# Patient Record
Sex: FEMALE | Race: WHITE | NOT HISPANIC OR LATINO | Employment: OTHER | URBAN - METROPOLITAN AREA
[De-identification: names, ages, dates, MRNs, and addresses within clinical notes are randomized per-mention and may not be internally consistent; named-entity substitution may affect disease eponyms.]

---

## 2017-02-19 ENCOUNTER — HOSPITAL ENCOUNTER (EMERGENCY)
Facility: HOSPITAL | Age: 82
Discharge: HOME | End: 2017-02-19
Attending: EMERGENCY MEDICINE | Admitting: EMERGENCY MEDICINE
Payer: COMMERCIAL

## 2017-02-19 VITALS
TEMPERATURE: 98.6 F | DIASTOLIC BLOOD PRESSURE: 93 MMHG | OXYGEN SATURATION: 95 % | WEIGHT: 182.32 LBS | HEART RATE: 106 BPM | SYSTOLIC BLOOD PRESSURE: 144 MMHG | RESPIRATION RATE: 21 BRPM

## 2017-02-19 DIAGNOSIS — R60.0 BILATERAL LOWER EXTREMITY EDEMA: Primary | ICD-10-CM

## 2017-02-19 LAB
ANION GAP SERPL CALCULATED.3IONS-SCNC: 9 MMOL/L (ref 4–13)
BUN SERPL-MCNC: 25 MG/DL (ref 5–25)
CALCIUM SERPL-MCNC: 8.5 MG/DL (ref 8.3–10.1)
CHLORIDE SERPL-SCNC: 105 MMOL/L (ref 100–108)
CO2 SERPL-SCNC: 23 MMOL/L (ref 21–32)
CREAT SERPL-MCNC: 1.55 MG/DL (ref 0.6–1.3)
GFR SERPL CREATININE-BSD FRML MDRD: 31.4 ML/MIN/1.73SQ M
GLUCOSE SERPL-MCNC: 107 MG/DL (ref 65–140)
POTASSIUM SERPL-SCNC: 4.2 MMOL/L (ref 3.5–5.3)
SODIUM SERPL-SCNC: 137 MMOL/L (ref 136–145)

## 2017-02-19 PROCEDURE — 80048 BASIC METABOLIC PNL TOTAL CA: CPT | Performed by: EMERGENCY MEDICINE

## 2017-02-19 PROCEDURE — 99283 EMERGENCY DEPT VISIT LOW MDM: CPT

## 2017-02-19 PROCEDURE — 36415 COLL VENOUS BLD VENIPUNCTURE: CPT | Performed by: EMERGENCY MEDICINE

## 2017-02-19 RX ORDER — DIPHENOXYLATE HYDROCHLORIDE AND ATROPINE SULFATE 2.5; .025 MG/1; MG/1
1 TABLET ORAL DAILY
COMMUNITY
End: 2018-10-08 | Stop reason: ALTCHOICE

## 2017-02-19 RX ORDER — NEBIVOLOL 5 MG/1
5 TABLET ORAL DAILY
COMMUNITY
End: 2017-04-06 | Stop reason: HOSPADM

## 2017-02-19 RX ORDER — LEVOTHYROXINE SODIUM 0.03 MG/1
25 TABLET ORAL DAILY
COMMUNITY
End: 2018-03-05 | Stop reason: SDUPTHER

## 2017-02-19 RX ORDER — AMLODIPINE BESYLATE 5 MG/1
5 TABLET ORAL DAILY
COMMUNITY
End: 2017-03-28

## 2017-02-19 RX ORDER — ROSUVASTATIN CALCIUM 5 MG/1
5 TABLET, COATED ORAL DAILY
COMMUNITY
End: 2019-03-18 | Stop reason: SDUPTHER

## 2017-02-19 RX ORDER — ALPRAZOLAM 0.25 MG/1
0.25 TABLET ORAL 4 TIMES DAILY
COMMUNITY
End: 2018-05-02 | Stop reason: SDUPTHER

## 2017-02-21 ENCOUNTER — ALLSCRIPTS OFFICE VISIT (OUTPATIENT)
Dept: OTHER | Facility: OTHER | Age: 82
End: 2017-02-21

## 2017-02-22 DIAGNOSIS — R60.9 EDEMA: ICD-10-CM

## 2017-03-01 ENCOUNTER — ALLSCRIPTS OFFICE VISIT (OUTPATIENT)
Dept: OTHER | Facility: OTHER | Age: 82
End: 2017-03-01

## 2017-03-22 ENCOUNTER — APPOINTMENT (EMERGENCY)
Dept: RADIOLOGY | Facility: HOSPITAL | Age: 82
End: 2017-03-22
Payer: COMMERCIAL

## 2017-03-22 ENCOUNTER — GENERIC CONVERSION - ENCOUNTER (OUTPATIENT)
Dept: OTHER | Facility: OTHER | Age: 82
End: 2017-03-22

## 2017-03-22 ENCOUNTER — HOSPITAL ENCOUNTER (EMERGENCY)
Facility: HOSPITAL | Age: 82
Discharge: HOME | End: 2017-03-22
Attending: EMERGENCY MEDICINE | Admitting: EMERGENCY MEDICINE
Payer: COMMERCIAL

## 2017-03-22 VITALS
HEART RATE: 80 BPM | SYSTOLIC BLOOD PRESSURE: 127 MMHG | WEIGHT: 179.2 LBS | DIASTOLIC BLOOD PRESSURE: 78 MMHG | TEMPERATURE: 97.8 F | OXYGEN SATURATION: 98 % | RESPIRATION RATE: 18 BRPM

## 2017-03-22 DIAGNOSIS — T78.40XA ALLERGIC REACTION: Primary | ICD-10-CM

## 2017-03-22 LAB
ALBUMIN SERPL BCP-MCNC: 3.7 G/DL (ref 3.5–5)
ALP SERPL-CCNC: 75 U/L (ref 46–116)
ALT SERPL W P-5'-P-CCNC: 89 U/L (ref 12–78)
ANION GAP SERPL CALCULATED.3IONS-SCNC: 10 MMOL/L (ref 4–13)
APTT PPP: 32 SECONDS (ref 24–36)
AST SERPL W P-5'-P-CCNC: 41 U/L (ref 5–45)
ATRIAL RATE: 120 BPM
BASOPHILS # BLD AUTO: 0.03 THOUSANDS/ΜL (ref 0–0.1)
BASOPHILS NFR BLD AUTO: 0 % (ref 0–1)
BILIRUB SERPL-MCNC: 1.5 MG/DL (ref 0.2–1)
BUN SERPL-MCNC: 26 MG/DL (ref 5–25)
CALCIUM SERPL-MCNC: 9 MG/DL (ref 8.3–10.1)
CHLORIDE SERPL-SCNC: 104 MMOL/L (ref 100–108)
CO2 SERPL-SCNC: 22 MMOL/L (ref 21–32)
CREAT SERPL-MCNC: 1.89 MG/DL (ref 0.6–1.3)
EOSINOPHIL # BLD AUTO: 0.18 THOUSAND/ΜL (ref 0–0.61)
EOSINOPHIL NFR BLD AUTO: 2 % (ref 0–6)
ERYTHROCYTE [DISTWIDTH] IN BLOOD BY AUTOMATED COUNT: 13.9 % (ref 11.6–15.1)
GFR SERPL CREATININE-BSD FRML MDRD: 25 ML/MIN/1.73SQ M
GLUCOSE SERPL-MCNC: 124 MG/DL (ref 65–140)
HCT VFR BLD AUTO: 46.9 % (ref 34.8–46.1)
HGB BLD-MCNC: 16.1 G/DL (ref 11.5–15.4)
INR PPP: 1.28 (ref 0.86–1.16)
LYMPHOCYTES # BLD AUTO: 1.36 THOUSANDS/ΜL (ref 0.6–4.47)
LYMPHOCYTES NFR BLD AUTO: 16 % (ref 14–44)
MAGNESIUM SERPL-MCNC: 2 MG/DL (ref 1.6–2.6)
MCH RBC QN AUTO: 32.5 PG (ref 26.8–34.3)
MCHC RBC AUTO-ENTMCNC: 34.3 G/DL (ref 31.4–37.4)
MCV RBC AUTO: 95 FL (ref 82–98)
MONOCYTES # BLD AUTO: 0.64 THOUSAND/ΜL (ref 0.17–1.22)
MONOCYTES NFR BLD AUTO: 7 % (ref 4–12)
NEUTROPHILS # BLD AUTO: 6.58 THOUSANDS/ΜL (ref 1.85–7.62)
NEUTS SEG NFR BLD AUTO: 75 % (ref 43–75)
NT-PROBNP SERPL-MCNC: 7242 PG/ML
PLATELET # BLD AUTO: 229 THOUSANDS/UL (ref 149–390)
PMV BLD AUTO: 10.6 FL (ref 8.9–12.7)
POTASSIUM SERPL-SCNC: 4.5 MMOL/L (ref 3.5–5.3)
PROT SERPL-MCNC: 6.9 G/DL (ref 6.4–8.2)
PROTHROMBIN TIME: 15.7 SECONDS (ref 12–14.3)
QRS AXIS: 30 DEGREES
QRSD INTERVAL: 86 MS
QT INTERVAL: 284 MS
QTC INTERVAL: 399 MS
RBC # BLD AUTO: 4.95 MILLION/UL (ref 3.81–5.12)
SODIUM SERPL-SCNC: 136 MMOL/L (ref 136–145)
T WAVE AXIS: 238 DEGREES
TROPONIN I SERPL-MCNC: 0.02 NG/ML
TSH SERPL DL<=0.05 MIU/L-ACNC: 3.49 UIU/ML (ref 0.36–3.74)
VENTRICULAR RATE: 119 BPM
WBC # BLD AUTO: 8.79 THOUSAND/UL (ref 4.31–10.16)

## 2017-03-22 PROCEDURE — 85730 THROMBOPLASTIN TIME PARTIAL: CPT | Performed by: EMERGENCY MEDICINE

## 2017-03-22 PROCEDURE — 71010 HB CHEST X-RAY 1 VIEW FRONTAL (PORTABLE): CPT

## 2017-03-22 PROCEDURE — 93005 ELECTROCARDIOGRAM TRACING: CPT

## 2017-03-22 PROCEDURE — 99284 EMERGENCY DEPT VISIT MOD MDM: CPT

## 2017-03-22 PROCEDURE — 96372 THER/PROPH/DIAG INJ SC/IM: CPT

## 2017-03-22 PROCEDURE — 96375 TX/PRO/DX INJ NEW DRUG ADDON: CPT

## 2017-03-22 PROCEDURE — 36415 COLL VENOUS BLD VENIPUNCTURE: CPT | Performed by: EMERGENCY MEDICINE

## 2017-03-22 PROCEDURE — 83880 ASSAY OF NATRIURETIC PEPTIDE: CPT | Performed by: EMERGENCY MEDICINE

## 2017-03-22 PROCEDURE — 84484 ASSAY OF TROPONIN QUANT: CPT | Performed by: EMERGENCY MEDICINE

## 2017-03-22 PROCEDURE — 85025 COMPLETE CBC W/AUTO DIFF WBC: CPT | Performed by: EMERGENCY MEDICINE

## 2017-03-22 PROCEDURE — 84443 ASSAY THYROID STIM HORMONE: CPT | Performed by: EMERGENCY MEDICINE

## 2017-03-22 PROCEDURE — 85610 PROTHROMBIN TIME: CPT | Performed by: EMERGENCY MEDICINE

## 2017-03-22 PROCEDURE — 96374 THER/PROPH/DIAG INJ IV PUSH: CPT

## 2017-03-22 PROCEDURE — 80053 COMPREHEN METABOLIC PANEL: CPT | Performed by: EMERGENCY MEDICINE

## 2017-03-22 PROCEDURE — 83735 ASSAY OF MAGNESIUM: CPT | Performed by: EMERGENCY MEDICINE

## 2017-03-22 PROCEDURE — 93005 ELECTROCARDIOGRAM TRACING: CPT | Performed by: EMERGENCY MEDICINE

## 2017-03-22 RX ORDER — PREDNISONE 10 MG/1
10 TABLET ORAL DAILY
Qty: 63 TABLET | Refills: 0 | Status: SHIPPED | OUTPATIENT
Start: 2017-03-22 | End: 2017-03-28

## 2017-03-22 RX ORDER — DIPHENHYDRAMINE HYDROCHLORIDE 50 MG/ML
12.5 INJECTION INTRAMUSCULAR; INTRAVENOUS ONCE
Status: COMPLETED | OUTPATIENT
Start: 2017-03-22 | End: 2017-03-22

## 2017-03-22 RX ORDER — ZOLPIDEM TARTRATE 12.5 MG/1
12.5 TABLET, FILM COATED, EXTENDED RELEASE ORAL
COMMUNITY
End: 2018-05-12 | Stop reason: SDUPTHER

## 2017-03-22 RX ORDER — FAMOTIDINE 20 MG/1
20 TABLET, FILM COATED ORAL 2 TIMES DAILY
Qty: 60 TABLET | Refills: 0 | Status: SHIPPED | OUTPATIENT
Start: 2017-03-22 | End: 2017-03-28

## 2017-03-22 RX ORDER — METHYLPREDNISOLONE SODIUM SUCCINATE 125 MG/2ML
125 INJECTION, POWDER, LYOPHILIZED, FOR SOLUTION INTRAMUSCULAR; INTRAVENOUS ONCE
Status: COMPLETED | OUTPATIENT
Start: 2017-03-22 | End: 2017-03-22

## 2017-03-22 RX ORDER — SPIRONOLACTONE 25 MG/1
25 TABLET ORAL DAILY
COMMUNITY
End: 2017-04-06 | Stop reason: HOSPADM

## 2017-03-22 RX ORDER — DILTIAZEM HYDROCHLORIDE 5 MG/ML
15 INJECTION INTRAVENOUS ONCE
Status: COMPLETED | OUTPATIENT
Start: 2017-03-22 | End: 2017-03-22

## 2017-03-22 RX ADMIN — METHYLPREDNISOLONE SODIUM SUCCINATE 125 MG: 125 INJECTION, POWDER, FOR SOLUTION INTRAMUSCULAR; INTRAVENOUS at 13:22

## 2017-03-22 RX ADMIN — DILTIAZEM HYDROCHLORIDE 15 MG: 5 INJECTION INTRAVENOUS at 13:23

## 2017-03-22 RX ADMIN — FAMOTIDINE 20 MG: 10 INJECTION, SOLUTION INTRAVENOUS at 13:19

## 2017-03-22 RX ADMIN — DIPHENHYDRAMINE HYDROCHLORIDE 12.5 MG: 50 INJECTION, SOLUTION INTRAMUSCULAR; INTRAVENOUS at 13:15

## 2017-03-28 ENCOUNTER — GENERIC CONVERSION - ENCOUNTER (OUTPATIENT)
Dept: OTHER | Facility: OTHER | Age: 82
End: 2017-03-28

## 2017-03-28 ENCOUNTER — HOSPITAL ENCOUNTER (INPATIENT)
Facility: HOSPITAL | Age: 82
LOS: 9 days | Discharge: HOME HEALTH | DRG: 314 | End: 2017-04-06
Attending: EMERGENCY MEDICINE | Admitting: INTERNAL MEDICINE
Payer: COMMERCIAL

## 2017-03-28 ENCOUNTER — APPOINTMENT (EMERGENCY)
Dept: RADIOLOGY | Facility: HOSPITAL | Age: 82
DRG: 314 | End: 2017-03-28
Payer: COMMERCIAL

## 2017-03-28 DIAGNOSIS — R22.0 TONGUE SWELLING: ICD-10-CM

## 2017-03-28 DIAGNOSIS — I48.91 ATRIAL FIBRILLATION WITH RVR (HCC): Primary | ICD-10-CM

## 2017-03-28 PROBLEM — E03.9 HYPOTHYROIDISM: Status: ACTIVE | Noted: 2017-03-28

## 2017-03-28 PROBLEM — K13.79 ORAL PAIN OF UNKNOWN ETIOLOGY: Status: ACTIVE | Noted: 2017-03-28

## 2017-03-28 PROBLEM — F41.9 ANXIETY: Chronic | Status: ACTIVE | Noted: 2017-03-28

## 2017-03-28 PROBLEM — E03.9 HYPOTHYROIDISM: Chronic | Status: ACTIVE | Noted: 2017-03-28

## 2017-03-28 PROBLEM — I48.20 CHRONIC ATRIAL FIBRILLATION (HCC): Chronic | Status: ACTIVE | Noted: 2017-03-28

## 2017-03-28 LAB
ALBUMIN SERPL BCP-MCNC: 3.4 G/DL (ref 3.5–5)
ALP SERPL-CCNC: 82 U/L (ref 46–116)
ALT SERPL W P-5'-P-CCNC: 117 U/L (ref 12–78)
ANION GAP SERPL CALCULATED.3IONS-SCNC: 10 MMOL/L (ref 4–13)
AST SERPL W P-5'-P-CCNC: 54 U/L (ref 5–45)
ATRIAL RATE: 170 BPM
BASOPHILS # BLD AUTO: 0.03 THOUSANDS/ΜL (ref 0–0.1)
BASOPHILS NFR BLD AUTO: 0 % (ref 0–1)
BILIRUB SERPL-MCNC: 1.2 MG/DL (ref 0.2–1)
BUN SERPL-MCNC: 30 MG/DL (ref 5–25)
CALCIUM SERPL-MCNC: 8.7 MG/DL (ref 8.3–10.1)
CHLORIDE SERPL-SCNC: 104 MMOL/L (ref 100–108)
CO2 SERPL-SCNC: 22 MMOL/L (ref 21–32)
CREAT SERPL-MCNC: 1.68 MG/DL (ref 0.6–1.3)
EOSINOPHIL # BLD AUTO: 0.25 THOUSAND/ΜL (ref 0–0.61)
EOSINOPHIL NFR BLD AUTO: 3 % (ref 0–6)
ERYTHROCYTE [DISTWIDTH] IN BLOOD BY AUTOMATED COUNT: 14.1 % (ref 11.6–15.1)
GFR SERPL CREATININE-BSD FRML MDRD: 28.6 ML/MIN/1.73SQ M
GLUCOSE SERPL-MCNC: 103 MG/DL (ref 65–140)
HCT VFR BLD AUTO: 47.4 % (ref 34.8–46.1)
HGB BLD-MCNC: 15.9 G/DL (ref 11.5–15.4)
LYMPHOCYTES # BLD AUTO: 1.38 THOUSANDS/ΜL (ref 0.6–4.47)
LYMPHOCYTES NFR BLD AUTO: 14 % (ref 14–44)
MCH RBC QN AUTO: 32.2 PG (ref 26.8–34.3)
MCHC RBC AUTO-ENTMCNC: 33.5 G/DL (ref 31.4–37.4)
MCV RBC AUTO: 96 FL (ref 82–98)
MONOCYTES # BLD AUTO: 0.88 THOUSAND/ΜL (ref 0.17–1.22)
MONOCYTES NFR BLD AUTO: 9 % (ref 4–12)
NEUTROPHILS # BLD AUTO: 7.56 THOUSANDS/ΜL (ref 1.85–7.62)
NEUTS SEG NFR BLD AUTO: 74 % (ref 43–75)
PLATELET # BLD AUTO: 222 THOUSANDS/UL (ref 149–390)
PLATELET # BLD AUTO: 240 THOUSANDS/UL (ref 149–390)
PMV BLD AUTO: 10.6 FL (ref 8.9–12.7)
PMV BLD AUTO: 10.8 FL (ref 8.9–12.7)
POTASSIUM SERPL-SCNC: 4.3 MMOL/L (ref 3.5–5.3)
PROT SERPL-MCNC: 6.4 G/DL (ref 6.4–8.2)
QRS AXIS: 14 DEGREES
QRSD INTERVAL: 84 MS
QT INTERVAL: 294 MS
QTC INTERVAL: 453 MS
RBC # BLD AUTO: 4.94 MILLION/UL (ref 3.81–5.12)
SODIUM SERPL-SCNC: 136 MMOL/L (ref 136–145)
T WAVE AXIS: 224 DEGREES
TROPONIN I SERPL-MCNC: 0.03 NG/ML
VENTRICULAR RATE: 143 BPM
WBC # BLD AUTO: 10.1 THOUSAND/UL (ref 4.31–10.16)

## 2017-03-28 PROCEDURE — 84484 ASSAY OF TROPONIN QUANT: CPT | Performed by: EMERGENCY MEDICINE

## 2017-03-28 PROCEDURE — 80053 COMPREHEN METABOLIC PANEL: CPT | Performed by: EMERGENCY MEDICINE

## 2017-03-28 PROCEDURE — 96365 THER/PROPH/DIAG IV INF INIT: CPT

## 2017-03-28 PROCEDURE — 96361 HYDRATE IV INFUSION ADD-ON: CPT

## 2017-03-28 PROCEDURE — 36415 COLL VENOUS BLD VENIPUNCTURE: CPT | Performed by: EMERGENCY MEDICINE

## 2017-03-28 PROCEDURE — 99284 EMERGENCY DEPT VISIT MOD MDM: CPT

## 2017-03-28 PROCEDURE — 96376 TX/PRO/DX INJ SAME DRUG ADON: CPT

## 2017-03-28 PROCEDURE — 85025 COMPLETE CBC W/AUTO DIFF WBC: CPT | Performed by: EMERGENCY MEDICINE

## 2017-03-28 PROCEDURE — 85049 AUTOMATED PLATELET COUNT: CPT | Performed by: INTERNAL MEDICINE

## 2017-03-28 PROCEDURE — 93005 ELECTROCARDIOGRAM TRACING: CPT | Performed by: EMERGENCY MEDICINE

## 2017-03-28 PROCEDURE — 71020 HB CHEST X-RAY 2VW FRONTAL&LATL: CPT

## 2017-03-28 RX ORDER — ACETAMINOPHEN 325 MG/1
650 TABLET ORAL EVERY 6 HOURS PRN
Status: DISCONTINUED | OUTPATIENT
Start: 2017-03-28 | End: 2017-04-06 | Stop reason: HOSPADM

## 2017-03-28 RX ORDER — CALCIUM CARBONATE 200(500)MG
500 TABLET,CHEWABLE ORAL DAILY PRN
Status: DISCONTINUED | OUTPATIENT
Start: 2017-03-28 | End: 2017-04-06 | Stop reason: HOSPADM

## 2017-03-28 RX ORDER — ALPRAZOLAM 0.25 MG/1
0.25 TABLET ORAL 4 TIMES DAILY PRN
Status: DISCONTINUED | OUTPATIENT
Start: 2017-03-28 | End: 2017-04-03

## 2017-03-28 RX ORDER — LEVOTHYROXINE SODIUM 0.03 MG/1
25 TABLET ORAL
Status: DISCONTINUED | OUTPATIENT
Start: 2017-03-29 | End: 2017-04-06 | Stop reason: HOSPADM

## 2017-03-28 RX ORDER — DILTIAZEM HYDROCHLORIDE 5 MG/ML
15 INJECTION INTRAVENOUS ONCE
Status: COMPLETED | OUTPATIENT
Start: 2017-03-28 | End: 2017-03-28

## 2017-03-28 RX ORDER — SPIRONOLACTONE 25 MG/1
25 TABLET ORAL DAILY
Status: DISCONTINUED | OUTPATIENT
Start: 2017-03-28 | End: 2017-03-29

## 2017-03-28 RX ORDER — ALPRAZOLAM 0.5 MG/1
TABLET ORAL
Status: COMPLETED
Start: 2017-03-28 | End: 2017-03-28

## 2017-03-28 RX ORDER — ALPRAZOLAM 0.5 MG/1
0.25 TABLET ORAL ONCE
Status: COMPLETED | OUTPATIENT
Start: 2017-03-28 | End: 2017-03-28

## 2017-03-28 RX ORDER — ZOLPIDEM TARTRATE 5 MG/1
10 TABLET ORAL
Status: DISCONTINUED | OUTPATIENT
Start: 2017-03-28 | End: 2017-04-06 | Stop reason: HOSPADM

## 2017-03-28 RX ADMIN — ZOLPIDEM TARTRATE 10 MG: 5 TABLET, FILM COATED ORAL at 23:00

## 2017-03-28 RX ADMIN — ENOXAPARIN SODIUM 40 MG: 40 INJECTION SUBCUTANEOUS at 16:57

## 2017-03-28 RX ADMIN — SODIUM CHLORIDE 1000 ML: 0.9 INJECTION, SOLUTION INTRAVENOUS at 11:02

## 2017-03-28 RX ADMIN — ANTACID TABLETS 500 MG: 500 TABLET, CHEWABLE ORAL at 21:33

## 2017-03-28 RX ADMIN — DILTIAZEM HYDROCHLORIDE 15 MG: 5 INJECTION INTRAVENOUS at 11:40

## 2017-03-28 RX ADMIN — ALPRAZOLAM 0.25 MG: 0.5 TABLET ORAL at 14:51

## 2017-03-28 RX ADMIN — DILTIAZEM HYDROCHLORIDE 5 MG/HR: 5 INJECTION INTRAVENOUS at 11:42

## 2017-03-28 RX ADMIN — Medication 1 TABLET: at 16:56

## 2017-03-28 RX ADMIN — LIDOCAINE HYDROCHLORIDE 15 ML: 20 SOLUTION ORAL; TOPICAL at 18:54

## 2017-03-29 ENCOUNTER — APPOINTMENT (INPATIENT)
Dept: PHYSICAL THERAPY | Facility: HOSPITAL | Age: 82
DRG: 314 | End: 2017-03-29
Payer: COMMERCIAL

## 2017-03-29 LAB
ANION GAP SERPL CALCULATED.3IONS-SCNC: 13 MMOL/L (ref 4–13)
BUN SERPL-MCNC: 25 MG/DL (ref 5–25)
CALCIUM SERPL-MCNC: 8.9 MG/DL (ref 8.3–10.1)
CHLORIDE SERPL-SCNC: 105 MMOL/L (ref 100–108)
CO2 SERPL-SCNC: 19 MMOL/L (ref 21–32)
CREAT SERPL-MCNC: 1.68 MG/DL (ref 0.6–1.3)
GFR SERPL CREATININE-BSD FRML MDRD: 28.6 ML/MIN/1.73SQ M
GLUCOSE SERPL-MCNC: 118 MG/DL (ref 65–140)
NT-PROBNP SERPL-MCNC: 4764 PG/ML
POTASSIUM SERPL-SCNC: 4.4 MMOL/L (ref 3.5–5.3)
SODIUM SERPL-SCNC: 137 MMOL/L (ref 136–145)
TSH SERPL DL<=0.05 MIU/L-ACNC: 3.14 UIU/ML (ref 0.36–3.74)
VIT B12 SERPL-MCNC: 971 PG/ML (ref 100–900)

## 2017-03-29 PROCEDURE — 84443 ASSAY THYROID STIM HORMONE: CPT | Performed by: INTERNAL MEDICINE

## 2017-03-29 PROCEDURE — G8979 MOBILITY GOAL STATUS: HCPCS

## 2017-03-29 PROCEDURE — 97163 PT EVAL HIGH COMPLEX 45 MIN: CPT

## 2017-03-29 PROCEDURE — 97110 THERAPEUTIC EXERCISES: CPT

## 2017-03-29 PROCEDURE — G8978 MOBILITY CURRENT STATUS: HCPCS

## 2017-03-29 PROCEDURE — 83880 ASSAY OF NATRIURETIC PEPTIDE: CPT | Performed by: NURSE PRACTITIONER

## 2017-03-29 PROCEDURE — 82607 VITAMIN B-12: CPT | Performed by: NURSE PRACTITIONER

## 2017-03-29 PROCEDURE — 80048 BASIC METABOLIC PNL TOTAL CA: CPT | Performed by: INTERNAL MEDICINE

## 2017-03-29 RX ORDER — DABIGATRAN ETEXILATE 75 MG/1
75 CAPSULE, COATED PELLETS ORAL 2 TIMES DAILY WITH MEALS
Status: DISCONTINUED | OUTPATIENT
Start: 2017-03-29 | End: 2017-04-06 | Stop reason: HOSPADM

## 2017-03-29 RX ORDER — ROSUVASTATIN CALCIUM 5 MG/1
5 TABLET, COATED ORAL DAILY
Status: DISCONTINUED | OUTPATIENT
Start: 2017-03-30 | End: 2017-04-06 | Stop reason: HOSPADM

## 2017-03-29 RX ORDER — FUROSEMIDE 10 MG/ML
40 INJECTION INTRAMUSCULAR; INTRAVENOUS DAILY
Status: DISCONTINUED | OUTPATIENT
Start: 2017-03-29 | End: 2017-03-31

## 2017-03-29 RX ORDER — ASPIRIN 81 MG/1
81 TABLET, CHEWABLE ORAL DAILY
Status: DISCONTINUED | OUTPATIENT
Start: 2017-03-29 | End: 2017-03-29

## 2017-03-29 RX ADMIN — ALPRAZOLAM 0.25 MG: 0.25 TABLET ORAL at 17:10

## 2017-03-29 RX ADMIN — METOPROLOL TARTRATE 25 MG: 25 TABLET ORAL at 23:22

## 2017-03-29 RX ADMIN — ZOLPIDEM TARTRATE 10 MG: 5 TABLET, FILM COATED ORAL at 23:22

## 2017-03-29 RX ADMIN — ALPRAZOLAM 0.25 MG: 0.25 TABLET ORAL at 08:17

## 2017-03-29 RX ADMIN — Medication 1 TABLET: at 08:17

## 2017-03-29 RX ADMIN — LEVOTHYROXINE SODIUM 25 MCG: 25 TABLET ORAL at 05:50

## 2017-03-29 RX ADMIN — ASPIRIN 81 MG: 81 TABLET, CHEWABLE ORAL at 12:46

## 2017-03-29 RX ADMIN — LIDOCAINE HYDROCHLORIDE 15 ML: 20 SOLUTION ORAL; TOPICAL at 08:17

## 2017-03-29 RX ADMIN — FUROSEMIDE 40 MG: 10 INJECTION, SOLUTION INTRAMUSCULAR; INTRAVENOUS at 12:47

## 2017-03-29 RX ADMIN — ENOXAPARIN SODIUM 40 MG: 40 INJECTION SUBCUTANEOUS at 08:18

## 2017-03-29 RX ADMIN — SPIRONOLACTONE 25 MG: 25 TABLET, FILM COATED ORAL at 08:17

## 2017-03-29 RX ADMIN — METOPROLOL TARTRATE 25 MG: 25 TABLET ORAL at 17:10

## 2017-03-29 RX ADMIN — LIDOCAINE HYDROCHLORIDE 15 ML: 20 SOLUTION ORAL; TOPICAL at 15:45

## 2017-03-29 RX ADMIN — METOPROLOL TARTRATE 25 MG: 25 TABLET ORAL at 12:46

## 2017-03-29 RX ADMIN — DABIGATRAN ETEXILATE MESYLATE 75 MG: 75 CAPSULE ORAL at 16:34

## 2017-03-29 RX ADMIN — ALPRAZOLAM 0.25 MG: 0.25 TABLET ORAL at 12:46

## 2017-03-29 RX ADMIN — DILTIAZEM HYDROCHLORIDE 5 MG/HR: 5 INJECTION INTRAVENOUS at 02:10

## 2017-03-30 ENCOUNTER — APPOINTMENT (INPATIENT)
Dept: NON INVASIVE DIAGNOSTICS | Facility: HOSPITAL | Age: 82
DRG: 314 | End: 2017-03-30
Payer: COMMERCIAL

## 2017-03-30 ENCOUNTER — GENERIC CONVERSION - ENCOUNTER (OUTPATIENT)
Dept: OTHER | Facility: OTHER | Age: 82
End: 2017-03-30

## 2017-03-30 LAB
ANION GAP SERPL CALCULATED.3IONS-SCNC: 10 MMOL/L (ref 4–13)
BUN SERPL-MCNC: 23 MG/DL (ref 5–25)
CALCIUM SERPL-MCNC: 8.7 MG/DL (ref 8.3–10.1)
CHLORIDE SERPL-SCNC: 105 MMOL/L (ref 100–108)
CO2 SERPL-SCNC: 24 MMOL/L (ref 21–32)
CREAT SERPL-MCNC: 1.7 MG/DL (ref 0.6–1.3)
GFR SERPL CREATININE-BSD FRML MDRD: 28.2 ML/MIN/1.73SQ M
GLUCOSE SERPL-MCNC: 103 MG/DL (ref 65–140)
MAGNESIUM SERPL-MCNC: 1.9 MG/DL (ref 1.6–2.6)
POTASSIUM SERPL-SCNC: 3.8 MMOL/L (ref 3.5–5.3)
SODIUM SERPL-SCNC: 139 MMOL/L (ref 136–145)

## 2017-03-30 PROCEDURE — 93306 TTE W/DOPPLER COMPLETE: CPT

## 2017-03-30 PROCEDURE — 97116 GAIT TRAINING THERAPY: CPT

## 2017-03-30 PROCEDURE — 97530 THERAPEUTIC ACTIVITIES: CPT

## 2017-03-30 PROCEDURE — 83735 ASSAY OF MAGNESIUM: CPT | Performed by: NURSE PRACTITIONER

## 2017-03-30 PROCEDURE — 80048 BASIC METABOLIC PNL TOTAL CA: CPT | Performed by: NURSE PRACTITIONER

## 2017-03-30 RX ORDER — METOPROLOL TARTRATE 50 MG/1
50 TABLET, FILM COATED ORAL EVERY 8 HOURS
Status: DISCONTINUED | OUTPATIENT
Start: 2017-03-30 | End: 2017-03-31

## 2017-03-30 RX ORDER — POTASSIUM CHLORIDE 20 MEQ/1
40 TABLET, EXTENDED RELEASE ORAL ONCE
Status: COMPLETED | OUTPATIENT
Start: 2017-03-30 | End: 2017-03-30

## 2017-03-30 RX ADMIN — METOPROLOL TARTRATE 5 MG: 5 INJECTION INTRAVENOUS at 17:02

## 2017-03-30 RX ADMIN — ZOLPIDEM TARTRATE 10 MG: 5 TABLET, FILM COATED ORAL at 22:38

## 2017-03-30 RX ADMIN — METOPROLOL TARTRATE 25 MG: 25 TABLET ORAL at 05:46

## 2017-03-30 RX ADMIN — LEVOTHYROXINE SODIUM 25 MCG: 25 TABLET ORAL at 05:46

## 2017-03-30 RX ADMIN — METOPROLOL TARTRATE 50 MG: 50 TABLET ORAL at 13:03

## 2017-03-30 RX ADMIN — Medication 1 TABLET: at 09:26

## 2017-03-30 RX ADMIN — ALPRAZOLAM 0.25 MG: 0.25 TABLET ORAL at 22:00

## 2017-03-30 RX ADMIN — LIDOCAINE HYDROCHLORIDE 15 ML: 20 SOLUTION ORAL; TOPICAL at 13:00

## 2017-03-30 RX ADMIN — METOPROLOL TARTRATE 50 MG: 50 TABLET ORAL at 21:55

## 2017-03-30 RX ADMIN — POTASSIUM CHLORIDE 40 MEQ: 1500 TABLET, EXTENDED RELEASE ORAL at 17:02

## 2017-03-30 RX ADMIN — LIDOCAINE HYDROCHLORIDE 15 ML: 20 SOLUTION ORAL; TOPICAL at 17:02

## 2017-03-30 RX ADMIN — DABIGATRAN ETEXILATE MESYLATE 75 MG: 75 CAPSULE ORAL at 17:01

## 2017-03-30 RX ADMIN — ROSUVASTATIN CALCIUM 5 MG: 5 TABLET, COATED ORAL at 11:07

## 2017-03-30 RX ADMIN — METOPROLOL TARTRATE 5 MG: 5 INJECTION INTRAVENOUS at 23:52

## 2017-03-30 RX ADMIN — ALPRAZOLAM 0.25 MG: 0.25 TABLET ORAL at 13:07

## 2017-03-30 RX ADMIN — FUROSEMIDE 40 MG: 10 INJECTION, SOLUTION INTRAMUSCULAR; INTRAVENOUS at 09:26

## 2017-03-30 RX ADMIN — DABIGATRAN ETEXILATE MESYLATE 75 MG: 75 CAPSULE ORAL at 09:25

## 2017-03-31 LAB
ANION GAP SERPL CALCULATED.3IONS-SCNC: 8 MMOL/L (ref 4–13)
BUN SERPL-MCNC: 25 MG/DL (ref 5–25)
CALCIUM SERPL-MCNC: 8.9 MG/DL (ref 8.3–10.1)
CHLORIDE SERPL-SCNC: 106 MMOL/L (ref 100–108)
CHOLEST SERPL-MCNC: 70 MG/DL (ref 50–200)
CO2 SERPL-SCNC: 27 MMOL/L (ref 21–32)
CREAT SERPL-MCNC: 1.88 MG/DL (ref 0.6–1.3)
GFR SERPL CREATININE-BSD FRML MDRD: 25.1 ML/MIN/1.73SQ M
GLUCOSE SERPL-MCNC: 89 MG/DL (ref 65–140)
HDLC SERPL-MCNC: 33 MG/DL (ref 40–60)
LDLC SERPL CALC-MCNC: 16 MG/DL (ref 0–100)
POTASSIUM SERPL-SCNC: 4.3 MMOL/L (ref 3.5–5.3)
SODIUM SERPL-SCNC: 141 MMOL/L (ref 136–145)
TRIGL SERPL-MCNC: 104 MG/DL

## 2017-03-31 PROCEDURE — 80048 BASIC METABOLIC PNL TOTAL CA: CPT | Performed by: NURSE PRACTITIONER

## 2017-03-31 PROCEDURE — 80061 LIPID PANEL: CPT | Performed by: NURSE PRACTITIONER

## 2017-03-31 RX ORDER — ISOSORBIDE MONONITRATE 30 MG/1
30 TABLET, EXTENDED RELEASE ORAL DAILY
Status: DISCONTINUED | OUTPATIENT
Start: 2017-03-31 | End: 2017-04-06

## 2017-03-31 RX ORDER — METOPROLOL TARTRATE 100 MG/1
100 TABLET ORAL EVERY 12 HOURS SCHEDULED
Status: DISCONTINUED | OUTPATIENT
Start: 2017-03-31 | End: 2017-04-01

## 2017-03-31 RX ORDER — FUROSEMIDE 20 MG/1
20 TABLET ORAL
Status: DISCONTINUED | OUTPATIENT
Start: 2017-04-01 | End: 2017-04-01

## 2017-03-31 RX ORDER — FUROSEMIDE 40 MG/1
40 TABLET ORAL
Status: DISCONTINUED | OUTPATIENT
Start: 2017-04-02 | End: 2017-04-01

## 2017-03-31 RX ORDER — METOPROLOL TARTRATE 50 MG/1
50 TABLET, FILM COATED ORAL ONCE
Status: COMPLETED | OUTPATIENT
Start: 2017-03-31 | End: 2017-03-31

## 2017-03-31 RX ORDER — HYDRALAZINE HYDROCHLORIDE 10 MG/1
10 TABLET, FILM COATED ORAL EVERY 8 HOURS SCHEDULED
Status: DISCONTINUED | OUTPATIENT
Start: 2017-03-31 | End: 2017-04-01

## 2017-03-31 RX ADMIN — ANTACID TABLETS 500 MG: 500 TABLET, CHEWABLE ORAL at 09:03

## 2017-03-31 RX ADMIN — FUROSEMIDE 40 MG: 10 INJECTION, SOLUTION INTRAMUSCULAR; INTRAVENOUS at 08:09

## 2017-03-31 RX ADMIN — METOPROLOL TARTRATE 50 MG: 50 TABLET ORAL at 09:38

## 2017-03-31 RX ADMIN — METOPROLOL TARTRATE 100 MG: 100 TABLET ORAL at 21:36

## 2017-03-31 RX ADMIN — DABIGATRAN ETEXILATE MESYLATE 75 MG: 75 CAPSULE ORAL at 17:05

## 2017-03-31 RX ADMIN — LIDOCAINE HYDROCHLORIDE 15 ML: 20 SOLUTION ORAL; TOPICAL at 17:05

## 2017-03-31 RX ADMIN — ALPRAZOLAM 0.25 MG: 0.25 TABLET ORAL at 18:14

## 2017-03-31 RX ADMIN — ALPRAZOLAM 0.25 MG: 0.25 TABLET ORAL at 08:13

## 2017-03-31 RX ADMIN — LIDOCAINE HYDROCHLORIDE 15 ML: 20 SOLUTION ORAL; TOPICAL at 11:53

## 2017-03-31 RX ADMIN — ISOSORBIDE MONONITRATE 30 MG: 30 TABLET, EXTENDED RELEASE ORAL at 11:52

## 2017-03-31 RX ADMIN — ZOLPIDEM TARTRATE 10 MG: 5 TABLET, FILM COATED ORAL at 21:36

## 2017-03-31 RX ADMIN — Medication 1 TABLET: at 08:09

## 2017-03-31 RX ADMIN — METOPROLOL TARTRATE 5 MG: 5 INJECTION INTRAVENOUS at 13:57

## 2017-03-31 RX ADMIN — ALPRAZOLAM 0.25 MG: 0.25 TABLET ORAL at 14:14

## 2017-03-31 RX ADMIN — ROSUVASTATIN CALCIUM 5 MG: 5 TABLET, COATED ORAL at 08:13

## 2017-03-31 RX ADMIN — DABIGATRAN ETEXILATE MESYLATE 75 MG: 75 CAPSULE ORAL at 08:09

## 2017-03-31 RX ADMIN — LEVOTHYROXINE SODIUM 25 MCG: 25 TABLET ORAL at 05:33

## 2017-03-31 RX ADMIN — METOPROLOL TARTRATE 50 MG: 50 TABLET ORAL at 05:33

## 2017-04-01 PROBLEM — I42.9 CARDIOMYOPATHY (HCC): Status: ACTIVE | Noted: 2017-04-01

## 2017-04-01 PROBLEM — I50.21 ACUTE SYSTOLIC CHF (CONGESTIVE HEART FAILURE) (HCC): Status: ACTIVE | Noted: 2017-04-01

## 2017-04-01 PROBLEM — N18.4 CKD (CHRONIC KIDNEY DISEASE), STAGE IV (HCC): Status: ACTIVE | Noted: 2017-04-01

## 2017-04-01 LAB
ANION GAP SERPL CALCULATED.3IONS-SCNC: 13 MMOL/L (ref 4–13)
BUN SERPL-MCNC: 33 MG/DL (ref 5–25)
CALCIUM SERPL-MCNC: 8.8 MG/DL (ref 8.3–10.1)
CHLORIDE SERPL-SCNC: 103 MMOL/L (ref 100–108)
CO2 SERPL-SCNC: 24 MMOL/L (ref 21–32)
CREAT SERPL-MCNC: 2.2 MG/DL (ref 0.6–1.3)
GFR SERPL CREATININE-BSD FRML MDRD: 21 ML/MIN/1.73SQ M
GLUCOSE SERPL-MCNC: 105 MG/DL (ref 65–140)
POTASSIUM SERPL-SCNC: 4.1 MMOL/L (ref 3.5–5.3)
SODIUM SERPL-SCNC: 140 MMOL/L (ref 136–145)

## 2017-04-01 PROCEDURE — 80048 BASIC METABOLIC PNL TOTAL CA: CPT | Performed by: NURSE PRACTITIONER

## 2017-04-01 RX ORDER — SODIUM CHLORIDE 9 MG/ML
50 INJECTION, SOLUTION INTRAVENOUS CONTINUOUS
Status: DISCONTINUED | OUTPATIENT
Start: 2017-04-01 | End: 2017-04-05

## 2017-04-01 RX ADMIN — ALPRAZOLAM 0.25 MG: 0.25 TABLET ORAL at 21:56

## 2017-04-01 RX ADMIN — ISOSORBIDE MONONITRATE 30 MG: 30 TABLET, EXTENDED RELEASE ORAL at 08:12

## 2017-04-01 RX ADMIN — METOPROLOL TARTRATE 100 MG: 100 TABLET ORAL at 08:11

## 2017-04-01 RX ADMIN — DABIGATRAN ETEXILATE MESYLATE 75 MG: 75 CAPSULE ORAL at 08:12

## 2017-04-01 RX ADMIN — LIDOCAINE HYDROCHLORIDE 15 ML: 20 SOLUTION ORAL; TOPICAL at 18:05

## 2017-04-01 RX ADMIN — Medication 1 TABLET: at 08:12

## 2017-04-01 RX ADMIN — ZOLPIDEM TARTRATE 10 MG: 5 TABLET, FILM COATED ORAL at 21:56

## 2017-04-01 RX ADMIN — ALPRAZOLAM 0.25 MG: 0.25 TABLET ORAL at 17:48

## 2017-04-01 RX ADMIN — ROSUVASTATIN CALCIUM 5 MG: 5 TABLET, COATED ORAL at 08:12

## 2017-04-01 RX ADMIN — METOPROLOL SUCCINATE 75 MG: 50 TABLET, FILM COATED, EXTENDED RELEASE ORAL at 17:44

## 2017-04-01 RX ADMIN — DABIGATRAN ETEXILATE MESYLATE 75 MG: 75 CAPSULE ORAL at 17:44

## 2017-04-01 RX ADMIN — ALPRAZOLAM 0.25 MG: 0.25 TABLET ORAL at 12:54

## 2017-04-01 RX ADMIN — ALPRAZOLAM 0.25 MG: 0.25 TABLET ORAL at 08:12

## 2017-04-01 RX ADMIN — FUROSEMIDE 20 MG: 20 TABLET ORAL at 08:11

## 2017-04-01 RX ADMIN — ANTACID TABLETS 500 MG: 500 TABLET, CHEWABLE ORAL at 01:41

## 2017-04-01 RX ADMIN — SODIUM CHLORIDE 75 ML/HR: 0.9 INJECTION, SOLUTION INTRAVENOUS at 12:54

## 2017-04-01 RX ADMIN — LEVOTHYROXINE SODIUM 25 MCG: 25 TABLET ORAL at 05:32

## 2017-04-01 RX ADMIN — LIDOCAINE HYDROCHLORIDE 15 ML: 20 SOLUTION ORAL; TOPICAL at 12:03

## 2017-04-01 RX ADMIN — ANTACID TABLETS 500 MG: 500 TABLET, CHEWABLE ORAL at 08:50

## 2017-04-02 LAB
ANION GAP SERPL CALCULATED.3IONS-SCNC: 9 MMOL/L (ref 4–13)
BUN SERPL-MCNC: 27 MG/DL (ref 5–25)
CALCIUM SERPL-MCNC: 8.7 MG/DL (ref 8.3–10.1)
CHLORIDE SERPL-SCNC: 104 MMOL/L (ref 100–108)
CO2 SERPL-SCNC: 25 MMOL/L (ref 21–32)
CREAT SERPL-MCNC: 1.89 MG/DL (ref 0.6–1.3)
GFR SERPL CREATININE-BSD FRML MDRD: 25 ML/MIN/1.73SQ M
GLUCOSE SERPL-MCNC: 93 MG/DL (ref 65–140)
POTASSIUM SERPL-SCNC: 4 MMOL/L (ref 3.5–5.3)
SODIUM SERPL-SCNC: 138 MMOL/L (ref 136–145)

## 2017-04-02 PROCEDURE — 80048 BASIC METABOLIC PNL TOTAL CA: CPT | Performed by: INTERNAL MEDICINE

## 2017-04-02 RX ORDER — METOPROLOL SUCCINATE 100 MG/1
100 TABLET, EXTENDED RELEASE ORAL 2 TIMES DAILY
Status: DISCONTINUED | OUTPATIENT
Start: 2017-04-02 | End: 2017-04-02

## 2017-04-02 RX ORDER — METOPROLOL SUCCINATE 25 MG/1
25 TABLET, EXTENDED RELEASE ORAL ONCE
Status: COMPLETED | OUTPATIENT
Start: 2017-04-02 | End: 2017-04-02

## 2017-04-02 RX ORDER — FUROSEMIDE 20 MG/1
20 TABLET ORAL DAILY
Status: DISCONTINUED | OUTPATIENT
Start: 2017-04-03 | End: 2017-04-06 | Stop reason: HOSPADM

## 2017-04-02 RX ADMIN — DABIGATRAN ETEXILATE MESYLATE 75 MG: 75 CAPSULE ORAL at 16:57

## 2017-04-02 RX ADMIN — METOPROLOL SUCCINATE 75 MG: 50 TABLET, FILM COATED, EXTENDED RELEASE ORAL at 08:26

## 2017-04-02 RX ADMIN — Medication 1 TABLET: at 08:26

## 2017-04-02 RX ADMIN — METOPROLOL TARTRATE 5 MG: 5 INJECTION INTRAVENOUS at 11:57

## 2017-04-02 RX ADMIN — ZOLPIDEM TARTRATE 10 MG: 5 TABLET, FILM COATED ORAL at 22:08

## 2017-04-02 RX ADMIN — LEVOTHYROXINE SODIUM 25 MCG: 25 TABLET ORAL at 05:50

## 2017-04-02 RX ADMIN — DABIGATRAN ETEXILATE MESYLATE 75 MG: 75 CAPSULE ORAL at 08:26

## 2017-04-02 RX ADMIN — ISOSORBIDE MONONITRATE 30 MG: 30 TABLET, EXTENDED RELEASE ORAL at 08:26

## 2017-04-02 RX ADMIN — ALPRAZOLAM 0.25 MG: 0.25 TABLET ORAL at 16:57

## 2017-04-02 RX ADMIN — METOPROLOL SUCCINATE 25 MG: 25 TABLET, FILM COATED, EXTENDED RELEASE ORAL at 11:57

## 2017-04-02 RX ADMIN — METOPROLOL SUCCINATE 100 MG: 100 TABLET, FILM COATED, EXTENDED RELEASE ORAL at 17:02

## 2017-04-02 RX ADMIN — LIDOCAINE HYDROCHLORIDE 15 ML: 20 SOLUTION ORAL; TOPICAL at 11:57

## 2017-04-02 RX ADMIN — ALPRAZOLAM 0.25 MG: 0.25 TABLET ORAL at 11:57

## 2017-04-02 RX ADMIN — ROSUVASTATIN CALCIUM 5 MG: 5 TABLET, COATED ORAL at 08:27

## 2017-04-02 RX ADMIN — ALPRAZOLAM 0.25 MG: 0.25 TABLET ORAL at 08:26

## 2017-04-02 RX ADMIN — LIDOCAINE HYDROCHLORIDE 15 ML: 20 SOLUTION ORAL; TOPICAL at 16:57

## 2017-04-03 ENCOUNTER — APPOINTMENT (INPATIENT)
Dept: PHYSICAL THERAPY | Facility: HOSPITAL | Age: 82
DRG: 314 | End: 2017-04-03
Payer: COMMERCIAL

## 2017-04-03 LAB
ANION GAP SERPL CALCULATED.3IONS-SCNC: 10 MMOL/L (ref 4–13)
BASOPHILS # BLD AUTO: 0.03 THOUSANDS/ΜL (ref 0–0.1)
BASOPHILS NFR BLD AUTO: 0 % (ref 0–1)
BUN SERPL-MCNC: 27 MG/DL (ref 5–25)
CALCIUM SERPL-MCNC: 8.5 MG/DL (ref 8.3–10.1)
CHLORIDE SERPL-SCNC: 102 MMOL/L (ref 100–108)
CO2 SERPL-SCNC: 25 MMOL/L (ref 21–32)
CREAT SERPL-MCNC: 1.83 MG/DL (ref 0.6–1.3)
EOSINOPHIL # BLD AUTO: 0.24 THOUSAND/ΜL (ref 0–0.61)
EOSINOPHIL NFR BLD AUTO: 2 % (ref 0–6)
ERYTHROCYTE [DISTWIDTH] IN BLOOD BY AUTOMATED COUNT: 13.9 % (ref 11.6–15.1)
GFR SERPL CREATININE-BSD FRML MDRD: 25.9 ML/MIN/1.73SQ M
GLUCOSE SERPL-MCNC: 91 MG/DL (ref 65–140)
HCT VFR BLD AUTO: 45.9 % (ref 34.8–46.1)
HGB BLD-MCNC: 15 G/DL (ref 11.5–15.4)
LYMPHOCYTES # BLD AUTO: 1.78 THOUSANDS/ΜL (ref 0.6–4.47)
LYMPHOCYTES NFR BLD AUTO: 17 % (ref 14–44)
MCH RBC QN AUTO: 31.6 PG (ref 26.8–34.3)
MCHC RBC AUTO-ENTMCNC: 32.7 G/DL (ref 31.4–37.4)
MCV RBC AUTO: 97 FL (ref 82–98)
MONOCYTES # BLD AUTO: 0.78 THOUSAND/ΜL (ref 0.17–1.22)
MONOCYTES NFR BLD AUTO: 7 % (ref 4–12)
NEUTROPHILS # BLD AUTO: 7.73 THOUSANDS/ΜL (ref 1.85–7.62)
NEUTS SEG NFR BLD AUTO: 74 % (ref 43–75)
PLATELET # BLD AUTO: 187 THOUSANDS/UL (ref 149–390)
PMV BLD AUTO: 11 FL (ref 8.9–12.7)
POTASSIUM SERPL-SCNC: 4.2 MMOL/L (ref 3.5–5.3)
RBC # BLD AUTO: 4.74 MILLION/UL (ref 3.81–5.12)
SODIUM SERPL-SCNC: 137 MMOL/L (ref 136–145)
WBC # BLD AUTO: 10.56 THOUSAND/UL (ref 4.31–10.16)

## 2017-04-03 PROCEDURE — 97116 GAIT TRAINING THERAPY: CPT

## 2017-04-03 PROCEDURE — 85025 COMPLETE CBC W/AUTO DIFF WBC: CPT | Performed by: INTERNAL MEDICINE

## 2017-04-03 PROCEDURE — 97110 THERAPEUTIC EXERCISES: CPT

## 2017-04-03 PROCEDURE — 80048 BASIC METABOLIC PNL TOTAL CA: CPT | Performed by: INTERNAL MEDICINE

## 2017-04-03 RX ORDER — ALPRAZOLAM 0.25 MG/1
0.25 TABLET ORAL
Status: DISCONTINUED | OUTPATIENT
Start: 2017-04-03 | End: 2017-04-06 | Stop reason: HOSPADM

## 2017-04-03 RX ADMIN — LEVOTHYROXINE SODIUM 25 MCG: 25 TABLET ORAL at 06:21

## 2017-04-03 RX ADMIN — NYSTATIN 500000 UNITS: 100000 SUSPENSION ORAL at 22:07

## 2017-04-03 RX ADMIN — ALPRAZOLAM 0.25 MG: 0.25 TABLET ORAL at 22:07

## 2017-04-03 RX ADMIN — ALPRAZOLAM 0.25 MG: 0.25 TABLET ORAL at 08:08

## 2017-04-03 RX ADMIN — METOPROLOL SUCCINATE 125 MG: 100 TABLET, FILM COATED, EXTENDED RELEASE ORAL at 06:21

## 2017-04-03 RX ADMIN — LIDOCAINE HYDROCHLORIDE 15 ML: 20 SOLUTION ORAL; TOPICAL at 12:15

## 2017-04-03 RX ADMIN — NYSTATIN 500000 UNITS: 100000 SUSPENSION ORAL at 12:15

## 2017-04-03 RX ADMIN — METOPROLOL SUCCINATE 125 MG: 100 TABLET, FILM COATED, EXTENDED RELEASE ORAL at 17:15

## 2017-04-03 RX ADMIN — DABIGATRAN ETEXILATE MESYLATE 75 MG: 75 CAPSULE ORAL at 17:15

## 2017-04-03 RX ADMIN — NYSTATIN 500000 UNITS: 100000 SUSPENSION ORAL at 17:15

## 2017-04-03 RX ADMIN — ANTACID TABLETS 500 MG: 500 TABLET, CHEWABLE ORAL at 09:30

## 2017-04-03 RX ADMIN — ISOSORBIDE MONONITRATE 30 MG: 30 TABLET, EXTENDED RELEASE ORAL at 08:09

## 2017-04-03 RX ADMIN — ALPRAZOLAM 0.25 MG: 0.25 TABLET ORAL at 17:15

## 2017-04-03 RX ADMIN — ZOLPIDEM TARTRATE 10 MG: 5 TABLET, FILM COATED ORAL at 22:08

## 2017-04-03 RX ADMIN — ALPRAZOLAM 0.25 MG: 0.25 TABLET ORAL at 12:14

## 2017-04-03 RX ADMIN — METOPROLOL TARTRATE 5 MG: 5 INJECTION INTRAVENOUS at 08:18

## 2017-04-03 RX ADMIN — DABIGATRAN ETEXILATE MESYLATE 75 MG: 75 CAPSULE ORAL at 08:08

## 2017-04-03 RX ADMIN — ROSUVASTATIN CALCIUM 5 MG: 5 TABLET, COATED ORAL at 08:09

## 2017-04-03 RX ADMIN — FUROSEMIDE 20 MG: 20 TABLET ORAL at 08:08

## 2017-04-04 ENCOUNTER — ANESTHESIA EVENT (INPATIENT)
Dept: NON INVASIVE DIAGNOSTICS | Facility: HOSPITAL | Age: 82
DRG: 314 | End: 2017-04-04
Payer: COMMERCIAL

## 2017-04-04 ENCOUNTER — APPOINTMENT (INPATIENT)
Dept: PHYSICAL THERAPY | Facility: HOSPITAL | Age: 82
DRG: 314 | End: 2017-04-04
Payer: COMMERCIAL

## 2017-04-04 LAB
ANION GAP SERPL CALCULATED.3IONS-SCNC: 9 MMOL/L (ref 4–13)
BASOPHILS # BLD AUTO: 0.02 THOUSANDS/ΜL (ref 0–0.1)
BASOPHILS NFR BLD AUTO: 0 % (ref 0–1)
BUN SERPL-MCNC: 26 MG/DL (ref 5–25)
CALCIUM SERPL-MCNC: 8.8 MG/DL (ref 8.3–10.1)
CHLORIDE SERPL-SCNC: 102 MMOL/L (ref 100–108)
CO2 SERPL-SCNC: 26 MMOL/L (ref 21–32)
CREAT SERPL-MCNC: 1.9 MG/DL (ref 0.6–1.3)
EOSINOPHIL # BLD AUTO: 0.15 THOUSAND/ΜL (ref 0–0.61)
EOSINOPHIL NFR BLD AUTO: 1 % (ref 0–6)
ERYTHROCYTE [DISTWIDTH] IN BLOOD BY AUTOMATED COUNT: 13.8 % (ref 11.6–15.1)
GFR SERPL CREATININE-BSD FRML MDRD: 24.8 ML/MIN/1.73SQ M
GLUCOSE SERPL-MCNC: 112 MG/DL (ref 65–140)
HCT VFR BLD AUTO: 45.7 % (ref 34.8–46.1)
HGB BLD-MCNC: 15.3 G/DL (ref 11.5–15.4)
LYMPHOCYTES # BLD AUTO: 1.22 THOUSANDS/ΜL (ref 0.6–4.47)
LYMPHOCYTES NFR BLD AUTO: 10 % (ref 14–44)
MCH RBC QN AUTO: 32.2 PG (ref 26.8–34.3)
MCHC RBC AUTO-ENTMCNC: 33.5 G/DL (ref 31.4–37.4)
MCV RBC AUTO: 96 FL (ref 82–98)
MONOCYTES # BLD AUTO: 0.81 THOUSAND/ΜL (ref 0.17–1.22)
MONOCYTES NFR BLD AUTO: 7 % (ref 4–12)
NEUTROPHILS # BLD AUTO: 10.1 THOUSANDS/ΜL (ref 1.85–7.62)
NEUTS SEG NFR BLD AUTO: 82 % (ref 43–75)
PLATELET # BLD AUTO: 189 THOUSANDS/UL (ref 149–390)
PMV BLD AUTO: 10.8 FL (ref 8.9–12.7)
POTASSIUM SERPL-SCNC: 4.2 MMOL/L (ref 3.5–5.3)
RBC # BLD AUTO: 4.75 MILLION/UL (ref 3.81–5.12)
SODIUM SERPL-SCNC: 137 MMOL/L (ref 136–145)
WBC # BLD AUTO: 12.3 THOUSAND/UL (ref 4.31–10.16)

## 2017-04-04 PROCEDURE — 80048 BASIC METABOLIC PNL TOTAL CA: CPT | Performed by: INTERNAL MEDICINE

## 2017-04-04 PROCEDURE — 85025 COMPLETE CBC W/AUTO DIFF WBC: CPT | Performed by: INTERNAL MEDICINE

## 2017-04-04 RX ADMIN — ALPRAZOLAM 0.25 MG: 0.25 TABLET ORAL at 12:44

## 2017-04-04 RX ADMIN — ROSUVASTATIN CALCIUM 5 MG: 5 TABLET, COATED ORAL at 10:06

## 2017-04-04 RX ADMIN — FUROSEMIDE 20 MG: 20 TABLET ORAL at 10:06

## 2017-04-04 RX ADMIN — METOPROLOL SUCCINATE 125 MG: 100 TABLET, FILM COATED, EXTENDED RELEASE ORAL at 17:20

## 2017-04-04 RX ADMIN — DABIGATRAN ETEXILATE MESYLATE 75 MG: 75 CAPSULE ORAL at 10:05

## 2017-04-04 RX ADMIN — NYSTATIN 500000 UNITS: 100000 SUSPENSION ORAL at 22:16

## 2017-04-04 RX ADMIN — ZOLPIDEM TARTRATE 10 MG: 5 TABLET, FILM COATED ORAL at 22:20

## 2017-04-04 RX ADMIN — LEVOTHYROXINE SODIUM 25 MCG: 25 TABLET ORAL at 06:42

## 2017-04-04 RX ADMIN — NYSTATIN 500000 UNITS: 100000 SUSPENSION ORAL at 10:06

## 2017-04-04 RX ADMIN — ISOSORBIDE MONONITRATE 30 MG: 30 TABLET, EXTENDED RELEASE ORAL at 10:06

## 2017-04-04 RX ADMIN — LIDOCAINE HYDROCHLORIDE 15 ML: 20 SOLUTION ORAL; TOPICAL at 01:36

## 2017-04-04 RX ADMIN — NYSTATIN 500000 UNITS: 100000 SUSPENSION ORAL at 17:21

## 2017-04-04 RX ADMIN — ALPRAZOLAM 0.25 MG: 0.25 TABLET ORAL at 22:16

## 2017-04-04 RX ADMIN — METOPROLOL SUCCINATE 125 MG: 100 TABLET, FILM COATED, EXTENDED RELEASE ORAL at 06:42

## 2017-04-04 RX ADMIN — DABIGATRAN ETEXILATE MESYLATE 75 MG: 75 CAPSULE ORAL at 16:14

## 2017-04-04 RX ADMIN — ALPRAZOLAM 0.25 MG: 0.25 TABLET ORAL at 17:15

## 2017-04-05 ENCOUNTER — APPOINTMENT (INPATIENT)
Dept: NON INVASIVE DIAGNOSTICS | Facility: HOSPITAL | Age: 82
DRG: 314 | End: 2017-04-05
Payer: COMMERCIAL

## 2017-04-05 ENCOUNTER — GENERIC CONVERSION - ENCOUNTER (OUTPATIENT)
Dept: OTHER | Facility: OTHER | Age: 82
End: 2017-04-05

## 2017-04-05 ENCOUNTER — APPOINTMENT (INPATIENT)
Dept: PHYSICAL THERAPY | Facility: HOSPITAL | Age: 82
DRG: 314 | End: 2017-04-05
Payer: COMMERCIAL

## 2017-04-05 LAB
ANION GAP SERPL CALCULATED.3IONS-SCNC: 12 MMOL/L (ref 4–13)
BASOPHILS # BLD AUTO: 0.02 THOUSANDS/ΜL (ref 0–0.1)
BASOPHILS NFR BLD AUTO: 0 % (ref 0–1)
BUN SERPL-MCNC: 24 MG/DL (ref 5–25)
CALCIUM SERPL-MCNC: 8.6 MG/DL (ref 8.3–10.1)
CHLORIDE SERPL-SCNC: 102 MMOL/L (ref 100–108)
CO2 SERPL-SCNC: 25 MMOL/L (ref 21–32)
CREAT SERPL-MCNC: 1.83 MG/DL (ref 0.6–1.3)
EOSINOPHIL # BLD AUTO: 0.15 THOUSAND/ΜL (ref 0–0.61)
EOSINOPHIL NFR BLD AUTO: 1 % (ref 0–6)
ERYTHROCYTE [DISTWIDTH] IN BLOOD BY AUTOMATED COUNT: 13.7 % (ref 11.6–15.1)
GFR SERPL CREATININE-BSD FRML MDRD: 25.9 ML/MIN/1.73SQ M
GLUCOSE SERPL-MCNC: 112 MG/DL (ref 65–140)
HCT VFR BLD AUTO: 45.2 % (ref 34.8–46.1)
HGB BLD-MCNC: 15.1 G/DL (ref 11.5–15.4)
LYMPHOCYTES # BLD AUTO: 1.07 THOUSANDS/ΜL (ref 0.6–4.47)
LYMPHOCYTES NFR BLD AUTO: 8 % (ref 14–44)
MAGNESIUM SERPL-MCNC: 1.9 MG/DL (ref 1.6–2.6)
MCH RBC QN AUTO: 32.1 PG (ref 26.8–34.3)
MCHC RBC AUTO-ENTMCNC: 33.4 G/DL (ref 31.4–37.4)
MCV RBC AUTO: 96 FL (ref 82–98)
MONOCYTES # BLD AUTO: 0.9 THOUSAND/ΜL (ref 0.17–1.22)
MONOCYTES NFR BLD AUTO: 7 % (ref 4–12)
NEUTROPHILS # BLD AUTO: 11.42 THOUSANDS/ΜL (ref 1.85–7.62)
NEUTS SEG NFR BLD AUTO: 84 % (ref 43–75)
PLATELET # BLD AUTO: 181 THOUSANDS/UL (ref 149–390)
PMV BLD AUTO: 10.9 FL (ref 8.9–12.7)
POTASSIUM SERPL-SCNC: 3.9 MMOL/L (ref 3.5–5.3)
RBC # BLD AUTO: 4.7 MILLION/UL (ref 3.81–5.12)
SODIUM SERPL-SCNC: 139 MMOL/L (ref 136–145)
WBC # BLD AUTO: 13.56 THOUSAND/UL (ref 4.31–10.16)

## 2017-04-05 PROCEDURE — 83735 ASSAY OF MAGNESIUM: CPT | Performed by: PHYSICIAN ASSISTANT

## 2017-04-05 PROCEDURE — 92960 CARDIOVERSION ELECTRIC EXT: CPT | Performed by: NURSE PRACTITIONER

## 2017-04-05 PROCEDURE — 93005 ELECTROCARDIOGRAM TRACING: CPT | Performed by: NURSE PRACTITIONER

## 2017-04-05 PROCEDURE — 85025 COMPLETE CBC W/AUTO DIFF WBC: CPT | Performed by: INTERNAL MEDICINE

## 2017-04-05 PROCEDURE — 80048 BASIC METABOLIC PNL TOTAL CA: CPT | Performed by: INTERNAL MEDICINE

## 2017-04-05 PROCEDURE — 5A2204Z RESTORATION OF CARDIAC RHYTHM, SINGLE: ICD-10-PCS | Performed by: INTERNAL MEDICINE

## 2017-04-05 PROCEDURE — 93312 ECHO TRANSESOPHAGEAL: CPT

## 2017-04-05 RX ORDER — AMIODARONE HYDROCHLORIDE 200 MG/1
200 TABLET ORAL
Status: DISCONTINUED | OUTPATIENT
Start: 2017-04-05 | End: 2017-04-06 | Stop reason: HOSPADM

## 2017-04-05 RX ORDER — LANOLIN ALCOHOL/MO/W.PET/CERES
6 CREAM (GRAM) TOPICAL
Status: DISCONTINUED | OUTPATIENT
Start: 2017-04-05 | End: 2017-04-06 | Stop reason: HOSPADM

## 2017-04-05 RX ORDER — PROPOFOL 10 MG/ML
INJECTION, EMULSION INTRAVENOUS AS NEEDED
Status: DISCONTINUED | OUTPATIENT
Start: 2017-04-05 | End: 2017-04-05 | Stop reason: SURG

## 2017-04-05 RX ORDER — AMIODARONE HYDROCHLORIDE 50 MG/ML
INJECTION, SOLUTION INTRAVENOUS CODE/TRAUMA/SEDATION MEDICATION
Status: COMPLETED | OUTPATIENT
Start: 2017-04-05 | End: 2017-04-05

## 2017-04-05 RX ADMIN — TOPICAL ANESTHETIC 1 SPRAY: 200 SPRAY DENTAL; PERIODONTAL at 13:53

## 2017-04-05 RX ADMIN — FUROSEMIDE 20 MG: 20 TABLET ORAL at 08:37

## 2017-04-05 RX ADMIN — PROPOFOL 10 MG: 10 INJECTION, EMULSION INTRAVENOUS at 13:57

## 2017-04-05 RX ADMIN — ROSUVASTATIN CALCIUM 5 MG: 5 TABLET, COATED ORAL at 16:41

## 2017-04-05 RX ADMIN — MELATONIN TAB 3 MG 6 MG: 3 TAB at 22:26

## 2017-04-05 RX ADMIN — PROPOFOL 10 MG: 10 INJECTION, EMULSION INTRAVENOUS at 14:07

## 2017-04-05 RX ADMIN — SODIUM CHLORIDE: 0.9 INJECTION, SOLUTION INTRAVENOUS at 13:49

## 2017-04-05 RX ADMIN — ALPRAZOLAM 0.25 MG: 0.25 TABLET ORAL at 13:03

## 2017-04-05 RX ADMIN — AMIODARONE HYDROCHLORIDE 150 MG: 50 INJECTION, SOLUTION INTRAVENOUS at 14:12

## 2017-04-05 RX ADMIN — DABIGATRAN ETEXILATE MESYLATE 75 MG: 75 CAPSULE ORAL at 08:38

## 2017-04-05 RX ADMIN — METOPROLOL SUCCINATE 125 MG: 100 TABLET, FILM COATED, EXTENDED RELEASE ORAL at 06:46

## 2017-04-05 RX ADMIN — PROPOFOL 10 MG: 10 INJECTION, EMULSION INTRAVENOUS at 13:59

## 2017-04-05 RX ADMIN — LEVOTHYROXINE SODIUM 25 MCG: 25 TABLET ORAL at 06:47

## 2017-04-05 RX ADMIN — PROPOFOL 10 MG: 10 INJECTION, EMULSION INTRAVENOUS at 14:03

## 2017-04-05 RX ADMIN — PROPOFOL 50 MG: 10 INJECTION, EMULSION INTRAVENOUS at 13:55

## 2017-04-05 RX ADMIN — ISOSORBIDE MONONITRATE 30 MG: 30 TABLET, EXTENDED RELEASE ORAL at 08:38

## 2017-04-05 RX ADMIN — ALPRAZOLAM 0.25 MG: 0.25 TABLET ORAL at 22:26

## 2017-04-05 RX ADMIN — ALPRAZOLAM 0.25 MG: 0.25 TABLET ORAL at 08:38

## 2017-04-05 RX ADMIN — DABIGATRAN ETEXILATE MESYLATE 75 MG: 75 CAPSULE ORAL at 16:39

## 2017-04-05 RX ADMIN — PROPOFOL 10 MG: 10 INJECTION, EMULSION INTRAVENOUS at 14:09

## 2017-04-05 RX ADMIN — NYSTATIN 500000 UNITS: 100000 SUSPENSION ORAL at 22:26

## 2017-04-05 RX ADMIN — NYSTATIN 500000 UNITS: 100000 SUSPENSION ORAL at 17:52

## 2017-04-05 RX ADMIN — AMIODARONE HYDROCHLORIDE 200 MG: 200 TABLET ORAL at 17:49

## 2017-04-05 RX ADMIN — PROPOFOL 10 MG: 10 INJECTION, EMULSION INTRAVENOUS at 14:01

## 2017-04-06 ENCOUNTER — GENERIC CONVERSION - ENCOUNTER (OUTPATIENT)
Dept: OTHER | Facility: OTHER | Age: 82
End: 2017-04-06

## 2017-04-06 ENCOUNTER — APPOINTMENT (INPATIENT)
Dept: PHYSICAL THERAPY | Facility: HOSPITAL | Age: 82
DRG: 314 | End: 2017-04-06
Payer: COMMERCIAL

## 2017-04-06 VITALS
SYSTOLIC BLOOD PRESSURE: 132 MMHG | TEMPERATURE: 98 F | HEIGHT: 67 IN | DIASTOLIC BLOOD PRESSURE: 62 MMHG | OXYGEN SATURATION: 92 % | RESPIRATION RATE: 19 BRPM | BODY MASS INDEX: 27.58 KG/M2 | WEIGHT: 175.71 LBS | HEART RATE: 60 BPM

## 2017-04-06 PROBLEM — I50.21 ACUTE SYSTOLIC CHF (CONGESTIVE HEART FAILURE) (HCC): Status: RESOLVED | Noted: 2017-04-01 | Resolved: 2017-04-06

## 2017-04-06 LAB
ATRIAL RATE: 55 BPM
P AXIS: 268 DEGREES
PR INTERVAL: 206 MS
QRS AXIS: -27 DEGREES
QRSD INTERVAL: 86 MS
QT INTERVAL: 454 MS
QTC INTERVAL: 434 MS
T WAVE AXIS: 154 DEGREES
VENTRICULAR RATE: 55 BPM

## 2017-04-06 PROCEDURE — 97116 GAIT TRAINING THERAPY: CPT

## 2017-04-06 PROCEDURE — 97110 THERAPEUTIC EXERCISES: CPT

## 2017-04-06 RX ORDER — FUROSEMIDE 20 MG/1
20 TABLET ORAL DAILY
Qty: 30 TABLET | Refills: 0 | Status: SHIPPED | OUTPATIENT
Start: 2017-04-06 | End: 2018-05-02 | Stop reason: SDUPTHER

## 2017-04-06 RX ORDER — AMIODARONE HYDROCHLORIDE 200 MG/1
200 TABLET ORAL DAILY
Qty: 30 TABLET | Refills: 0 | Status: SHIPPED | OUTPATIENT
Start: 2017-04-22 | End: 2018-04-19 | Stop reason: SDUPTHER

## 2017-04-06 RX ORDER — METOPROLOL SUCCINATE 50 MG/1
50 TABLET, EXTENDED RELEASE ORAL 2 TIMES DAILY
Status: DISCONTINUED | OUTPATIENT
Start: 2017-04-06 | End: 2017-04-06 | Stop reason: HOSPADM

## 2017-04-06 RX ORDER — AMIODARONE HYDROCHLORIDE 200 MG/1
200 TABLET ORAL
Qty: 21 TABLET | Refills: 0 | Status: SHIPPED | OUTPATIENT
Start: 2017-04-06 | End: 2017-04-13

## 2017-04-06 RX ORDER — METOPROLOL SUCCINATE 50 MG/1
50 TABLET, EXTENDED RELEASE ORAL 2 TIMES DAILY
Qty: 60 TABLET | Refills: 0 | Status: SHIPPED | OUTPATIENT
Start: 2017-04-06 | End: 2018-10-08 | Stop reason: SDDI

## 2017-04-06 RX ORDER — DABIGATRAN ETEXILATE 75 MG/1
75 CAPSULE, COATED PELLETS ORAL 2 TIMES DAILY WITH MEALS
Qty: 60 CAPSULE | Refills: 0 | Status: SHIPPED | OUTPATIENT
Start: 2017-04-06 | End: 2017-05-06

## 2017-04-06 RX ORDER — AMIODARONE HYDROCHLORIDE 200 MG/1
200 TABLET ORAL 2 TIMES DAILY
Qty: 14 TABLET | Refills: 0 | Status: SHIPPED | OUTPATIENT
Start: 2017-04-14 | End: 2018-04-19 | Stop reason: SDUPTHER

## 2017-04-06 RX ADMIN — ROSUVASTATIN CALCIUM 5 MG: 5 TABLET, COATED ORAL at 09:18

## 2017-04-06 RX ADMIN — NYSTATIN 500000 UNITS: 100000 SUSPENSION ORAL at 13:30

## 2017-04-06 RX ADMIN — LEVOTHYROXINE SODIUM 25 MCG: 25 TABLET ORAL at 06:50

## 2017-04-06 RX ADMIN — AMIODARONE HYDROCHLORIDE 200 MG: 200 TABLET ORAL at 13:29

## 2017-04-06 RX ADMIN — NYSTATIN 500000 UNITS: 100000 SUSPENSION ORAL at 09:20

## 2017-04-06 RX ADMIN — ALPRAZOLAM 0.25 MG: 0.25 TABLET ORAL at 13:29

## 2017-04-06 RX ADMIN — AMIODARONE HYDROCHLORIDE 200 MG: 200 TABLET ORAL at 09:18

## 2017-04-06 RX ADMIN — FUROSEMIDE 20 MG: 20 TABLET ORAL at 09:17

## 2017-04-06 RX ADMIN — ISOSORBIDE MONONITRATE 30 MG: 30 TABLET, EXTENDED RELEASE ORAL at 09:18

## 2017-04-06 RX ADMIN — DABIGATRAN ETEXILATE MESYLATE 75 MG: 75 CAPSULE ORAL at 09:19

## 2017-04-06 RX ADMIN — ALPRAZOLAM 0.25 MG: 0.25 TABLET ORAL at 06:49

## 2017-04-06 RX ADMIN — METOPROLOL SUCCINATE 125 MG: 100 TABLET, FILM COATED, EXTENDED RELEASE ORAL at 06:49

## 2017-04-10 ENCOUNTER — GENERIC CONVERSION - ENCOUNTER (OUTPATIENT)
Dept: OTHER | Facility: OTHER | Age: 82
End: 2017-04-10

## 2017-04-11 DIAGNOSIS — I50.9 HEART FAILURE (HCC): ICD-10-CM

## 2017-04-11 DIAGNOSIS — R00.1 BRADYCARDIA: ICD-10-CM

## 2017-04-11 DIAGNOSIS — I48.91 ATRIAL FIBRILLATION (HCC): ICD-10-CM

## 2017-04-11 DIAGNOSIS — I10 ESSENTIAL (PRIMARY) HYPERTENSION: ICD-10-CM

## 2017-04-12 ENCOUNTER — GENERIC CONVERSION - ENCOUNTER (OUTPATIENT)
Dept: OTHER | Facility: OTHER | Age: 82
End: 2017-04-12

## 2017-04-13 ENCOUNTER — GENERIC CONVERSION - ENCOUNTER (OUTPATIENT)
Dept: OTHER | Facility: OTHER | Age: 82
End: 2017-04-13

## 2017-04-13 ENCOUNTER — LAB REQUISITION (OUTPATIENT)
Dept: LAB | Facility: HOSPITAL | Age: 82
End: 2017-04-13
Payer: COMMERCIAL

## 2017-04-13 DIAGNOSIS — I48.20 CHRONIC ATRIAL FIBRILLATION (HCC): ICD-10-CM

## 2017-04-13 LAB
ANION GAP SERPL CALCULATED.3IONS-SCNC: 9 MMOL/L (ref 4–13)
BUN SERPL-MCNC: 14 MG/DL (ref 5–25)
CALCIUM SERPL-MCNC: 8.7 MG/DL (ref 8.3–10.1)
CHLORIDE SERPL-SCNC: 101 MMOL/L (ref 100–108)
CO2 SERPL-SCNC: 31 MMOL/L (ref 21–32)
CREAT SERPL-MCNC: 1.55 MG/DL (ref 0.6–1.3)
GFR SERPL CREATININE-BSD FRML MDRD: 31.4 ML/MIN/1.73SQ M
GLUCOSE P FAST SERPL-MCNC: 50 MG/DL (ref 65–99)
POTASSIUM SERPL-SCNC: 3.6 MMOL/L (ref 3.5–5.3)
SODIUM SERPL-SCNC: 141 MMOL/L (ref 136–145)

## 2017-04-13 PROCEDURE — 80048 BASIC METABOLIC PNL TOTAL CA: CPT | Performed by: INTERNAL MEDICINE

## 2017-04-18 ENCOUNTER — GENERIC CONVERSION - ENCOUNTER (OUTPATIENT)
Dept: OTHER | Facility: OTHER | Age: 82
End: 2017-04-18

## 2017-04-20 ENCOUNTER — ALLSCRIPTS OFFICE VISIT (OUTPATIENT)
Dept: OTHER | Facility: OTHER | Age: 82
End: 2017-04-20

## 2017-04-25 ENCOUNTER — ALLSCRIPTS OFFICE VISIT (OUTPATIENT)
Dept: OTHER | Facility: OTHER | Age: 82
End: 2017-04-25

## 2017-05-01 ENCOUNTER — ALLSCRIPTS OFFICE VISIT (OUTPATIENT)
Dept: OTHER | Facility: OTHER | Age: 82
End: 2017-05-01

## 2017-05-08 ENCOUNTER — GENERIC CONVERSION - ENCOUNTER (OUTPATIENT)
Dept: OTHER | Facility: OTHER | Age: 82
End: 2017-05-08

## 2017-05-10 ENCOUNTER — GENERIC CONVERSION - ENCOUNTER (OUTPATIENT)
Dept: OTHER | Facility: OTHER | Age: 82
End: 2017-05-10

## 2017-05-11 ENCOUNTER — GENERIC CONVERSION - ENCOUNTER (OUTPATIENT)
Dept: OTHER | Facility: OTHER | Age: 82
End: 2017-05-11

## 2017-05-15 ENCOUNTER — GENERIC CONVERSION - ENCOUNTER (OUTPATIENT)
Dept: OTHER | Facility: OTHER | Age: 82
End: 2017-05-15

## 2017-05-22 ENCOUNTER — ALLSCRIPTS OFFICE VISIT (OUTPATIENT)
Dept: OTHER | Facility: OTHER | Age: 82
End: 2017-05-22

## 2017-05-22 DIAGNOSIS — I10 ESSENTIAL (PRIMARY) HYPERTENSION: ICD-10-CM

## 2017-05-23 ENCOUNTER — LAB REQUISITION (OUTPATIENT)
Dept: LAB | Facility: HOSPITAL | Age: 82
End: 2017-05-23
Payer: COMMERCIAL

## 2017-05-23 DIAGNOSIS — I48.20 CHRONIC ATRIAL FIBRILLATION (HCC): ICD-10-CM

## 2017-05-23 LAB
ANION GAP SERPL CALCULATED.3IONS-SCNC: 10 MMOL/L (ref 4–13)
BUN SERPL-MCNC: 21 MG/DL (ref 5–25)
CALCIUM SERPL-MCNC: 9.2 MG/DL (ref 8.3–10.1)
CHLORIDE SERPL-SCNC: 101 MMOL/L (ref 100–108)
CO2 SERPL-SCNC: 26 MMOL/L (ref 21–32)
CREAT SERPL-MCNC: 1.74 MG/DL (ref 0.6–1.3)
GFR SERPL CREATININE-BSD FRML MDRD: 27.5 ML/MIN/1.73SQ M
GLUCOSE SERPL-MCNC: 73 MG/DL (ref 65–140)
POTASSIUM SERPL-SCNC: 4.1 MMOL/L (ref 3.5–5.3)
SODIUM SERPL-SCNC: 137 MMOL/L (ref 136–145)

## 2017-05-23 PROCEDURE — 80048 BASIC METABOLIC PNL TOTAL CA: CPT | Performed by: INTERNAL MEDICINE

## 2017-06-13 ENCOUNTER — LAB REQUISITION (OUTPATIENT)
Dept: LAB | Facility: HOSPITAL | Age: 82
End: 2017-06-13
Payer: COMMERCIAL

## 2017-06-13 DIAGNOSIS — I50.22 CHRONIC SYSTOLIC CONGESTIVE HEART FAILURE (HCC): ICD-10-CM

## 2017-06-13 LAB
ANION GAP SERPL CALCULATED.3IONS-SCNC: 8 MMOL/L (ref 4–13)
BUN SERPL-MCNC: 23 MG/DL (ref 5–25)
CALCIUM SERPL-MCNC: 8.6 MG/DL (ref 8.3–10.1)
CHLORIDE SERPL-SCNC: 103 MMOL/L (ref 100–108)
CO2 SERPL-SCNC: 26 MMOL/L (ref 21–32)
CREAT SERPL-MCNC: 1.75 MG/DL (ref 0.6–1.3)
GFR SERPL CREATININE-BSD FRML MDRD: 27.3 ML/MIN/1.73SQ M
GLUCOSE SERPL-MCNC: 137 MG/DL (ref 65–140)
POTASSIUM SERPL-SCNC: 3.9 MMOL/L (ref 3.5–5.3)
SODIUM SERPL-SCNC: 137 MMOL/L (ref 136–145)

## 2017-06-13 PROCEDURE — 80048 BASIC METABOLIC PNL TOTAL CA: CPT | Performed by: FAMILY MEDICINE

## 2017-07-13 ENCOUNTER — ALLSCRIPTS OFFICE VISIT (OUTPATIENT)
Dept: OTHER | Facility: OTHER | Age: 82
End: 2017-07-13

## 2017-07-13 DIAGNOSIS — I50.22 CHRONIC SYSTOLIC CONGESTIVE HEART FAILURE (HCC): ICD-10-CM

## 2017-07-13 DIAGNOSIS — I48.91 ATRIAL FIBRILLATION (HCC): ICD-10-CM

## 2017-07-20 ENCOUNTER — LAB REQUISITION (OUTPATIENT)
Dept: LAB | Facility: HOSPITAL | Age: 82
End: 2017-07-20
Payer: COMMERCIAL

## 2017-07-20 DIAGNOSIS — I48.91 ATRIAL FIBRILLATION (HCC): ICD-10-CM

## 2017-07-20 LAB
ALBUMIN SERPL BCP-MCNC: 3.6 G/DL (ref 3.5–5)
ALP SERPL-CCNC: 82 U/L (ref 46–116)
ALT SERPL W P-5'-P-CCNC: 25 U/L (ref 12–78)
ANION GAP SERPL CALCULATED.3IONS-SCNC: 7 MMOL/L (ref 4–13)
AST SERPL W P-5'-P-CCNC: 22 U/L (ref 5–45)
BILIRUB SERPL-MCNC: 0.83 MG/DL (ref 0.2–1)
BUN SERPL-MCNC: 19 MG/DL (ref 5–25)
CALCIUM SERPL-MCNC: 8.8 MG/DL (ref 8.3–10.1)
CHLORIDE SERPL-SCNC: 103 MMOL/L (ref 100–108)
CO2 SERPL-SCNC: 27 MMOL/L (ref 21–32)
CREAT SERPL-MCNC: 1.81 MG/DL (ref 0.6–1.3)
ERYTHROCYTE [DISTWIDTH] IN BLOOD BY AUTOMATED COUNT: 14.2 % (ref 11.6–15.1)
GFR SERPL CREATININE-BSD FRML MDRD: 26.3 ML/MIN/1.73SQ M
GLUCOSE P FAST SERPL-MCNC: 106 MG/DL (ref 65–99)
HCT VFR BLD AUTO: 43.2 % (ref 34.8–46.1)
HGB BLD-MCNC: 14.5 G/DL (ref 11.5–15.4)
MAGNESIUM SERPL-MCNC: 2.3 MG/DL (ref 1.6–2.6)
MCH RBC QN AUTO: 32 PG (ref 26.8–34.3)
MCHC RBC AUTO-ENTMCNC: 33.6 G/DL (ref 31.4–37.4)
MCV RBC AUTO: 95 FL (ref 82–98)
PLATELET # BLD AUTO: 197 THOUSANDS/UL (ref 149–390)
PMV BLD AUTO: 10.9 FL (ref 8.9–12.7)
POTASSIUM SERPL-SCNC: 3.9 MMOL/L (ref 3.5–5.3)
PROT SERPL-MCNC: 7 G/DL (ref 6.4–8.2)
RBC # BLD AUTO: 4.53 MILLION/UL (ref 3.81–5.12)
SODIUM SERPL-SCNC: 137 MMOL/L (ref 136–145)
TSH SERPL DL<=0.05 MIU/L-ACNC: 4.24 UIU/ML (ref 0.36–3.74)
WBC # BLD AUTO: 6.15 THOUSAND/UL (ref 4.31–10.16)

## 2017-07-20 PROCEDURE — 83735 ASSAY OF MAGNESIUM: CPT | Performed by: INTERNAL MEDICINE

## 2017-07-20 PROCEDURE — 80053 COMPREHEN METABOLIC PANEL: CPT | Performed by: INTERNAL MEDICINE

## 2017-07-20 PROCEDURE — 84443 ASSAY THYROID STIM HORMONE: CPT | Performed by: INTERNAL MEDICINE

## 2017-07-20 PROCEDURE — 85027 COMPLETE CBC AUTOMATED: CPT | Performed by: INTERNAL MEDICINE

## 2017-08-10 ENCOUNTER — GENERIC CONVERSION - ENCOUNTER (OUTPATIENT)
Dept: OTHER | Facility: OTHER | Age: 82
End: 2017-08-10

## 2017-08-15 ENCOUNTER — TRANSCRIBE ORDERS (OUTPATIENT)
Dept: ADMINISTRATIVE | Facility: HOSPITAL | Age: 82
End: 2017-08-15

## 2017-08-15 DIAGNOSIS — I50.22 CHRONIC SYSTOLIC HEART FAILURE (HCC): ICD-10-CM

## 2017-08-15 DIAGNOSIS — I48.91 ATRIAL FIBRILLATION, UNSPECIFIED TYPE (HCC): Primary | ICD-10-CM

## 2017-08-23 ENCOUNTER — ALLSCRIPTS OFFICE VISIT (OUTPATIENT)
Dept: OTHER | Facility: OTHER | Age: 82
End: 2017-08-23

## 2017-08-25 ENCOUNTER — HOSPITAL ENCOUNTER (OUTPATIENT)
Dept: NON INVASIVE DIAGNOSTICS | Facility: CLINIC | Age: 82
Discharge: HOME | End: 2017-08-25
Payer: COMMERCIAL

## 2017-08-25 DIAGNOSIS — I48.91 ATRIAL FIBRILLATION, UNSPECIFIED TYPE (HCC): ICD-10-CM

## 2017-08-25 DIAGNOSIS — I50.22 CHRONIC SYSTOLIC HEART FAILURE (HCC): ICD-10-CM

## 2017-08-25 PROCEDURE — 93306 TTE W/DOPPLER COMPLETE: CPT

## 2017-10-02 ENCOUNTER — ALLSCRIPTS OFFICE VISIT (OUTPATIENT)
Dept: OTHER | Facility: OTHER | Age: 82
End: 2017-10-02

## 2017-10-02 DIAGNOSIS — I48.91 ATRIAL FIBRILLATION (HCC): ICD-10-CM

## 2017-10-02 DIAGNOSIS — E78.5 HYPERLIPIDEMIA: ICD-10-CM

## 2017-10-02 DIAGNOSIS — I50.22 CHRONIC SYSTOLIC CONGESTIVE HEART FAILURE (HCC): ICD-10-CM

## 2017-10-27 NOTE — PROGRESS NOTES
Assessment  Assessed    1  Atrial fibrillation (427 31) (I48 91)   2  Chronic systolic congestive heart failure (428 22,428 0) (I50 22)   3  Dilated cardiomyopathy (425 4) (I42 0)   4  Sinus bradycardia (427 89) (R00 1)   5  Edema (782 3) (R60 9)   6  Hypertension (401 9) (I10)   7  Hyperlipidemia (272 4) (E78 5)    Plan  Atrial fibrillation    · Pradaxa 75 MG Oral Capsule; TAKE 1 CAPSULE TWICE DAILY   Rx By: Lee Mathias; Dispense: 30 Days ; #:60 Capsule; Refill: 6;For: Atrial fibrillation; WALLY = N; Dispense Sample; Last Updated By: Mary Harvey; 10/2/2017 1:53:50 PM  Atrial fibrillation, CHF (congestive heart failure), Edema, Hypertension    · EKG/ECG- POC; Status:Complete;   Done: [id number]   Perform: In Office; [phone]; Last Updated Mirna Ramal; 10/2/2017 1:50:57 PM;Ordered; For:Atrial fibrillation, CHF (congestive heart failure), Edema, Hypertension; Ordered By:Kuldeep Eldridge;  Atrial fibrillation, Chronic systolic congestive heart failure, Hyperlipidemia    · Continue with our present treatment plan ; Status:Complete;   Done: [id number]   Ordered; For:Atrial fibrillation, Chronic systolic congestive heart failure, Hyperlipidemia; Ordered By:Martínez Eldridge;   · Restrict your sodium (salt) intake to 2 grams per day ; Status:Complete;   Done:  57OOI7665   Ordered; For:Atrial fibrillation, Chronic systolic congestive heart failure, Hyperlipidemia; Ordered By:Martínez Eldridge;   · Weigh yourself every day ; Status:Complete;   Done: 57QQA2912   Ordered; For:Atrial fibrillation, Chronic systolic congestive heart failure, Hyperlipidemia; Ordered By:Martínez Eldridge;   · Follow-up visit in 6 months Evaluation and Treatment  Follow-up  Status: Hold For -  Scheduling  Requested for: 78LWT1640   Ordered; For: Atrial fibrillation, Chronic systolic congestive heart failure, Hyperlipidemia; Ordered By: Lee Mathias Performed:  Due: 94FAQ3552   · (1) CBC/ PLT (NO DIFF); Status:Active;  Requested JFU:[id number];    Perform:[facility]; FPK:[id number]; Ordered; For:Atrial fibrillation, Chronic systolic congestive heart failure, Hyperlipidemia; Ordered By:Brent Eldridge;   · (1) COMPREHENSIVE METABOLIC PANEL; Status:Active; Requested TPX:[id number];    Perform:[facility]; MPH:[id number]; Ordered; For:Atrial fibrillation, Chronic systolic congestive heart failure, Hyperlipidemia; Ordered By:Kuldeep Eldridge;   · (1) LIPID PANEL FASTING W DIRECT LDL REFLEX; Status:Active; Requested  CFI:[id number];    Perform:[facility]; [id number]; Ordered; For:Atrial fibrillation, Chronic systolic congestive heart failure, Hyperlipidemia; Ordered By:Brent Eldridge;    Discussion/Summary  Cardiology Discussion Summary Free Text Note Form [facility]:   - Magui Borges remains in sinus rhythm  Her heart rate does run low at times however it is improved with coming down on some of her medications  She is asymptomatic at this time  We will continue low-dose amiodarone and her Pradaxa for stroke prevention  We will see her back in 6 months  systolic CHF - In the setting of her hospitalization she was found to have a reduced ejection fraction down about 35%  Her ejection fraction has since come back to normal, showing that this was either a tachycardia mediated or stress-induced cardiomyopathy  No changes were made and she appears at her baseline today  She knows to watch her sodium intake and weigh herself regularly  - Her blood pressure remains controlled even with coming off of amlodipine  No changes were made today  - She remains on low-dose Crestor  No changes were made today  We did order updated blood work  We will see her back in 6 months  Chief Complaint  Chief Complaint Free Text Note Form: Patient is here today for 3 mo f/u  Patient c/p Swelling in LE, occ Headache and coaptation  EKG today        History of Present Illness  Cardiology HPI Free Text Note Form [facility]: Mrs Wan Model comes in for follow-up given her paroxysmal atrial fibrillation and congestive heart failure  I saw her during hospitalization which she presented in rapid atrial fibrillation  A rate control strategy was initially attempted  She had an echocardiogram performed that demonstrated an ejection fraction of 35%  There was hypokinesis seen in the anterior and anterior septal walls, with diffuse hypokinesis otherwise  A rate control strategy was not successful, and therefore we had her undergo a ALLI guided cardioversion  This successfully converted back to sinus rhythm  Given her ejection fraction, and moderate left atrial enlargement we felt antiarrhythmic therapy was needed  We started IV amiodarone, and she was discharged on this as well  This did help her symptoms significantly  He also help control her volume status better  She was discharged on low-dose Lasix  She has also been on Pradaxa 75 mg twice daily  in follow-up she was having some low heart rates  She was for the most part asymptomatic from this standpoint, other than some fatigue  At initial follow-up visit with our CHF clinic, her metoprolol was discontinued and her amiodarone was decreased to 100 mg daily  At our last visit her heart rates looked improved  I had her continue her heart rates have decreased over the last few visits  She was having some increasing lower extremity edema, and saw our CHF clinic  There her amlodipine was decreased to 2 5 mg daily, and has since been stopped  She has also been on daily Lasix  Since I last saw her we repeated an echocardiogram that showed an improvement of her ejection fraction back to 65 percent  This proved that 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  has felt well since last visit  She remains in sinus rhythm  She denies any palpitations or return of symptoms of her atrial fibrillation  No chest pain  She does have exertional shortness of breath which is improved   She does have some mild lower extremity edema which is also improved  She denies any orthopnea or PND  There has been no lightheadedness or syncope  Her heart rate is in the 50s today  Atrial Fibrillation (Follow-Up): The patient presents with paroxysmal atrial fibrillation  The treatment strategy for this patient is rhythm control  She states her atrial fibrillation has been well controlled since the last visit  She has no significant interval events  Symptoms: The patient is currently asymptomatic  Risks: the patient has not had a home evaluation for prevention of falls, but-- no increased risk for falling  Medications: the patient is adherent with her medication regimen  -- She denies medication side effects  Congestive Heart Failure (Follow-Up): The patient presents for follow-up of chronic, systolic heart failure  The patient's last LV ejection fraction was 30%  Comorbid Illnesses: chronic kidney disease (1-4)  The patient is NYHA functional Class II  The patient states she has been stable with her heart failure symptoms since the last visit  She has no significant interval events  Symptoms: improved lower extremity edema,-- improved dyspnea on exertion,-- stable fatigue,-- stable exercise intolerance,-- denies orthopnea-- and-- denies paroxysmal nocturnal dyspnea  Associated symptoms include no chest pain,-- no syncope-- and-- no palpitations  Medications: the patient is adherent with her medication regimen  -- She denies medication side effects  Review of Systems  Cardiology Female ROS:     Cardiac: has swelling in the , but-- as noted in HPI  Skin: No complaints of nonhealing sores or skin rash  Genitourinary: No complaints of recurrent urinary tract infections, frequent urination at night, difficult urination, blood in urine, kidney stones, loss of bladder control, kidney problems, denies any birth control or hormone replacement, is not post menopausal, not currently pregnant     Psychological: No complaints of feeling depressed, anxiety, panic attacks, or difficulty concentrating  General: lack of energy/fatigue, but-- No complaints of trouble sleeping, lack of energy, fatigue, appetite changes, weight changes, fever, frequent infections, or night sweats  Respiratory: shortness of breath, but-- as noted in HPI  HEENT: No complaints of serious problems, hearing problems, nose problems, throat problems, or snoring  Gastrointestinal: No complaints of liver problems, nausea, vomiting, heartburn, constipation, bloody stools, diarrhea, problems swallowing, adbominal pain, or rectal bleeding  Hematologic: No complaints of bleeding disorders, anemia, blood clots, or excessive brusing  Neurological: No complaints of numbness, tingling, dizziness, weakness, seizures, headaches, syncope or fainting, AM fatigue, daytime sleepiness, no witnessed apnea episodes  Musculoskeletal: No complaints of arthritis, back pain, or painfull swelling  ROS Reviewed:   ROS reviewed  Active Problems  Problems    1  Acute pharyngitis (462) (J02 9)   2  Acute sinusitis (461 9) (J01 90)   3  Allergic Reaction (995 3)   4  Angioedema (995 1) (T78 3XXA)   5  Anxiety (300 00) (F41 9)   6  Arthritis (716 90) (M19 90)   7  Atopic dermatitis (691 8) (L20 9)   8  Atrial fibrillation (427 31) (I48 91)   9  CHF (congestive heart failure) (428 0) (I50 9)   10  Chronic systolic congestive heart failure (428 22,428 0) (I50 22)   11  Colon cancer screening (V76 51) (Z12 11)   12  Constipation (564 00) (K59 00)   13  Contact dermatitis (692 9) (L25 9)   14  Cyst of breast, unspecified laterality (610 0) (N60 09)   15  Dilated cardiomyopathy (425 4) (I42 0)   16  Dysuria (788 1) (R30 0)   17  Edema (782 3) (R60 9)   18  Encounter for routine gynecological examination (V72 31) (Z01 419)   19  Encounter for screening colonoscopy (V76 51) (Z12 11)   20  Encounter for screening mammogram for malignant neoplasm of breast (V76 12)    (Z12 31)   21  Gastroesophageal reflux (530 81) (K21 9)   22  Hyperlipidemia (272 4) (E78 5)   23  Hypertension (401 9) (I10)   24  Iliotibial band syndrome, right (728 89) (M76 31)   25  Insomnia (780 52) (G47 00)   26  Left renal mass (593 9) (N28 89)   27  Medicare annual wellness visit, initial (V70 0) (Z00 00)   28  Medicare annual wellness visit, subsequent (V70 0) (Z00 00)   29  Need for influenza vaccination (V04 81) (Z23)   30  Preoperative clearance (V72 84) (Z01 818)   31  Primary hypothyroidism (244 9) (E03 9)   32  Ptosis, bilateral (374 30) (H02 403)   33  Screening for genitourinary condition (V81 6) (Z13 89)   34  Screening for neurological condition (V80 09) (Z13 89)   35  Sinus bradycardia (427 89) (R00 1)   36  Urinary tract infection (599 0) (N39 0)    Past Medical History  Problems    1  History of atrial fibrillation (V12 59) (Z86 79)  Active Problems And Past Medical History Reviewed: The active problems and past medical history were reviewed and updated today  Surgical History  Problems    1  History of Hernia Repair   2  History of Hysterectomy   3  History of Kidney Surgery  Surgical History Reviewed: The surgical history was reviewed and updated today  Family History  Family History    1  Family history of Cancer   2  Family history of Heart Disease (V17 49)  Family History Reviewed: The family history was reviewed and updated today         Social History  Problems    · Denied: History of Alcohol Use (History)   · Always uses seat belt   · Denied: History of Daily caffeinated cola consumption   · Denied: History of Daily Coffee Consumption (___ Cups/Day)   · Daily Tea Consumption (___ Cups/Day)   · Denied: History of Dental care, regularly   · Education Level: Less than high school   · Exercise: Walking   · Living will in place (V49 89) (Z78 9)   · Marital History -    · Never A Smoker   · No illicit drug use   · No Zoroastrian beliefs   · Power of  in existence   · Retired · Denied: History of Sexually active   · Water intake, adequate (per day)  Social History Reviewed: The social history was reviewed and updated today  The social history was reviewed and is unchanged  Current Meds   1  ALPRAZolam 0 25 MG Oral Tablet; TAKE 1 TABLET 4 TIMES DAILY AS NEEDED; Therapy: 00WMA8251 to (Evaluate:69Bhg9515)  Requested for: 78Eti3073; Last   Rx:23Aug2017 Ordered   2  Amiodarone HCl - 100 MG Oral Tablet; TAKE  1  TABLET Daily  Requested for:   23Aug2017; Last Rx:23Aug2017 Ordered   3  Eucerin External Lotion; APPLY 0 5 INCH Twice daily; Therapy: 98Zho7218 to (Last Rx:23Apr2015)  Requested for: 42Bul2973 Ordered   4  Furosemide 20 MG Oral Tablet; take 2 tablet daily  Requested for: 23Aug2017; Last   Rx:23Aug2017 Ordered   5  Levothyroxine Sodium 25 MCG Oral Tablet; Take 1 tablet daily; Therapy: 68YYI8927 to (Abhishek Santo)  Requested for: 23Aug2017; Last   Rx:23Aug2017 Ordered   6  Linzess 145 MCG Oral Capsule; take 1 capsule daily; Therapy: 07Sep2017 to (Last Rx:07Sep2017) Ordered   7  Polyethylene Glycol 3350 Oral Packet; MIX 1 PACKET IN 8 OUNCES OF LIQUID AND   DRINK ONCE DAILY; Therapy: 23Aug2017 to (Evaluate:98Yiu5328); Last Rx:23Aug2017 Ordered   8  Pradaxa 75 MG Oral Capsule; TAKE 1 CAPSULE TWICE DAILY  Requested for:   23Aug2017; Last Rx:23Aug2017 Ordered   9  Pramosone 1-1 % External Lotion; APPLY SPARINGLY TO AFFECTED AREA(S) 3 TIMES A   DAY; Therapy: 36Tcn1605 to (Evaluate:58Zpc5692)  Requested for: 96Eve7499; Last   Rx:38Non9827 Ordered   10  Rosuvastatin Calcium 5 MG Oral Tablet; 1 TAB QD  Requested for: 04Jrw2377; Last    Rx:23Aug2017 Ordered   11  Voltaren 1 % Transdermal Gel; APPLY TO LOWER EXTREMITIES, 4 GM OF GEL TO    AFFECTED AREA 4 TIMES DAILY  DO NOT APPLY MORE THAN 16 GM DAILY TO ANY    ONE AFFECTED JOINT; Therapy: 72Hwa1768 to (Evaluate:63Ihy1738)  Requested for: 25Apr2017; Last    Rx:25Apr2017 Ordered   12   Zolpidem Tartrate ER 12 5 MG Oral Tablet Extended Release; TAKE 1 TABLET AT    BEDTIME; Therapy: 44BYA6578 to (Evaluate:53Goq2882); Last Rx:95Ksu2308 Ordered  Medication List Reviewed: The medication list was reviewed and updated today  Allergies  Medication    1  Caffeine TABS   2  atorvastatin   3  Cephalexin TABS   4  Citalopram Hydrobromide TABS   5  Methylpred TABS   6  Pravastatin Sodium TABS   7  Fosinopril Sodium TABS   8  Monopril TABS   9  Penicillins   10  Spironolactone TABS   11  Sporanox CAPS  Non-Medication    12  Pollen    Vitals  Vital Signs    Recorded: 30NDQ6890 01:45PM   Heart Rate 54, Apical   Systolic 170, LUE, Sitting   Diastolic 70, LUE, Sitting   Height 5 ft 4 5 in   Weight 172 lb 2 oz   BMI Calculated 29 09   BSA Calculated 1 85     Physical Exam    Constitutional   General appearance: No acute distress, well appearing and well nourished  Eyes   Conjunctiva and Sclera examination: Conjunctiva pink, sclera anicteric  Ears, Nose, Mouth, and Throat - Oropharynx: Clear, nares are clear, mucous membranes are moist    Neck   Neck and thyroid: Normal, supple, trachea midline, no thyromegaly  Pulmonary   Respiratory effort: No increased work of breathing or signs of respiratory distress  Auscultation of lungs: Abnormal   Auscultation of the lungs revealed decreased breath sounds diffusely  no rales or crackles were heard bilaterally  no rhonchi  no wheezing  Cardiovascular   Auscultation of heart: Abnormal   The heart rate was bradycardic  The rhythm was regular  Heart sounds: normal S1,-- normal S2-- and-- no gallop heard  A grade 2 systolic murmur was heard at the RUSB  Carotid pulses: Normal, 2+ bilaterally  Peripheral vascular exam: Normal pulses throughout, no tenderness, erythema or swelling  Pedal pulses: Normal, 2+ bilaterally  Examination of extremities for edema and/or varicosities: Abnormal   bilateral ankle 0 5+ pitting edema-- and-- bilateral pretibial 0 5+ pitting edema     Abdomen Abdomen: Non-tender and no distention  Liver and spleen: No hepatomegaly or splenomegaly  Musculoskeletal Gait and station: Normal gait  -- Digits and nails: Normal without clubbing or cyanosis  -- Inspection/palpation of joints, bones, and muscles: Normal, ROM normal     Skin - Skin and subcutaneous tissue: Normal without rashes or lesions  Skin is warm and well perfused, normal turgor  Neurologic - Cranial nerves: II - XII intact  -- Speech: Normal     Psychiatric - Orientation to person, place, and time: Normal -- Mood and affect: Normal       Results/Data  ECG Report:   Rhythm and rate: sinus bradycardia--   ventricular rate is 54 beats per minute  T waves:   there are nonspecific ST-T wave changes  Comparison to prior ECGs: no interval change        Future Appointments    Date/Time Provider Specialty Site   01/03/2018 10:45 AM Dino Rosas DO [address]     Signatures   Electronically signed by : MAXWELL López ; Oct  2 2017  2:33PM EST                       (Author)

## 2017-12-19 ENCOUNTER — APPOINTMENT (OUTPATIENT)
Dept: LAB | Facility: CLINIC | Age: 82
End: 2017-12-19
Payer: COMMERCIAL

## 2017-12-19 DIAGNOSIS — I48.91 ATRIAL FIBRILLATION (HCC): ICD-10-CM

## 2017-12-19 DIAGNOSIS — E78.5 HYPERLIPIDEMIA: ICD-10-CM

## 2017-12-19 DIAGNOSIS — I50.22 CHRONIC SYSTOLIC CONGESTIVE HEART FAILURE (HCC): ICD-10-CM

## 2017-12-19 LAB
ALBUMIN SERPL BCP-MCNC: 3.6 G/DL (ref 3.5–5)
ALP SERPL-CCNC: 78 U/L (ref 46–116)
ALT SERPL W P-5'-P-CCNC: 23 U/L (ref 12–78)
ANION GAP SERPL CALCULATED.3IONS-SCNC: 4 MMOL/L (ref 4–13)
AST SERPL W P-5'-P-CCNC: 19 U/L (ref 5–45)
BILIRUB SERPL-MCNC: 0.92 MG/DL (ref 0.2–1)
BUN SERPL-MCNC: 29 MG/DL (ref 5–25)
CALCIUM SERPL-MCNC: 9 MG/DL (ref 8.3–10.1)
CHLORIDE SERPL-SCNC: 104 MMOL/L (ref 100–108)
CHOLEST SERPL-MCNC: 100 MG/DL (ref 50–200)
CO2 SERPL-SCNC: 29 MMOL/L (ref 21–32)
CREAT SERPL-MCNC: 1.87 MG/DL (ref 0.6–1.3)
ERYTHROCYTE [DISTWIDTH] IN BLOOD BY AUTOMATED COUNT: 13 % (ref 11.6–15.1)
GFR SERPL CREATININE-BSD FRML MDRD: 23 ML/MIN/1.73SQ M
GLUCOSE P FAST SERPL-MCNC: 97 MG/DL (ref 65–99)
HCT VFR BLD AUTO: 44.2 % (ref 34.8–46.1)
HDLC SERPL-MCNC: 39 MG/DL (ref 40–60)
HGB BLD-MCNC: 15.1 G/DL (ref 11.5–15.4)
LDLC SERPL CALC-MCNC: 34 MG/DL (ref 0–100)
MCH RBC QN AUTO: 32.7 PG (ref 26.8–34.3)
MCHC RBC AUTO-ENTMCNC: 34.2 G/DL (ref 31.4–37.4)
MCV RBC AUTO: 96 FL (ref 82–98)
PLATELET # BLD AUTO: 187 THOUSANDS/UL (ref 149–390)
PMV BLD AUTO: 10.8 FL (ref 8.9–12.7)
POTASSIUM SERPL-SCNC: 4.1 MMOL/L (ref 3.5–5.3)
PROT SERPL-MCNC: 7.2 G/DL (ref 6.4–8.2)
RBC # BLD AUTO: 4.62 MILLION/UL (ref 3.81–5.12)
SODIUM SERPL-SCNC: 137 MMOL/L (ref 136–145)
TRIGL SERPL-MCNC: 137 MG/DL
WBC # BLD AUTO: 5.63 THOUSAND/UL (ref 4.31–10.16)

## 2017-12-19 PROCEDURE — 80061 LIPID PANEL: CPT

## 2017-12-19 PROCEDURE — 36415 COLL VENOUS BLD VENIPUNCTURE: CPT

## 2017-12-19 PROCEDURE — 80053 COMPREHEN METABOLIC PANEL: CPT

## 2017-12-19 PROCEDURE — 85027 COMPLETE CBC AUTOMATED: CPT

## 2018-01-03 ENCOUNTER — ALLSCRIPTS OFFICE VISIT (OUTPATIENT)
Dept: OTHER | Facility: OTHER | Age: 83
End: 2018-01-03

## 2018-01-03 DIAGNOSIS — I10 ESSENTIAL (PRIMARY) HYPERTENSION: ICD-10-CM

## 2018-01-03 DIAGNOSIS — E03.9 HYPOTHYROIDISM: ICD-10-CM

## 2018-01-03 DIAGNOSIS — E78.5 HYPERLIPIDEMIA: ICD-10-CM

## 2018-01-04 NOTE — PROGRESS NOTES
Assessment   1  Hypertension (401 9) (I10)   2  Hyperlipidemia (272 4) (E78 5)   3  Primary hypothyroidism (244 9) (E03 9)   4  Allergic conjunctivitis (372 14) (H10 10)   5  Atopic dermatitis (691 8) (L20 9)    Plan   Allergic conjunctivitis    · Olopatadine HCl - 0 1 % Ophthalmic Solution (Patanol); INSTILL 1 DROP INTO    AFFECTED EYE(S) TWICE DAILY AS DIRECTED  Anxiety    · ALPRAZolam 0 25 MG Oral Tablet; TAKE 1 TABLET 4 TIMES DAILY AS NEEDED  Atopic dermatitis    · Triamcinolone Acetonide 0 1 % External Cream; APPLY  AND RUB  IN A THIN FILM    TO AFFECTED AREAS TWICE DAILY  (AM AND PM)  Hyperlipidemia    · (1) LIPID PANEL FASTING W DIRECT LDL REFLEX; Status:Active; Requested    DBC:53VNR2188;   Hypertension    · (1) COMPREHENSIVE METABOLIC PANEL; Status:Active; Requested CWP:[id number]; Insomnia    · Zolpidem Tartrate ER 12 5 MG Oral Tablet Extended Release; TAKE 1 TABLET AT    BEDTIME  Primary hypothyroidism    · (1) TSH; Status:Active; Requested JKX:[id number]; Discussion/Summary      If the patient's itchy eyes and dry skin is not resolve with using Eucerin cream and a topical steroid as needed she is to call  I will see her back in 4 months time and she will have her labs completed prior to that visit  Chief Complaint   4MO CK UP    Patient is here today for follow up of chronic conditions described in HPI  History of Present Illness   Patient presents for follow-up of hypertension, hypothyroidism and hypercholesterolemia  All are well controlled at this time  She complains of itchy skin dry patches on her left shoulder area and dry itchy eyes they get irritated very easily  She denies any discolored drainage from her eyes at this time  Review of Systems        Constitutional: No fever, no chills, feels well, no tiredness, no recent weight gain or weight loss        Eyes: dryness of the eyes-- and-- itching of the eyes, but-- No complaints of eye pain, no red eyes, no eyesight problems, no discharge, no dry eyes, no itching of eyes  ENT: no complaints of earache, no loss of hearing, no nose bleeds, no nasal discharge, no sore throat, no hoarseness  Cardiovascular: No complaints of slow heart rate, no fast heart rate, no chest pain, no palpitations, no leg claudication, no lower extremity edema  Respiratory: No complaints of shortness of breath, no wheezing, no cough, no SOB on exertion, no orthopnea, no PND  Gastrointestinal: No complaints of abdominal pain, no constipation, no nausea or vomiting, no diarrhea, no bloody stools  Genitourinary: No complaints of dysuria, no incontinence, no pelvic pain, no dysmenorrhea, no vaginal discharge or bleeding  Musculoskeletal: No complaints of arthralgias, no myalgias, no joint swelling or stiffness, no limb pain or swelling  Integumentary: a rash-- and-- itching, but-- No complaints of skin rash or lesions, no itching, no skin wounds, no breast pain or lump  Neurological: No complaints of headache, no confusion, no convulsions, no numbness, no dizziness or fainting, no tingling, no limb weakness, no difficulty walking  Psychiatric: Not suicidal, no sleep disturbance, no anxiety or depression, no change in personality, no emotional problems  Endocrine: No complaints of proptosis, no hot flashes, no muscle weakness, no deepening of the voice, no feelings of weakness  Hematologic/Lymphatic: No complaints of swollen glands, no swollen glands in the neck, does not bleed easily, does not bruise easily  Preventive Quality 65 and Older: Falls Risk: The patient fell 0 times in the past 12 months  The patient is currently asymptomatic Symptoms Include: The patient currently has no urinary incontinence symptoms  Active Problems   1  Acute pharyngitis (462) (J02 9)   2  Acute sinusitis (461 9) (J01 90)   3  Allergic Reaction (995 3)   4  Angioedema (995 1) (T78 3XXA)   5   Anxiety (300 00) (F41 9) 6  Arthritis (716 90) (M19 90)   7  Atopic dermatitis (691 8) (L20 9)   8  Atrial fibrillation (427 31) (I48 91)   9  CHF (congestive heart failure) (428 0) (I50 9)   10  Chronic systolic congestive heart failure (428 22,428 0) (I50 22)   11  Colon cancer screening (V76 51) (Z12 11)   12  Constipation (564 00) (K59 00)   13  Contact dermatitis (692 9) (L25 9)   14  Cyst of breast, unspecified laterality (610 0) (N60 09)   15  Dilated cardiomyopathy (425 4) (I42 0)   16  Dysuria (788 1) (R30 0)   17  Edema (782 3) (R60 9)   18  Encounter for routine gynecological examination (V72 31) (Z01 419)   19  Encounter for screening colonoscopy (V76 51) (Z12 11)   20  Encounter for screening mammogram for malignant neoplasm of breast (V76 12)      (Z12 31)   21  Gastroesophageal reflux (530 81) (K21 9)   22  Hyperlipidemia (272 4) (E78 5)   23  Hypertension (401 9) (I10)   24  Iliotibial band syndrome, right (728 89) (M76 31)   25  Insomnia (780 52) (G47 00)   26  Left renal mass (593 9) (N28 89)   27  Medicare annual wellness visit, initial (V70 0) (Z00 00)   28  Medicare annual wellness visit, subsequent (V70 0) (Z00 00)   29  Need for influenza vaccination (V04 81) (Z23)   30  Preoperative clearance (V72 84) (Z01 818)   31  Primary hypothyroidism (244 9) (E03 9)   32  Ptosis, bilateral (374 30) (H02 403)   33  Screening for genitourinary condition (V81 6) (Z13 89)   34  Screening for neurological condition (V80 09) (Z13 89)   35  Sinus bradycardia (427 89) (R00 1)   36  Urinary tract infection (599 0) (N39 0)    Past Medical History   1  History of atrial fibrillation (V12 59) (Z86 79)     The active problems and past medical history were reviewed and updated today  Surgical History   1  History of Hernia Repair   2  History of Hysterectomy   3  History of Kidney Surgery     The surgical history was reviewed and updated today  Family History   Family History    1  Family history of Cancer   2   Family history of Heart Disease (V17 49)     The family history was reviewed and updated today  Social History    · Denied: History of Alcohol Use (History)   · Always uses seat belt   · Denied: History of Daily caffeinated cola consumption   · Denied: History of Daily Coffee Consumption (___ Cups/Day)   · Daily Tea Consumption (___ Cups/Day)   · Denied: History of Dental care, regularly   · Education Level: Less than high school   · Exercise: Walking   · Living will in place (V49 89) (Z78 9)   · Marital History -    · Never A Smoker   · No illicit drug use   · No Gnosticist beliefs   · Power of  in existence   · Retired   · Denied: History of Sexually active   · Water intake, adequate (per day)  The social history was reviewed and updated today  The social history was reviewed and is unchanged  Current Meds    1  ALPRAZolam 0 25 MG Oral Tablet; TAKE 1 TABLET 4 TIMES DAILY AS NEEDED; Therapy: 32UGD7538 to (Evaluate:10Jan2018)  Requested for: 08Ehf0579; Last     Rx:94Cee0225 Ordered   2  Amiodarone HCl - 100 MG Oral Tablet; TAKE  1  TABLET Daily  Requested for:     80Xvf1706; Last Rx:17Yfy6308 Ordered   3  Eucerin External Lotion; APPLY 0 5 INCH Twice daily; Therapy: 84Iwx9030 to (Last Rx:23Apr2015)  Requested for: 87Mdx4460 Ordered   4  Furosemide 20 MG Oral Tablet; TAKE 2 TABLETS ONCE DAILY; Therapy: 20RFP3417 to [phone])  Requested for: 81Qqf4708; Last     Rx:95Avc0213 Ordered   5  Levothyroxine Sodium 25 MCG Oral Tablet; Take 1 tablet daily; Therapy: 05XPI9629 to (Matteo Peraza)  Requested for: 72Sls8642; Last     Rx:43Cpi7103 Ordered   6  Linzess 145 MCG Oral Capsule; take 1 capsule daily; Therapy: 07Sep2017 to (Last Rx:07Sep2017) Ordered   7  Polyethylene Glycol 3350 Oral Packet; MIX 1 PACKET IN 8 OUNCES OF LIQUID AND     DRINK ONCE DAILY; Therapy: 33Bjc6842 to (Evaluate:14Cvl6016); Last Rx:53Hsx5337 Ordered   8   Pradaxa 75 MG Oral Capsule; TAKE 1 CAPSULE TWICE DAILY  Requested for:     17WFI4334; Last Rx:02Oct2017 Ordered   9  Pramosone 1-1 % External Lotion; APPLY SPARINGLY TO AFFECTED AREA(S) 3 TIMES A     DAY; Therapy: 22Apr2015 to (Evaluate:05Sjl7326)  Requested for: 22Apr2015; Last     Rx:23Juq7078 Ordered   10  Rosuvastatin Calcium 5 MG Oral Tablet; 1 TAB QD  Requested for: 56Vuo5348; Last      Rx:18Gtt0363 Ordered   11  Voltaren 1 % Transdermal Gel; APPLY TO LOWER EXTREMITIES, 4 GM OF GEL TO      AFFECTED AREA 4 TIMES DAILY  DO NOT APPLY MORE THAN 16 GM DAILY TO ANY      ONE AFFECTED JOINT; Therapy: 34Ctj1375 to (Evaluate:80Rqe2500)  Requested for: 25Apr2017; Last      Rx:03Bhr7027 Ordered   12  Zolpidem Tartrate ER 12 5 MG Oral Tablet Extended Release; TAKE 1 TABLET AT      BEDTIME; Therapy: 63HHX8075 to (Evaluate:31Jan2018); Last Rx:03Oct2017 Ordered     The medication list was reviewed and updated today  Allergies   1  Caffeine TABS   2  atorvastatin   3  Cephalexin TABS   4  Citalopram Hydrobromide TABS   5  Methylpred TABS   6  Pravastatin Sodium TABS   7  Fosinopril Sodium TABS   8  Monopril TABS   9  Penicillins   10  Spironolactone TABS   11  Sporanox CAPS  12  Pollen    Vitals   Vital Signs    Recorded: 32WJP2399 10:49AM   Temperature 97 9 F   Heart Rate 68   Systolic 150 mm Hg   Diastolic 74 mm Hg   Height 5 ft 4 5 in   Weight 171 lb 6 oz   BMI Calculated 28 96 kg/m2   BSA Calculated 1 84 m2     Physical Exam        Constitutional      General appearance: No acute distress, well appearing and well nourished  Head and Face      Head and face: Normal        Palpation of the face and sinuses: No sinus tenderness  Eyes      Conjunctiva and lids: Abnormal  -- Positive conjunctival injection bilaterally  Pupils and irises: Equal, round, reactive to light  Ophthalmoscopic examination: Abnormal  -- School area pink bilaterally        Ears, Nose, Mouth, and Throat      External inspection of ears and nose: Normal  Otoscopic examination: Tympanic membranes translucent with normal light reflex  Canals patent without erythema  Hearing: Normal        Nasal mucosa, septum, and turbinates: Normal without edema or erythema  Lips, teeth, and gums: Normal, good dentition  Oropharynx: Normal with no erythema, edema, exudate or lesions  Neck      Neck: Supple, symmetric, trachea midline, no masses  Thyroid: Normal, no thyromegaly  Pulmonary      Respiratory effort: No increased work of breathing or signs of respiratory distress  Auscultation of lungs: Clear to auscultation  Cardiovascular      Auscultation of heart: Abnormal  -- irregularly regular  Carotid pulses: 2+ bilaterally  Pedal pulses: 2+ bilaterally  Peripheral vascular exam: Normal        Examination of extremities for edema and/or varicosities: Normal        Abdomen      Abdomen: Non-tender, no masses  Liver and spleen: No hepatomegaly or splenomegaly  Lymphatic      Palpation of lymph nodes in neck: No lymphadenopathy  Musculoskeletal      Digits and nails: Normal without clubbing or cyanosis  Joints, bones, and muscles: Abnormal  -- Positive tenderness on palpation of the right iliotibial band been negative pain with flexion and extension of the hip itself  Skin      Skin and subcutaneous tissue: Abnormal  -- Dry scaling skin on the left shoulder area which is slightly erythematous  Palpation of skin and subcutaneous tissue: Normal turgor  Neurologic      Reflexes: 2+ and symmetric  Psychiatric      Judgment and insight: Normal        Orientation to person, place, and time: Normal        Recent and remote memory: Intact         Mood and affect: Normal        Results/Data   PHQ-2 Adult Depression Screening 89ROV9371 10:53AM User, s      Test Name Result Flag Reference   PHQ-2 Adult Depression Score 0     Over the last two weeks, how often have you been bothered by any of the following problems? Little interest or pleasure in doing things: Not at all - 0     Feeling down, depressed, or hopeless: Not at all - 0   PHQ-2 Adult Depression Screening Negative          Future Appointments      Date/Time Provider Specialty Site   01/09/2018 11:00 AM [facility]WE[facility]عر[facility]   Cardiology [address]     Signatures    Electronically signed by : Trish Paula DO; Saad  3 2018 11:21AM EST                       (Author)

## 2018-01-11 NOTE — MISCELLANEOUS
History of Present Illness    The patient is being contacted for follow-up after hospitalization  This was not a readmission  The date of discharge was 4/6/17  She has a high risk for readmission  Spoke with patient's family  Patient's daughter, Karrin Lesches who patient is living with now  [facility]  The name and phone number of the home health agency is [facility]  The patient's discharge weight was 171  The patient's weight today is 167  Patient is experiencing the following symptoms: fatigue  Knowledge Assessment/Teachback Questions: the patient is following diet, foods to limit/avoid, the patient is obtaining daily weights, she knows the name of her medications/water pill, the patient understands the activity/exercise plan and the patient knows when to report symptoms  Counseling was provided to patient's family  Topics counseled included diet, need for daily weights, medications, importance of compliance with treatment, symptoms to report and Need for closer provider follow up  HF[facility] Additional Notes: Made an appointment with Anson Diaz our HF NP for Tuesday, April 18, 2017  No provider appointment as yet  Also spoke to Luis Gutiérrez who reports patient's lasix and metoprolol were decreased by half starting today and patient's HR was 55 today /72  Patient still fatigued, but no dizziness  Current Meds   1  ALPRAZolam 0 25 MG Oral Tablet; TAKE ONE HALF (1/2) TO ONE (1) TABLET(S)THREE   TIMES DAILY AS NEEDED; Therapy: 45LWO7519 to (Evaluate:04Apr2017)  Requested for: 13ESA9541; Last   Rx:05Mar2017 Ordered   2  Atorvastatin Calcium 10 MG Oral Tablet; TAKE 1 TABLET DAILY; Therapy: 87Clw1321 to (Evaluate:21Jun2017); Last Rx:53Uqy0849 Ordered   3  Belsomra 15 MG Oral Tablet; TAKE 1 TABLET AT BEDTIME AS NEEDED FOR SLEEP; Therapy: 37TEZ8313 to (Evaluate:15Mar2017); Last Rx:05Mar2017 Ordered   4  Bystolic 10 MG Oral Tablet; Take 1 tablet daily;    Therapy: 89LHC9047 to (Evaluate:23Yrp0927); Last Rx:25Nov2014 Ordered   5  Daily Multivitamins/Iron TABS; Take 1 tablet once daily Recorded   6  Eucerin External Lotion; APPLY 0 5 INCH Twice daily; Therapy: 55Qcx7941 to (Last Rx:87Krg7785)  Requested for: 10Fyz1246 Ordered   7  Furosemide 20 MG Oral Tablet; take 1/2 tablet daily; Therapy: 97Rdd1665 to Recorded   8  Levothyroxine Sodium 25 MCG Oral Tablet; TAKE ONE (1) TABLET daily; Therapy: 65TAB9843 to (Cloteal Raw)  Requested for: 90PPH3025; Last   Rx:77Uzc2180 Ordered   9  Meloxicam 7 5 MG Oral Tablet; Take 1 tablet daily; Therapy: 18ZHQ2382 to 447 1209)  Requested for: ; Last   Rx:85Ahv6361 Ordered   10  Metoprolol Succinate ER 50 MG Oral Tablet Extended Release 24 Hour; Take 1 tablet    daily; Therapy: 48Qtu9517 to (Evaluate:07Nov2017) Recorded   11  Pramosone 1-1 % External Lotion; APPLY SPARINGLY TO AFFECTED AREA(S) 3 TIMES A    DAY; Therapy: 17Jxr1675 to (Evaluate:77Taj1297)  Requested for: 13Vwq3983; Last    Rx:74Hal8973 Ordered   12  Probiotic CAPS Recorded   13  Spironolactone 25 MG Oral Tablet (Aldactone); Take 1 tablet daily; Therapy: 53CQR5987 to (Last Rx:13Qdu5254)  Requested for: 30EMQ4337 Ordered   14  Zolpidem Tartrate ER 12 5 MG Oral Tablet Extended Release; TAKE 1 TABLET AT    BEDTIME; Therapy: 43JFQ7486 to (Evaluate:22Xdj3054); Last YH:34MUN8122 Ordered    Future Appointments    Date/Time Provider Specialty Site   05/01/2017 03:40 PM MAXWELL Garrido  Cardiology [facility]     Allergies    1  Caffeine TABS   2  atorvastatin   3  Cephalexin TABS   4  Citalopram Hydrobromide TABS   5  Methylpred TABS   6  Pravastatin Sodium TABS   7  Fosinopril Sodium TABS   8  Monopril TABS   9  Penicillins   10  Sporanox CAPS    11   Pollen    Signatures   Electronically signed by : Randa Nolasco, ; Apr 12 2017  3:39PM EST                       (Author)

## 2018-01-11 NOTE — MISCELLANEOUS
Message  Moon 78 RN called with HR 51- /87 first am and then 130/70 after am meds  Daughter only gave patient 1/4 of 50 mg tablet of metoprolol (12 5 mg)  No symptoms other than "tired"  Discussed with Guerline Olsen and will go back to 200 mg of amiodarone and stop the metoprolol  E-cat still not arrived to home  Will follow up  Offered follow up visit with HF NP, Guerline Olsen next week  Active Problems    1  Acute pharyngitis (462) (J02 9)   2  Acute sinusitis (461 9) (J01 90)   3  Allergic Reaction (995 3)   4  Angioedema (995 1) (T78 3XXA)   5  Anxiety (300 00) (F41 9)   6  Arthritis (716 90) (M19 90)   7  Atopic dermatitis (691 8) (L20 9)   8  Atrial fibrillation (427 31) (I48 91)   9  CHF (congestive heart failure) (428 0) (I50 9)   10  Chronic systolic congestive heart failure (428 22,428 0) (I50 22)   11  Colon cancer screening (V76 51) (Z12 11)   12  Constipation (564 00) (K59 00)   13  Contact dermatitis (692 9) (L25 9)   14  Cyst of breast, unspecified laterality (610 0) (N60 09)   15  Dilated cardiomyopathy (425 4) (I42 0)   16  Dysuria (788 1) (R30 0)   17  Edema (782 3) (R60 9)   18  Encounter for routine gynecological examination (V72 31) (Z01 419)   19  Encounter for screening colonoscopy (V76 51) (Z12 11)   20  Encounter for screening mammogram for malignant neoplasm of breast (V76 12)    (Z12 31)   21  Gastroesophageal reflux (530 81) (K21 9)   22  Hyperlipidemia (272 4) (E78 5)   23  Hypertension (401 9) (I10)   24  Iliotibial band syndrome, right (728 89) (M76 31)   25  Insomnia (780 52) (G47 00)   26  Left renal mass (593 9) (N28 89)   27  Medicare annual wellness visit, initial (V70 0) (Z00 00)   28  Need for influenza vaccination (V04 81) (Z23)   29  Preoperative clearance (V72 84) (Z01 818)   30  Primary hypothyroidism (244 9) (E03 9)   31  Ptosis, bilateral (374 30) (H02 403)   32  Screening for genitourinary condition (V81 6) (Z13 89)   33   Screening for neurological condition (V80 09) (Z13 89)   34  Sinus bradycardia (427 89) (R00 1)   35  Urinary tract infection (599 0) (N39 0)    Current Meds   1  ALPRAZolam 0 25 MG Oral Tablet; TAKE ONE HALF (1/2) TO ONE (1) TABLET(S)THREE   TIMES DAILY AS NEEDED; Therapy: 84NVJ4359 to (Evaluate:04Apr2017)  Requested for: 94BYO5090; Last   Rx:55Xpv1638 Ordered   2  Amiodarone HCl - 200 MG Oral Tablet; TAKE 1/2  TABLET DAILY; Therapy: 89Icy0734 to (Nyoka Venedocia)  Requested for: 37JHM8340; Last   LD:04RXI3607 Ordered   3  Crestor 5 MG Oral Tablet (Rosuvastatin Calcium); Take 1 tablet daily; Therapy: (Recorded:20Apr2017) to Recorded   4  Eucerin External Lotion; APPLY 0 5 INCH Twice daily; Therapy: 36Dnk0445 to (Last Rx:98Ocw2514)  Requested for: 53Zpw1303 Ordered   5  Furosemide 20 MG Oral Tablet; take 1/2 tablet daily; Therapy: 09Sdj1500 to (Evaluate:27Nov2017)  Requested for: 54QUW5076; Last   TN:00ALO8845 Ordered   6  Levothyroxine Sodium 25 MCG Oral Tablet; TAKE ONE (1) TABLET daily; Therapy: 35ZML7992 to (Amarilys Almeida)  Requested for: 83JPO7963; Last   Rx:64Hqc4866 Ordered   7  Metoprolol Succinate ER 25 MG Oral Tablet Extended Release 24 Hour; TAKE 1 TABLET   DAILY; Therapy: 12DTO1272 to (Evaluate:07Jun2017); Last PL:90HSB9577 Ordered   8  Pradaxa 75 MG Oral Capsule; TAKE 1 CAPSULE TWICE DAILY  Requested for:   17JFN6016; Last VI:72ZCU2916 Ordered   9  Pramosone 1-1 % External Lotion; APPLY SPARINGLY TO AFFECTED AREA(S) 3   TIMES A DAY; Therapy: 51Pbw6480 to (Evaluate:25Tph5716)  Requested for: 52Fxk1762; Last   Rx:12Tix9433 Ordered   10  Voltaren 1 % Transdermal Gel (Diclofenac Sodium); APPLY TO LOWER EXTREMITIES,    4 GM OF GEL TO AFFECTED AREA 4 TIMES DAILY  DO NOT    APPLY MORE THAN 16 GM DAILY TO ANY ONE AFFECTED JOINT; Therapy: 99Jps4655 to (Evaluate:04Lug9882)  Requested for: 25Apr2017; Last    Rx:25Apr2017 Ordered   11   Zolpidem Tartrate ER 12 5 MG Oral Tablet Extended Release; TAKE 1 TABLET AT    BEDTIME; Therapy: 34YQQ5724 to (Evaluate:69Iyp1998); Last Rx:12Apr2017 Ordered    Allergies    1  Caffeine TABS   2  atorvastatin   3  Cephalexin TABS   4  Citalopram Hydrobromide TABS   5  Methylpred TABS   6  Pravastatin Sodium TABS   7  Fosinopril Sodium TABS   8  Monopril TABS   9  Penicillins   10  Spironolactone TABS   11  Sporanox CAPS    12   Pollen    Signatures   Electronically signed by : Joan Chau, ; May 11 2017  1:21PM EST                       (Author)

## 2018-01-12 NOTE — MISCELLANEOUS
Message  Patient's daughter called to report Li's /94 and feels "unwell and ttouble sleeping when it is this high  HR is 70's on no metoprolol and 200 mg amiodarone  Discussed with Mikey Beavers and will add 5 mg amlodipine  Daughter updated and will keep us informed of BP response  Active Problems    1  Acute pharyngitis (462) (J02 9)   2  Acute sinusitis (461 9) (J01 90)   3  Allergic Reaction (995 3)   4  Angioedema (995 1) (T78 3XXA)   5  Anxiety (300 00) (F41 9)   6  Arthritis (716 90) (M19 90)   7  Atopic dermatitis (691 8) (L20 9)   8  Atrial fibrillation (427 31) (I48 91)   9  CHF (congestive heart failure) (428 0) (I50 9)   10  Chronic systolic congestive heart failure (428 22,428 0) (I50 22)   11  Colon cancer screening (V76 51) (Z12 11)   12  Constipation (564 00) (K59 00)   13  Contact dermatitis (692 9) (L25 9)   14  Cyst of breast, unspecified laterality (610 0) (N60 09)   15  Dilated cardiomyopathy (425 4) (I42 0)   16  Dysuria (788 1) (R30 0)   17  Edema (782 3) (R60 9)   18  Encounter for routine gynecological examination (V72 31) (Z01 419)   19  Encounter for screening colonoscopy (V76 51) (Z12 11)   20  Encounter for screening mammogram for malignant neoplasm of breast (V76 12)    (Z12 31)   21  Gastroesophageal reflux (530 81) (K21 9)   22  Hyperlipidemia (272 4) (E78 5)   23  Hypertension (401 9) (I10)   24  Iliotibial band syndrome, right (728 89) (M76 31)   25  Insomnia (780 52) (G47 00)   26  Left renal mass (593 9) (N28 89)   27  Medicare annual wellness visit, initial (V70 0) (Z00 00)   28  Need for influenza vaccination (V04 81) (Z23)   29  Preoperative clearance (V72 84) (Z01 818)   30  Primary hypothyroidism (244 9) (E03 9)   31  Ptosis, bilateral (374 30) (H02 403)   32  Screening for genitourinary condition (V81 6) (Z13 89)   33  Screening for neurological condition (V80 09) (Z13 89)   34  Sinus bradycardia (427 89) (R00 1)   35   Urinary tract infection (599 0) (N39 0)    Current Meds   1  ALPRAZolam 0 25 MG Oral Tablet; TAKE ONE HALF (1/2) TO ONE (1) TABLET(S)THREE   TIMES DAILY AS NEEDED; Therapy: 33EEY5375 to (Evaluate:04Apr2017)  Requested for: 20LFA7514; Last   Rx:05Mar2017 Ordered   2  Amiodarone HCl - 200 MG Oral Tablet; TAKE 1 TABLET DAILY; Therapy: 91Ikv5560 to (Evaluate:11Nov2017)  Requested for: 36IUJ4074; Last   Rx:42Gkz5856; Status: ACTIVE - Retrospective Authorization Ordered   3  AmLODIPine Besylate 5 MG Oral Tablet (Norvasc); TAKE 1 TABLET DAILY; Therapy: 93GLY3365 to (Evaluate:05Fse9481)  Requested for: 79SXO1867; Last   Rx:12Kny7048; Status: ACTIVE - Retrospective Authorization Ordered   4  Crestor 5 MG Oral Tablet (Rosuvastatin Calcium); Take 1 tablet daily; Therapy: (Recorded:20Apr2017) to Recorded   5  Eucerin External Lotion; APPLY 0 5 INCH Twice daily; Therapy: 52Kwq1451 to (Last Rx:85Gxp3229)  Requested for: 46Qyk6926 Ordered   6  Furosemide 20 MG Oral Tablet; take 1/2 tablet daily; Therapy: 60Izt3245 to (Evaluate:27Nov2017)  Requested for: 09DDJ9357; Last   BI:53JKY2201 Ordered   7  Levothyroxine Sodium 25 MCG Oral Tablet; TAKE ONE (1) TABLET daily; Therapy: 91SBL3360 to (Elian Marin)  Requested for: 59TMF7010; Last   Rx:01Nvh9708 Ordered   8  Pradaxa 75 MG Oral Capsule; TAKE 1 CAPSULE TWICE DAILY  Requested for:   71MUH8868; Last GN:51FSU6296 Ordered   9  Pramosone 1-1 % External Lotion; APPLY SPARINGLY TO AFFECTED AREA(S) 3   TIMES A DAY; Therapy: 19Zxa1354 to (Evaluate:96Cab1508)  Requested for: 42Wwh2973; Last   Rx:50Evd1119 Ordered   10  Voltaren 1 % Transdermal Gel (Diclofenac Sodium); APPLY TO LOWER EXTREMITIES,    4 GM OF GEL TO AFFECTED AREA 4 TIMES DAILY  DO NOT    APPLY MORE THAN 16 GM DAILY TO ANY ONE AFFECTED JOINT; Therapy: 52Rli1923 to (Evaluate:10Rhx7959)  Requested for: 25Apr2017; Last    Rx:25Apr2017 Ordered   11   Zolpidem Tartrate ER 12 5 MG Oral Tablet Extended Release; TAKE 1 TABLET AT    BEDTIME; Therapy: 50PMP8499 to (Evaluate:11Jun2017); Last Rx:34Pql9433 Ordered    Allergies    1  Caffeine TABS   2  atorvastatin   3  Cephalexin TABS   4  Citalopram Hydrobromide TABS   5  Methylpred TABS   6  Pravastatin Sodium TABS   7  Fosinopril Sodium TABS   8  Monopril TABS   9  Penicillins   10  Spironolactone TABS   11  Sporanox CAPS    12   Pollen    Signatures   Electronically signed by : Varinder Kyle, ; May 15 2017  1:41PM EST                       (Author)

## 2018-01-12 NOTE — MISCELLANEOUS
History of Present Illness  A call was received from   Status Check: BP:110/70; 140/70  and HR:45-55  Patient is experiencing the following symptoms: fatigue  Concerns expressed today consisted of: daughter called to report concern over HR reading from home BP cuff of 45  [facility] RN had been there earlier today and was 54  No dizziness, only fatigue  Daughter also reports this is anniversary of her 's death today and maybe contibuting to patient's anxiety  HFCC Additional Notes: Discussed with [facility], NP and offered daughter to bring mom in for a holter or EKG but declined  Since no dizziness and amiodarone is due to decrease to 200 gm daily (from 200 mg tid) - we will just watch for now  Instructed daughter to watch for s/s worsening bradycardia- change in mentation, lower BP/HR or dizziness and bring mom to ED or call 911  Future Appointments    Date/Time Provider Specialty Site   04/18/2017 03:00 PM Kranthi Meraz [facility]   05/01/2017 03:40 PM MAXWELL Nicole   Cardiology [address]     Signatures   Electronically signed by : Odilon Major, ; Apr 13 2017  4:36PM EST                       (Author)

## 2018-01-13 VITALS
DIASTOLIC BLOOD PRESSURE: 60 MMHG | BODY MASS INDEX: 28.66 KG/M2 | HEIGHT: 65 IN | SYSTOLIC BLOOD PRESSURE: 108 MMHG | TEMPERATURE: 98.2 F | WEIGHT: 172 LBS | HEART RATE: 70 BPM

## 2018-01-13 VITALS
TEMPERATURE: 97 F | HEIGHT: 65 IN | DIASTOLIC BLOOD PRESSURE: 76 MMHG | HEART RATE: 76 BPM | OXYGEN SATURATION: 93 % | SYSTOLIC BLOOD PRESSURE: 108 MMHG | WEIGHT: 184 LBS | BODY MASS INDEX: 30.66 KG/M2

## 2018-01-13 VITALS
TEMPERATURE: 97.7 F | BODY MASS INDEX: 28.7 KG/M2 | RESPIRATION RATE: 20 BRPM | DIASTOLIC BLOOD PRESSURE: 72 MMHG | HEIGHT: 65 IN | WEIGHT: 172.25 LBS | SYSTOLIC BLOOD PRESSURE: 120 MMHG | HEART RATE: 71 BPM

## 2018-01-13 VITALS
DIASTOLIC BLOOD PRESSURE: 70 MMHG | HEIGHT: 65 IN | SYSTOLIC BLOOD PRESSURE: 122 MMHG | WEIGHT: 172.13 LBS | HEART RATE: 54 BPM | BODY MASS INDEX: 28.68 KG/M2

## 2018-01-14 VITALS
HEART RATE: 70 BPM | OXYGEN SATURATION: 90 % | BODY MASS INDEX: 28.49 KG/M2 | DIASTOLIC BLOOD PRESSURE: 80 MMHG | SYSTOLIC BLOOD PRESSURE: 126 MMHG | WEIGHT: 171 LBS | HEIGHT: 65 IN

## 2018-01-14 VITALS
BODY MASS INDEX: 30.49 KG/M2 | WEIGHT: 183 LBS | HEIGHT: 65 IN | HEART RATE: 68 BPM | DIASTOLIC BLOOD PRESSURE: 80 MMHG | SYSTOLIC BLOOD PRESSURE: 108 MMHG | TEMPERATURE: 96.7 F

## 2018-01-14 VITALS
SYSTOLIC BLOOD PRESSURE: 128 MMHG | WEIGHT: 166 LBS | DIASTOLIC BLOOD PRESSURE: 60 MMHG | BODY MASS INDEX: 27.66 KG/M2 | HEIGHT: 65 IN | HEART RATE: 67 BPM

## 2018-01-14 VITALS
HEIGHT: 65 IN | BODY MASS INDEX: 28.58 KG/M2 | WEIGHT: 171.56 LBS | SYSTOLIC BLOOD PRESSURE: 120 MMHG | HEART RATE: 47 BPM | DIASTOLIC BLOOD PRESSURE: 64 MMHG

## 2018-01-14 NOTE — MISCELLANEOUS
Message  Received call from Yemi Rowell, patient's daughter who reports patients HR is still running low- 46-54  BP is 126/62  No dizziness, just still fatigued  Discussed above with Dr Alicia Yang and will decrease metoprolol succinate to 25 mg daily  Also her amiodarone taper is in process and will be down to 20 mg daily on Thursday  Patient scheduled to see Vitaliy Chirinos, HF NP as well on Thursday,      Active Problems    1  Acute pharyngitis (462) (J02 9)   2  Acute sinusitis (461 9) (J01 90)   3  Allergic Reaction (995 3)   4  Angioedema (995 1) (T78 3XXA)   5  Anxiety (300 00) (F41 9)   6  Arthritis (716 90) (M19 90)   7  Atopic dermatitis (691 8) (L20 9)   8  Atrial fibrillation (427 31) (I48 91)   9  CHF (congestive heart failure) (428 0) (I50 9)   10  Colon cancer screening (V76 51) (Z12 11)   11  Constipation (564 00) (K59 00)   12  Contact dermatitis (692 9) (L25 9)   13  Cyst of breast, unspecified laterality (610 0) (N60 09)   14  Dysuria (788 1) (R30 0)   15  Edema (782 3) (R60 9)   16  Encounter for routine gynecological examination (V72 31) (Z01 419)   17  Encounter for screening colonoscopy (V76 51) (Z12 11)   18  Encounter for screening mammogram for malignant neoplasm of breast (V76 12)    (Z12 31)   19  Gastroesophageal reflux (530 81) (K21 9)   20  Hyperlipidemia (272 4) (E78 5)   21  Hypertension (401 9) (I10)   22  Insomnia (780 52) (G47 00)   23  Left renal mass (593 9) (N28 89)   24  Medicare annual wellness visit, initial (V70 0) (Z00 00)   25  Need for influenza vaccination (V04 81) (Z23)   26  Preoperative clearance (V72 84) (Z01 818)   27  Primary hypothyroidism (244 9) (E03 9)   28  Ptosis, bilateral (374 30) (H02 403)   29  Screening for genitourinary condition (V81 6) (Z13 89)   30  Screening for neurological condition (V80 09) (Z13 89)   31  Urinary tract infection (599 0) (N39 0)    Current Meds   1   ALPRAZolam 0 25 MG Oral Tablet; TAKE ONE HALF (1/2) TO ONE (1) TABLET(S)THREE   TIMES DAILY AS NEEDED; Therapy: 58GTR3944 to (Evaluate:04Apr2017)  Requested for: 09JYS2741; Last   Rx:05Mar2017 Ordered   2  Atorvastatin Calcium 10 MG Oral Tablet; TAKE 1 TABLET DAILY; Therapy: 21Sbn6868 to (Evaluate:21Jun2017); Last Rx:44Axq5139 Ordered   3  Belsomra 15 MG Oral Tablet; TAKE 1 TABLET AT BEDTIME AS NEEDED FOR SLEEP; Therapy: 48MUM1552 to (Evaluate:15Mar2017); Last Rx:05Mar2017 Ordered   4  Bystolic 10 MG Oral Tablet; Take 1 tablet daily; Therapy: 30NWM1169 to (Evaluate:80Sov0531); Last Rx:25Nov2014 Ordered   5  Daily Multivitamins/Iron TABS; Take 1 tablet once daily Recorded   6  Eucerin External Lotion; APPLY 0 5 INCH Twice daily; Therapy: 63Uhs5775 to (Last Rx:54Ppk0114)  Requested for: 18Fgz1550 Ordered   7  Furosemide 20 MG Oral Tablet; take 1/2 tablet daily; Therapy: 70Idj6989 to Recorded   8  Levothyroxine Sodium 25 MCG Oral Tablet; TAKE ONE (1) TABLET daily; Therapy: 64EXW9643 to (Cloteal Daring)  Requested for: 04AMU2144; Last   Rx:85Jkx2801 Ordered   9  Meloxicam 7 5 MG Oral Tablet; Take 1 tablet daily; Therapy: 89HFD8479 to 451 84 382)  Requested for: [phone]; Last   Rx:90Nup1363 Ordered   10  Metoprolol Succinate ER 50 MG Oral Tablet Extended Release 24 Hour; Take 1 tablet    daily; Therapy: 65Byk7378 to (Evaluate:07Nov2017) Recorded   11  Pramosone 1-1 % External Lotion; APPLY SPARINGLY TO AFFECTED AREA(S) 3    TIMES A DAY; Therapy: 39Gqg9028 to (Evaluate:23Zyj1189)  Requested for: 98Pcx5695; Last    Rx:05Ibh6544 Ordered   12  Probiotic CAPS Recorded   13  Spironolactone 25 MG Oral Tablet (Aldactone); Take 1 tablet daily; Therapy: 31ETB6515 to (Last Rx:70Esg0863)  Requested for: 45DET1987 Ordered   14  Zolpidem Tartrate ER 12 5 MG Oral Tablet Extended Release; TAKE 1 TABLET AT    BEDTIME; Therapy: 42YVX7480 to (Evaluate:30Ktz6096); Last Rx:12Apr2017 Ordered    Allergies    1  Caffeine TABS   2  atorvastatin   3  Cephalexin TABS   4   Citalopram Hydrobromide TABS   5  Methylpred TABS   6  Pravastatin Sodium TABS   7  Fosinopril Sodium TABS   8  Monopril TABS   9  Penicillins   10  Sporanox CAPS    11   Pollen    Signatures   Electronically signed by : Michelle Castro, ; Apr 18 2017 11:40AM EST                       (Author)

## 2018-01-15 VITALS
SYSTOLIC BLOOD PRESSURE: 120 MMHG | HEIGHT: 65 IN | BODY MASS INDEX: 28.57 KG/M2 | WEIGHT: 171.5 LBS | DIASTOLIC BLOOD PRESSURE: 70 MMHG | HEART RATE: 38 BPM

## 2018-01-15 NOTE — MISCELLANEOUS
History of Present Illness  A call was received from the patient/patient's family  The contact name and phone number is  DaughterTrish  Status Check: BP:142/89 and HR:80's  Concerns expressed today consisted of: States her mom has not felt as well with higher HR and BP as she did with HR in 60's and -120's  Some insomnia and anxiety also since restarted 200 mg amiodarone  HFCC Additional Notes: Discussed with Dr Yvonne Walker and will try 100 mg amiodarone and 25 mg metoprolol succinate  Daughter to check HR and BP and notify office  [facility] Lei RN also updated  E-cat monitor has been ordered and should arrive this week        Plan  Atrial fibrillation    · Metoprolol Succinate ER 25 MG Oral Tablet Extended Release 24 Hour; TAKE 1  TABLET DAILY   · Amiodarone HCl - 200 MG Oral Tablet; TAKE 1/2  TABLET DAILY    Future Appointments    Date/Time Provider Specialty Site   08/23/2017 10:45 AM Xiomara Singleton DO Family Medicine [facility]     Signatures   Electronically signed by : Dewayne Castano, ; May  8 2017  2:54PM EST                       (Author)

## 2018-01-16 NOTE — MISCELLANEOUS
History of Present Illness  TCM Communication [facility]: The patient is being contacted for NO APPOINT SCHEDULED ,UNABLE TO REACH PT  She was hospitalized at and   [facility]  The dates of hospitalization:, date of admission: 3-28-17, date of discharge: 4-6-17  Diagnosis: ACUTE CHF  She was discharged to home  Medications were not reviewed today  She did not schedule a follow up appointment  Follow-up appointments with other specialists: DR Rashad Krishnamurthy  Communication performed and completed by MAXWELL GREEN LPN      Active Problems    1  Acute pharyngitis (462) (J02 9)   2  Acute sinusitis (461 9) (J01 90)   3  Allergic Reaction (995 3)   4  Angioedema (995 1) (T78 3XXA)   5  Anxiety (300 00) (F41 9)   6  Arthritis (716 90) (M19 90)   7  Atopic dermatitis (691 8) (L20 9)   8  Atrial fibrillation (427 31) (I48 91)   9  CHF (congestive heart failure) (428 0) (I50 9)   10  Colon cancer screening (V76 51) (Z12 11)   11  Constipation (564 00) (K59 00)   12  Contact dermatitis (692 9) (L25 9)   13  Cyst of breast, unspecified laterality (610 0) (N60 09)   14  Dysuria (788 1) (R30 0)   15  Edema (782 3) (R60 9)   16  Encounter for routine gynecological examination (V72 31) (Z01 419)   17  Encounter for screening colonoscopy (V76 51) (Z12 11)   18  Encounter for screening mammogram for malignant neoplasm of breast (V76 12)    (Z12 31)   19  Gastroesophageal reflux (530 81) (K21 9)   20  Hyperlipidemia (272 4) (E78 5)   21  Hypertension (401 9) (I10)   22  Insomnia (780 52) (G47 00)   23  Left renal mass (593 9) (N28 89)   24  Medicare annual wellness visit, initial (V70 0) (Z00 00)   25  Need for influenza vaccination (V04 81) (Z23)   26  Preoperative clearance (V72 84) (Z01 818)   27  Primary hypothyroidism (244 9) (E03 9)   28  Ptosis, bilateral (374 30) (H02 403)   29  Screening for genitourinary condition (V81 6) (Z13 89)   30  Screening for neurological condition (V80 09) (Z13 89)   31   Urinary tract infection (599 0) (N39 0)    Surgical History    1  History of Hernia Repair   2  History of Hysterectomy   3  History of Kidney Surgery    Family History  Family History    1  Family history of Cancer   2  Family history of Heart Disease (V17 49)    Social History    · Denied: History of Alcohol Use (History)   · Always uses seat belt   · Denied: History of Daily caffeinated cola consumption   · Denied: History of Daily Coffee Consumption (___ Cups/Day)   · Daily Tea Consumption (___ Cups/Day)   · Denied: History of Dental care, regularly   · Education Level: Less than high school   · Exercise: Walking   · Living will in place (V49 89) (Z78 9)   · Marital History -    · Never A Smoker   · No illicit drug use   · No Religion beliefs   · Power of  in existence   · Retired   · Denied: History of Sexually active   · Water intake, adequate (per day)    Current Meds   1  ALPRAZolam 0 25 MG Oral Tablet; TAKE ONE HALF (1/2) TO ONE (1) TABLET(S)THREE   TIMES DAILY AS NEEDED; Therapy: 42OJR1382 to (Evaluate:04Apr2017)  Requested for: 57QKS6595; Last   Rx:05Mar2017 Ordered   2  Atorvastatin Calcium 10 MG Oral Tablet; TAKE 1 TABLET DAILY; Therapy: 64Kdr9752 to (Evaluate:21Jun2017); Last Rx:49Lbr0274 Ordered   3  Belsomra 15 MG Oral Tablet; TAKE 1 TABLET AT BEDTIME AS NEEDED FOR SLEEP; Therapy: 09OZE5378 to (Evaluate:15Mar2017); Last Rx:05Mar2017 Ordered   4  Bystolic 10 MG Oral Tablet; Take 1 tablet daily; Therapy: 95BIX5598 to (Evaluate:79Pgh8537); Last Rx:25Nov2014 Ordered   5  Daily Multivitamins/Iron TABS; Take 1 tablet once daily Recorded   6  Eucerin External Lotion; APPLY 0 5 INCH Twice daily; Therapy: 23Apr2015 to (Last Rx:23Apr2015)  Requested for: 23Apr2015 Ordered   7  Levothyroxine Sodium 25 MCG Oral Tablet; TAKE ONE (1) TABLET daily; Therapy: 01PNG3025 to (Jenn Leonard)  Requested for: 73TKT1676; Last   Rx:09Noe3146 Ordered   8  Meloxicam 7 5 MG Oral Tablet;  Take 1 tablet daily; Therapy: 91AOE8902 to 447 1209)  Requested for: ; Last   Rx:27Oct2014 Ordered   9  Pramosone 1-1 % External Lotion; APPLY SPARINGLY TO AFFECTED AREA(S) 3 TIMES A   DAY; Therapy: 22Apr2015 to (Evaluate:72Yyr0429)  Requested for: 22Apr2015; Last   Rx:22Apr2015 Ordered   10  Probiotic CAPS Recorded   11  Spironolactone 25 MG Oral Tablet; Take 1 tablet daily; Therapy: 20YWU5270 to (Last Rx:92Gym8019)  Requested for: 18IUJ4501 Ordered   12  Zolpidem Tartrate ER 12 5 MG Oral Tablet Extended Release; TAKE 1 TABLET AT    BEDTIME; Therapy: 67CHA4229 to (Evaluate:06Apr2017); Last Rx:07Mar2017 Ordered    Allergies    1  Caffeine TABS   2  atorvastatin   3  Cephalexin TABS   4  Citalopram Hydrobromide TABS   5  Methylpred TABS   6  Pravastatin Sodium TABS   7  Fosinopril Sodium TABS   8  Monopril TABS   9  Penicillins   10  Sporanox CAPS    11  Pollen    Future Appointments    Date/Time Provider Specialty Site   05/01/2017 03:40 PM MAXWELL Asif  Cardiology [address]     Signatures   Electronically signed by : Iveth Chambers DO;  Apr 11 2017  8:20AM EST                       (Author)

## 2018-01-16 NOTE — MISCELLANEOUS
History of Present Illness    The patient is being contacted for follow-up after hospitalization  Left message on oohiloveil requesting return call  [facility] Additional Notes:   Also called [facility] RN Oliva Hamman who will call me after seeing patient tomorrow  Current Meds   1  ALPRAZolam 0 25 MG Oral Tablet; TAKE ONE HALF (1/2) TO ONE (1) TABLET(S)THREE   TIMES DAILY AS NEEDED; Therapy: 06SBB0880 to (Evaluate:04Apr2017)  Requested for: 47TWV1591; Last   Rx:05Mar2017 Ordered   2  Atorvastatin Calcium 10 MG Oral Tablet; TAKE 1 TABLET DAILY; Therapy: 05Rao9760 to (Evaluate:21Jun2017); Last Rx:99Kdv7707 Ordered   3  Belsomra 15 MG Oral Tablet; TAKE 1 TABLET AT BEDTIME AS NEEDED FOR SLEEP; Therapy: 02NVW9112 to (Evaluate:15Mar2017); Last Rx:05Mar2017 Ordered   4  Bystolic 10 MG Oral Tablet; Take 1 tablet daily; Therapy: 07DMA0700 to (Evaluate:62Sfh7273); Last Rx:81Oxg8312 Ordered   5  Daily Multivitamins/Iron TABS; Take 1 tablet once daily Recorded   6  Eucerin External Lotion; APPLY 0 5 INCH Twice daily; Therapy: 43Thq3400 to (Last Rx:23Apr2015)  Requested for: 60Kwn2866 Ordered   7  Levothyroxine Sodium 25 MCG Oral Tablet; TAKE ONE (1) TABLET daily; Therapy: 62TGZ8131 to (Paece Asif)  Requested for: 21WWX3582; Last   Rx:76Izh0489 Ordered   8  Meloxicam 7 5 MG Oral Tablet; Take 1 tablet daily; Therapy: 28VVL2540 to 447 2869)  Requested for: [phone]; Last   Rx:55Jdr4224 Ordered   9  Pramosone 1-1 % External Lotion; APPLY SPARINGLY TO AFFECTED AREA(S) 3 TIMES A   DAY; Therapy: 62Jys9240 to (Evaluate:08Ohr5043)  Requested for: 11Tqf9328; Last   Rx:65Sms7746 Ordered   10  Probiotic CAPS Recorded   11  Spironolactone 25 MG Oral Tablet (Aldactone); Take 1 tablet daily; Therapy: 26FRC4767 to (Last Rx:99Hcp6797)  Requested for: 34RRU0818 Ordered   12  Zolpidem Tartrate ER 12 5 MG Oral Tablet Extended Release; TAKE 1 TABLET AT    BEDTIME;     Therapy: 88QMO7813 to (Evaluate:42Lnk4674); Last MH:64UAZ9249 Ordered    Future Appointments    Date/Time Provider Specialty Site   05/01/2017 03:40 PM MAXWELL Sibley  Cardiology [facility]     Allergies    1  Caffeine TABS   2  atorvastatin   3  Cephalexin TABS   4  Citalopram Hydrobromide TABS   5  Methylpred TABS   6  Pravastatin Sodium TABS   7  Fosinopril Sodium TABS   8  Monopril TABS   9  Penicillins   10  Sporanox CAPS    11   Pollen    Signatures   Electronically signed by : Ricki Vicente, ; Apr 10 2017  3:42PM EST                       (Author)

## 2018-01-18 NOTE — MISCELLANEOUS
History of Present Illness  A call was received from the [facility] Nurse  The contact name and phone number is  Kiran Barajas [phone]  Status Check: HR:55  Patient is experiencing the following symptoms:  The patient is not experiencing signs of heart failure  Concerns expressed today consisted of: doing ok - will get BMP tomorrow and check HR  HFCC Additional Notes: I will try to call patient's daughter for sooner appointment  Future Appointments    Date/Time Provider Specialty Site   05/01/2017 03:40 PM MAXWELL Greenberg   Cardiology [address]     Signatures   Electronically signed by : Марина Solis, ; Apr 12 2017  2:42PM EST                       (Author)

## 2018-01-23 VITALS
SYSTOLIC BLOOD PRESSURE: 110 MMHG | WEIGHT: 171.38 LBS | DIASTOLIC BLOOD PRESSURE: 74 MMHG | TEMPERATURE: 97.9 F | HEART RATE: 68 BPM | HEIGHT: 65 IN | BODY MASS INDEX: 28.55 KG/M2

## 2018-03-01 DIAGNOSIS — I48.91 ATRIAL FIBRILLATION, UNSPECIFIED TYPE (HCC): Primary | ICD-10-CM

## 2018-03-01 RX ORDER — DABIGATRAN ETEXILATE MESYLATE 75 MG/1
CAPSULE ORAL
Refills: 6 | COMMUNITY
Start: 2017-12-06 | End: 2018-03-01 | Stop reason: SDUPTHER

## 2018-03-01 RX ORDER — DABIGATRAN ETEXILATE MESYLATE 75 MG/1
75 CAPSULE ORAL EVERY 12 HOURS SCHEDULED
Qty: 60 CAPSULE | Refills: 6 | Status: SHIPPED | OUTPATIENT
Start: 2018-03-01 | End: 2018-09-21 | Stop reason: SDUPTHER

## 2018-03-05 DIAGNOSIS — E03.9 HYPOTHYROIDISM, UNSPECIFIED TYPE: Primary | ICD-10-CM

## 2018-03-05 RX ORDER — LEVOTHYROXINE SODIUM 0.03 MG/1
25 TABLET ORAL DAILY
Qty: 90 TABLET | Refills: 3 | Status: SHIPPED | OUTPATIENT
Start: 2018-03-05 | End: 2019-03-26 | Stop reason: DRUGHIGH

## 2018-03-13 ENCOUNTER — TELEPHONE (OUTPATIENT)
Dept: FAMILY MEDICINE CLINIC | Facility: CLINIC | Age: 83
End: 2018-03-13

## 2018-03-13 DIAGNOSIS — R05.9 COUGH: Primary | ICD-10-CM

## 2018-03-13 RX ORDER — DEXTROMETHORPHAN HYDROBROMIDE AND PROMETHAZINE HYDROCHLORIDE 15; 6.25 MG/5ML; MG/5ML
5 SYRUP ORAL 4 TIMES DAILY PRN
Qty: 180 ML | Refills: 1 | Status: SHIPPED | OUTPATIENT
Start: 2018-03-13 | End: 2018-05-02 | Stop reason: ALTCHOICE

## 2018-03-13 NOTE — TELEPHONE ENCOUNTER
Pt called and would like promethazine dm 62 5mL once every 6 hours  sent into pharmacy for cough   I do not see it in her med list     [facility]

## 2018-03-15 ENCOUNTER — OFFICE VISIT (OUTPATIENT)
Dept: FAMILY MEDICINE CLINIC | Facility: CLINIC | Age: 83
End: 2018-03-15
Payer: COMMERCIAL

## 2018-03-15 ENCOUNTER — TELEPHONE (OUTPATIENT)
Dept: FAMILY MEDICINE CLINIC | Facility: CLINIC | Age: 83
End: 2018-03-15

## 2018-03-15 VITALS
BODY MASS INDEX: 27 KG/M2 | HEIGHT: 67 IN | DIASTOLIC BLOOD PRESSURE: 78 MMHG | WEIGHT: 172 LBS | SYSTOLIC BLOOD PRESSURE: 124 MMHG | HEART RATE: 58 BPM | TEMPERATURE: 98.5 F

## 2018-03-15 DIAGNOSIS — J02.9 VIRAL PHARYNGITIS: Primary | ICD-10-CM

## 2018-03-15 PROBLEM — I50.22 CHRONIC SYSTOLIC CONGESTIVE HEART FAILURE (HCC): Status: ACTIVE | Noted: 2017-05-01

## 2018-03-15 PROCEDURE — 99213 OFFICE O/P EST LOW 20 MIN: CPT | Performed by: NURSE PRACTITIONER

## 2018-03-15 PROCEDURE — 3725F SCREEN DEPRESSION PERFORMED: CPT | Performed by: NURSE PRACTITIONER

## 2018-03-15 PROCEDURE — 1101F PT FALLS ASSESS-DOCD LE1/YR: CPT | Performed by: NURSE PRACTITIONER

## 2018-03-15 RX ORDER — TRIAMCINOLONE ACETONIDE 1 MG/G
CREAM TOPICAL 2 TIMES DAILY
COMMUNITY
Start: 2018-01-03 | End: 2022-02-24 | Stop reason: HOSPADM

## 2018-03-15 RX ORDER — OLOPATADINE HYDROCHLORIDE 1 MG/ML
1 SOLUTION/ DROPS OPHTHALMIC 2 TIMES DAILY
COMMUNITY
Start: 2018-01-03 | End: 2020-07-28 | Stop reason: ALTCHOICE

## 2018-03-15 RX ORDER — LANOLIN ALCOHOL/MO/W.PET/CERES
CREAM (GRAM) TOPICAL 2 TIMES DAILY
COMMUNITY
Start: 2015-04-23

## 2018-03-15 RX ORDER — FUROSEMIDE 20 MG/1
40 TABLET ORAL DAILY
Refills: 3 | COMMUNITY
Start: 2018-03-11 | End: 2018-06-14 | Stop reason: SDUPTHER

## 2018-03-15 NOTE — PATIENT INSTRUCTIONS
Pharyngitis   AMBULATORY CARE:   Pharyngitis , or sore throat, is inflammation of the tissues and structures in your pharynx (throat)  Pharyngitis is most often caused by bacteria  It may also be caused by a cold or flu virus  Other causes include smoking, allergies, or acid reflux  Signs and symptoms that may occur with pharyngitis:   · Sore throat or pain when you swallow    · Fever, chills, and body aches    · Hoarse or raspy voice    · Cough, runny or stuffy nose, itchy or watery eyes    · Headache    · Upset stomach and loss of appetite    · Mild neck stiffness    · Swollen glands that feel like hard lumps when you touch your neck    · White and yellow pus-filled blisters in the back of your throat  Call 911 for any of the following:   · You have trouble breathing or swallowing because your throat is swollen or sore  Seek care immediately if:   · You are drooling because it hurts too much to swallow  · Your fever is higher than 102? F (39?C) or lasts longer than 3 days  · You are confused  · You taste blood in your throat  Contact your healthcare provider if:   · Your throat pain gets worse  · You have a painful lump in your throat that does not go away after 5 days  · Your symptoms do not improve after 5 days  · You have questions or concerns about your condition or care  Treatment for pharyngitis:  Viral pharyngitis will go away on its own without treatment  Your sore throat should start to feel better in 3 to 5 days for both viral and bacterial infections  You may need any of the following:  · Antibiotics  treat a bacterial infection  · NSAIDs , such as ibuprofen, help decrease swelling, pain, and fever  NSAIDs can cause stomach bleeding or kidney problems in certain people  If you take blood thinner medicine, always ask your healthcare provider if NSAIDs are safe for you  Always read the medicine label and follow directions  · Acetaminophen  decreases pain and fever   It is available without a doctor's order  Ask how much to take and how often to take it  Follow directions  Acetaminophen can cause liver damage if not taken correctly  Manage your symptoms:   · Gargle salt water  Mix ¼ teaspoon salt in an 8 ounce glass of warm water and gargle  This may help decrease swelling in your throat  · Drink liquids as directed  You may need to drink more liquids than usual  Liquids may help soothe your throat and prevent dehydration  Ask how much liquid to drink each day and which liquids are best for you  · Use a cool-steam humidifier  to help moisten the air in your room and calm your cough  · Soothe your throat  with cough drops, ice, soft foods, or popsicles  Prevent the spread of pharyngitis:  Cover your mouth and nose when you cough or sneeze  Do not share food or drinks  Wash your hands often  Use soap and water  If soap and water are unavailable, use an alcohol based hand   Follow up with your healthcare provider as directed:  Write down your questions so you remember to ask them during your visits  © 2017 [facility] Information is for End User's use only and may not be sold, redistributed or otherwise used for commercial purposes  All illustrations and images included in CareNotes® are the copyrighted property of [employer]  or Gustavo Carson  The above information is an  only  It is not intended as medical advice for individual conditions or treatments  Talk to your doctor, nurse or pharmacist before following any medical regimen to see if it is safe and effective for you

## 2018-03-15 NOTE — PROGRESS NOTES
Patient ID: Tracie Whatley is a 80 y o  female  HPI: 80 y  o female presenting with sore throat with hoarseness, and occasional thick clear phlegm  Patient is accompanied to the appointment by her daughter  Patient and daughter denies generalized body aches, fever or chills being associated with above mentioned symptoms, but does have fatigue  Patient states that she as been placing Vicks Vapor Rub on her neck to loosen phlegm and hot tea with honey did help to soothe her throat for awhile  Daughter as been using humidifiers in the home to assist in improving dry environment  Patient feels like she constantly as to clear her throat but nothing is produced  Patient as been treated for acute bronchitis on 02/19/18 by an urgent care center  She was given azithromycin and promethazine DM at that time and patient controls using the promethazine DM and she did feel better after completing the antibiotic  SUBJECTIVE    No family history on file  Social History     Social History    Marital status:      Spouse name: N/A    Number of children: N/A    Years of education: N/A     Occupational History    Not on file       Social History Main Topics    Smoking status: Never Smoker    Smokeless tobacco: Not on file    Alcohol use No    Drug use: No    Sexual activity: Not on file     Other Topics Concern    Not on file     Social History Narrative    No narrative on file     Past Medical History:   Diagnosis Date    A-fib [facility])     Cardiac disease     Hyperlipidemia     Hypertension      Past Surgical History:   Procedure Laterality Date    APPENDECTOMY      CHOLECYSTECTOMY      HERNIA REPAIR      HYSTERECTOMY      NEPHRECTOMY Right      Allergies   Allergen Reactions    Penicillin G Anaphylaxis    Atorvastatin      Other reaction(s): Leg Cramps    Cephalexin Headache    Chocolate     Citalopram Abdominal Pain and Headache    Diltiazem     Fosinopril Sodium-Hctz     Methylprednisolone Nausea Only     Tablet generic only    Monopril [Fosinopril]      Reaction Date: [id number];     Penicillins     Pollen Extract     Pravastatin Myalgia     Other reaction(s): Leg Cramps    Spironolactone     Sporanox [Itraconazole]      Reaction Date: [id number];     Strawberry C [Ascorbate]     Caffeine Palpitations     Tachycardia       Current Outpatient Prescriptions:     ALPRAZolam (XANAX) 0 25 mg tablet, Take 0 25 mg by mouth 4 (four) times a day  , Disp: , Rfl:     Cyanocobalamin (VITAMIN B-12 PO), Take by mouth, Disp: , Rfl:     diclofenac sodium (VOLTAREN) 1 %, Place on the skin, Disp: , Rfl:     Emollient (EUCERIN) lotion, Apply topically 2 (two) times a day, Disp: , Rfl:     furosemide (LASIX) 20 mg tablet, Take 40 mg by mouth daily, Disp: , Rfl: 3    levothyroxine 25 mcg tablet, Take 1 tablet (25 mcg total) by mouth daily, Disp: 90 tablet, Rfl: 3    multivitamin (THERAGRAN) TABS, Take 1 tablet by mouth daily, Disp: , Rfl:     olopatadine (PATANOL) 0 1 % ophthalmic solution, Apply 1 drop to eye 2 (two) times a day, Disp: , Rfl:     PRADAXA 75 MG capsule, Take 1 capsule (75 mg total) by mouth every 12 (twelve) hours, Disp: 60 capsule, Rfl: 6    promethazine-dextromethorphan (PHENERGAN-DM) 6 25-15 mg/5 mL oral syrup, Take 5 mL by mouth 4 (four) times a day as needed for cough, Disp: 180 mL, Rfl: 1    rosuvastatin (CRESTOR) 5 mg tablet, Take 5 mg by mouth daily, Disp: , Rfl:     triamcinolone (KENALOG) 0 1 % cream, Apply topically Twice daily, Disp: , Rfl:     zolpidem (AMBIEN CR) 12 5 MG CR tablet, Take 12 5 mg by mouth daily at bedtime as needed for sleep, Disp: , Rfl:     amiodarone 200 mg tablet, Take 1 tablet by mouth 3 (three) times a day with meals for 7 days, Disp: 21 tablet, Rfl: 0    amiodarone 200 mg tablet, Take 1 tablet by mouth 2 (two) times a day for 7 days, Disp: 14 tablet, Rfl: 0    amiodarone 200 mg tablet, Take 1 tablet by mouth daily for 30 days, Disp: 30 tablet, Rfl: 0    dabigatran etexilate (PRADAXA) 75 mg capsule, Take 1 capsule by mouth 2 (two) times a day with meals for 30 days, Disp: 60 capsule, Rfl: 0    furosemide (LASIX) 20 mg tablet, Take 1 tablet by mouth daily for 30 days, Disp: 30 tablet, Rfl: 0    metoprolol succinate (TOPROL-XL) 50 mg 24 hr tablet, Take 1 tablet by mouth 2 (two) times a day for 30 days, Disp: 60 tablet, Rfl: 0    Review of Systems    Consitutional:  Positive for generalized fatigue Denies chills, fever and malaise  ENT:  Positive for, sore throat and hoarseness Denies, ear pain/pressure, nasal discharge, post nasal drip, itchy/watery eyes and sneezing  Pulmonary:  Positive for dry non-productive cough  Denies shortness of breath or dyspnea on exertion    Cardiovascular:  Denies chest pain/pressure   Abdomen:   Denies abdominal pain, nausea, vomiting, diarrhea, constipation    Genitourinary:  no urinary symptoms   Hematology/Lymphatics:   Denies, bruising, jaundice, night sweats, pallor, swollen lymph nodes   Musculoskeletal:  No gait disturbance, myalgia,  arthalgia or muscle weakness    Integumentary:  No ecchymosis, petechiae ,rash or lesions   Neurological:  Denies headaches, dizziness, confusion, loss of consciousness or behavioral changes  Psychological:  Denies anxiety, depression or sleep disturbances      OBJECTIVE    Constitutional:  Well appearing in no acute distress  ENT:  Bilateral TMs normal without fluid or infection, slight posterior pharynx erythematous, sinuses non-tender and no congestion noted in nasal mucosa    Voice quality slightly decreased and raspy  Pulmonary:  clear to auscultation bilaterally, no crackles, no wheezes, chest expansion normal and normal percussion bilaterally  Cardiovascular:  S1S2, regular rate and rhythm  Gastrointestinal:  abdomen is soft without significant tenderness    Lymphatic:  no lymphadenopathy   Musculoskeletal:  no muscular tenderness noted  Skin:  warm, no rashes, no ecchymosis and skin color, texture and turgor are normal; no bruising, rashes or lesions noted  Neurologic:  Alert and oriented x 4, Normal Reflexes and Affect and mood normal      Assessment/Plan:  Diagnoses and all orders for this visit:    Viral pharyngitis    Other orders  -     diclofenac sodium (VOLTAREN) 1 %; Place on the skin  -     triamcinolone (KENALOG) 0 1 % cream; Apply topically Twice daily  -     olopatadine (PATANOL) 0 1 % ophthalmic solution; Apply 1 drop to eye 2 (two) times a day  -     furosemide (LASIX) 20 mg tablet; Take 40 mg by mouth daily  -     Emollient (EUCERIN) lotion; Apply topically 2 (two) times a day      Viral pharyngitis  Reviewed with patient and her daughter plan to treat with continuation use of Promethazine DM and add on use of Mucinex (one in plain blue and white package)  Discussed with patient and her daughter use of conservative measures: keep well hydrated, get plenty of rest, use of acetaminophen or NSAIDs for fever and pain control, if appropriate, perform warm salt water gargles, drink warm liquids with honey, avoid clearing her throat to decrease irritation    Patient instructed to call if not feeling better in 72 hours or if symptoms worsen

## 2018-04-19 ENCOUNTER — OFFICE VISIT (OUTPATIENT)
Dept: CARDIOLOGY CLINIC | Facility: CLINIC | Age: 83
End: 2018-04-19
Payer: COMMERCIAL

## 2018-04-19 VITALS
HEIGHT: 67 IN | DIASTOLIC BLOOD PRESSURE: 68 MMHG | HEART RATE: 83 BPM | SYSTOLIC BLOOD PRESSURE: 122 MMHG | WEIGHT: 175.6 LBS | OXYGEN SATURATION: 95 % | BODY MASS INDEX: 27.56 KG/M2

## 2018-04-19 DIAGNOSIS — R00.1 SINUS BRADYCARDIA: ICD-10-CM

## 2018-04-19 DIAGNOSIS — E78.2 MIXED HYPERLIPIDEMIA: ICD-10-CM

## 2018-04-19 DIAGNOSIS — I48.0 PAROXYSMAL ATRIAL FIBRILLATION (HCC): Primary | ICD-10-CM

## 2018-04-19 DIAGNOSIS — E03.9 HYPOTHYROIDISM, UNSPECIFIED TYPE: Chronic | ICD-10-CM

## 2018-04-19 DIAGNOSIS — N18.4 CKD (CHRONIC KIDNEY DISEASE), STAGE IV (HCC): ICD-10-CM

## 2018-04-19 DIAGNOSIS — I50.32 CHRONIC DIASTOLIC CONGESTIVE HEART FAILURE (HCC): ICD-10-CM

## 2018-04-19 DIAGNOSIS — I10 ESSENTIAL HYPERTENSION: ICD-10-CM

## 2018-04-19 PROCEDURE — 99214 OFFICE O/P EST MOD 30 MIN: CPT | Performed by: INTERNAL MEDICINE

## 2018-04-19 PROCEDURE — 93000 ELECTROCARDIOGRAM COMPLETE: CPT | Performed by: INTERNAL MEDICINE

## 2018-04-19 RX ORDER — AMIODARONE HYDROCHLORIDE 100 MG/1
100 TABLET ORAL DAILY
Refills: 3 | COMMUNITY
Start: 2018-04-12 | End: 2018-05-02 | Stop reason: SDUPTHER

## 2018-04-19 NOTE — PROGRESS NOTES
Cardiology Follow Up    Koko Coulter  8/26/1926  [id number]  [address] [facility] [place] [id number]    1  Paroxysmal atrial fibrillation (HCC)  POCT ECG   2  Chronic diastolic congestive heart failure ([facility] [address] )     3  Essential hypertension     4  CKD (chronic kidney disease), stage IV ([facility] [address] )     5  Mixed hyperlipidemia     6  Sinus bradycardia     7  Hypothyroidism, unspecified type         Interval History:     Mrs Manjit Corona comes in for follow-up given her paroxysmal atrial fibrillation and congestive heart failure  I saw her during hospitalization which she presented in rapid atrial fibrillation  A rate control strategy was initially attempted  She had an echocardiogram performed that demonstrated an ejection fraction of 35%  There was hypokinesis seen in the anterior and anterior septal walls, with diffuse hypokinesis otherwise  A rate control strategy was not successful, and therefore we had her undergo a ALLI guided cardioversion  This successfully converted back to sinus rhythm  Given her ejection fraction, and moderate left atrial enlargement we felt antiarrhythmic therapy was needed  We started IV amiodarone, and she was discharged on this as well  This did help her symptoms significantly  He also help control her volume status better  She was discharged on low-dose Lasix  She has also been on Pradaxa 75 mg twice daily  In follow-up she was having some low heart rates  She was for the most part asymptomatic from this standpoint, other than some fatigue  At initial follow-up visit with our CHF clinic, her metoprolol was discontinued and her amiodarone was decreased to 100 mg daily  At our last visit her heart rates looked improved  I had her continue her heart rates have decreased over the last few visits  She was having some increasing lower extremity edema, and saw our CHF clinic again   There her amlodipine was decreased to 2 5 mg daily, and has since been stopped  She has also been on daily Lasix  Since I last saw her we repeated an echocardiogram that showed an improvement of her ejection fraction back to 65 percent  This proved that 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  In has felt well since last visit  She remains in sinus rhythm  She denies any palpitations or return of symptoms of her atrial fibrillation  No chest pain  She does have some mild lower extremity edema which is also improved  She denies any orthopnea or PND  There has been no lightheadedness or syncope  Her heart rate is in the 50s today  She did recently suffer from influenza  She was quite fatigued and short of breath with this, but is improving  Patient Active Problem List   Diagnosis    Oral pain of unknown etiology    Paroxysmal atrial fibrillation (HCC)    Hypothyroidism    Anxiety    Cardiomyopathy ([address] )    CKD (chronic kidney disease), stage IV (HCC)    Arthritis    Chronic diastolic congestive heart failure (HCC)    Hyperlipidemia    Hypertension    Insomnia    Contact dermatitis    Sinus bradycardia     Past Medical History:   Diagnosis Date    A-fib [facility])     Cardiac disease     Hyperlipidemia     Hypertension      Social History     Social History    Marital status:       Spouse name: N/A    Number of children: N/A    Years of education: Less than high school     Occupational History    Retired      Social History Main Topics    Smoking status: Never Smoker    Smokeless tobacco: Never Used    Alcohol use No    Drug use: No    Sexual activity: Not Currently     Other Topics Concern    Not on file     Social History Narrative    Always uses seatbelt    Denied:  History of daily caffeinated cola consumption    Denied:  History of daily coffee consumption    Daily tea consumption    Denied:  History of dental care, regularly    Exercise:  Walking    Living will in place    No Zoroastrianism beliefs    Power of  in existence    Water intake, adequate (per day)      Family History   Problem Relation Age of Onset    Heart disease Family     Hypertension Family     Cancer Family      Past Surgical History:   Procedure Laterality Date    APPENDECTOMY      CHOLECYSTECTOMY      HERNIA REPAIR      HYSTERECTOMY      NEPHRECTOMY Right        Current Outpatient Prescriptions:     ALPRAZolam (XANAX) 0 25 mg tablet, Take 0 25 mg by mouth 4 (four) times a day  , Disp: , Rfl:     Cyanocobalamin (VITAMIN B-12 PO), Take by mouth, Disp: , Rfl:     diclofenac sodium (VOLTAREN) 1 %, Place on the skin, Disp: , Rfl:     Emollient (EUCERIN) lotion, Apply topically 2 (two) times a day, Disp: , Rfl:     furosemide (LASIX) 20 mg tablet, Take 40 mg by mouth daily, Disp: , Rfl: 3    levothyroxine 25 mcg tablet, Take 1 tablet (25 mcg total) by mouth daily, Disp: 90 tablet, Rfl: 3    multivitamin (THERAGRAN) TABS, Take 1 tablet by mouth daily, Disp: , Rfl:     olopatadine (PATANOL) 0 1 % ophthalmic solution, Apply 1 drop to eye 2 (two) times a day, Disp: , Rfl:     PACERONE 100 MG tablet, Take 100 mg by mouth daily, Disp: , Rfl: 3    PRADAXA 75 MG capsule, Take 1 capsule (75 mg total) by mouth every 12 (twelve) hours, Disp: 60 capsule, Rfl: 6    promethazine-dextromethorphan (PHENERGAN-DM) 6 25-15 mg/5 mL oral syrup, Take 5 mL by mouth 4 (four) times a day as needed for cough, Disp: 180 mL, Rfl: 1    rosuvastatin (CRESTOR) 5 mg tablet, Take 5 mg by mouth daily, Disp: , Rfl:     triamcinolone (KENALOG) 0 1 % cream, Apply topically Twice daily, Disp: , Rfl:     zolpidem (AMBIEN CR) 12 5 MG CR tablet, Take 12 5 mg by mouth daily at bedtime as needed for sleep, Disp: , Rfl:     dabigatran etexilate (PRADAXA) 75 mg capsule, Take 1 capsule by mouth 2 (two) times a day with meals for 30 days, Disp: 60 capsule, Rfl: 0    furosemide (LASIX) 20 mg tablet, Take 1 tablet by mouth daily for 30 days, Disp: 30 tablet, Rfl: 0    metoprolol succinate (TOPROL-XL) 50 mg 24 hr tablet, Take 1 tablet by mouth 2 (two) times a day for 30 days, Disp: 60 tablet, Rfl: 0  Allergies   Allergen Reactions    Penicillin G Anaphylaxis    Atorvastatin      Other reaction(s): Leg Cramps    Cephalexin Headache    Chocolate     Citalopram Abdominal Pain and Headache    Diltiazem     Fosinopril Sodium-Hctz     Methylprednisolone Nausea Only     Tablet generic only    Monopril [Fosinopril]      Reaction Date: [id number];     Penicillins     Pollen Extract     Pravastatin Myalgia     Other reaction(s): Leg Cramps    Spironolactone     Sporanox [Itraconazole]      Reaction Date: [id number];     Strawberry C [Ascorbate]     Caffeine Palpitations     Tachycardia       Labs:  Lab Results   Component Value Date     12/19/2017    K 4 1 12/19/2017     12/19/2017    CO2 29 12/19/2017    BUN 29 (H) 12/19/2017    CREATININE 1 87 (H) 12/19/2017    GLUCOSE 137 06/13/2017    CALCIUM 9 0 12/19/2017     Lab Results   Component Value Date    WBC 5 63 12/19/2017    HGB 15 1 12/19/2017    HCT 44 2 12/19/2017    MCV 96 12/19/2017     12/19/2017     Lab Results   Component Value Date    CHOL 100 12/19/2017    TRIG 137 12/19/2017    HDL 39 (L) 12/19/2017     Imaging: ECG shows   Sinus bradycardia 51 beats per minute, poor R-wave progression and nonspecific ST and T-wave changes  Review of Systems:  Review of Systems   Constitutional: Positive for fatigue  HENT: Negative  Eyes: Negative  Respiratory: Negative  Cardiovascular: Negative  Gastrointestinal: Negative  Musculoskeletal: Negative  Skin: Negative  Allergic/Immunologic: Negative  Neurological: Positive for tremors  Hematological: Negative  Psychiatric/Behavioral: Negative  All other systems reviewed and are negative  Physical Exam:  Physical Exam   Constitutional: She is oriented to person, place, and time   She appears well-developed and well-nourished  HENT:   Head: Normocephalic and atraumatic  Eyes: EOM are normal  Pupils are equal, round, and reactive to light  Right eye exhibits no discharge  Left eye exhibits no discharge  No scleral icterus  Neck: Normal range of motion  Neck supple  No JVD present  No thyromegaly present  Cardiovascular: Normal rate, regular rhythm, S1 normal, S2 normal, intact distal pulses and normal pulses  No extrasystoles are present  Exam reveals no gallop and no friction rub  Murmur heard  Systolic murmur is present with a grade of 2/6   Pulmonary/Chest: Effort normal  No respiratory distress  She has decreased breath sounds  She has no wheezes  She has no rales  Abdominal: Soft  Bowel sounds are normal  She exhibits no distension  There is no tenderness  Musculoskeletal: Normal range of motion  She exhibits edema  She exhibits no tenderness or deformity  Neurological: She is alert and oriented to person, place, and time  No cranial nerve deficit  Skin: Skin is warm and dry  No rash noted  Psychiatric: She has a normal mood and affect  Judgment and thought content normal    Nursing note and vitals reviewed  Discussion/Summary:    1  HERMELINDO Jefferson remains in sinus rhythm  Her heart rate does run low at times however it is improved with coming down on some of her medications  She is asymptomatic at this time  We will continue low-dose amiodarone and her Pradaxa for stroke prevention  We will see her back in 6 months  2  Chronic diastolic CHF - In the setting of her hospitalization she was found to have a reduced ejection fraction down about 35%  Her ejection fraction has since come back to normal, showing that this was either a tachycardia mediated or stress-induced cardiomyopathy  No changes were made and she appears at her baseline today  She knows to watch her sodium intake and weigh herself regularly      3  HTN - Her blood pressure remains controlled even with coming off of amlodipine  No changes were made today  4  Dysipidemia - She remains on low-dose Crestor  No changes were made today  We did order updated blood work  We will see her back in 6 months  Counseling / Coordination of Care  Total floor / unit time spent today 25 minutes  Greater than 50% of total time was spent with the patient and / or family counseling and / or coordination of care

## 2018-04-19 NOTE — PATIENT INSTRUCTIONS
Low-Sodium Diet   AMBULATORY CARE:   A low-sodium diet  limits foods that are high in sodium (salt)  You will need to follow a low-sodium diet if you have high blood pressure, kidney disease, or heart failure  You may also need to follow this diet if you have a condition that is causing your body to retain (hold) extra fluid  You may need to limit the amount of sodium you eat to 1,500 mg  Ask your healthcare provider how much sodium you can have each day  How to use food labels to choose foods that are low in sodium:  Read food labels to find the amount of sodium they contain  The amount of sodium is listed in milligrams (mg)  The % Daily Value (DV) column tells you how much of your daily needs are met by 1 serving of the food for each nutrient listed  Choose foods that have less than 5% of the DV of sodium  These foods are considered low in sodium  Foods that have 20% or more of the DV of sodium are considered high in sodium  Some food labels may also list any of the following terms that tell you about the sodium content in the food:  · Sodium-free:  Less than 5 mg in each serving    · Very low sodium:  35 mg of sodium or less in each serving    · Low sodium:  140 mg of sodium or less in each serving    · Reduced sodium: At least 25% less sodium in each serving than the regular type    · Light in sodium:  50% less sodium in each serving    · Unsalted or no added salt:  No extra salt is added during processing (the food may still contain sodium)  Foods to avoid:  Salty foods are high in sodium   You should avoid the following:  · Processed foods:      ¨ Mixes for cornbread, biscuits, cake, and pudding     ¨ Instant foods, such as potatoes, cereals, noodles, and rice     ¨ Packaged foods, such as bread stuffing, rice and pasta mixes, snack dip mixes, and macaroni and cheese     ¨ Canned foods, such as canned vegetables, soups, broths, sauces, and vegetable or tomato juice    ¨ Snack foods, such as salted chips, popcorn, pretzels, pork rinds, salted crackers, and salted nuts    ¨ Frozen foods, such as dinners, entrees, vegetables with sauces, and breaded meats    ¨ Sauerkraut, pickled vegetables, and other foods prepared in brine    · Meats and cheeses:      ¨ Smoked or cured meat, such as corned beef, chester, ham, hot dogs, and sausage    ¨ Canned meats or spreads, such as potted meats, sardines, anchovies, and imitation seafood    ¨ Deli or lunch meats, such as bologna, ham, turkey, and roast beef    ¨ Processed cheese, such as American cheese and cheese spreads    · Condiments, sauces, and seasonings:      ¨ Salt (¼ teaspoon of salt contains 575 mg of sodium)    ¨ Seasonings made with salt, such as garlic salt, celery salt, onion salt, and seasoned salt    ¨ Regular soy sauce, barbecue sauce, teriyaki sauce, steak sauce, Worcestershire sauce, and most flavored vinegars    ¨ Canned gravy and mixes     ¨ Regular condiments, such as mustard, ketchup, and salad dressings    ¨ Pickles and olives    ¨ Meat tenderizers and monosodium glutamate (MSG)  Foods to include:  Read the food label to find the exact amount of sodium in each serving  · Bread and cereal:  Try to choose breads with less than 80 mg of sodium per serving  ¨ Bread, roll, josue, tortilla, or unsalted crackers  ¨ Ready-to-eat cereals with less than 5% DV of sodium (examples include shredded wheat and puffed rice)    ¨ Pasta    · Vegetables and fruits:      ¨ Unsalted fresh, frozen, or canned vegetables    ¨ Fresh, frozen, or canned fruits    ¨ Fruit juice    · Dairy:  One serving has about 150 mg of sodium  ¨ Milk, all types    ¨ Yogurt    ¨ Hard cheese, such as cheddar, Swiss, [employer], or mozzarella    · Meat and other protein foods:  Some raw meats may have added sodium       ¨ Plain meats, fish, and poultry     ¨ Eggs    · Other foods:      ¨ Homemade pudding    ¨ Unsalted nuts, popcorn, or pretzels    ¨ Unsalted butter or margarine  Ways to decrease sodium:   · Add spices and herbs to foods instead of salt during cooking  Use salt-free seasonings to add flavor to foods  Examples include onion powder, garlic powder, basil, tiwari powder, paprika, and parsley  Try lemon or lime juice or vinegar to give foods a tart flavor  Use hot peppers, pepper, or cayenne pepper to add a spicy flavor to foods  · Do not keep a salt shaker at your kitchen table  This may help keep you from adding salt to food at the table  It may take time to get used to enjoying the natural flavor of food instead of adding salt  Talk to your healthcare provider before you use salt substitutes  Some salt substitutes have a high amount of potassium and need to be avoided if you have kidney disease  · Choose low-sodium foods at restaurants  Meals from restaurants are often high in sodium  Some restaurants have nutrition information on the menu that tells you the amount of sodium in their foods  If possible, ask for your food to be prepared with less, or no salt  · Shop for unsalted or low-sodium foods and snacks at the grocery store  Examples include unsalted or low-sodium broths, soups, and canned vegetables  Choose fresh or frozen vegetables instead  Choose unsalted nuts or seeds or fresh fruits or vegetables as snacks  Read food labels and choose salt-free, very low-sodium, or low-sodium foods  © 2017 [address] Information is for End User's use only and may not be sold, redistributed or otherwise used for commercial purposes  All illustrations and images included in CareNotes® are the copyrighted property of [employer]  or Gustavo Carson  The above information is an  only  It is not intended as medical advice for individual conditions or treatments  Talk to your doctor, nurse or pharmacist before following any medical regimen to see if it is safe and effective for you

## 2018-04-24 ENCOUNTER — TRANSCRIBE ORDERS (OUTPATIENT)
Dept: LAB | Facility: CLINIC | Age: 83
End: 2018-04-24

## 2018-04-24 ENCOUNTER — APPOINTMENT (OUTPATIENT)
Dept: LAB | Facility: CLINIC | Age: 83
End: 2018-04-24
Payer: COMMERCIAL

## 2018-04-24 DIAGNOSIS — E78.5 HYPERLIPIDEMIA: ICD-10-CM

## 2018-04-24 DIAGNOSIS — E03.9 HYPOTHYROIDISM: ICD-10-CM

## 2018-04-24 DIAGNOSIS — I10 ESSENTIAL (PRIMARY) HYPERTENSION: ICD-10-CM

## 2018-04-24 LAB
ALBUMIN SERPL BCP-MCNC: 3.6 G/DL (ref 3.5–5)
ALP SERPL-CCNC: 75 U/L (ref 46–116)
ALT SERPL W P-5'-P-CCNC: 22 U/L (ref 12–78)
ANION GAP SERPL CALCULATED.3IONS-SCNC: 7 MMOL/L (ref 4–13)
AST SERPL W P-5'-P-CCNC: 19 U/L (ref 5–45)
BILIRUB SERPL-MCNC: 1.05 MG/DL (ref 0.2–1)
BUN SERPL-MCNC: 25 MG/DL (ref 5–25)
CALCIUM SERPL-MCNC: 9.1 MG/DL (ref 8.3–10.1)
CHLORIDE SERPL-SCNC: 102 MMOL/L (ref 100–108)
CHOLEST SERPL-MCNC: 108 MG/DL (ref 50–200)
CO2 SERPL-SCNC: 28 MMOL/L (ref 21–32)
CREAT SERPL-MCNC: 1.95 MG/DL (ref 0.6–1.3)
GFR SERPL CREATININE-BSD FRML MDRD: 22 ML/MIN/1.73SQ M
GLUCOSE P FAST SERPL-MCNC: 90 MG/DL (ref 65–99)
HDLC SERPL-MCNC: 40 MG/DL (ref 40–60)
LDLC SERPL CALC-MCNC: 37 MG/DL (ref 0–100)
POTASSIUM SERPL-SCNC: 3.8 MMOL/L (ref 3.5–5.3)
PROT SERPL-MCNC: 7 G/DL (ref 6.4–8.2)
SODIUM SERPL-SCNC: 137 MMOL/L (ref 136–145)
TRIGL SERPL-MCNC: 153 MG/DL
TSH SERPL DL<=0.05 MIU/L-ACNC: 3.7 UIU/ML (ref 0.36–3.74)

## 2018-04-24 PROCEDURE — 80061 LIPID PANEL: CPT

## 2018-04-24 PROCEDURE — 36415 COLL VENOUS BLD VENIPUNCTURE: CPT

## 2018-04-24 PROCEDURE — 80053 COMPREHEN METABOLIC PANEL: CPT

## 2018-04-24 PROCEDURE — 84443 ASSAY THYROID STIM HORMONE: CPT

## 2018-05-02 ENCOUNTER — OFFICE VISIT (OUTPATIENT)
Dept: FAMILY MEDICINE CLINIC | Facility: CLINIC | Age: 83
End: 2018-05-02
Payer: COMMERCIAL

## 2018-05-02 VITALS
SYSTOLIC BLOOD PRESSURE: 128 MMHG | DIASTOLIC BLOOD PRESSURE: 74 MMHG | BODY MASS INDEX: 27.44 KG/M2 | TEMPERATURE: 98.6 F | HEIGHT: 67 IN | WEIGHT: 174.8 LBS | HEART RATE: 60 BPM

## 2018-05-02 DIAGNOSIS — I10 ESSENTIAL HYPERTENSION: Primary | ICD-10-CM

## 2018-05-02 DIAGNOSIS — R60.9 EDEMA, UNSPECIFIED TYPE: ICD-10-CM

## 2018-05-02 DIAGNOSIS — F41.9 ANXIETY: ICD-10-CM

## 2018-05-02 DIAGNOSIS — I48.91 ATRIAL FIBRILLATION, UNSPECIFIED TYPE (HCC): ICD-10-CM

## 2018-05-02 DIAGNOSIS — E78.2 MIXED HYPERLIPIDEMIA: ICD-10-CM

## 2018-05-02 DIAGNOSIS — J06.9 UPPER RESPIRATORY TRACT INFECTION, UNSPECIFIED TYPE: ICD-10-CM

## 2018-05-02 DIAGNOSIS — E03.9 HYPOTHYROIDISM, UNSPECIFIED TYPE: Chronic | ICD-10-CM

## 2018-05-02 PROCEDURE — 99214 OFFICE O/P EST MOD 30 MIN: CPT | Performed by: FAMILY MEDICINE

## 2018-05-02 RX ORDER — DEXTROMETHORPHAN HYDROBROMIDE AND PROMETHAZINE HYDROCHLORIDE 15; 6.25 MG/5ML; MG/5ML
5 SYRUP ORAL 4 TIMES DAILY PRN
Qty: 180 ML | Refills: 0 | Status: SHIPPED | OUTPATIENT
Start: 2018-05-02 | End: 2018-05-21 | Stop reason: SDUPTHER

## 2018-05-02 RX ORDER — AZITHROMYCIN 250 MG/1
TABLET, FILM COATED ORAL
Qty: 6 TABLET | Refills: 0 | Status: SHIPPED | OUTPATIENT
Start: 2018-05-02 | End: 2018-05-06

## 2018-05-02 RX ORDER — AMIODARONE HYDROCHLORIDE 100 MG/1
100 TABLET ORAL DAILY
Qty: 30 TABLET | Refills: 3 | Status: SHIPPED | OUTPATIENT
Start: 2018-05-02 | End: 2018-10-08 | Stop reason: SDDI

## 2018-05-02 RX ORDER — FUROSEMIDE 20 MG/1
20 TABLET ORAL DAILY
Qty: 30 TABLET | Refills: 3 | Status: SHIPPED | OUTPATIENT
Start: 2018-05-02 | End: 2018-10-08 | Stop reason: SDUPTHER

## 2018-05-02 RX ORDER — ALPRAZOLAM 0.25 MG/1
0.25 TABLET ORAL 4 TIMES DAILY
Qty: 120 TABLET | Refills: 3 | Status: SHIPPED | OUTPATIENT
Start: 2018-05-02 | End: 2018-09-08 | Stop reason: SDUPTHER

## 2018-05-02 NOTE — PROGRESS NOTES
Patient ID: Luz Lucas is a 80 y o  female  HPI: 80 y  o female presents for follow up of hypertension, hypercholesterolemia, hypothyroidism, and c/o a dry cough, sore throat and pnd x1 week  SUBJECTIVE    Family History   Problem Relation Age of Onset    Heart disease Family     Hypertension Family     Cancer Family      Social History     Social History    Marital status:       Spouse name: N/A    Number of children: N/A    Years of education: Less than high school     Occupational History    Retired      Social History Main Topics    Smoking status: Never Smoker    Smokeless tobacco: Never Used    Alcohol use No    Drug use: No    Sexual activity: Not Currently     Other Topics Concern    Not on file     Social History Narrative    Always uses seatbelt    Denied:  History of daily caffeinated cola consumption    Denied:  History of daily coffee consumption    Daily tea consumption    Denied:  History of dental care, regularly    Exercise:  Walking    Living will in place    No Spiritism beliefs    Power of  in existence    Water intake, adequate (per day)     Past Medical History:   Diagnosis Date    [facility]-fib [facility])     Cardiac disease     Hyperlipidemia     Hypertension      Past Surgical History:   Procedure Laterality Date    APPENDECTOMY      CHOLECYSTECTOMY      HERNIA REPAIR      HYSTERECTOMY      NEPHRECTOMY Right      Allergies   Allergen Reactions    Penicillin G Anaphylaxis    Atorvastatin      Other reaction(s): Leg Cramps    Cephalexin Headache    Chocolate     Citalopram Abdominal Pain and Headache    Diltiazem     Fosinopril Sodium-Hctz     Methylprednisolone Nausea Only     Tablet generic only    Monopril [Fosinopril]      Reaction Date: 28Apr2011;     Penicillins     Pollen Extract     Pravastatin Myalgia     Other reaction(s): Leg Cramps    Spironolactone     Sporanox [Itraconazole]      Reaction Date: 28Apr2011;     Strawberry C [Ascorbate]  Caffeine Palpitations     Tachycardia       Current Outpatient Prescriptions:     ALPRAZolam (XANAX) 0 25 mg tablet, Take 1 tablet (0 25 mg total) by mouth 4 (four) times a day, Disp: 120 tablet, Rfl: 3    Cyanocobalamin (VITAMIN B-12 PO), Take by mouth, Disp: , Rfl:     diclofenac sodium (VOLTAREN) 1 %, Place on the skin, Disp: , Rfl:     Emollient (EUCERIN) lotion, Apply topically 2 (two) times a day, Disp: , Rfl:     furosemide (LASIX) 20 mg tablet, Take 40 mg by mouth daily, Disp: , Rfl: 3    levothyroxine 25 mcg tablet, Take 1 tablet (25 mcg total) by mouth daily, Disp: 90 tablet, Rfl: 3    multivitamin (THERAGRAN) TABS, Take 1 tablet by mouth daily, Disp: , Rfl:     olopatadine (PATANOL) 0 1 % ophthalmic solution, Apply 1 drop to eye 2 (two) times a day, Disp: , Rfl:     PACERONE 100 MG tablet, Take 1 tablet (100 mg total) by mouth daily for 30 days, Disp: 30 tablet, Rfl: 3    PRADAXA 75 MG capsule, Take 1 capsule (75 mg total) by mouth every 12 (twelve) hours, Disp: 60 capsule, Rfl: 6    rosuvastatin (CRESTOR) 5 mg tablet, Take 5 mg by mouth daily, Disp: , Rfl:     triamcinolone (KENALOG) 0 1 % cream, Apply topically Twice daily, Disp: , Rfl:     zolpidem (AMBIEN CR) 12 5 MG CR tablet, Take 12 5 mg by mouth daily at bedtime as needed for sleep, Disp: , Rfl:     azithromycin (ZITHROMAX) 250 mg tablet, Take 2 tablets today then 1 tablet daily x 4 days, Disp: 6 tablet, Rfl: 0    dabigatran etexilate (PRADAXA) 75 mg capsule, Take 1 capsule by mouth 2 (two) times a day with meals for 30 days, Disp: 60 capsule, Rfl: 0    furosemide (LASIX) 20 mg tablet, Take 1 tablet (20 mg total) by mouth daily for 30 days, Disp: 30 tablet, Rfl: 3    metoprolol succinate (TOPROL-XL) 50 mg 24 hr tablet, Take 1 tablet by mouth 2 (two) times a day for 30 days, Disp: 60 tablet, Rfl: 0    promethazine-dextromethorphan (PHENERGAN-DM) 6 25-15 mg/5 mL oral syrup, Take 5 mL by mouth 4 (four) times a day as needed for cough, Disp: 180 mL, Rfl: 0    Review of Systems  Constitutional:     Denies fever, chills ,fatigue ,weakness ,weight loss, weight gain     ENT: Denies earache ,loss of hearing ,nosebleed, nasal discharge,nasal congestion ,+sore throat ,hoarseness  Pulmonary: Denies shortness of breath ,+ dry cough  , denies dyspnea on exertion, orthopnea  ,+PND   Cardiovascular:  Denies bradycardia , tachycardia  ,palpations, lower extremity edema leg, claudication  Breast:  Denies new or changing breast lumps ,nipple discharge ,nipple changes  Abdomen:  Denies abdominal pain , anorexia , indigestion, nausea, vomiting, constipation, diarrhea  Musculoskeletal: Denies myalgias, arthralgias, joint swelling, joint stiffness , limb pain, limb swelling  Gu: denies dysuria, polyuria  Skin: Denies skin rash, skin lesion, skin wound, itching, dry skin  Neuro: Denies headache, numbness, tingling, confusion, loss of consciousness, dizziness, vertigo  Psychiatric: Denies feelings of depression, suicidal ideation, anxiety, sleep disturbances    OBJECTIVE    Constitutional:   NAD, well appearing and well nourished      ENT:   Conjunctiva and lids: no injection, edema, or discharge     Pupils and iris: ANKIT bilaterally    External inspection of ears and nose: normal without deformities or discharge  Otoscopic exam: Canals patent without erythema  Nasal mucosa, septum and turbinates: Normal or edema or discharge         Oropharynx:  Moist mucosa, normal tongue and tonsils without lesions  No erythema        Pulmonary:Respiratory effort normal rate and rhythm, no increased work of breathing   Auscultation of lungs:  Clear bilaterally with no adventitious breath sounds       Cardiovascular: regular rate and rhythm, S1 and S2, no murmur, no edema and/or varicosities of LE      Abdomen: Soft and non-distended     Positive bowel sounds      No heptomegaly or splenomegaly      Gu: no suprapubic tenderness or CVA tenderness, no urethral discharge  Lymphatic:  No anterior or posterior cervical lymphadenopathy         Musculoskeletal:  Gait and station: Normal gait      Digits and nails normal without clubbing or cyanosis       Inspection/palpation of joints, bones, and muscles:  No joint tenderness, swelling, full active and passive range of motion       Skin: Normal skin turgor and no rashes      Neuro:     Normal reflexes       Psych:   alert and oriented to person, place and time     normal mood and affect       Assessment/Plan:Diagnoses and all orders for this visit:    Essential hypertension    Mixed hyperlipidemia    Atrial fibrillation, unspecified type ([facility] [place] [address] )  -     PACERONE 100 MG tablet; Take 1 tablet (100 mg total) by mouth daily for 30 days    Upper respiratory tract infection, unspecified type  -     azithromycin (ZITHROMAX) 250 mg tablet; Take 2 tablets today then 1 tablet daily x 4 days  -     promethazine-dextromethorphan (PHENERGAN-DM) 6 25-15 mg/5 mL oral syrup; Take 5 mL by mouth 4 (four) times a day as needed for cough    Edema, unspecified type  -     furosemide (LASIX) 20 mg tablet; Take 1 tablet (20 mg total) by mouth daily for 30 days    Anxiety  -     ALPRAZolam (XANAX) 0 25 mg tablet; Take 1 tablet (0 25 mg total) by mouth 4 (four) times a day    Hypothyroidism, unspecified type        Reviewed with patient plan to treat with the above  Patient instructed to call in 72 hours if not feeling better or if symptoms worsen  I will see patient back in 4 mos or sooner prn

## 2018-05-12 DIAGNOSIS — G47.00 INSOMNIA, UNSPECIFIED TYPE: Primary | ICD-10-CM

## 2018-05-14 RX ORDER — ZOLPIDEM TARTRATE 12.5 MG/1
TABLET, FILM COATED, EXTENDED RELEASE ORAL
Qty: 30 TABLET | Refills: 3 | Status: SHIPPED | OUTPATIENT
Start: 2018-05-14 | End: 2018-09-20 | Stop reason: SDUPTHER

## 2018-05-21 ENCOUNTER — OFFICE VISIT (OUTPATIENT)
Dept: FAMILY MEDICINE CLINIC | Facility: CLINIC | Age: 83
End: 2018-05-21
Payer: COMMERCIAL

## 2018-05-21 ENCOUNTER — TELEPHONE (OUTPATIENT)
Dept: FAMILY MEDICINE CLINIC | Facility: CLINIC | Age: 83
End: 2018-05-21

## 2018-05-21 VITALS
SYSTOLIC BLOOD PRESSURE: 118 MMHG | WEIGHT: 177.2 LBS | HEART RATE: 62 BPM | TEMPERATURE: 98.4 F | HEIGHT: 67 IN | BODY MASS INDEX: 27.81 KG/M2 | DIASTOLIC BLOOD PRESSURE: 78 MMHG

## 2018-05-21 DIAGNOSIS — J06.9 UPPER RESPIRATORY TRACT INFECTION, UNSPECIFIED TYPE: ICD-10-CM

## 2018-05-21 DIAGNOSIS — R05.9 COUGH: Primary | ICD-10-CM

## 2018-05-21 DIAGNOSIS — J30.1 SEASONAL ALLERGIC RHINITIS DUE TO POLLEN: ICD-10-CM

## 2018-05-21 PROCEDURE — 99213 OFFICE O/P EST LOW 20 MIN: CPT | Performed by: NURSE PRACTITIONER

## 2018-05-21 RX ORDER — BENZONATATE 100 MG/1
100 CAPSULE ORAL 3 TIMES DAILY PRN
Qty: 20 CAPSULE | Refills: 0 | Status: SHIPPED | OUTPATIENT
Start: 2018-05-21 | End: 2019-03-26 | Stop reason: ALTCHOICE

## 2018-05-21 RX ORDER — LORATADINE 10 MG/1
10 TABLET ORAL DAILY
Refills: 0
Start: 2018-05-21 | End: 2020-03-25 | Stop reason: ALTCHOICE

## 2018-05-21 RX ORDER — DEXTROMETHORPHAN HYDROBROMIDE AND PROMETHAZINE HYDROCHLORIDE 15; 6.25 MG/5ML; MG/5ML
5 SYRUP ORAL 4 TIMES DAILY PRN
Qty: 180 ML | Refills: 0 | Status: SHIPPED | OUTPATIENT
Start: 2018-05-21 | End: 2018-10-01 | Stop reason: SDUPTHER

## 2018-05-21 NOTE — PROGRESS NOTES
Patient ID: Martin Ferreira is a 80 y o  female  HPI: 80 y  o female presenting with continuing coughing and sneezing episodes  The cough is worsen first thing in the morning and productive for thick clear secretions  She reports that she sneezes a lot at night before she goes to bed  SUBJECTIVE    Family History   Problem Relation Age of Onset    Heart disease Family     Hypertension Family     Cancer Family      Social History     Social History    Marital status:       Spouse name: N/A    Number of children: N/A    Years of education: Less than high school     Occupational History    Retired      Social History Main Topics    Smoking status: Never Smoker    Smokeless tobacco: Never Used    Alcohol use No    Drug use: No    Sexual activity: Not Currently     Other Topics Concern    Not on file     Social History Narrative    Always uses seatbelt    Denied:  History of daily caffeinated cola consumption    Denied:  History of daily coffee consumption    Daily tea consumption    Denied:  History of dental care, regularly    Exercise:  Walking    Living will in place    No Yazdanism beliefs    Power of  in existence    Water intake, adequate (per day)     Past Medical History:   Diagnosis Date    [facility]fib [facility])     Cardiac disease     Hyperlipidemia     Hypertension      Past Surgical History:   Procedure Laterality Date    APPENDECTOMY      CHOLECYSTECTOMY      HERNIA REPAIR      HYSTERECTOMY      NEPHRECTOMY Right      Allergies   Allergen Reactions    Penicillin G Anaphylaxis    Atorvastatin      Other reaction(s): Leg Cramps    Cephalexin Headache    Chocolate     Citalopram Abdominal Pain and Headache    Diltiazem     Fosinopril Sodium-Hctz     Methylprednisolone Nausea Only     Tablet generic only    Monopril [Fosinopril]      Reaction Date: 28Apr2011;     Penicillins     Pollen Extract     Pravastatin Myalgia     Other reaction(s): Leg Cramps    Spironolactone  Sporanox [Itraconazole]      Reaction Date: [id number];     Strawberry C [Ascorbate]     Caffeine Palpitations     Tachycardia       Current Outpatient Prescriptions:     ALPRAZolam (XANAX) 0 25 mg tablet, Take 1 tablet (0 25 mg total) by mouth 4 (four) times a day, Disp: 120 tablet, Rfl: 3    Cyanocobalamin (VITAMIN B-12 PO), Take by mouth, Disp: , Rfl:     diclofenac sodium (VOLTAREN) 1 %, Place on the skin, Disp: , Rfl:     Emollient (EUCERIN) lotion, Apply topically 2 (two) times a day, Disp: , Rfl:     furosemide (LASIX) 20 mg tablet, Take 1 tablet (20 mg total) by mouth daily for 30 days, Disp: 30 tablet, Rfl: 3    levothyroxine 25 mcg tablet, Take 1 tablet (25 mcg total) by mouth daily, Disp: 90 tablet, Rfl: 3    multivitamin (THERAGRAN) TABS, Take 1 tablet by mouth daily, Disp: , Rfl:     olopatadine (PATANOL) 0 1 % ophthalmic solution, Apply 1 drop to eye 2 (two) times a day, Disp: , Rfl:     PACERONE 100 MG tablet, Take 1 tablet (100 mg total) by mouth daily for 30 days, Disp: 30 tablet, Rfl: 3    PRADAXA 75 MG capsule, Take 1 capsule (75 mg total) by mouth every 12 (twelve) hours, Disp: 60 capsule, Rfl: 6    rosuvastatin (CRESTOR) 5 mg tablet, Take 5 mg by mouth daily, Disp: , Rfl:     triamcinolone (KENALOG) 0 1 % cream, Apply topically Twice daily, Disp: , Rfl:     zolpidem (AMBIEN CR) 12 5 MG CR tablet, TAKE ONE TABLET BY MOUTH AT BEDTIME, Disp: 30 tablet, Rfl: 3    benzonatate (TESSALON PERLES) 100 mg capsule, Take 1 capsule (100 mg total) by mouth 3 (three) times a day as needed for cough, Disp: 20 capsule, Rfl: 0    furosemide (LASIX) 20 mg tablet, Take 40 mg by mouth daily, Disp: , Rfl: 3    loratadine (CLARITIN) 10 mg tablet, Take 1 tablet (10 mg total) by mouth daily, Disp: , Rfl: 0    metoprolol succinate (TOPROL-XL) 50 mg 24 hr tablet, Take 1 tablet by mouth 2 (two) times a day for 30 days, Disp: 60 tablet, Rfl: 0    promethazine-dextromethorphan (PHENERGAN-DM) 6 25-15 mg/5 mL oral syrup, Take 5 mL by mouth 4 (four) times a day as needed for cough, Disp: 180 mL, Rfl: 0    Review of Systems    Consitutional:  Denies chills, fatigue and fever   ENT:  Positive for slight nasal congestion, post nasal drip, sore throat and itchy/watery eyes   Pulmonary:  Denies shortness of breath or dyspnea on exertion Positive for productive cough for clear thick secretions and occasionally notices wheezing    Cardiovascular:  Denies chest pain/pressure or lower extremity swelling  Abdomen:   Denies abdominal pain, nausea, vomiting, diarrhea, constipation    Musculoskeletal:  Denies gait disturbance, myalgia,  arthalgia or muscle weakness    Integumentary:  Denies ecchymosis, petechiae ,rash or lesions   Neurological:  Denies headaches, dizziness, confusion, loss of consciousness or behavioral changes  Psychological:  Denies anxiety, depression or sleep disturbances      OBJECTIVE    /78   Pulse 62   Temp 98 4 °F (36 9 °C)   Ht 5' 7" (1 702 m)   Wt 80 4 kg (177 lb 3 2 oz)   BMI 27 75 kg/m²     Constitutional:  Well appearing and in no acute distress  ENT:  TM normal without fluid or infection  throat normal slight slight erythema with post nasal drip noted, sinuses non-tender and nasal mucosa pale and congested   Pulmonary:  clear to auscultation bilaterally and no crackles, no wheezes, chest expansion normal  Cardiovascular:  S1S2, regular rate and rhythm  Gastrointestinal:  abdomen is soft without significant tenderness  Lymphatic:  no lymphadenopathy   Musculoskeletal:  no muscular tenderness noted  Skin:  skin color, texture and turgor are normal; no bruising, rashes or lesions noted  Neurologic:  Alert and oriented x 4 and Affect and mood normal      Assessment/Plan:  Diagnoses and all orders for this visit:    Cough  -     benzonatate (TESSALON PERLES) 100 mg capsule;  Take 1 capsule (100 mg total) by mouth 3 (three) times a day as needed for cough    Seasonal allergic rhinitis due to pollen  -     loratadine (CLARITIN) 10 mg tablet; Take 1 tablet (10 mg total) by mouth daily    Upper respiratory tract infection, unspecified type  -     promethazine-dextromethorphan (PHENERGAN-DM) 6 25-15 mg/5 mL oral syrup;  Take 5 mL by mouth 4 (four) times a day as needed for cough      #1 Cough  Reviewed with patient plan to treat with continuation of promethazine DM and added on benzonatate up to three times a day for cough  #2 Allergic rhinitis  Reviewed with patient plan to start Loratadine daily until pollen and allergy triggers lessen  Patient instructed to call in 72 hours if not feeling better or if symptoms worsen

## 2018-05-21 NOTE — TELEPHONE ENCOUNTER
Patient daughter called stating she has a deep cough and sneezing like crazy  Patient just had the Flu in feb  Patient was on medication Mucinex and cough syrup and was wondering if she could have something stronger called in? Or if the doctor wants to see her?     [facility]

## 2018-06-13 DIAGNOSIS — I50.32 CHRONIC DIASTOLIC HEART FAILURE (HCC): Primary | ICD-10-CM

## 2018-06-14 RX ORDER — FUROSEMIDE 20 MG/1
TABLET ORAL
Qty: 60 TABLET | Refills: 3 | Status: SHIPPED | OUTPATIENT
Start: 2018-06-14 | End: 2018-10-04 | Stop reason: SDUPTHER

## 2018-07-12 DIAGNOSIS — I48.91 ATRIAL FIBRILLATION, UNSPECIFIED TYPE (HCC): Primary | ICD-10-CM

## 2018-07-12 RX ORDER — AMIODARONE HYDROCHLORIDE 100 MG/1
100 TABLET ORAL DAILY
Qty: 90 TABLET | Refills: 3 | Status: SHIPPED | OUTPATIENT
Start: 2018-07-12 | End: 2019-01-21 | Stop reason: SDUPTHER

## 2018-07-18 ENCOUNTER — TELEPHONE (OUTPATIENT)
Dept: FAMILY MEDICINE CLINIC | Facility: CLINIC | Age: 83
End: 2018-07-18

## 2018-07-18 NOTE — TELEPHONE ENCOUNTER
Please do the prior auth  Zolpidem is one of the only non addictive sleep aides indicated for chronic insomnia  Pt has tried lower dose of this and failed

## 2018-07-18 NOTE — TELEPHONE ENCOUNTER
Anjel Foreman came in to let us know [facility] is requesting Prior Authorization on the patients Zolpidem Tartrate ER 12 5mg  Anjel Foreman stated the authorization has to go through [id number] [address] [facility]  Anjel Foreman stated [employer] Cannon pay for this  It is going strictly through [facility]  Patient is almost fully out of medication

## 2018-09-08 DIAGNOSIS — F41.9 ANXIETY: ICD-10-CM

## 2018-09-10 RX ORDER — ALPRAZOLAM 0.25 MG/1
TABLET ORAL
Qty: 120 TABLET | Refills: 3 | Status: SHIPPED | OUTPATIENT
Start: 2018-09-10 | End: 2019-01-16 | Stop reason: SDUPTHER

## 2018-09-20 DIAGNOSIS — G47.00 INSOMNIA, UNSPECIFIED TYPE: ICD-10-CM

## 2018-09-21 DIAGNOSIS — I48.91 ATRIAL FIBRILLATION, UNSPECIFIED TYPE (HCC): ICD-10-CM

## 2018-09-21 RX ORDER — DABIGATRAN ETEXILATE MESYLATE 75 MG/1
75 CAPSULE ORAL EVERY 12 HOURS SCHEDULED
Qty: 60 CAPSULE | Refills: 1 | Status: SHIPPED | OUTPATIENT
Start: 2018-09-21 | End: 2018-12-05 | Stop reason: SDUPTHER

## 2018-09-21 RX ORDER — ZOLPIDEM TARTRATE 12.5 MG/1
TABLET, FILM COATED, EXTENDED RELEASE ORAL
Qty: 30 TABLET | Refills: 3 | Status: SHIPPED | OUTPATIENT
Start: 2018-09-21 | End: 2019-01-22 | Stop reason: SDUPTHER

## 2018-09-24 ENCOUNTER — OFFICE VISIT (OUTPATIENT)
Dept: FAMILY MEDICINE CLINIC | Facility: CLINIC | Age: 83
End: 2018-09-24
Payer: COMMERCIAL

## 2018-09-24 VITALS
HEART RATE: 68 BPM | RESPIRATION RATE: 14 BRPM | BODY MASS INDEX: 27.21 KG/M2 | TEMPERATURE: 98.7 F | WEIGHT: 173.4 LBS | DIASTOLIC BLOOD PRESSURE: 76 MMHG | SYSTOLIC BLOOD PRESSURE: 124 MMHG | HEIGHT: 67 IN

## 2018-09-24 DIAGNOSIS — E78.00 HYPERCHOLESTEROLEMIA: ICD-10-CM

## 2018-09-24 DIAGNOSIS — E03.9 HYPOTHYROIDISM, UNSPECIFIED TYPE: ICD-10-CM

## 2018-09-24 DIAGNOSIS — Z00.00 MEDICARE ANNUAL WELLNESS VISIT, SUBSEQUENT: Primary | ICD-10-CM

## 2018-09-24 DIAGNOSIS — B37.2 CUTANEOUS CANDIDIASIS: ICD-10-CM

## 2018-09-24 DIAGNOSIS — R10.9 ABDOMINAL SPASMS: ICD-10-CM

## 2018-09-24 DIAGNOSIS — I10 ESSENTIAL HYPERTENSION: ICD-10-CM

## 2018-09-24 DIAGNOSIS — Z23 NEED FOR VACCINATION: ICD-10-CM

## 2018-09-24 PROCEDURE — 1160F RVW MEDS BY RX/DR IN RCRD: CPT | Performed by: FAMILY MEDICINE

## 2018-09-24 PROCEDURE — 1125F AMNT PAIN NOTED PAIN PRSNT: CPT | Performed by: FAMILY MEDICINE

## 2018-09-24 PROCEDURE — 90662 IIV NO PRSV INCREASED AG IM: CPT | Performed by: FAMILY MEDICINE

## 2018-09-24 PROCEDURE — 1170F FXNL STATUS ASSESSED: CPT | Performed by: FAMILY MEDICINE

## 2018-09-24 PROCEDURE — G0439 PPPS, SUBSEQ VISIT: HCPCS | Performed by: FAMILY MEDICINE

## 2018-09-24 PROCEDURE — 99214 OFFICE O/P EST MOD 30 MIN: CPT | Performed by: FAMILY MEDICINE

## 2018-09-24 PROCEDURE — G0008 ADMIN INFLUENZA VIRUS VAC: HCPCS | Performed by: FAMILY MEDICINE

## 2018-09-24 RX ORDER — HYOSCYAMINE SULFATE EXTENDED-RELEASE 0.38 MG/1
0.38 TABLET ORAL EVERY 12 HOURS PRN
Qty: 60 TABLET | Refills: 0 | Status: SHIPPED | OUTPATIENT
Start: 2018-09-24 | End: 2020-07-28 | Stop reason: ALTCHOICE

## 2018-09-24 NOTE — PROGRESS NOTES
Patient ID: Gilmar Gardner is a 80 y o  female  HPI: 80 y  o female presents for follow up of hypertension, hypercholesterolemia and hypothyroidism  She complains of intermittent diarrhea and constipation and frequently experiences colon spasms intermittently lately in right lower [place]rant   SUBJECTIVE    Family History   Problem Relation Age of Onset    Heart disease Family     Hypertension Family     Cancer Family      Social History     Social History    Marital status:       Spouse name: N/A    Number of children: N/A    Years of education: Less than high school     Occupational History    Retired      Social History Main Topics    Smoking status: Never Smoker    Smokeless tobacco: Never Used    Alcohol use No    Drug use: No    Sexual activity: Not Currently     Other Topics Concern    Not on file     Social History Narrative    Always uses seatbelt    Denied:  History of daily caffeinated cola consumption    Denied:  History of daily coffee consumption    Daily tea consumption    Denied:  History of dental care, regularly    Exercise:  Walking    Living will in place    No Taoist beliefs    Power of  in existence    Water intake, adequate (per day)     Past Medical History:   Diagnosis Date    [facility]-fib [facility])     Cardiac disease     Hyperlipidemia     Hypertension      Past Surgical History:   Procedure Laterality Date    APPENDECTOMY      CHOLECYSTECTOMY      HERNIA REPAIR      HYSTERECTOMY      NEPHRECTOMY Right      Allergies   Allergen Reactions    Penicillin G Anaphylaxis    Atorvastatin      Other reaction(s): Leg Cramps    Cephalexin Headache    Chocolate     Citalopram Abdominal Pain and Headache    Diltiazem     Fosinopril Sodium-Hctz     Methylprednisolone Nausea Only     Tablet generic only    Monopril [Fosinopril]      Reaction Date: 28Apr2011;     Penicillins     Pollen Extract     Pravastatin Myalgia     Other reaction(s): Leg Cramps    Spironolactone     Sporanox [Itraconazole]      Reaction Date: 28Apr2011;     Strawberry C [Ascorbate]     Caffeine Palpitations     Tachycardia       Current Outpatient Prescriptions:     ALPRAZolam (XANAX) 0 25 mg tablet, TAKE ONE TABLET BY MOUTH FOUR TIMES A DAY, Disp: 120 tablet, Rfl: 3    amiodarone 100 mg tablet, Take 1 tablet (100 mg total) by mouth daily, Disp: 90 tablet, Rfl: 3    benzonatate (TESSALON PERLES) 100 mg capsule, Take 1 capsule (100 mg total) by mouth 3 (three) times a day as needed for cough, Disp: 20 capsule, Rfl: 0    Cyanocobalamin (VITAMIN B-12 PO), Take by mouth, Disp: , Rfl:     diclofenac sodium (VOLTAREN) 1 %, Place on the skin, Disp: , Rfl:     Emollient (EUCERIN) lotion, Apply topically 2 (two) times a day, Disp: , Rfl:     furosemide (LASIX) 20 mg tablet, TAKE 2 TABLETS BY MOUTH ONCE DAILY, Disp: 60 tablet, Rfl: 3    levothyroxine 25 mcg tablet, Take 1 tablet (25 mcg total) by mouth daily, Disp: 90 tablet, Rfl: 3    loratadine (CLARITIN) 10 mg tablet, Take 1 tablet (10 mg total) by mouth daily, Disp: , Rfl: 0    multivitamin (THERAGRAN) TABS, Take 1 tablet by mouth daily, Disp: , Rfl:     olopatadine (PATANOL) 0 1 % ophthalmic solution, Apply 1 drop to eye 2 (two) times a day, Disp: , Rfl:     PRADAXA 75 MG capsule, Take 1 capsule (75 mg total) by mouth every 12 (twelve) hours, Disp: 60 capsule, Rfl: 1    promethazine-dextromethorphan (PHENERGAN-DM) 6 25-15 mg/5 mL oral syrup, Take 5 mL by mouth 4 (four) times a day as needed for cough, Disp: 180 mL, Rfl: 0    rosuvastatin (CRESTOR) 5 mg tablet, Take 5 mg by mouth daily, Disp: , Rfl:     triamcinolone (KENALOG) 0 1 % cream, Apply topically Twice daily, Disp: , Rfl:     zolpidem (AMBIEN CR) 12 5 MG CR tablet, TAKE ONE TABLET BY MOUTH AT BEDTIME, Disp: 30 tablet, Rfl: 3    furosemide (LASIX) 20 mg tablet, Take 1 tablet (20 mg total) by mouth daily for 30 days, Disp: 30 tablet, Rfl: 3    hyoscyamine (LEVBID) 0 375 mg 12 hr tablet, Take 1 tablet (0 375 mg total) by mouth every 12 (twelve) hours as needed for cramping, Disp: 60 tablet, Rfl: 0    metoprolol succinate (TOPROL-XL) 50 mg 24 hr tablet, Take 1 tablet by mouth 2 (two) times a day for 30 days, Disp: 60 tablet, Rfl: 0    nystatin-triamcinolone (MYCOLOG-II) cream, Apply topically 4 (four) times a day, Disp: 60 g, Rfl: 3    PACERONE 100 MG tablet, Take 1 tablet (100 mg total) by mouth daily for 30 days, Disp: 30 tablet, Rfl: 3    Review of Systems  Constitutional:     Denies fever, chills ,fatigue ,weakness ,weight loss, weight gain     ENT: Denies earache ,loss of hearing ,nosebleed, nasal discharge,nasal congestion ,sore throat ,hoarseness  Pulmonary: Denies shortness of breath ,cough  ,dyspnea on exertion, orthopnea  ,PND   Cardiovascular:  Denies bradycardia , tachycardia  ,palpations, lower extremity edema leg, claudication  Breast:  Denies new or changing breast lumps ,nipple discharge ,nipple changes  Abdomen:  +intermittent RLQ abdominal pain , anorexia , indigestion, nausea, vomiting, + alternating constipation and  diarrhea  Musculoskeletal: Denies myalgias, arthralgias, joint swelling, joint stiffness , limb pain, limb swelling  Gu: denies dysuria, polyuria  Skin: Denies skin rash, skin lesion, skin wound, itching, dry skin  Neuro: Denies headache, numbness, tingling, confusion, loss of consciousness, dizziness, vertigo  Psychiatric: Denies feelings of depression, suicidal ideation, anxiety, sleep disturbances    OBJECTIVE  /76   Pulse 68   Temp 98 7 °F (37 1 °C)   Resp 14   Ht 5' 7" (1 702 m)   Wt 78 7 kg (173 lb 6 4 oz)   BMI 27 16 kg/m²   Constitutional:   NAD, well appearing and well nourished      ENT:   Conjunctiva and lids: no injection, edema, or discharge     Pupils and iris: ANKIT bilaterally    External inspection of ears and nose: normal without deformities or discharge  Otoscopic exam: Canals patent without erythema         Nasal mucosa, septum and turbinates: Normal or edema or discharge         Oropharynx:  Moist mucosa, normal tongue and tonsils without lesions  No erythema        Pulmonary:Respiratory effort normal rate and rhythm, no increased work of breathing  Auscultation of lungs:  Clear bilaterally with no adventitious breath sounds     Cardiovascular: regular rate and rhythm, S1 and S2, no murmur, no edema and/or varicosities of LE      Abdomen: Soft and non-distended     Positive bowel sounds      No heptomegaly or splenomegaly      Gu: no suprapubic tenderness or CVA tenderness, no urethral discharge  Lymphatic:  No anterior or posterior cervical lymphadenopathy         Musculoskeletal:  Gait and station: Normal gait      Digits and nails normal without clubbing or cyanosis       Inspection/palpation of joints, bones, and muscles:  No joint tenderness, swelling, full active and passive range of motion       Skin: Normal skin turgor and no rashes      Neuro:    Normal reflexes      Psych:   alert and oriented to person, place and time     normal mood and affect       Assessment/Plan:Diagnoses and all orders for this visit:    Medicare annual wellness visit, subsequent    Abdominal spasms  -     hyoscyamine (LEVBID) 0 375 mg 12 hr tablet; Take 1 tablet (0 375 mg total) by mouth every 12 (twelve) hours as needed for cramping    Hypercholesterolemia  -     Lipid Panel with Direct LDL reflex; Future    Essential hypertension  -     Comprehensive metabolic panel; Future    Hypothyroidism, unspecified type  -     TSH baseline; Future    Cutaneous candidiasis  -     nystatin-triamcinolone (MYCOLOG-II) cream; Apply topically 4 (four) times a day    Need for vaccination  -     influenza vaccine, 4308-2277, high-dose, PF 0 5 mL, for patients 65 yr+ (FLUZONE HIGH-DOSE)    Reviewed with patient plan to treat with above  Suggested picking up fibercon and taking that one daily    Once she has been on antispasmotic for 10 days, she is to switch to prn use at that time      Patient instructed to call in 72 hours if not feeling better or if symptoms worsen

## 2018-09-24 NOTE — PROGRESS NOTES
Assessment and Plan:    Problem List Items Addressed This Visit     None        Health Maintenance Due   Topic Date Due    DTaP,Tdap,and Td Vaccines (1 - Tdap) 08/26/1947    INFLUENZA VACCINE  09/01/2018         HPI:  Moe Lynne is a 80 y o  female here for her Subsequent Wellness Visit  She is declining a mammogram, as well as a dexa scan, but is consenting to a flu shot today      Patient Active Problem List   Diagnosis    Oral pain of unknown etiology    Paroxysmal atrial fibrillation (HCC)    Hypothyroidism    Anxiety    Cardiomyopathy ([address] )    CKD (chronic kidney disease), stage IV (HCC)    Arthritis    Chronic diastolic congestive heart failure (HCC)    Hyperlipidemia    Hypertension    Insomnia    Contact dermatitis    Sinus bradycardia     Past Medical History:   Diagnosis Date    A-fib ([address] )     Cardiac disease     Hyperlipidemia     Hypertension      Past Surgical History:   Procedure Laterality Date    APPENDECTOMY      CHOLECYSTECTOMY      HERNIA REPAIR      HYSTERECTOMY      NEPHRECTOMY Right      Family History   Problem Relation Age of Onset    Heart disease Family     Hypertension Family     Cancer Family      History   Smoking Status    Never Smoker   Smokeless Tobacco    Never Used     History   Alcohol Use No      History   Drug Use No       Current Outpatient Prescriptions   Medication Sig Dispense Refill    ALPRAZolam (XANAX) 0 25 mg tablet TAKE ONE TABLET BY MOUTH FOUR TIMES A  tablet 3    amiodarone 100 mg tablet Take 1 tablet (100 mg total) by mouth daily 90 tablet 3    benzonatate (TESSALON PERLES) 100 mg capsule Take 1 capsule (100 mg total) by mouth 3 (three) times a day as needed for cough 20 capsule 0    Cyanocobalamin (VITAMIN B-12 PO) Take by mouth      diclofenac sodium (VOLTAREN) 1 % Place on the skin      Emollient (EUCERIN) lotion Apply topically 2 (two) times a day      furosemide (LASIX) 20 mg tablet TAKE 2 TABLETS BY MOUTH ONCE DAILY 60 tablet 3    levothyroxine 25 mcg tablet Take 1 tablet (25 mcg total) by mouth daily 90 tablet 3    loratadine (CLARITIN) 10 mg tablet Take 1 tablet (10 mg total) by mouth daily  0    multivitamin (THERAGRAN) TABS Take 1 tablet by mouth daily      olopatadine (PATANOL) 0 1 % ophthalmic solution Apply 1 drop to eye 2 (two) times a day      PRADAXA 75 MG capsule Take 1 capsule (75 mg total) by mouth every 12 (twelve) hours 60 capsule 1    promethazine-dextromethorphan (PHENERGAN-DM) 6 25-15 mg/5 mL oral syrup Take 5 mL by mouth 4 (four) times a day as needed for cough 180 mL 0    rosuvastatin (CRESTOR) 5 mg tablet Take 5 mg by mouth daily      triamcinolone (KENALOG) 0 1 % cream Apply topically Twice daily      zolpidem (AMBIEN CR) 12 5 MG CR tablet TAKE ONE TABLET BY MOUTH AT BEDTIME 30 tablet 3    furosemide (LASIX) 20 mg tablet Take 1 tablet (20 mg total) by mouth daily for 30 days 30 tablet 3    metoprolol succinate (TOPROL-XL) 50 mg 24 hr tablet Take 1 tablet by mouth 2 (two) times a day for 30 days 60 tablet 0    PACERONE 100 MG tablet Take 1 tablet (100 mg total) by mouth daily for 30 days 30 tablet 3     No current facility-administered medications for this visit        Allergies   Allergen Reactions    Penicillin G Anaphylaxis    Atorvastatin      Other reaction(s): Leg Cramps    Cephalexin Headache    Chocolate     Citalopram Abdominal Pain and Headache    Diltiazem     Fosinopril Sodium-Hctz     Methylprednisolone Nausea Only     Tablet generic only    Monopril [Fosinopril]      Reaction Date: [id number];     Penicillins     Pollen Extract     Pravastatin Myalgia     Other reaction(s): Leg Cramps    Spironolactone     Sporanox [Itraconazole]      Reaction Date: [id number];     Strawberry C [Ascorbate]     Caffeine Palpitations     Tachycardia     Immunization History   Administered Date(s) Administered    Influenza Split High Dose Preservative Free IM 10/11/2011, 09/27/2012, 10/10/2013, 10/09/2014, 10/14/2015, 10/20/2016, 10/04/2017    Pneumococcal Conjugate 13-Valent 12/14/2015    Pneumococcal Polysaccharide PPV23 10/11/2011       Patient Care Team:  Ariane Mcclellan, DO as PCP - General  MD Chayito Mcfadden, DO    Medicare Screening Tests and Risk Assessments:  Ankita Carmen is here for her Subsequent Wellness visit  Last Medicare Wellness visit information reviewed, patient interviewed, no change since last AWV  Health Risk Assessment:  Patient rates overall health as fair  Patient feels that their physical health rating is Same  Eyesight was rated as Same  Hearing was rated as Same  Patient feels that their emotional and mental health rating is Same  Pain experienced by patient in the last 7 days has been A lot  Patient's pain rating has been 7/10  Patient states that she has experienced no weight loss or gain in last 6 months  Home Safety:  Patient does not have trouble with stairs inside or outside of their home  Patient currently reports that there are no safety hazards present in home, working smoke alarms, no working carbon monoxide detectors  Preventative Screenings:   No breast cancer screening performed, no colon cancer screen completed, cholesterol screen completed, no glaucoma eye exam completed    Nutrition:  Current diet: Regular, No Added Salt and Limited junk food with servings of the following:    Medications:  Patient is currently taking over-the-counter supplements  List of OTC medications includes: probiotics ,mineral oil    Lifestyle Choices:  Patient reports no tobacco use  Patient has smoked or used tobacco in the past   Patient has stopped her tobacco use  Tobacco use quit date: 1970  Patient reports no alcohol use  Patient does not drive a vehicle  Patient wears seat belt  Current level of exercise of physical activity described by patient as: walking in the house          Activities of Daily Living:  Can get out of bed by his or her self, able to dress self, unable to make own meals, able to do own shopping, able to bathe self, unable to do laundry/housekeeping, can manage own money, pay bills and track expenses    Previous Hospitalizations:  No hospitalization or ED visit in past 12 months        Advanced Directives:  Patient has decided on a power of   Patient has spoken to designated power of   Patient has completed advanced directive  Preventative Screening/Counseling:      Cardiovascular:      General: Screening Current          Diabetes:      General: Screening Current          Colorectal Cancer:      General: Screening Current          Breast Cancer:      General: Patient Declines          Cervical Cancer:      General: Screening Not Indicated          Osteoporosis:      General: Patient Declines          AAA:      General: Screening Current          Glaucoma:      General: Screening Current          HIV:      General: Screening Not Indicated          Hepatitis C:      General: Screening Not Indicated        Advanced Directives:   Patient has living will for healthcare, has durable POA for healthcare, patient has an advanced directive  Information on ACP and/or AD provided  5 wishes given  End of life assessment reviewed with patient  Provider agrees with end of life decisions        Immunizations:  Patient reviewed and up to date      Influenza: Influenza Due Today      Pneumococcal: Lifetime Vaccine Completed      Shingrix: Risks & Benefits Discussed      Hepatitis B (Medium to high risk patients): Patient Declines      Zostavax: Zostavax Vaccine UTD      TD: Td Vaccine UTD      TDAP: Patient Declines

## 2018-10-01 DIAGNOSIS — J06.9 UPPER RESPIRATORY TRACT INFECTION, UNSPECIFIED TYPE: ICD-10-CM

## 2018-10-01 DIAGNOSIS — R10.9 RIGHT FLANK PAIN: Primary | ICD-10-CM

## 2018-10-01 RX ORDER — DEXTROMETHORPHAN HYDROBROMIDE AND PROMETHAZINE HYDROCHLORIDE 15; 6.25 MG/5ML; MG/5ML
5 SYRUP ORAL 4 TIMES DAILY PRN
Qty: 180 ML | Refills: 0 | Status: SHIPPED | OUTPATIENT
Start: 2018-10-01 | End: 2019-09-20 | Stop reason: ALTCHOICE

## 2018-10-04 DIAGNOSIS — I50.32 CHRONIC DIASTOLIC HEART FAILURE (HCC): ICD-10-CM

## 2018-10-04 RX ORDER — FUROSEMIDE 20 MG/1
TABLET ORAL
Qty: 60 TABLET | Refills: 3 | Status: SHIPPED | OUTPATIENT
Start: 2018-10-04 | End: 2019-02-25 | Stop reason: SDUPTHER

## 2018-10-08 ENCOUNTER — OFFICE VISIT (OUTPATIENT)
Dept: CARDIOLOGY CLINIC | Facility: CLINIC | Age: 83
End: 2018-10-08
Payer: COMMERCIAL

## 2018-10-08 ENCOUNTER — HOSPITAL ENCOUNTER (OUTPATIENT)
Dept: RADIOLOGY | Facility: HOSPITAL | Age: 83
Discharge: HOME | End: 2018-10-08
Payer: COMMERCIAL

## 2018-10-08 VITALS
HEIGHT: 67 IN | SYSTOLIC BLOOD PRESSURE: 138 MMHG | OXYGEN SATURATION: 93 % | BODY MASS INDEX: 27.47 KG/M2 | DIASTOLIC BLOOD PRESSURE: 66 MMHG | WEIGHT: 175 LBS | HEART RATE: 48 BPM

## 2018-10-08 DIAGNOSIS — R10.9 RIGHT FLANK PAIN: ICD-10-CM

## 2018-10-08 DIAGNOSIS — I42.9 CARDIOMYOPATHY, UNSPECIFIED TYPE (HCC): ICD-10-CM

## 2018-10-08 DIAGNOSIS — I50.32 CHRONIC DIASTOLIC CONGESTIVE HEART FAILURE (HCC): ICD-10-CM

## 2018-10-08 DIAGNOSIS — I35.0 MILD AORTIC STENOSIS: ICD-10-CM

## 2018-10-08 DIAGNOSIS — I10 ESSENTIAL HYPERTENSION: ICD-10-CM

## 2018-10-08 DIAGNOSIS — I48.0 PAROXYSMAL ATRIAL FIBRILLATION (HCC): Primary | ICD-10-CM

## 2018-10-08 DIAGNOSIS — R00.1 SINUS BRADYCARDIA: ICD-10-CM

## 2018-10-08 PROCEDURE — 99214 OFFICE O/P EST MOD 30 MIN: CPT | Performed by: INTERNAL MEDICINE

## 2018-10-08 PROCEDURE — 4040F PNEUMOC VAC/ADMIN/RCVD: CPT | Performed by: INTERNAL MEDICINE

## 2018-10-08 PROCEDURE — 74018 RADEX ABDOMEN 1 VIEW: CPT

## 2018-10-08 PROCEDURE — 93000 ELECTROCARDIOGRAM COMPLETE: CPT | Performed by: INTERNAL MEDICINE

## 2018-10-08 NOTE — PROGRESS NOTES
Cardiology Follow Up    Shaw Hernandez  8/26/1926  [id number]  [address] [facility] [place] [id number]    1  Paroxysmal atrial fibrillation (HCC)  POCT ECG   2  Chronic diastolic congestive heart failure (HCC)     3  Cardiomyopathy, unspecified type ([address] )     4  Essential hypertension     5  Sinus bradycardia     6  Mild aortic stenosis         Interval History:     Mrs Missy Lambert comes in for follow-up given her paroxysmal atrial fibrillation and congestive heart failure  I saw her during hospitalization which she presented in rapid atrial fibrillation  A rate control strategy was initially attempted  She had an echocardiogram performed that demonstrated an ejection fraction of 35%  There was hypokinesis seen in the anterior and anterior septal walls, with diffuse hypokinesis otherwise  A rate control strategy was not successful, and therefore we had her undergo a ALLI guided cardioversion  This successfully converted back to sinus rhythm  Given her ejection fraction, and moderate left atrial enlargement we felt antiarrhythmic therapy was needed  We started IV amiodarone, and she was discharged on this as well  This did help her symptoms significantly  He also help control her volume status better  She was discharged on low-dose Lasix  She has also been on Pradaxa 75 mg twice daily  In follow-up she was having some low heart rates  She was for the most part asymptomatic from this standpoint, other than some fatigue  At initial follow-up visit with our CHF clinic, her metoprolol was discontinued and her amiodarone was decreased to 100 mg daily  She was having some increasing lower extremity edema, and saw our CHF clinic again  There her amlodipine was decreased to 2 5 mg daily, and has since been stopped  She has also been on daily Lasix   Since I last saw her we repeated an echocardiogram that showed an improvement of her ejection fraction back to 65 percent  This proved that 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  Brian Moran has felt well since last visit  She remains in sinus rhythm  She denies any palpitations or return of symptoms of her atrial fibrillation  No chest pain  She does have some mild lower extremity edema which is also improved  She denies any orthopnea or PND  There has been no lightheadedness or syncope  Her heart rate is around 50 today  She did recently suffer from influenza  She was quite fatigued and short of breath with this, but is improving  Patient Active Problem List   Diagnosis    Oral pain of unknown etiology    Paroxysmal atrial fibrillation (HCC)    Hypothyroidism    Anxiety    Cardiomyopathy [facility] [address] )    CKD (chronic kidney disease), stage IV (HCC)    Arthritis    Chronic diastolic congestive heart failure ([facility] [address] )    Hyperlipidemia    Hypertension    Insomnia    Contact dermatitis    Sinus bradycardia    Mild aortic stenosis     Past Medical History:   Diagnosis Date    A-fib [facility])     Cardiac disease     Hyperlipidemia     Hypertension      Social History     Social History    Marital status:       Spouse name: N/A    Number of children: N/A    Years of education: Less than high school     Occupational History    Retired      Social History Main Topics    Smoking status: Never Smoker    Smokeless tobacco: Never Used    Alcohol use No    Drug use: No    Sexual activity: Not Currently     Other Topics Concern    Not on file     Social History Narrative    Always uses seatbelt    Denied:  History of daily caffeinated cola consumption    Denied:  History of daily coffee consumption    Daily tea consumption    Denied:  History of dental care, regularly    Exercise:  Walking    Living will in place    No [employer] beliefs    Power of  in existence    Water intake, adequate (per day)      Family History   Problem Relation Age of Onset    Heart disease Family     Hypertension Family     Cancer Family      Past Surgical History:   Procedure Laterality Date    APPENDECTOMY      CHOLECYSTECTOMY      HERNIA REPAIR      HYSTERECTOMY      NEPHRECTOMY Right        Current Outpatient Prescriptions:     ALPRAZolam (XANAX) 0 25 mg tablet, TAKE ONE TABLET BY MOUTH FOUR TIMES A DAY, Disp: 120 tablet, Rfl: 3    amiodarone 100 mg tablet, Take 1 tablet (100 mg total) by mouth daily, Disp: 90 tablet, Rfl: 3    benzonatate (TESSALON PERLES) 100 mg capsule, Take 1 capsule (100 mg total) by mouth 3 (three) times a day as needed for cough, Disp: 20 capsule, Rfl: 0    Cyanocobalamin (VITAMIN B-12 PO), Take by mouth, Disp: , Rfl:     Emollient (EUCERIN) lotion, Apply topically 2 (two) times a day, Disp: , Rfl:     furosemide (LASIX) 20 mg tablet, TAKE 2 TABLETS BY MOUTH ONCE DAILY, Disp: 60 tablet, Rfl: 3    hyoscyamine (LEVBID) 0 375 mg 12 hr tablet, Take 1 tablet (0 375 mg total) by mouth every 12 (twelve) hours as needed for cramping, Disp: 60 tablet, Rfl: 0    levothyroxine 25 mcg tablet, Take 1 tablet (25 mcg total) by mouth daily, Disp: 90 tablet, Rfl: 3    loratadine (CLARITIN) 10 mg tablet, Take 1 tablet (10 mg total) by mouth daily (Patient taking differently: Take 10 mg by mouth daily as needed  ), Disp: , Rfl: 0    nystatin-triamcinolone (MYCOLOG-II) cream, Apply topically 4 (four) times a day, Disp: 60 g, Rfl: 3    olopatadine (PATANOL) 0 1 % ophthalmic solution, Apply 1 drop to eye 2 (two) times a day, Disp: , Rfl:     PRADAXA 75 MG capsule, Take 1 capsule (75 mg total) by mouth every 12 (twelve) hours, Disp: 60 capsule, Rfl: 1    promethazine-dextromethorphan (PHENERGAN-DM) 6 25-15 mg/5 mL oral syrup, Take 5 mL by mouth 4 (four) times a day as needed for cough, Disp: 180 mL, Rfl: 0    rosuvastatin (CRESTOR) 5 mg tablet, Take 5 mg by mouth daily, Disp: , Rfl:     triamcinolone (KENALOG) 0 1 % cream, Apply topically Twice daily, Disp: , Rfl:     zolpidem (AMBIEN CR) 12 5 MG CR tablet, TAKE ONE TABLET BY MOUTH AT BEDTIME, Disp: 30 tablet, Rfl: 3  Allergies   Allergen Reactions    Penicillin G Anaphylaxis    Atorvastatin      Other reaction(s): Leg Cramps    Cephalexin Headache    Chocolate     Citalopram Abdominal Pain and Headache    Diltiazem     Fosinopril Sodium-Hctz     Methylprednisolone Nausea Only     Tablet generic only    Monopril [Fosinopril]      Reaction Date: [id number];     Penicillins     Pollen Extract     Pravastatin Myalgia     Other reaction(s): Leg Cramps    Spironolactone     Sporanox [Itraconazole]      Reaction Date: [id number];     Strawberry C [Ascorbate]     Caffeine Palpitations     Tachycardia       Labs:  Lab Results   Component Value Date     04/24/2018    K 3 8 04/24/2018     04/24/2018    CO2 28 04/24/2018    BUN 25 04/24/2018    CREATININE 1 95 (H) 04/24/2018    CALCIUM 9 1 04/24/2018     Lab Results   Component Value Date    WBC 5 63 12/19/2017    HGB 15 1 12/19/2017    HCT 44 2 12/19/2017    MCV 96 12/19/2017     12/19/2017     Lab Results   Component Value Date    TRIG 153 (H) 04/24/2018    HDL 40 04/24/2018     Imaging: ECG shows   Sinus bradycardia 49 beats per minute, nonspecific ST and T-wave changes  ECHO(8/2017):  LEFT VENTRICLE:  Systolic function was vigorous  Ejection fraction was estimated to be 65 %  There were no regional wall motion abnormalities  Wall thickness was increased  There was moderate concentric hypertrophy  Doppler parameters were consistent with abnormal left ventricular relaxation (grade 1 diastolic dysfunction)      AORTIC VALVE:  There was increased LVOT velocity up to 2 m/s due to sigmoid septum with vigorous LV function  Vmax across aortic valve was 2 7 m/s, with mean gradient of 17 mm Hg, maximum gradient 28 mm Hg, suggestive of possible mild stenosis versus  sclerosis        Review of Systems:  Review of Systems   Constitutional: Positive for fatigue  HENT: Negative  Eyes: Negative  Respiratory: Negative  Cardiovascular: Negative  Gastrointestinal: Negative  Musculoskeletal: Negative  Skin: Negative  Allergic/Immunologic: Negative  Neurological: Positive for tremors  Hematological: Negative  Psychiatric/Behavioral: Negative  All other systems reviewed and are negative  Physical Exam:  Physical Exam   Constitutional: She is oriented to person, place, and time  She appears well-developed and well-nourished  HENT:   Head: Normocephalic and atraumatic  Eyes: Pupils are equal, round, and reactive to light  EOM are normal  Right eye exhibits no discharge  Left eye exhibits no discharge  No scleral icterus  Neck: Normal range of motion  Neck supple  No JVD present  No thyromegaly present  Cardiovascular: Normal rate, regular rhythm, S1 normal, S2 normal, intact distal pulses and normal pulses  No extrasystoles are present  Exam reveals no gallop and no friction rub  Murmur heard  Systolic murmur is present with a grade of 2/6   Pulmonary/Chest: Effort normal  No respiratory distress  She has decreased breath sounds  She has no wheezes  She has no rales  Abdominal: Soft  Bowel sounds are normal  She exhibits no distension  There is no tenderness  Musculoskeletal: Normal range of motion  She exhibits edema  She exhibits no tenderness or deformity  Neurological: She is alert and oriented to person, place, and time  No cranial nerve deficit  Skin: Skin is warm and dry  No rash noted  Psychiatric: She has a normal mood and affect  Judgment and thought content normal    Nursing note and vitals reviewed  Discussion/Summary:    1  HERMELINDO Jefferson remains in sinus rhythm  Her heart rate does run low at times however it is improved with coming down on some of her medications  She is asymptomatic at this time  We will continue low-dose amiodarone and her Pradaxa for stroke prevention    We did discuss that at some point a pacemaker may be needed, but she is not meeting indication at this time  We will see her back in 6 months  2  Chronic diastolic CHF - In the setting of her hospitalization she was found to have a reduced ejection fraction down about 35%  Her ejection fraction has since come back to normal, showing that this was either a tachycardia mediated or stress-induced cardiomyopathy  No changes were made and she appears at her baseline today  She knows to watch her sodium intake and weigh herself regularly  3  HTN - Her blood pressure remains controlled even with coming off of amlodipine  No changes were made today  4  Dysipidemia - She remains on low-dose Crestor  No changes were made today  We did order updated blood work  We will see her back in 6 months  5  Mild aortic stenosis - This appears to be at most mild, by her last echocardiogram and was not evident on her prior ALLI  Some of her elevated velocities was also likely due to flow  Sometime next year we will likely repeat an echocardiogram       Counseling / Coordination of Care  Total floor / unit time spent today 25 minutes  Greater than 50% of total time was spent with the patient and / or family counseling and / or coordination of care

## 2018-12-05 DIAGNOSIS — I48.91 ATRIAL FIBRILLATION, UNSPECIFIED TYPE (HCC): ICD-10-CM

## 2018-12-06 RX ORDER — DABIGATRAN ETEXILATE MESYLATE 75 MG/1
CAPSULE ORAL
Qty: 60 CAPSULE | Refills: 1 | Status: SHIPPED | OUTPATIENT
Start: 2018-12-06 | End: 2019-02-25 | Stop reason: SDUPTHER

## 2019-01-16 DIAGNOSIS — F41.9 ANXIETY: ICD-10-CM

## 2019-01-16 RX ORDER — ALPRAZOLAM 0.25 MG/1
TABLET ORAL
Qty: 120 TABLET | Refills: 3 | Status: SHIPPED | OUTPATIENT
Start: 2019-01-16 | End: 2019-05-16 | Stop reason: SDUPTHER

## 2019-01-21 DIAGNOSIS — I48.91 ATRIAL FIBRILLATION, UNSPECIFIED TYPE (HCC): ICD-10-CM

## 2019-01-21 RX ORDER — AMIODARONE HYDROCHLORIDE 100 MG/1
100 TABLET ORAL DAILY
Qty: 90 TABLET | Refills: 3 | Status: SHIPPED | OUTPATIENT
Start: 2019-01-21 | End: 2019-11-30 | Stop reason: SDUPTHER

## 2019-01-21 NOTE — TELEPHONE ENCOUNTER
REILL REQUEST AMIODARONE 100 MG TABS 90 DAY REFILL SENT TO [facility] ON [address]

## 2019-01-22 DIAGNOSIS — G47.00 INSOMNIA, UNSPECIFIED TYPE: ICD-10-CM

## 2019-01-22 RX ORDER — ZOLPIDEM TARTRATE 12.5 MG/1
TABLET, FILM COATED, EXTENDED RELEASE ORAL
Qty: 30 TABLET | Refills: 3 | Status: SHIPPED | OUTPATIENT
Start: 2019-01-22 | End: 2019-06-12 | Stop reason: SDUPTHER

## 2019-02-25 DIAGNOSIS — I50.32 CHRONIC DIASTOLIC HEART FAILURE (HCC): ICD-10-CM

## 2019-02-25 DIAGNOSIS — I48.91 ATRIAL FIBRILLATION, UNSPECIFIED TYPE (HCC): ICD-10-CM

## 2019-02-25 RX ORDER — FUROSEMIDE 20 MG/1
TABLET ORAL
Qty: 60 TABLET | Refills: 3 | Status: SHIPPED | OUTPATIENT
Start: 2019-02-25 | End: 2019-10-01 | Stop reason: SDUPTHER

## 2019-02-26 RX ORDER — DABIGATRAN ETEXILATE MESYLATE 75 MG/1
CAPSULE ORAL
Qty: 60 CAPSULE | Refills: 1 | Status: SHIPPED | OUTPATIENT
Start: 2019-02-26 | End: 2019-07-18 | Stop reason: SDUPTHER

## 2019-03-18 DIAGNOSIS — E78.00 HYPERCHOLESTEROLEMIA: Primary | ICD-10-CM

## 2019-03-18 RX ORDER — ROSUVASTATIN CALCIUM 5 MG/1
5 TABLET, COATED ORAL DAILY
Qty: 90 TABLET | Refills: 3 | Status: SHIPPED | OUTPATIENT
Start: 2019-03-18 | End: 2019-10-01 | Stop reason: SDUPTHER

## 2019-03-22 ENCOUNTER — OFFICE VISIT (OUTPATIENT)
Dept: CARDIOLOGY CLINIC | Facility: CLINIC | Age: 84
End: 2019-03-22
Payer: COMMERCIAL

## 2019-03-22 VITALS
WEIGHT: 179.8 LBS | HEART RATE: 52 BPM | BODY MASS INDEX: 28.22 KG/M2 | DIASTOLIC BLOOD PRESSURE: 70 MMHG | OXYGEN SATURATION: 98 % | SYSTOLIC BLOOD PRESSURE: 116 MMHG | HEIGHT: 67 IN

## 2019-03-22 DIAGNOSIS — I42.0 DILATED CARDIOMYOPATHY (HCC): ICD-10-CM

## 2019-03-22 DIAGNOSIS — I48.0 PAF (PAROXYSMAL ATRIAL FIBRILLATION) (HCC): Primary | ICD-10-CM

## 2019-03-22 DIAGNOSIS — I50.32 CHRONIC DIASTOLIC CONGESTIVE HEART FAILURE (HCC): ICD-10-CM

## 2019-03-22 DIAGNOSIS — I48.0 PAROXYSMAL ATRIAL FIBRILLATION (HCC): ICD-10-CM

## 2019-03-22 DIAGNOSIS — R00.1 SINUS BRADYCARDIA: ICD-10-CM

## 2019-03-22 PROCEDURE — 93000 ELECTROCARDIOGRAM COMPLETE: CPT | Performed by: INTERNAL MEDICINE

## 2019-03-22 PROCEDURE — 99214 OFFICE O/P EST MOD 30 MIN: CPT | Performed by: INTERNAL MEDICINE

## 2019-03-22 NOTE — PROGRESS NOTES
Cardiology Follow Up    Maye Ibarra  8/26/1926  [id number]  [facility] [place]  [place] [id number]    1  PAF (paroxysmal atrial fibrillation) ([facility])  POCT ECG   2  Paroxysmal atrial fibrillation ([facility])     3  Dilated cardiomyopathy ([facility] )     4  Chronic diastolic congestive heart failure ([facility] )     5  Sinus bradycardia         Interval History:     Mrs Moon Arrington comes in for follow-up given her paroxysmal atrial fibrillation and congestive heart failure  I saw her during hospitalization which she presented in rapid atrial fibrillation  She had an echocardiogram performed that demonstrated an ejection fraction of 35%  There was hypokinesis seen in the anterior and anterior septal walls, with diffuse hypokinesis otherwise  We had her undergo a ALLI guided cardioversion  This successfully converted her back to sinus rhythm  Given her ejection fraction, and moderate left atrial enlargement we felt antiarrhythmic therapy was needed  We started IV amiodarone, and she was discharged on this as well  This did help her symptoms significantly  He also help control her volume status better  She was discharged on low-dose Lasix  She has also been on Pradaxa 75 mg twice daily  In follow-up she was having some low heart rates  She was for the most part asymptomatic from this standpoint, other than some fatigue  At initial follow-up visit with our CHF clinic, her metoprolol was discontinued and her amiodarone was decreased to 100 mg daily  She was having some increasing lower extremity edema, and saw our CHF clinic again  There her amlodipine was decreased to 2 5 mg daily, and has since been stopped  She has also been on daily Lasix  Since I last saw her we repeated an echocardiogram that showed an improvement of her ejection fraction back to 65 percent   This proved that her cardiomyopathy was likely tachycardia shonna Painter has felt well since last visit  She remains in sinus rhythm  She denies any palpitations or return of symptoms of her atrial fibrillation  No chest pain  She does have some mild lower extremity edema which is also improved  She denies any orthopnea or PND  There has been no lightheadedness or syncope  Her heart rate is around 50 today  Her ECG showing sinus bradycardia competing with a junctional rhythm  Patient Active Problem List   Diagnosis    Oral pain of unknown etiology    Paroxysmal atrial fibrillation (HCC)    Hypothyroidism    Anxiety    Cardiomyopathy ([facility] )    CKD (chronic kidney disease), stage IV (HCC)    Arthritis    [facility]    Hyperlipidemia    Hypertension    Insomnia    Contact dermatitis    Sinus bradycardia    Mild aortic stenosis     Past Medical History:   Diagnosis Date    A-fib [facility])     Cardiac disease     Hyperlipidemia     Hypertension      Social History     Socioeconomic History    Marital status:       Spouse name: Not on file    Number of children: Not on file    Years of education: Less than high school    Highest education level: Not on file   Occupational History    Occupation: Retired   Social Needs    Financial resource strain: Not on file    Food insecurity:     Worry: Not on file     Inability: Not on file   [employer] needs:     Medical: Not on file     Non-medical: Not on file   Tobacco Use    Smoking status: Never Smoker    Smokeless tobacco: Never Used   Substance and Sexual Activity    Alcohol use: No    Drug use: No    Sexual activity: Not Currently   Lifestyle    Physical activity:     Days per week: Not on file     Minutes per session: Not on file    Stress: Not on file   Relationships    Social connections:     Talks on phone: Not on file     Gets together: Not on file     Attends Zoroastrian service: Not on file     Active member of club or organization: Not on file     Attends meetings of clubs or organizations: Not on file     Relationship status: Not on file    Intimate partner violence:     Fear of current or ex partner: Not on file     Emotionally abused: Not on file     Physically abused: Not on file     Forced sexual activity: Not on file   Other Topics Concern    Not on file   Social History Narrative    Always uses seatbelt    Denied:  History of daily caffeinated cola consumption    Denied:  History of daily coffee consumption    Daily tea consumption    Denied:  History of dental care, regularly    Exercise:  Walking    Living will in place    No Bahai beliefs    Power of  in existence    Water intake, adequate (per day)      Family History   Problem Relation Age of Onset    Heart disease Family     Hypertension Family     Cancer Family      Past Surgical History:   Procedure Laterality Date    APPENDECTOMY      CHOLECYSTECTOMY      HERNIA REPAIR      HYSTERECTOMY      NEPHRECTOMY Right        Current Outpatient Medications:     ALPRAZolam (XANAX) 0 25 mg tablet, TAKE ONE TABLET BY MOUTH FOUR TIMES A DAY, Disp: 120 tablet, Rfl: 3    amiodarone 100 mg tablet, Take 1 tablet (100 mg total) by mouth daily, Disp: 90 tablet, Rfl: 3    benzonatate (TESSALON PERLES) 100 mg capsule, Take 1 capsule (100 mg total) by mouth 3 (three) times a day as needed for cough, Disp: 20 capsule, Rfl: 0    Cyanocobalamin (VITAMIN B-12 PO), Take by mouth, Disp: , Rfl:     Emollient (EUCERIN) lotion, Apply topically 2 (two) times a day, Disp: , Rfl:     furosemide (LASIX) 20 mg tablet, TAKE 2 TABLETS BY MOUTH ONCE DAILY, Disp: 60 tablet, Rfl: 3    hyoscyamine (LEVBID) 0 375 mg 12 hr tablet, Take 1 tablet (0 375 mg total) by mouth every 12 (twelve) hours as needed for cramping, Disp: 60 tablet, Rfl: 0    levothyroxine 25 mcg tablet, Take 1 tablet (25 mcg total) by mouth daily, Disp: 90 tablet, Rfl: 3    loratadine (CLARITIN) 10 mg tablet, Take 1 tablet (10 mg total) by mouth daily (Patient taking differently: Take 10 mg by mouth daily as needed  ), Disp: , Rfl: 0    nystatin-triamcinolone (MYCOLOG-II) cream, Apply topically 4 (four) times a day, Disp: 60 g, Rfl: 3    olopatadine (PATANOL) 0 1 % ophthalmic solution, Apply 1 drop to eye 2 (two) times a day, Disp: , Rfl:     PRADAXA 75 MG capsule, TAKE ONE CAPSULE BY MOUTH EVERY 12 HOURS, Disp: 60 capsule, Rfl: 1    promethazine-dextromethorphan (PHENERGAN-DM) 6 25-15 mg/5 mL oral syrup, Take 5 mL by mouth 4 (four) times a day as needed for cough, Disp: 180 mL, Rfl: 0    rosuvastatin (CRESTOR) 5 mg tablet, Take 1 tablet (5 mg total) by mouth daily for 90 days, Disp: 90 tablet, Rfl: 3    triamcinolone (KENALOG) 0 1 % cream, Apply topically Twice daily, Disp: , Rfl:     zolpidem (AMBIEN CR) 12 5 MG CR tablet, TAKE ONE TABLET BY MOUTH AT BEDTIME, Disp: 30 tablet, Rfl: 3  Allergies   Allergen Reactions    Penicillin G Anaphylaxis    Atorvastatin      Other reaction(s): Leg Cramps    Cephalexin Headache    Chocolate     Citalopram Abdominal Pain and Headache    Diltiazem     Fosinopril Sodium-Hctz     Methylprednisolone Nausea Only     Tablet generic only    Monopril [Fosinopril]      Reaction Date: 28Apr2011;     Penicillins     Pollen Extract     Pravastatin Myalgia     Other reaction(s): Leg Cramps    Spironolactone     Sporanox [Itraconazole]      Reaction Date: 28Apr2011;     Strawberry C [Ascorbate]     Caffeine Palpitations     Tachycardia       Labs:  Lab Results   Component Value Date    K 3 8 04/24/2018     04/24/2018    CO2 28 04/24/2018    BUN 25 04/24/2018    CREATININE 1 95 (H) 04/24/2018    CALCIUM 9 1 04/24/2018     Lab Results   Component Value Date    WBC 5 63 12/19/2017    HGB 15 1 12/19/2017    HCT 44 2 12/19/2017    MCV 96 12/19/2017     12/19/2017     Lab Results   Component Value Date    TRIG 153 (H) 04/24/2018    HDL 40 04/24/2018     Imaging: ECG shows Sinus bradycardia with a competing junctional rhythm, 48 beats per minute, LVH with nonspecific ST and T-wave changes  ECHO(8/2017):  LEFT VENTRICLE:  Systolic function was vigorous  Ejection fraction was estimated to be 65 %  There were no regional wall motion abnormalities  Wall thickness was increased  There was moderate concentric hypertrophy  Doppler parameters were consistent with abnormal left ventricular relaxation (grade 1 diastolic dysfunction)      AORTIC VALVE:  There was increased LVOT velocity up to 2 m/s due to sigmoid septum with vigorous LV function  Vmax across aortic valve was 2 7 m/s, with mean gradient of 17 mm Hg, maximum gradient 28 mm Hg, suggestive of possible mild stenosis versus  sclerosis  Review of Systems:  Review of Systems   Constitutional: Positive for fatigue  HENT: Negative  Eyes: Negative  Respiratory: Negative  Cardiovascular: Negative  Gastrointestinal: Negative  Musculoskeletal: Negative  Skin: Negative  Allergic/Immunologic: Negative  Neurological: Positive for tremors  Hematological: Negative  Psychiatric/Behavioral: Negative  All other systems reviewed and are negative  Vitals:    03/22/19 1147   BP: 116/70   Pulse: (!) 52   SpO2: 98%     Physical Exam:  Physical Exam   Constitutional: She is oriented to person, place, and time  She appears well-developed and well-nourished  HENT:   Head: Normocephalic and atraumatic  Eyes: Pupils are equal, round, and reactive to light  EOM are normal  Right eye exhibits no discharge  Left eye exhibits no discharge  No scleral icterus  Neck: Normal range of motion  Neck supple  No JVD present  No thyromegaly present  Cardiovascular: Normal rate, regular rhythm, S1 normal, S2 normal, intact distal pulses and normal pulses  No extrasystoles are present  Exam reveals no gallop and no friction rub  Murmur heard  Systolic murmur is present with a grade of 2/6    Pulmonary/Chest: Effort normal  No respiratory distress  She has decreased breath sounds  She has no wheezes  She has no rales  Abdominal: Soft  Bowel sounds are normal  She exhibits no distension  There is no tenderness  Musculoskeletal: Normal range of motion  She exhibits edema  She exhibits no tenderness or deformity  Neurological: She is alert and oriented to person, place, and time  No cranial nerve deficit  Skin: Skin is warm and dry  No rash noted  Psychiatric: She has a normal mood and affect  Judgment and thought content normal    Nursing note and vitals reviewed  Discussion/Summary:    1  HERMELINDO Jefferson remains in sinus rhythm  Her heart rate does run low at times however it is improved with coming down on some of her medications  She is asymptomatic at this time  We will continue low-dose amiodarone and her Pradaxa for stroke prevention  We did discuss that at some point a pacemaker may be needed, but at this point we will continue to observe  We will have her back in 3 months for an ECG  2  Chronic diastolic CHF - In the setting of her hospitalization she was found to have a reduced ejection fraction down about 35%  Her ejection fraction has since come back to normal, showing that 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  No changes were made and she appears at her baseline today  She knows to watch her sodium intake and weigh herself regularly  3  HTN - Her blood pressure remains controlled even with coming off of amlodipine  No changes were made today  4  Dysipidemia - She remains on low-dose Crestor  No changes were made today  We did order updated blood work  We will see her back in 3 months  5  Mild aortic stenosis - This appears to be at most mild, by her last echocardiogram and was not evident on her prior ALLI  Some of her elevated velocities was also likely due to flow  Counseling / Coordination of Care  Total floor / unit time spent today 25 minutes    Greater than 50% of total time was spent with the patient and / or family counseling and / or coordination of care

## 2019-03-25 ENCOUNTER — APPOINTMENT (OUTPATIENT)
Dept: LAB | Facility: CLINIC | Age: 84
End: 2019-03-25
Payer: COMMERCIAL

## 2019-03-25 DIAGNOSIS — E78.00 HYPERCHOLESTEROLEMIA: ICD-10-CM

## 2019-03-25 DIAGNOSIS — E03.9 HYPOTHYROIDISM, UNSPECIFIED TYPE: ICD-10-CM

## 2019-03-25 DIAGNOSIS — I10 ESSENTIAL HYPERTENSION: ICD-10-CM

## 2019-03-25 LAB
ALBUMIN SERPL BCP-MCNC: 3.8 G/DL (ref 3.5–5)
ALP SERPL-CCNC: 83 U/L (ref 46–116)
ALT SERPL W P-5'-P-CCNC: 23 U/L (ref 12–78)
ANION GAP SERPL CALCULATED.3IONS-SCNC: 4 MMOL/L (ref 4–13)
AST SERPL W P-5'-P-CCNC: 20 U/L (ref 5–45)
BILIRUB SERPL-MCNC: 0.95 MG/DL (ref 0.2–1)
BUN SERPL-MCNC: 22 MG/DL (ref 5–25)
CALCIUM SERPL-MCNC: 8.7 MG/DL (ref 8.3–10.1)
CHLORIDE SERPL-SCNC: 103 MMOL/L (ref 100–108)
CHOLEST SERPL-MCNC: 115 MG/DL (ref 50–200)
CO2 SERPL-SCNC: 29 MMOL/L (ref 21–32)
CREAT SERPL-MCNC: 1.96 MG/DL (ref 0.6–1.3)
GFR SERPL CREATININE-BSD FRML MDRD: 22 ML/MIN/1.73SQ M
GLUCOSE P FAST SERPL-MCNC: 92 MG/DL (ref 65–99)
HDLC SERPL-MCNC: 42 MG/DL (ref 40–60)
LDLC SERPL CALC-MCNC: 50 MG/DL (ref 0–100)
POTASSIUM SERPL-SCNC: 4.4 MMOL/L (ref 3.5–5.3)
PROT SERPL-MCNC: 7.3 G/DL (ref 6.4–8.2)
SODIUM SERPL-SCNC: 136 MMOL/L (ref 136–145)
TRIGL SERPL-MCNC: 114 MG/DL
TSH SERPL DL<=0.05 MIU/L-ACNC: 5.28 UIU/ML

## 2019-03-25 PROCEDURE — 84443 ASSAY THYROID STIM HORMONE: CPT

## 2019-03-25 PROCEDURE — 80061 LIPID PANEL: CPT

## 2019-03-25 PROCEDURE — 80053 COMPREHEN METABOLIC PANEL: CPT

## 2019-03-25 PROCEDURE — 36415 COLL VENOUS BLD VENIPUNCTURE: CPT

## 2019-03-26 ENCOUNTER — OFFICE VISIT (OUTPATIENT)
Dept: FAMILY MEDICINE CLINIC | Facility: CLINIC | Age: 84
End: 2019-03-26
Payer: COMMERCIAL

## 2019-03-26 VITALS
RESPIRATION RATE: 16 BRPM | TEMPERATURE: 98.9 F | DIASTOLIC BLOOD PRESSURE: 78 MMHG | HEIGHT: 67 IN | WEIGHT: 179.4 LBS | SYSTOLIC BLOOD PRESSURE: 132 MMHG | BODY MASS INDEX: 28.16 KG/M2 | HEART RATE: 60 BPM

## 2019-03-26 DIAGNOSIS — N18.4 CKD (CHRONIC KIDNEY DISEASE), STAGE IV (HCC): ICD-10-CM

## 2019-03-26 DIAGNOSIS — E78.2 MIXED HYPERLIPIDEMIA: ICD-10-CM

## 2019-03-26 DIAGNOSIS — E03.9 HYPOTHYROIDISM, UNSPECIFIED TYPE: ICD-10-CM

## 2019-03-26 DIAGNOSIS — I10 ESSENTIAL HYPERTENSION: Primary | ICD-10-CM

## 2019-03-26 DIAGNOSIS — M54.50 LUMBAR BACK PAIN: ICD-10-CM

## 2019-03-26 DIAGNOSIS — R19.7 DIARRHEA, UNSPECIFIED TYPE: ICD-10-CM

## 2019-03-26 PROCEDURE — 99214 OFFICE O/P EST MOD 30 MIN: CPT | Performed by: FAMILY MEDICINE

## 2019-03-26 PROCEDURE — 1101F PT FALLS ASSESS-DOCD LE1/YR: CPT | Performed by: FAMILY MEDICINE

## 2019-03-26 PROCEDURE — 3725F SCREEN DEPRESSION PERFORMED: CPT | Performed by: FAMILY MEDICINE

## 2019-03-26 RX ORDER — LEVOTHYROXINE SODIUM 0.05 MG/1
50 TABLET ORAL DAILY
Qty: 30 TABLET | Refills: 3 | Status: SHIPPED | OUTPATIENT
Start: 2019-03-26 | End: 2019-05-24 | Stop reason: SDUPTHER

## 2019-03-26 RX ORDER — DIPHENOXYLATE HYDROCHLORIDE AND ATROPINE SULFATE 2.5; .025 MG/1; MG/1
1 TABLET ORAL 4 TIMES DAILY PRN
Qty: 30 TABLET | Refills: 2 | Status: SHIPPED | OUTPATIENT
Start: 2019-03-26 | End: 2019-04-25

## 2019-03-26 NOTE — PROGRESS NOTES
BMI Counseling: Body mass index is 28 1 kg/m²  Discussed the patient's BMI with her  The BMI is above average  No BMI follow-up plan is appropriate  Patient is 72 years old and weight reduction/weight gain would further complicate their underlying physical disability  Patient ID: Susanna Brothers is a 80 y o  female  HPI: 80 y  o female presents for follow up for hypertension, hypothyroidism and hyperlipidemia  She is being followed by nephrology for stage 4 CRF  She is having lumbar back pain not totally responding to tylenol therapy, but can't take nsaids due to her impaired kidney function  We discusssed a topical Flector patch and she is receptive to trying so  SUBJECTIVE    Family History   Problem Relation Age of Onset    Heart disease Family     Hypertension Family     Cancer Family      Social History     Socioeconomic History    Marital status:       Spouse name: Not on file    Number of children: Not on file    Years of education: Less than high school    Highest education level: Not on file   Occupational History    Occupation: Retired   Social Needs    Financial resource strain: Not on file    Food insecurity:     Worry: Not on file     Inability: Not on file   [employer] needs:     Medical: Not on file     Non-medical: Not on file   Tobacco Use    Smoking status: Never Smoker    Smokeless tobacco: Never Used   Substance and Sexual Activity    Alcohol use: No    Drug use: No    Sexual activity: Not Currently   Lifestyle    Physical activity:     Days per week: Not on file     Minutes per session: Not on file    Stress: Not on file   Relationships    Social connections:     Talks on phone: Not on file     Gets together: Not on file     Attends Jew service: Not on file     Active member of club or organization: Not on file     Attends meetings of clubs or organizations: Not on file     Relationship status: Not on file    Intimate partner violence:     Fear of current or ex partner: Not on file     Emotionally abused: Not on file     Physically abused: Not on file     Forced sexual activity: Not on file   Other Topics Concern    Not on file   Social History Narrative    Always uses seatbelt    Denied:  History of daily caffeinated cola consumption    Denied:  History of daily coffee consumption    Daily tea consumption    Denied:  History of dental care, regularly    Exercise:  Walking    Living will in place    No Scientologist beliefs    Power of  in existence    Water intake, adequate (per day)     Past Medical History:   Diagnosis Date    [facility]fib [facility])     Cardiac disease     Hyperlipidemia     Hypertension      Past Surgical History:   Procedure Laterality Date    APPENDECTOMY      CHOLECYSTECTOMY      HERNIA REPAIR      HYSTERECTOMY      NEPHRECTOMY Right      Allergies   Allergen Reactions    Penicillin G Anaphylaxis    Atorvastatin      Other reaction(s): Leg Cramps    Cephalexin Headache    Chocolate     Citalopram Abdominal Pain and Headache    Diltiazem     Fosinopril Sodium-Hctz     Methylprednisolone Nausea Only     Tablet generic only    Monopril [Fosinopril]      Reaction Date: 28Apr2011;     Penicillins     Pollen Extract     Pravastatin Myalgia     Other reaction(s): Leg Cramps    Spironolactone     Sporanox [Itraconazole]      Reaction Date: 28Apr2011;     Strawberry C [Ascorbate]     Caffeine Palpitations     Tachycardia       Current Outpatient Medications:     ALPRAZolam (XANAX) 0 25 mg tablet, TAKE ONE TABLET BY MOUTH FOUR TIMES A DAY, Disp: 120 tablet, Rfl: 3    amiodarone 100 mg tablet, Take 1 tablet (100 mg total) by mouth daily, Disp: 90 tablet, Rfl: 3    Cyanocobalamin (VITAMIN B-12 PO), Take by mouth, Disp: , Rfl:     Emollient (EUCERIN) lotion, Apply topically 2 (two) times a day, Disp: , Rfl:     furosemide (LASIX) 20 mg tablet, TAKE 2 TABLETS BY MOUTH ONCE DAILY, Disp: 60 tablet, Rfl: 3    hyoscyamine (LEVBID) 0 375 mg 12 hr tablet, Take 1 tablet (0 375 mg total) by mouth every 12 (twelve) hours as needed for cramping, Disp: 60 tablet, Rfl: 0    loratadine (CLARITIN) 10 mg tablet, Take 1 tablet (10 mg total) by mouth daily (Patient taking differently: Take 10 mg by mouth daily as needed  ), Disp: , Rfl: 0    nystatin-triamcinolone (MYCOLOG-II) cream, Apply topically 4 (four) times a day, Disp: 60 g, Rfl: 3    olopatadine (PATANOL) 0 1 % ophthalmic solution, Apply 1 drop to eye 2 (two) times a day, Disp: , Rfl:     PRADAXA 75 MG capsule, TAKE ONE CAPSULE BY MOUTH EVERY 12 HOURS, Disp: 60 capsule, Rfl: 1    promethazine-dextromethorphan (PHENERGAN-DM) 6 25-15 mg/5 mL oral syrup, Take 5 mL by mouth 4 (four) times a day as needed for cough, Disp: 180 mL, Rfl: 0    rosuvastatin (CRESTOR) 5 mg tablet, Take 1 tablet (5 mg total) by mouth daily for 90 days, Disp: 90 tablet, Rfl: 3    triamcinolone (KENALOG) 0 1 % cream, Apply topically Twice daily, Disp: , Rfl:     zolpidem (AMBIEN CR) 12 5 MG CR tablet, TAKE ONE TABLET BY MOUTH AT BEDTIME, Disp: 30 tablet, Rfl: 3    Diclofenac Epolamine 1 3 % PTCH, Place 1 patch on the skin daily, Disp: 30 patch, Rfl: 3    diphenoxylate-atropine (LOMOTIL) 2 5-0 025 mg per tablet, Take 1 tablet by mouth 4 (four) times a day as needed for diarrhea for up to 30 days, Disp: 30 tablet, Rfl: 2    levothyroxine 50 mcg tablet, Take 1 tablet (50 mcg total) by mouth daily for 30 days, Disp: 30 tablet, Rfl: 3    Review of Systems  Constitutional:     Denies fever, chills ,fatigue ,weakness ,weight loss, weight gain     ENT: Denies earache ,loss of hearing ,nosebleed, nasal discharge,nasal congestion ,sore throat ,hoarseness  Pulmonary: Denies shortness of breath ,cough  ,dyspnea on exertion, orthopnea  ,PND   Cardiovascular:  Denies bradycardia , tachycardia  ,palpations, lower extremity edema leg, claudication  Breast:  Denies new or changing breast lumps ,nipple discharge ,nipple changes  Abdomen:  Denies abdominal pain , anorexia , indigestion, nausea, vomiting, constipation, diarrhea  Musculoskeletal: Denies myalgias, , joint swelling, joint stiffness , limb pain, limb swelling + chronic lumbar back pain  Gu: denies dysuria, polyuria  Skin: Denies skin rash, skin lesion, skin wound, itching, dry skin  Neuro: Denies headache, numbness, tingling, confusion, loss of consciousness, dizziness, vertigo  Psychiatric: Denies feelings of depression, suicidal ideation, anxiety, sleep disturbances    OBJECTIVE  /78   Pulse 60   Temp 98 9 °F (37 2 °C)   Resp 16   Ht 5' 7" (1 702 m)   Wt 81 4 kg (179 lb 6 4 oz)   BMI 28 10 kg/m²   Constitutional:   NAD, well appearing and well nourished      ENT:   Conjunctiva and lids: no injection, edema, or discharge     Pupils and iris: ANKIT bilaterally    External inspection of ears and nose: normal without deformities or discharge  Otoscopic exam: Canals patent without erythema  Nasal mucosa, septum and turbinates: Normal or edema or discharge         Oropharynx:  Moist mucosa, normal tongue and tonsils without lesions  No erythema        Pulmonary:Respiratory effort normal rate and rhythm, no increased work of breathing   Auscultation of lungs:  Clear bilaterally with no adventitious breath sounds       Cardiovascular: regular rate and rhythm, S1 and S2, no murmur, no edema and/or varicosities of LE      Abdomen: Soft and non-distended     Positive bowel sounds      No heptomegaly or splenomegaly      Gu: no suprapubic tenderness or CVA tenderness, no urethral discharge  Lymphatic:  No anterior or posterior cervical lymphadenopathy         Musculoskeletal:  Gait and station: Normal gait      Digits and nails normal without clubbing or cyanosis       Inspection/palpation of joints, bones, and muscles:  No joint tenderness, swelling, full active and passive range of motion       Skin: Normal skin turgor and no rashes      Neuro:     Normal reflexes       Psych:   alert and oriented to person, place and time     normal mood and affect       Assessment/Plan:Diagnoses and all orders for this visit:    Essential hypertension    Lumbar back pain  -     Diclofenac Epolamine 1 3 % PTCH; Place 1 patch on the skin daily    Hypothyroidism, unspecified type  -     levothyroxine 50 mcg tablet; Take 1 tablet (50 mcg total) by mouth daily for 30 days  -     TSH, 3rd generation; Future    Mixed hyperlipidemia    CKD (chronic kidney disease), stage IV (HCC)    Diarrhea, unspecified type  -     diphenoxylate-atropine (LOMOTIL) 2 5-0 025 mg per tablet; Take 1 tablet by mouth 4 (four) times a day as needed for diarrhea for up to 30 days        Reviewed with patient plan to treat with above plan      Patient instructed to call in 72 hours if not feeling better or if symptoms worsen

## 2019-04-24 DIAGNOSIS — E03.9 HYPOTHYROIDISM, UNSPECIFIED TYPE: ICD-10-CM

## 2019-04-24 RX ORDER — LEVOTHYROXINE SODIUM 0.03 MG/1
TABLET ORAL
Qty: 90 TABLET | Refills: 3 | Status: SHIPPED | OUTPATIENT
Start: 2019-04-24 | End: 2020-03-25 | Stop reason: ALTCHOICE

## 2019-05-16 DIAGNOSIS — F41.9 ANXIETY: ICD-10-CM

## 2019-05-16 RX ORDER — ALPRAZOLAM 0.25 MG/1
TABLET ORAL
Qty: 120 TABLET | Refills: 3 | Status: SHIPPED | OUTPATIENT
Start: 2019-05-16 | End: 2019-09-06 | Stop reason: SDUPTHER

## 2019-05-24 ENCOUNTER — APPOINTMENT (OUTPATIENT)
Dept: LAB | Facility: CLINIC | Age: 84
End: 2019-05-24
Payer: COMMERCIAL

## 2019-05-24 DIAGNOSIS — E03.9 HYPOTHYROIDISM, UNSPECIFIED TYPE: ICD-10-CM

## 2019-05-24 LAB — TSH SERPL DL<=0.05 MIU/L-ACNC: 1.7 UIU/ML (ref 0.36–3.74)

## 2019-05-24 PROCEDURE — 84443 ASSAY THYROID STIM HORMONE: CPT

## 2019-05-24 PROCEDURE — 36415 COLL VENOUS BLD VENIPUNCTURE: CPT

## 2019-05-24 RX ORDER — LEVOTHYROXINE SODIUM 0.05 MG/1
50 TABLET ORAL DAILY
Qty: 90 TABLET | Refills: 3 | Status: SHIPPED | OUTPATIENT
Start: 2019-05-24 | End: 2020-06-03

## 2019-06-12 DIAGNOSIS — G47.00 INSOMNIA, UNSPECIFIED TYPE: ICD-10-CM

## 2019-06-13 ENCOUNTER — TELEPHONE (OUTPATIENT)
Dept: FAMILY MEDICINE CLINIC | Facility: CLINIC | Age: 84
End: 2019-06-13

## 2019-06-13 RX ORDER — ZOLPIDEM TARTRATE 12.5 MG/1
TABLET, FILM COATED, EXTENDED RELEASE ORAL
Qty: 30 TABLET | Refills: 3 | Status: SHIPPED | OUTPATIENT
Start: 2019-06-13 | End: 2019-10-06 | Stop reason: SDUPTHER

## 2019-06-18 ENCOUNTER — OFFICE VISIT (OUTPATIENT)
Dept: CARDIOLOGY CLINIC | Facility: CLINIC | Age: 84
End: 2019-06-18
Payer: COMMERCIAL

## 2019-06-18 VITALS
HEIGHT: 67 IN | HEART RATE: 55 BPM | BODY MASS INDEX: 28.11 KG/M2 | DIASTOLIC BLOOD PRESSURE: 72 MMHG | OXYGEN SATURATION: 95 % | SYSTOLIC BLOOD PRESSURE: 144 MMHG | WEIGHT: 179.1 LBS

## 2019-06-18 DIAGNOSIS — I50.32 CHRONIC DIASTOLIC CONGESTIVE HEART FAILURE (HCC): ICD-10-CM

## 2019-06-18 DIAGNOSIS — N18.4 CKD (CHRONIC KIDNEY DISEASE), STAGE IV (HCC): ICD-10-CM

## 2019-06-18 DIAGNOSIS — I48.0 PAF (PAROXYSMAL ATRIAL FIBRILLATION) (HCC): Primary | ICD-10-CM

## 2019-06-18 DIAGNOSIS — I35.0 MILD AORTIC STENOSIS: ICD-10-CM

## 2019-06-18 DIAGNOSIS — R00.1 SINUS BRADYCARDIA: ICD-10-CM

## 2019-06-18 DIAGNOSIS — I10 ESSENTIAL HYPERTENSION: ICD-10-CM

## 2019-06-18 PROCEDURE — 93000 ELECTROCARDIOGRAM COMPLETE: CPT | Performed by: INTERNAL MEDICINE

## 2019-06-18 PROCEDURE — 99214 OFFICE O/P EST MOD 30 MIN: CPT | Performed by: INTERNAL MEDICINE

## 2019-07-18 DIAGNOSIS — I48.91 ATRIAL FIBRILLATION, UNSPECIFIED TYPE (HCC): ICD-10-CM

## 2019-07-18 RX ORDER — DABIGATRAN ETEXILATE MESYLATE 75 MG/1
CAPSULE ORAL
Qty: 60 CAPSULE | Refills: 1 | Status: SHIPPED | OUTPATIENT
Start: 2019-07-18 | End: 2019-09-13 | Stop reason: SDUPTHER

## 2019-08-14 ENCOUNTER — OFFICE VISIT (OUTPATIENT)
Dept: FAMILY MEDICINE CLINIC | Facility: CLINIC | Age: 84
End: 2019-08-14
Payer: COMMERCIAL

## 2019-08-14 VITALS
HEIGHT: 67 IN | WEIGHT: 176 LBS | RESPIRATION RATE: 16 BRPM | BODY MASS INDEX: 27.62 KG/M2 | SYSTOLIC BLOOD PRESSURE: 138 MMHG | HEART RATE: 54 BPM | DIASTOLIC BLOOD PRESSURE: 78 MMHG | TEMPERATURE: 97.3 F | OXYGEN SATURATION: 94 %

## 2019-08-14 DIAGNOSIS — M25.462 PAIN AND SWELLING OF KNEE, LEFT: Primary | ICD-10-CM

## 2019-08-14 DIAGNOSIS — M25.562 PAIN AND SWELLING OF KNEE, LEFT: Primary | ICD-10-CM

## 2019-08-14 PROCEDURE — 20610 DRAIN/INJ JOINT/BURSA W/O US: CPT | Performed by: FAMILY MEDICINE

## 2019-08-14 PROCEDURE — 99214 OFFICE O/P EST MOD 30 MIN: CPT | Performed by: FAMILY MEDICINE

## 2019-08-14 PROCEDURE — 1036F TOBACCO NON-USER: CPT | Performed by: FAMILY MEDICINE

## 2019-08-14 PROCEDURE — 1160F RVW MEDS BY RX/DR IN RCRD: CPT | Performed by: FAMILY MEDICINE

## 2019-08-14 RX ORDER — METHYLPREDNISOLONE ACETATE 40 MG/ML
1 INJECTION, SUSPENSION INTRA-ARTICULAR; INTRALESIONAL; INTRAMUSCULAR; SOFT TISSUE
Status: COMPLETED | OUTPATIENT
Start: 2019-08-14 | End: 2019-08-14

## 2019-08-14 RX ORDER — LIDOCAINE HYDROCHLORIDE 10 MG/ML
3 INJECTION, SOLUTION INFILTRATION; PERINEURAL
Status: COMPLETED | OUTPATIENT
Start: 2019-08-14 | End: 2019-08-14

## 2019-08-14 RX ADMIN — LIDOCAINE HYDROCHLORIDE 3 ML: 10 INJECTION, SOLUTION INFILTRATION; PERINEURAL at 12:01

## 2019-08-14 RX ADMIN — METHYLPREDNISOLONE ACETATE 1 ML: 40 INJECTION, SUSPENSION INTRA-ARTICULAR; INTRALESIONAL; INTRAMUSCULAR; SOFT TISSUE at 12:01

## 2019-08-14 NOTE — PROGRESS NOTES
Tonya Whitley 8/26/1926 female MRN: [id number]    Acute Visit    Assessment/Plan   Large joint arthrocentesis: L knee  Date/Time: 8/14/2019 12:01 PM  Consent given by: patient  Site marked: site marked  Timeout: Immediately prior to procedure a time out was called to verify the correct patient, procedure, equipment, support staff and site/side marked as required   Supporting Documentation  Indications: pain, joint swelling and diagnostic evaluation   Procedure Details  Location: knee - L knee  Preparation: Patient was prepped and draped in the usual sterile fashion  Needle size: 20 G  Ultrasound guidance: no  Approach: anterolateral  Medications administered: 3 mL lidocaine 1 %; 1 mL methylPREDNISolone acetate 40 mg/mL    Aspirate amount: 0 mL    Patient tolerance: patient tolerated the procedure well with no immediate complications  Dressing:  Sterile dressing applied        Marcio Langley was seen today for knee pain  Diagnoses and all orders for this visit:    Pain and swelling of knee, left  -     Large joint arthrocentesis: L knee  -     XR knee 3 vw left non injury; Future    Other orders  -     Cancel: Medium joint arthrocentesis    Mariaa Beavers MD  [address]  8/14/2019      Please be aware that this note contains text that was dictated and there may be errors pertaining to "sound-alike "words during the dictation process  SUBJECTIVE    CC: Knee Pain (Left knee pain and swelling )      HPI:  Tonya Whitley is a 80 y o  female who presented for an acute visit complaining of left knee pain and swelling for several weeks  She denies inciting event or fall  She reports she feels pain in her whole knee when walking or laying down  Nothing makes it better or worse  Denies instability or loss of balance (walks with a cane at baseline)  Denies rashes or skin changes to knee  Denies numbness or tingling, fevers, chills, or weight changes  Is on blood thinner Pradaxa, not diabetic      Review of Systems   Constitutional: Negative for fatigue, fever and unexpected weight change  Musculoskeletal: Positive for arthralgias, gait problem and joint swelling  Skin: Negative for wound  Neurological: Negative for dizziness, syncope and numbness  All other systems reviewed and are negative      Medications:   Meds/Allergies   Current Outpatient Medications   Medication Sig Dispense Refill    ALPRAZolam (XANAX) 0 25 mg tablet TAKE ONE TABLET BY MOUTH FOUR TIMES A  tablet 3    amiodarone 100 mg tablet Take 1 tablet (100 mg total) by mouth daily 90 tablet 3    Cyanocobalamin (VITAMIN B-12 PO) Take by mouth      Diclofenac Epolamine 1 3 % PTCH Place 1 patch on the skin daily 30 patch 3    Emollient (EUCERIN) lotion Apply topically 2 (two) times a day      furosemide (LASIX) 20 mg tablet TAKE 2 TABLETS BY MOUTH ONCE DAILY 60 tablet 3    hyoscyamine (LEVBID) 0 375 mg 12 hr tablet Take 1 tablet (0 375 mg total) by mouth every 12 (twelve) hours as needed for cramping 60 tablet 0    levothyroxine 25 mcg tablet TAKE ONE TABLET BY MOUTH EVERY DAY 90 tablet 3    loratadine (CLARITIN) 10 mg tablet Take 1 tablet (10 mg total) by mouth daily (Patient taking differently: Take 10 mg by mouth daily as needed  )  0    nystatin-triamcinolone (MYCOLOG-II) cream Apply topically 4 (four) times a day 60 g 3    olopatadine (PATANOL) 0 1 % ophthalmic solution Apply 1 drop to eye 2 (two) times a day      PRADAXA 75 MG capsule TAKE ONE CAPSULE BY MOUTH EVERY 12 HOURS 60 capsule 1    promethazine-dextromethorphan (PHENERGAN-DM) 6 25-15 mg/5 mL oral syrup Take 5 mL by mouth 4 (four) times a day as needed for cough 180 mL 0    triamcinolone (KENALOG) 0 1 % cream Apply topically Twice daily      zolpidem (AMBIEN CR) 12 5 MG CR tablet TAKE ONE TABLET BY MOUTH AT BEDTIME 30 tablet 3    levothyroxine 50 mcg tablet Take 1 tablet (50 mcg total) by mouth daily for 30 days 90 tablet 3    rosuvastatin (CRESTOR) 5 mg tablet Take 1 tablet (5 mg total) by mouth daily for 90 days 90 tablet 3     No current facility-administered medications for this visit  Allergies   Allergen Reactions    Penicillin G Anaphylaxis    Atorvastatin      Other reaction(s): Leg Cramps    Cephalexin Headache    Chocolate     Citalopram Abdominal Pain and Headache    Diltiazem     Fosinopril Sodium-Hctz     Methylprednisolone Nausea Only     Tablet generic only    Monopril [Fosinopril]      Reaction Date: 28Apr2011;     Penicillins     Pollen Extract     Pravastatin Myalgia     Other reaction(s): Leg Cramps    Spironolactone     Sporanox [Itraconazole]      Reaction Date: [id number];     Strawberry C [Ascorbate]     Caffeine Palpitations     Tachycardia       OBJECTIVE    Vitals:   Vitals:    08/14/19 1132   BP: 138/78   BP Location: Left arm   Patient Position: Sitting   Cuff Size: Large   Pulse: (!) 54   Resp: 16   Temp: (!) 97 3 °F (36 3 °C)   SpO2: 94%   Weight: 79 8 kg (176 lb)   Height: 5' 7" (1 702 m)     Physical Exam   Constitutional: Vital signs are normal  She appears well-developed and well-nourished  She does not appear ill  No distress  HENT:   Head: Normocephalic and atraumatic  Right Ear: External ear normal    Left Ear: External ear normal    Nose: Nose normal    Eyes: Conjunctivae, EOM and lids are normal    Neck: No JVD present  No tracheal deviation present  Cardiovascular: Intact distal pulses  Pulmonary/Chest: No accessory muscle usage  No respiratory distress  Abdominal: Normal appearance  Musculoskeletal:        Left knee: She exhibits swelling and effusion  She exhibits normal range of motion, no ecchymosis, no laceration, no erythema, no LCL laxity, normal patellar mobility, no bony tenderness and no MCL laxity  Tenderness found  Medial joint line and patellar tendon tenderness noted  Neurological: She is alert  Skin: No rash noted  She is not diaphoretic     Psychiatric: She has a normal mood and affect  Her speech is normal and behavior is normal  She expresses no suicidal ideation  Nursing note and vitals reviewed

## 2019-08-20 ENCOUNTER — HOSPITAL ENCOUNTER (OUTPATIENT)
Dept: RADIOLOGY | Facility: HOSPITAL | Age: 84
Discharge: HOME | End: 2019-08-20
Payer: COMMERCIAL

## 2019-08-20 DIAGNOSIS — M25.562 PAIN AND SWELLING OF KNEE, LEFT: ICD-10-CM

## 2019-08-20 DIAGNOSIS — M25.462 PAIN AND SWELLING OF KNEE, LEFT: ICD-10-CM

## 2019-08-20 PROCEDURE — 73562 X-RAY EXAM OF KNEE 3: CPT

## 2019-08-27 DIAGNOSIS — M17.12 PRIMARY OSTEOARTHRITIS OF LEFT KNEE: Primary | ICD-10-CM

## 2019-08-27 RX ORDER — LIDOCAINE 40 MG/G
CREAM TOPICAL AS NEEDED
Qty: 30 G | Refills: 3 | Status: SHIPPED | OUTPATIENT
Start: 2019-08-27 | End: 2019-09-20 | Stop reason: ALTCHOICE

## 2019-09-06 DIAGNOSIS — F41.9 ANXIETY: ICD-10-CM

## 2019-09-09 RX ORDER — ALPRAZOLAM 0.25 MG/1
TABLET ORAL
Qty: 120 TABLET | Refills: 3 | Status: SHIPPED | OUTPATIENT
Start: 2019-09-09 | End: 2020-02-03

## 2019-09-13 DIAGNOSIS — I48.91 ATRIAL FIBRILLATION, UNSPECIFIED TYPE (HCC): ICD-10-CM

## 2019-09-16 RX ORDER — DABIGATRAN ETEXILATE MESYLATE 75 MG/1
CAPSULE ORAL
Qty: 60 CAPSULE | Refills: 1 | Status: SHIPPED | OUTPATIENT
Start: 2019-09-16 | End: 2019-11-30 | Stop reason: SDUPTHER

## 2019-09-20 ENCOUNTER — OFFICE VISIT (OUTPATIENT)
Dept: FAMILY MEDICINE CLINIC | Facility: CLINIC | Age: 84
End: 2019-09-20
Payer: COMMERCIAL

## 2019-09-20 ENCOUNTER — TELEPHONE (OUTPATIENT)
Dept: FAMILY MEDICINE CLINIC | Facility: CLINIC | Age: 84
End: 2019-09-20

## 2019-09-20 VITALS
TEMPERATURE: 98.1 F | WEIGHT: 177.38 LBS | DIASTOLIC BLOOD PRESSURE: 76 MMHG | HEART RATE: 60 BPM | HEIGHT: 67 IN | SYSTOLIC BLOOD PRESSURE: 122 MMHG | BODY MASS INDEX: 27.84 KG/M2

## 2019-09-20 DIAGNOSIS — E78.00 HYPERCHOLESTEROLEMIA: ICD-10-CM

## 2019-09-20 DIAGNOSIS — G89.29 CHRONIC PAIN OF LEFT KNEE: Primary | ICD-10-CM

## 2019-09-20 DIAGNOSIS — E03.9 HYPOTHYROIDISM, UNSPECIFIED TYPE: ICD-10-CM

## 2019-09-20 DIAGNOSIS — M25.562 CHRONIC PAIN OF LEFT KNEE: Primary | ICD-10-CM

## 2019-09-20 DIAGNOSIS — I10 ESSENTIAL HYPERTENSION: ICD-10-CM

## 2019-09-20 DIAGNOSIS — Z23 NEED FOR VACCINATION: ICD-10-CM

## 2019-09-20 PROCEDURE — 99214 OFFICE O/P EST MOD 30 MIN: CPT | Performed by: FAMILY MEDICINE

## 2019-09-20 PROCEDURE — G0008 ADMIN INFLUENZA VIRUS VAC: HCPCS | Performed by: FAMILY MEDICINE

## 2019-09-20 PROCEDURE — 90662 IIV NO PRSV INCREASED AG IM: CPT | Performed by: FAMILY MEDICINE

## 2019-09-20 RX ORDER — ACETAMINOPHEN AND CODEINE PHOSPHATE 300; 30 MG/1; MG/1
1 TABLET ORAL 3 TIMES DAILY PRN
Qty: 30 TABLET | Refills: 0 | Status: SHIPPED | OUTPATIENT
Start: 2019-09-20 | End: 2019-12-02 | Stop reason: SDUPTHER

## 2019-09-20 NOTE — PROGRESS NOTES
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 and pradaxa therapy  Xrays of her left knee show mild osteoarthritis  I discussed with the patient and her daughter Synivisc injections and a prn pain med  Both are in agreement  SUBJECTIVE    Family History   Problem Relation Age of Onset    Heart disease Family     Hypertension Family     Cancer Family      Social History     Socioeconomic History    Marital status:       Spouse name: Not on file    Number of children: Not on file    Years of education: Less than high school    Highest education level: Not on file   Occupational History    Occupation: Retired   Social Needs    Financial resource strain: Not on file    Food insecurity:     Worry: Not on file     Inability: Not on file   [employer] needs:     Medical: Not on file     Non-medical: Not on file   Tobacco Use    Smoking status: Never Smoker    Smokeless tobacco: Never Used   Substance and Sexual Activity    Alcohol use: No    Drug use: No    Sexual activity: Not Currently   Lifestyle    Physical activity:     Days per week: Not on file     Minutes per session: Not on file    Stress: Not on file   Relationships    Social connections:     Talks on phone: Not on file     Gets together: Not on file     Attends Jehovah's witness service: Not on file     Active member of club or organization: Not on file     Attends meetings of clubs or organizations: Not on file     Relationship status: Not on file    Intimate partner violence:     Fear of current or ex partner: Not on file     Emotionally abused: Not on file     Physically abused: Not on file     Forced sexual activity: Not on file   Other Topics Concern    Not on file   Social History Narrative    Always uses seatbelt    Denied:  History of daily caffeinated cola consumption    Denied:  History of daily coffee consumption    Daily tea consumption    Denied:  History of dental care, regularly    Exercise:  Walking    Living will in place    No Restorationist beliefs    Power of  in existence    Water intake, adequate (per day)     Past Medical History:   Diagnosis Date    A-fib [facility])     Cardiac disease     Hyperlipidemia     Hypertension      Past Surgical History:   Procedure Laterality Date    APPENDECTOMY      CHOLECYSTECTOMY      HERNIA REPAIR      HYSTERECTOMY      NEPHRECTOMY Right      Allergies   Allergen Reactions    Penicillin G Anaphylaxis    Atorvastatin      Other reaction(s): Leg Cramps    Cephalexin Headache    Chocolate     Citalopram Abdominal Pain and Headache    Diltiazem     Fosinopril Sodium-Hctz     Methylprednisolone Nausea Only     Tablet generic only    Monopril [Fosinopril]      Reaction Date: 28Apr2011;     Penicillins     Pollen Extract     Pravastatin Myalgia     Other reaction(s): Leg Cramps    Spironolactone     Sporanox [Itraconazole]      Reaction Date: 28Apr2011;     Strawberry C [Ascorbate]     Caffeine Palpitations     Tachycardia       Current Outpatient Medications:     ALPRAZolam (XANAX) 0 25 mg tablet, TAKE ONE TABLET BY MOUTH FOUR TIMES A DAY, Disp: 120 tablet, Rfl: 3    amiodarone 100 mg tablet, Take 1 tablet (100 mg total) by mouth daily, Disp: 90 tablet, Rfl: 3    Cyanocobalamin (VITAMIN B-12 PO), Take by mouth, Disp: , Rfl:     Emollient (EUCERIN) lotion, Apply topically 2 (two) times a day, Disp: , Rfl:     furosemide (LASIX) 20 mg tablet, TAKE 2 TABLETS BY MOUTH ONCE DAILY, Disp: 60 tablet, Rfl: 3    hyoscyamine (LEVBID) 0 375 mg 12 hr tablet, Take 1 tablet (0 375 mg total) by mouth every 12 (twelve) hours as needed for cramping, Disp: 60 tablet, Rfl: 0    levothyroxine 25 mcg tablet, TAKE ONE TABLET BY MOUTH EVERY DAY, Disp: 90 tablet, Rfl: 3    levothyroxine 50 mcg tablet, Take 1 tablet (50 mcg total) by mouth daily for 30 days, Disp: 90 tablet, Rfl: 3    loratadine (CLARITIN) 10 mg tablet, Take 1 tablet (10 mg total) by mouth daily (Patient taking differently: Take 10 mg by mouth daily as needed  ), Disp: , Rfl: 0    nystatin-triamcinolone (MYCOLOG-II) cream, Apply topically 4 (four) times a day, Disp: 60 g, Rfl: 3    olopatadine (PATANOL) 0 1 % ophthalmic solution, Apply 1 drop to eye 2 (two) times a day, Disp: , Rfl:     PRADAXA 75 MG capsule, TAKE ONE CAPSULE BY MOUTH EVERY 12 HOURS, Disp: 60 capsule, Rfl: 1    rosuvastatin (CRESTOR) 5 mg tablet, Take 1 tablet (5 mg total) by mouth daily for 90 days, Disp: 90 tablet, Rfl: 3    triamcinolone (KENALOG) 0 1 % cream, Apply topically Twice daily, Disp: , Rfl:     zolpidem (AMBIEN CR) 12 5 MG CR tablet, TAKE ONE TABLET BY MOUTH AT BEDTIME, Disp: 30 tablet, Rfl: 3    acetaminophen-codeine (TYLENOL/CODEINE #3) 300-30 MG per tablet, Take 1 tablet by mouth 3 (three) times a day as needed for moderate pain, Disp: 30 tablet, Rfl: 0    Review of Systems  Constitutional:     Denies fever, chills ,fatigue ,weakness ,weight loss, weight gain     ENT: Denies earache ,loss of hearing ,nosebleed, nasal discharge,nasal congestion ,sore throat ,hoarseness  Pulmonary: Denies shortness of breath ,cough  ,dyspnea on exertion, orthopnea  ,PND   Cardiovascular:  Denies bradycardia , tachycardia  ,palpations, lower extremity edema leg, claudication  Breast:  Denies new or changing breast lumps ,nipple discharge ,nipple changes  Abdomen:  Denies abdominal pain , anorexia , indigestion, nausea, vomiting, constipation, diarrhea  Musculoskeletal: Denies myalgias, +left knee pain,+swelling, +joint stiffness ,no limb pain or  limb swelling  Gu: denies dysuria, polyuria  Skin: Denies skin rash, skin lesion, skin wound, itching, dry skin  Neuro: Denies headache, numbness, tingling, confusion, loss of consciousness, dizziness, vertigo  Psychiatric: Denies feelings of depression, suicidal ideation, anxiety, sleep disturbances    OBJECTIVE  /76   Pulse 60   Temp 98 1 °F (36 7 °C)   Ht 5' 7" (1 702 m)   Wt 80 5 kg (177 lb 6 oz)   BMI 27 78 kg/m²   Constitutional:   NAD, well appearing and well nourished      ENT:   Conjunctiva and lids: no injection, edema, or discharge     Pupils and iris: ANKIT bilaterally    External inspection of ears and nose: normal without deformities or discharge  Otoscopic exam: Canals patent without erythema  Nasal mucosa, septum and turbinates: Normal or edema or discharge         Oropharynx:  Moist mucosa, normal tongue and tonsils without lesions  No erythema        Pulmonary:Respiratory effort normal rate and rhythm, no increased work of breathing  Auscultation of lungs:  Clear bilaterally with no adventitious breath sounds       Cardiovascular: regular rate and rhythm, S1 and S2, no murmur, no edema and/or varicosities of LE      Abdomen: Soft and non-distended     Positive bowel sounds      No heptomegaly or splenomegaly      Gu: no suprapubic tenderness or CVA tenderness, no urethral discharge  Lymphatic:  No anterior or posterior cervical lymphadenopathy         Musculoskeletal:  Gait and station: Normal gait      Digits and nails normal without clubbing or cyanosis       Inspection/palpation of joints, bones, and muscles: + crepitace of left knee and mild edema medially      Skin: Normal skin turgor and no rashes      Neuro:    Normal reflexes     Psych:   alert and oriented to person, place and time     normal mood and affect       Assessment/Plan:Diagnoses and all orders for this visit:    Chronic pain of left knee  Comments:   Will attempt to get Synivisc injections approved through insurance  Orders:  -     acetaminophen-codeine (TYLENOL/CODEINE #3) 300-30 MG per tablet; Take 1 tablet by mouth 3 (three) times a day as needed for moderate pain    Hypothyroidism, unspecified type  -     TSH, 3rd generation; Future    Hypercholesterolemia  -     Lipid Panel with Direct LDL reflex; Future    Essential hypertension  -     Comprehensive metabolic panel; Future    Need for vaccination  -     influenza vaccine, 2016-3400, high-dose, PF 0 5 mL (FLUZONE HIGH-DOSE)        Reviewed with patient plan to treat with above plan      Patient instructed to call in 72 hours if not feeling better or if symptoms worsen

## 2019-09-24 ENCOUNTER — APPOINTMENT (OUTPATIENT)
Dept: LAB | Facility: CLINIC | Age: 84
End: 2019-09-24
Payer: COMMERCIAL

## 2019-09-24 ENCOUNTER — HOSPITAL ENCOUNTER (OUTPATIENT)
Dept: RADIOLOGY | Facility: HOSPITAL | Age: 84
Discharge: HOME | End: 2019-09-24
Payer: COMMERCIAL

## 2019-09-24 ENCOUNTER — OFFICE VISIT (OUTPATIENT)
Dept: FAMILY MEDICINE CLINIC | Facility: CLINIC | Age: 84
End: 2019-09-24
Payer: COMMERCIAL

## 2019-09-24 ENCOUNTER — TELEPHONE (OUTPATIENT)
Dept: FAMILY MEDICINE CLINIC | Facility: CLINIC | Age: 84
End: 2019-09-24

## 2019-09-24 VITALS
OXYGEN SATURATION: 94 % | BODY MASS INDEX: 28.03 KG/M2 | RESPIRATION RATE: 18 BRPM | HEART RATE: 63 BPM | DIASTOLIC BLOOD PRESSURE: 72 MMHG | HEIGHT: 67 IN | TEMPERATURE: 98.3 F | SYSTOLIC BLOOD PRESSURE: 132 MMHG | WEIGHT: 178.6 LBS

## 2019-09-24 DIAGNOSIS — J40 BRONCHITIS: ICD-10-CM

## 2019-09-24 DIAGNOSIS — J40 BRONCHITIS: Primary | ICD-10-CM

## 2019-09-24 LAB
ANION GAP SERPL CALCULATED.3IONS-SCNC: 4 MMOL/L (ref 4–13)
BUN SERPL-MCNC: 20 MG/DL (ref 5–25)
CALCIUM SERPL-MCNC: 8.5 MG/DL (ref 8.3–10.1)
CHLORIDE SERPL-SCNC: 104 MMOL/L (ref 100–108)
CO2 SERPL-SCNC: 27 MMOL/L (ref 21–32)
CREAT SERPL-MCNC: 1.9 MG/DL (ref 0.6–1.3)
GFR SERPL CREATININE-BSD FRML MDRD: 22 ML/MIN/1.73SQ M
GLUCOSE SERPL-MCNC: 103 MG/DL (ref 65–140)
POTASSIUM SERPL-SCNC: 4 MMOL/L (ref 3.5–5.3)
SODIUM SERPL-SCNC: 135 MMOL/L (ref 136–145)

## 2019-09-24 PROCEDURE — 71046 X-RAY EXAM CHEST 2 VIEWS: CPT

## 2019-09-24 PROCEDURE — 99214 OFFICE O/P EST MOD 30 MIN: CPT | Performed by: FAMILY MEDICINE

## 2019-09-24 PROCEDURE — 36415 COLL VENOUS BLD VENIPUNCTURE: CPT

## 2019-09-24 PROCEDURE — 80048 BASIC METABOLIC PNL TOTAL CA: CPT

## 2019-09-24 RX ORDER — GUAIFENESIN 600 MG
1200 TABLET, EXTENDED RELEASE 12 HR ORAL EVERY 12 HOURS SCHEDULED
Qty: 14 TABLET | Refills: 0 | Status: SHIPPED | OUTPATIENT
Start: 2019-09-24 | End: 2020-03-25 | Stop reason: ALTCHOICE

## 2019-09-24 RX ORDER — BENZONATATE 100 MG/1
100 CAPSULE ORAL 3 TIMES DAILY PRN
Qty: 20 CAPSULE | Refills: 0 | Status: SHIPPED | OUTPATIENT
Start: 2019-09-24 | End: 2019-10-01 | Stop reason: ALTCHOICE

## 2019-09-24 RX ORDER — AZITHROMYCIN 250 MG/1
500 TABLET, FILM COATED ORAL EVERY 24 HOURS
Qty: 10 TABLET | Refills: 0 | Status: SHIPPED | OUTPATIENT
Start: 2019-09-24 | End: 2019-09-29

## 2019-09-24 NOTE — TELEPHONE ENCOUNTER
Called Pt's insurance ,Spoke with [place] ref # [id number] Pt has No deductible ,No Auth needed ,they will cover 80 % and Pt has 20 % co insurance ( payment)

## 2019-09-24 NOTE — TELEPHONE ENCOUNTER
Senait, she can't make times offered 12, 12:30 and doesn't want to wait til 5;30,  Can you see this am before noon or after 1 or see another provider  Aware you start later this morning      Cough , wheeze

## 2019-09-24 NOTE — ASSESSMENT & PLAN NOTE
Acute  Obtain BMP and CXR to eval for possible CAP  Start azithromycin, tessalon and mucinex  RTC 2 days for recheck before weekend  If BMP shows ISABELLE will call and send them to ED  Discussed inpatient/outpatient CAP treatment and they understand risks of outpatient treatment, would refer this route

## 2019-09-24 NOTE — PATIENT INSTRUCTIONS
Acute Bronchitis   WHAT YOU NEED TO KNOW:   Acute bronchitis is swelling and irritation in the air passages of your lungs  This irritation may cause you to cough or have other breathing problems  Acute bronchitis often starts because of another illness, such as a cold or the flu  The illness spreads from your nose and throat to your windpipe and airways  Bronchitis is often called a chest cold  Acute bronchitis lasts about 3 to 6 weeks and is usually not a serious illness  Your cough can last for several weeks  DISCHARGE INSTRUCTIONS:   Return to the emergency department if:   · You cough up blood  · Your lips or fingernails turn blue  · You feel like you are not getting enough air when you breathe  Contact your healthcare provider if:   · You have a fever  · Your breathing problems do not go away or get worse  · Your cough does not get better within 4 weeks  · You have questions or concerns about your condition or care  Self-care:   · Get more rest   Rest helps your body to heal  Slowly start to do more each day  Rest when you feel it is needed  · Avoid irritants in the air  Avoid chemicals, fumes, and dust  Wear a face mask if you must work around dust or fumes  Stay inside on days when air pollution levels are high  If you have allergies, stay inside when pollen counts are high  Do not use aerosol products, such as spray-on deodorant, bug spray, and hair spray  · Do not smoke or be around others who smoke  Nicotine and other chemicals in cigarettes and cigars damages the cilia that move mucus out of your lungs  Ask your healthcare provider for information if you currently smoke and need help to quit  E-cigarettes or smokeless tobacco still contain nicotine  Talk to your healthcare provider before you use these products  · Drink liquids as directed  Liquids help keep your air passages moist and help you cough up mucus   You may need to drink more liquids when you have acute bronchitis  Ask how much liquid to drink each day and which liquids are best for you  · Use a humidifier or vaporizer  Use a cool mist humidifier or a vaporizer to increase air moisture in your home  This may make it easier for you to breathe and help decrease your cough  Decrease risk for acute bronchitis:   · Get the vaccinations you need  Ask your healthcare provider if you should get vaccinated against the flu or pneumonia  · Prevent the spread of germs  You can decrease your risk of acute bronchitis and other illnesses by doing the following:     [facility] your hands often with soap and water  Carry germ-killing hand lotion or gel with you  You can use the lotion or gel to clean your hands when soap and water are not available  ¨ Do not touch your eyes, nose, or mouth unless you have washed your hands first     ¨ Always cover your mouth when you cough to prevent the spread of germs  It is best to cough into a tissue or your shirt sleeve instead of into your hand  Ask those around you cover their mouths when they cough  ¨ Try to avoid people who have a cold or the flu  If you are sick, stay away from others as much as possible  Medicines: Your healthcare provider may  give you any of the following:  · Ibuprofen or acetaminophen  are medicines that help lower your fever  They are available without a doctor's order  Ask your healthcare provider which medicine is right for you  Ask how much to take and how often to take it  Follow directions  These medicines can cause stomach bleeding if not taken correctly  Ibuprofen can cause kidney damage  Do not take ibuprofen if you have kidney disease, an ulcer, or allergies to aspirin  Acetaminophen can cause liver damage  Do not take more than 4,000 milligrams in 24 hours  · Decongestants  help loosen mucus in your lungs and make it easier to cough up  This can help you breathe easier  · Cough suppressants  decrease your urge to cough   If your cough produces mucus, do not take a cough suppressant unless your healthcare provider tells you to  Your healthcare provider may suggest that you take a cough suppressant at night so you can rest     · Inhalers  may be given  Your healthcare provider may give you one or more inhalers to help you breathe easier and cough less  An inhaler gives your medicine to open your airways  Ask your healthcare provider to show you how to use your inhaler correctly  · Take your medicine as directed  Contact your healthcare provider if you think your medicine is not helping or if you have side effects  Tell him of her if you are allergic to any medicine  Keep a list of the medicines, vitamins, and herbs you take  Include the amounts, and when and why you take them  Bring the list or the pill bottles to follow-up visits  Carry your medicine list with you in case of an emergency  Follow up with your healthcare provider as directed:  Write down questions you have so you will remember to ask them during your follow-up visits  © 2017 [employer] Information is for End User's use only and may not be sold, redistributed or otherwise used for commercial purposes  All illustrations and images included in CareNotes® are the copyrighted property of [employer]  or [employer]  The above information is an  only  It is not intended as medical advice for individual conditions or treatments  Talk to your doctor, nurse or pharmacist before following any medical regimen to see if it is safe and effective for you

## 2019-09-24 NOTE — PROGRESS NOTES
aBrbara Chow 8/26/1926 female MRN: [id number]    Acute Visit    Assessment/Plan   Bronchitis  Acute  Obtain BMP and CXR to eval for possible CAP  Start azithromycin, tessalon and mucinex  RTC 2 days for recheck before weekend  If BMP shows ISABELLE will call and send them to ED  Discussed inpatient/outpatient CAP treatment and they understand risks of outpatient treatment, would refer this route    Li was seen today for nasal congestion and cough  Diagnoses and all orders for this visit:    Bronchitis  -     azithromycin (ZITHROMAX) 250 mg tablet; Take 2 tablets (500 mg total) by mouth every 24 hours for 5 days  -     Basic metabolic panel; Future  -     XR chest pa & lateral; Future  -     benzonatate (TESSALON PERLES) 100 mg capsule; Take 1 capsule (100 mg total) by mouth 3 (three) times a day as needed for cough  -     guaiFENesin (MUCINEX) 600 mg 12 hr tablet; Take 2 tablets (1,200 mg total) by mouth every 12 (twelve) hours        Yani Abbasi MD  [address]  9/24/2019      Please be aware that this note contains text that was dictated and there may be errors pertaining to "sound-alike "words during the dictation process  SUBJECTIVE    CC: Nasal Congestion (wheezing) and Cough    HPI:  Barbara Chow is a 80 y o  female who presented for an acute visit complaining of several days runny nose and sneezing  Yesterday she developed a productive cough and she didn't sleep at all with this  Developed wheezing overnight (no Hx COPD/asthma)  +subjective chills  She feels very weak and fatigued  Denies myalgias, diarrhea, decreased urination  Pertinent PMHx: CKD IV, solitary kidney  Review of Systems   Constitutional: Positive for activity change and chills  Negative for diaphoresis, fatigue and fever  HENT: Positive for congestion, postnasal drip, rhinorrhea, sneezing and sore throat  Eyes: Negative for photophobia and visual disturbance     Respiratory: Positive for cough, shortness of breath and wheezing  Cardiovascular: Negative for chest pain, palpitations and leg swelling  Gastrointestinal: Negative for diarrhea and nausea  Genitourinary: Negative for decreased urine volume  Musculoskeletal: Negative for myalgias  Skin: Negative for rash  Neurological: Positive for weakness  Negative for dizziness, light-headedness, numbness and headaches  Psychiatric/Behavioral: Negative for decreased concentration  All other systems reviewed and are negative      Medications:   Meds/Allergies   Current Outpatient Medications   Medication Sig Dispense Refill    acetaminophen-codeine (TYLENOL/CODEINE #3) 300-30 MG per tablet Take 1 tablet by mouth 3 (three) times a day as needed for moderate pain 30 tablet 0    ALPRAZolam (XANAX) 0 25 mg tablet TAKE ONE TABLET BY MOUTH FOUR TIMES A  tablet 3    amiodarone 100 mg tablet Take 1 tablet (100 mg total) by mouth daily 90 tablet 3    Cyanocobalamin (VITAMIN B-12 PO) Take by mouth      Emollient (EUCERIN) lotion Apply topically 2 (two) times a day      furosemide (LASIX) 20 mg tablet TAKE 2 TABLETS BY MOUTH ONCE DAILY 60 tablet 3    hyoscyamine (LEVBID) 0 375 mg 12 hr tablet Take 1 tablet (0 375 mg total) by mouth every 12 (twelve) hours as needed for cramping 60 tablet 0    levothyroxine 25 mcg tablet TAKE ONE TABLET BY MOUTH EVERY DAY 90 tablet 3    levothyroxine 50 mcg tablet Take 1 tablet (50 mcg total) by mouth daily for 30 days 90 tablet 3    loratadine (CLARITIN) 10 mg tablet Take 1 tablet (10 mg total) by mouth daily (Patient taking differently: Take 10 mg by mouth daily as needed  )  0    nystatin-triamcinolone (MYCOLOG-II) cream Apply topically 4 (four) times a day 60 g 3    olopatadine (PATANOL) 0 1 % ophthalmic solution Apply 1 drop to eye 2 (two) times a day      PRADAXA 75 MG capsule TAKE ONE CAPSULE BY MOUTH EVERY 12 HOURS 60 capsule 1    rosuvastatin (CRESTOR) 5 mg tablet Take 1 tablet (5 mg total) by mouth daily for 90 days 90 tablet 3    triamcinolone (KENALOG) 0 1 % cream Apply topically Twice daily      zolpidem (AMBIEN CR) 12 5 MG CR tablet TAKE ONE TABLET BY MOUTH AT BEDTIME 30 tablet 3    azithromycin (ZITHROMAX) 250 mg tablet Take 2 tablets (500 mg total) by mouth every 24 hours for 5 days 10 tablet 0    benzonatate (TESSALON PERLES) 100 mg capsule Take 1 capsule (100 mg total) by mouth 3 (three) times a day as needed for cough 20 capsule 0    guaiFENesin (MUCINEX) 600 mg 12 hr tablet Take 2 tablets (1,200 mg total) by mouth every 12 (twelve) hours 14 tablet 0     No current facility-administered medications for this visit  Allergies   Allergen Reactions    Penicillin G Anaphylaxis    Atorvastatin      Other reaction(s): Leg Cramps    Cephalexin Headache    Chocolate     Citalopram Abdominal Pain and Headache    Diltiazem     Fosinopril Sodium-Hctz     Methylprednisolone Nausea Only     Tablet generic only    Monopril [Fosinopril]      Reaction Date: 28Apr2011;     Penicillins     Pollen Extract     Pravastatin Myalgia     Other reaction(s): Leg Cramps    Spironolactone     Sporanox [Itraconazole]      Reaction Date: [id number];     Strawberry C [Ascorbate]     Caffeine Palpitations     Tachycardia     OBJECTIVE    Vitals:   Vitals:    09/24/19 1107   BP: 132/72   Pulse: 63   Resp: 18   Temp: 98 3 °F (36 8 °C)   SpO2: 94%   Weight: 81 kg (178 lb 9 6 oz)   Height: 5' 7" (1 702 m)       Physical Exam   Constitutional: She is oriented to person, place, and time  Vital signs are normal  She appears well-developed and well-nourished  Non-toxic appearance  She appears ill  No distress  HENT:   Head: Normocephalic and atraumatic  Right Ear: External ear and ear canal normal    Left Ear: External ear and ear canal normal    Nose: Nose normal    Mouth/Throat: Uvula is midline and mucous membranes are normal  Posterior oropharyngeal erythema present  No oropharyngeal exudate   No tonsillar exudate  Eyes: Conjunctivae, EOM and lids are normal    Cardiovascular: Intact distal pulses  Murmur heard  Pulmonary/Chest: Effort normal  No accessory muscle usage or stridor  No respiratory distress  She has wheezes (scant)  She has rhonchi in the right upper field and the right middle field  She has rales (trace bibasilar)  Abdominal: Soft  Normal appearance  There is no guarding  Musculoskeletal:   Walking slowly with cane, which she normally doesn't use   Lymphadenopathy:     She has no cervical adenopathy  Neurological: She is alert and oriented to person, place, and time  She displays tremor  Skin: Capillary refill takes less than 2 seconds  No rash noted  Clammy skin   Psychiatric: She has a normal mood and affect  Her speech is normal and behavior is normal  She expresses no suicidal ideation  Nursing note and vitals reviewed

## 2019-09-25 NOTE — TELEPHONE ENCOUNTER
Called Pt and she is aware the cost is about $ 1000 00 and her payment will be 20 % and she is ok with that and would like to go ahead with the injections   Please order the Synvisc

## 2019-09-26 ENCOUNTER — OFFICE VISIT (OUTPATIENT)
Dept: FAMILY MEDICINE CLINIC | Facility: CLINIC | Age: 84
End: 2019-09-26
Payer: COMMERCIAL

## 2019-09-26 VITALS
SYSTOLIC BLOOD PRESSURE: 122 MMHG | DIASTOLIC BLOOD PRESSURE: 72 MMHG | BODY MASS INDEX: 27.72 KG/M2 | WEIGHT: 176.6 LBS | HEART RATE: 64 BPM | TEMPERATURE: 97.8 F | HEIGHT: 67 IN

## 2019-09-26 DIAGNOSIS — Z00.00 MEDICARE ANNUAL WELLNESS VISIT, SUBSEQUENT: Primary | ICD-10-CM

## 2019-09-26 DIAGNOSIS — J40 BRONCHITIS: ICD-10-CM

## 2019-09-26 PROCEDURE — 1170F FXNL STATUS ASSESSED: CPT | Performed by: FAMILY MEDICINE

## 2019-09-26 PROCEDURE — G0439 PPPS, SUBSEQ VISIT: HCPCS | Performed by: FAMILY MEDICINE

## 2019-09-26 PROCEDURE — 1125F AMNT PAIN NOTED PAIN PRSNT: CPT | Performed by: FAMILY MEDICINE

## 2019-09-26 PROCEDURE — 99213 OFFICE O/P EST LOW 20 MIN: CPT | Performed by: FAMILY MEDICINE

## 2019-09-26 RX ORDER — ALBUTEROL SULFATE 2.5 MG/3ML
2.5 SOLUTION RESPIRATORY (INHALATION) EVERY 6 HOURS PRN
Qty: 42 ML | Refills: 0 | Status: SHIPPED | OUTPATIENT
Start: 2019-09-26 | End: 2020-03-25 | Stop reason: ALTCHOICE

## 2019-09-26 RX ORDER — BENZONATATE 100 MG/1
100 CAPSULE ORAL 3 TIMES DAILY PRN
Qty: 20 CAPSULE | Refills: 0 | Status: SHIPPED | OUTPATIENT
Start: 2019-09-26 | End: 2019-10-01 | Stop reason: ALTCHOICE

## 2019-09-26 NOTE — PROGRESS NOTES
Moe Lynne 8/26/1926 female MRN: [id number]    Acute Visit    Assessment/Plan   Bronchitis  Improving  Continue current treatment with azithromycin, add Tessalon pearles and albuterol prn  RTC or call if not improving  Discussed long-course of recovery with cough    Li was seen today for follow-up  Diagnoses and all orders for this visit:    Bronchitis  -     albuterol (2 5 mg/3 mL) 0 083 % nebulizer solution; Take 1 vial (2 5 mg total) by nebulization every 6 (six) hours as needed for wheezing or shortness of breath  -     Respiratory Therapy Supplies (NEBULIZER/ADULT MASK) KIT; by Does not apply route every 4 (four) hours as needed (wheezing)  -     benzonatate (TESSALON PERLES) 100 mg capsule; Take 1 capsule (100 mg total) by mouth 3 (three) times a day as needed for cough    Medicare annual wellness visit, subsequent        Sarita Chery MD  [address]  9/26/2019      Please be aware that this note contains text that was dictated and there may be errors pertaining to "sound-alike "words during the dictation process  SUBJECTIVE    CC: Follow-up (bronchitis, worse when lying down)    HPI:  Moe Lynne is a 80 y o  female who presented to f/u bronchitis  Overall feels slightly better, less weak than last visit  Eating and tolerating po liquids well  Denies diarrhea or other new symptoms  Reports continuing cough that worsens when she lays down, making it difficult to sleep  Very productive  Notes that she has wheezing sometimes also  Review of Systems   Constitutional: Positive for fatigue  Negative for activity change, appetite change, chills, diaphoresis and fever  HENT: Negative for congestion, postnasal drip, rhinorrhea, sneezing and sore throat  Eyes: Negative for photophobia and visual disturbance  Respiratory: Positive for cough and wheezing  Negative for shortness of breath  Cardiovascular: Negative for chest pain, palpitations and leg swelling  Gastrointestinal: Negative for diarrhea and nausea  Genitourinary: Negative for decreased urine volume  Musculoskeletal: Negative for myalgias  Skin: Negative for rash  Neurological: Negative for dizziness, weakness, light-headedness, numbness and headaches  Psychiatric/Behavioral: Negative for decreased concentration  All other systems reviewed and are negative      Medications:   Meds/Allergies   Current Outpatient Medications   Medication Sig Dispense Refill    acetaminophen-codeine (TYLENOL/CODEINE #3) 300-30 MG per tablet Take 1 tablet by mouth 3 (three) times a day as needed for moderate pain 30 tablet 0    albuterol (2 5 mg/3 mL) 0 083 % nebulizer solution Take 1 vial (2 5 mg total) by nebulization every 6 (six) hours as needed for wheezing or shortness of breath 42 mL 0    ALPRAZolam (XANAX) 0 25 mg tablet TAKE ONE TABLET BY MOUTH FOUR TIMES A  tablet 3    amiodarone 100 mg tablet Take 1 tablet (100 mg total) by mouth daily 90 tablet 3    azithromycin (ZITHROMAX) 250 mg tablet Take 2 tablets (500 mg total) by mouth every 24 hours for 5 days 10 tablet 0    benzonatate (TESSALON PERLES) 100 mg capsule Take 1 capsule (100 mg total) by mouth 3 (three) times a day as needed for cough 20 capsule 0    benzonatate (TESSALON PERLES) 100 mg capsule Take 1 capsule (100 mg total) by mouth 3 (three) times a day as needed for cough 20 capsule 0    Cyanocobalamin (VITAMIN B-12 PO) Take by mouth      Emollient (EUCERIN) lotion Apply topically 2 (two) times a day      furosemide (LASIX) 20 mg tablet TAKE 2 TABLETS BY MOUTH ONCE DAILY 60 tablet 3    guaiFENesin (MUCINEX) 600 mg 12 hr tablet Take 2 tablets (1,200 mg total) by mouth every 12 (twelve) hours 14 tablet 0    hyoscyamine (LEVBID) 0 375 mg 12 hr tablet Take 1 tablet (0 375 mg total) by mouth every 12 (twelve) hours as needed for cramping 60 tablet 0    levothyroxine 25 mcg tablet TAKE ONE TABLET BY MOUTH EVERY DAY 90 tablet 3    levothyroxine 50 mcg tablet Take 1 tablet (50 mcg total) by mouth daily for 30 days 90 tablet 3    loratadine (CLARITIN) 10 mg tablet Take 1 tablet (10 mg total) by mouth daily (Patient taking differently: Take 10 mg by mouth daily as needed  )  0    nystatin-triamcinolone (MYCOLOG-II) cream Apply topically 4 (four) times a day 60 g 3    olopatadine (PATANOL) 0 1 % ophthalmic solution Apply 1 drop to eye 2 (two) times a day      PRADAXA 75 MG capsule TAKE ONE CAPSULE BY MOUTH EVERY 12 HOURS 60 capsule 1    Respiratory Therapy Supplies (NEBULIZER/ADULT MASK) KIT by Does not apply route every 4 (four) hours as needed (wheezing) 1 each 0    rosuvastatin (CRESTOR) 5 mg tablet Take 1 tablet (5 mg total) by mouth daily for 90 days 90 tablet 3    triamcinolone (KENALOG) 0 1 % cream Apply topically Twice daily      zolpidem (AMBIEN CR) 12 5 MG CR tablet TAKE ONE TABLET BY MOUTH AT BEDTIME 30 tablet 3     No current facility-administered medications for this visit  Allergies   Allergen Reactions    Penicillin G Anaphylaxis    Atorvastatin      Other reaction(s): Leg Cramps    Cephalexin Headache    Chocolate     Citalopram Abdominal Pain and Headache    Diltiazem     Fosinopril Sodium-Hctz     Methylprednisolone Nausea Only     Tablet generic only    Monopril [Fosinopril]      Reaction Date: 28Apr2011;     Penicillins     Pollen Extract     Pravastatin Myalgia     Other reaction(s): Leg Cramps    Spironolactone     Sporanox [Itraconazole]      Reaction Date: 28Apr2011;     Strawberry C [Ascorbate]     Caffeine Palpitations     Tachycardia       OBJECTIVE    Vitals:   Vitals:    09/26/19 1106   BP: 122/72   BP Location: Right arm   Patient Position: Sitting   Cuff Size: Standard   Pulse: 64   Temp: 97 8 °F (36 6 °C)   Weight: 80 1 kg (176 lb 9 6 oz)   Height: 5' 7" (1 702 m)     Physical Exam   Constitutional: Vital signs are normal  She appears well-developed and well-nourished    Non-toxic appearance  She does not have a sickly appearance  She appears ill  No distress  HENT:   Head: Normocephalic and atraumatic  Right Ear: External ear normal    Left Ear: External ear normal    Eyes: Conjunctivae and EOM are normal    Neck: No thyromegaly present  Cardiovascular: Normal rate, regular rhythm and intact distal pulses  Murmur heard  Pulmonary/Chest: Effort normal  No respiratory distress  She has no decreased breath sounds  She has no wheezes  She has rhonchi  She has no rales  SpO2 94% in RA   Abdominal: Soft  Bowel sounds are normal  She exhibits no distension  There is no tenderness  Musculoskeletal: She exhibits no edema  Lymphadenopathy:     She has no cervical adenopathy  Neurological: She is alert  Skin: Skin is warm and dry  Capillary refill takes less than 2 seconds  No rash noted  Psychiatric: She has a normal mood and affect  Nursing note and vitals reviewed

## 2019-09-26 NOTE — PATIENT INSTRUCTIONS
Medicare Preventive Visit Patient Instructions  Thank you for completing your Welcome to Medicare Visit or Medicare Annual Wellness Visit today  Your next wellness visit will be due in one year (9/26/2020)  The screening/preventive services that you may require over the next 5-10 years are detailed below  Some tests may not apply to you based off risk factors and/or age  Screening tests ordered at today's visit but not completed yet may show as past due  Also, please note that scanned in results may not display below  Preventive Screenings:  Service Recommendations Previous Testing/Comments   Colorectal Cancer Screening  * Colonoscopy    * Fecal Occult Blood Test (FOBT)/Fecal Immunochemical Test (FIT)  * Fecal DNA/Cologuard Test  * Flexible Sigmoidoscopy Age: 54-65 years old   Colonoscopy: every 10 years (may be performed more frequently if at higher risk)  OR  FOBT/FIT: every 1 year  OR  Cologuard: every 3 years  OR  Sigmoidoscopy: every 5 years  Screening may be recommended earlier than age 48 if at higher risk for colorectal cancer  Also, an individualized decision between you and your healthcare provider will decide whether screening between the ages of 74-80 would be appropriate  Colonoscopy: Not on file  FOBT/FIT: Not on file  Cologuard: Not on file  Sigmoidoscopy: Not on file         Breast Cancer Screening Age: 36 years old  Frequency: every 1-2 years  Not required if history of left and right mastectomy Mammogram: 04/24/2013       Cervical Cancer Screening Between the ages of 21-29, pap smear recommended once every 3 years  Between the ages of 33-67, can perform pap smear with HPV co-testing every 5 years     Recommendations may differ for women with a history of total hysterectomy, cervical cancer, or abnormal pap smears in past  Pap Smear: Not on file       Hepatitis C Screening Once for adults born between [facility]  More frequently in patients at high risk for Hepatitis C Hep C Antibody: Not on file       Diabetes Screening 1-2 times per year if you're at risk for diabetes or have pre-diabetes Fasting glucose: 92 mg/dL   A1C: No results in last 5 years       Cholesterol Screening Once every 5 years if you don't have a lipid disorder  May order more often based on risk factors  Lipid panel: 03/25/2019         Other Preventive Screenings Covered by Medicare:  1  Abdominal Aortic Aneurysm (AAA) Screening: covered once if your at risk  You're considered to be at risk if you have a family history of AAA  2  Lung Cancer Screening: covers low dose CT scan once per year if you meet all of the following conditions: (1) Age 50-69; (2) No signs or symptoms of lung cancer; (3) Current smoker or have quit smoking within the last 15 years; (4) You have a tobacco smoking history of at least 30 pack years (packs per day multiplied by number of years you smoked); (5) You get a written order from a healthcare provider  3  Glaucoma Screening: covered annually if you're considered high risk: (1) You have diabetes OR (2) Family history of glaucoma OR (3)  aged 48 and older OR (3)  American aged 72 and older  3  Osteoporosis Screening: covered every 2 years if you meet one of the following conditions: (1) You're estrogen deficient and at risk for osteoporosis based off medical history and other findings; (2) Have a vertebral abnormality; (3) On glucocorticoid therapy for more than 3 months; (4) Have primary hyperparathyroidism; (5) On osteoporosis medications and need to assess response to drug therapy  · Last bone density test (DXA Scan): 06/16/2011  5  HIV Screening: covered annually if you're between the age of 12-76  Also covered annually if you are younger than 13 and older than 72 with risk factors for HIV infection  For pregnant patients, it is covered up to 3 times per pregnancy      Immunizations:  Immunization Recommendations   Influenza Vaccine Annual influenza vaccination during flu season is recommended for all persons aged >= 6 months who do not have contraindications   Pneumococcal Vaccine (Prevnar and Pneumovax)  * Prevnar = PCV13  * Pneumovax = PPSV23   Adults 25-60 years old: 1-3 doses may be recommended based on certain risk factors  Adults 72 years old: Prevnar (PCV13) vaccine recommended followed by Pneumovax (PPSV23) vaccine  If already received PPSV23 since turning 65, then PCV13 recommended at least one year after PPSV23 dose  Hepatitis B Vaccine 3 dose series if at intermediate or high risk (ex: diabetes, end stage renal disease, liver disease)   Tetanus (Td) Vaccine - COST NOT COVERED BY MEDICARE PART B Following completion of primary series, a booster dose should be given every 10 years to maintain immunity against tetanus  Td may also be given as tetanus wound prophylaxis  Tdap Vaccine - COST NOT COVERED BY MEDICARE PART B Recommended at least once for all adults  For pregnant patients, recommended with each pregnancy  Shingles Vaccine (Shingrix) - COST NOT COVERED BY MEDICARE PART B  2 shot series recommended in those aged 48 and above     Health Maintenance Due:  There are no preventive care reminders to display for this patient  Immunizations Due:      Topic Date Due    HEPATITIS B VACCINES (1 of 3 - Risk 3-dose series) 08/26/1945    DTaP,Tdap,and Td Vaccines (1 - Tdap) [birth year]1947     Advance Directives   What are advance directives? Advance directives are legal documents that state your wishes and plans for medical care  These plans are made ahead of time in case you lose your ability to make decisions for yourself  Advance directives can apply to any medical decision, such as the treatments you want, and if you want to donate organs  What are the types of advance directives? There are many types of advance directives, and each state has rules about how to use them  You may choose a combination of any of the following:  · Living will:   This is a written record of the treatment you want  You can also choose which treatments you do not want, which to limit, and which to stop at a certain time  This includes surgery, medicine, IV fluid, and tube feedings  · Durable power of  for healthcare [facility]): This is a written record that states who you want to make healthcare choices for you when you are unable to make them for yourself  This person, called a proxy, is usually a family member or a friend  You may choose more than 1 proxy  · Do not resuscitate (DNR) order:  A DNR order is used in case your heart stops beating or you stop breathing  It is a request not to have certain forms of treatment, such as CPR  A DNR order may be included in other types of advance directives  · Medical directive: This covers the care that you want if you are in a coma, near death, or unable to make decisions for yourself  You can list the treatments you want for each condition  Treatment may include pain medicine, surgery, blood transfusions, dialysis, IV or tube feedings, and a ventilator (breathing machine)  · Values history: This document has questions about your views, beliefs, and how you feel and think about life  This information can help others choose the care that you would choose  Why are advance directives important? An advance directive helps you control your care  Although spoken wishes may be used, it is better to have your wishes written down  Spoken wishes can be misunderstood, or not followed  Treatments may be given even if you do not want them  An advance directive may make it easier for your family to make difficult choices about your care  Weight Management   Why it is important to manage your weight:  Being overweight increases your risk of health conditions such as heart disease, high blood pressure, type 2 diabetes, and certain types of cancer  It can also increase your risk for osteoarthritis, sleep apnea, and other respiratory problems   Aim for a slow, steady weight loss  Even a small amount of weight loss can lower your risk of health problems  How to lose weight safely:  A safe and healthy way to lose weight is to eat fewer calories and get regular exercise  You can lose up about 1 pound a week by decreasing the number of calories you eat by 500 calories each day  Healthy meal plan for weight management:  A healthy meal plan includes a variety of foods, contains fewer calories, and helps you stay healthy  A healthy meal plan includes the following:  · Eat whole-grain foods more often  A healthy meal plan should contain fiber  Fiber is the part of grains, fruits, and vegetables that is not broken down by your body  Whole-grain foods are healthy and provide extra fiber in your diet  Some examples of whole-grain foods are whole-wheat breads and pastas, oatmeal, brown rice, and bulgur  · Eat a variety of vegetables every day  Include dark, leafy greens such as spinach, kale, sylvia greens, and mustard greens  Eat yellow and orange vegetables such as carrots, sweet potatoes, and winter squash  · Eat a variety of fruits every day  Choose fresh or canned fruit (canned in its own juice or light syrup) instead of juice  Fruit juice has very little or no fiber  · Eat low-fat dairy foods  Drink fat-free (skim) milk or 1% milk  Eat fat-free yogurt and low-fat cottage cheese  Try low-fat cheeses such as mozzarella and other reduced-fat cheeses  · Choose meat and other protein foods that are low in fat  Choose beans or other legumes such as split peas or lentils  Choose fish, skinless poultry (chicken or turkey), or lean cuts of red meat (beef or pork)  Before you cook meat or poultry, cut off any visible fat  · Use less fat and oil  Try baking foods instead of frying them  Add less fat, such as margarine, sour cream, regular salad dressing and mayonnaise to foods  Eat fewer high-fat foods   Some examples of high-fat foods include french fries, doughnuts, ice cream, and cakes  · Eat fewer sweets  Limit foods and drinks that are high in sugar  This includes candy, cookies, regular soda, and sweetened drinks  Exercise:  Exercise at least 30 minutes per day on most days of the week  Some examples of exercise include walking, biking, dancing, and swimming  You can also fit in more physical activity by taking the stairs instead of the elevator or parking farther away from stores  Ask your healthcare provider about the best exercise plan for you  © Copyright [employer] 2018 Information is for End User's use only and may not be sold, redistributed or otherwise used for commercial purposes   All illustrations and images included in CareNotes® are the copyrighted property of [employer]  or [address] [place] [address]

## 2019-09-26 NOTE — PROGRESS NOTES
Assessment and Plan:        Preventive health issues were discussed with patient, and age appropriate screening tests were ordered as noted in patient's After Visit Summary  Personalized health advice and appropriate referrals for health education or preventive services given if needed, as noted in patient's After Visit Summary  History of Present Illness:     Patient presents for Medicare Annual Wellness visit    Patient Care Team:  Shruti Goodrich DO as PCP - General  MD Shruti Meléndez DO     Problem List:     Patient Active Problem List   Diagnosis    Oral pain of unknown etiology    [facility]    Hypothyroidism    Anxiety    Cardiomyopathy ([facility] )    CKD (chronic kidney disease), stage IV ([facility] )    Arthritis    Chronic diastolic congestive heart failure ([facility] )    Hyperlipidemia    Hypertension    Insomnia    Contact dermatitis    Sinus bradycardia    Mild aortic stenosis    Bronchitis      Past Medical and Surgical History:     Past Medical History:   Diagnosis Date    [facility]-fib [facility])     Cardiac disease     Hyperlipidemia     Hypertension      Past Surgical History:   Procedure Laterality Date    APPENDECTOMY      CHOLECYSTECTOMY      HERNIA REPAIR      HYSTERECTOMY      NEPHRECTOMY Right       Family History:     Family History   Problem Relation Age of Onset    Heart disease Family     Hypertension Family     Cancer Family       Social History:     Social History     Socioeconomic History    Marital status:       Spouse name: None    Number of children: None    Years of education: Less than high school    Highest education level: None   Occupational History    Occupation: Retired   Social Needs    Financial resource strain: None    Food insecurity:     Worry: None     Inability: None    Transportation needs:     Medical: None     Non-medical: None   Tobacco Use    Smoking status: Never Smoker    Smokeless tobacco: Never Used   Substance and Sexual Activity    Alcohol use: No    Drug use: No    Sexual activity: Not Currently   Lifestyle    Physical activity:     Days per week: None     Minutes per session: None    Stress: None   Relationships    Social connections:     Talks on phone: None     Gets together: None     Attends Holiness service: None     Active member of club or organization: None     Attends meetings of clubs or organizations: None     Relationship status: None    Intimate partner violence:     Fear of current or ex partner: None     Emotionally abused: None     Physically abused: None     Forced sexual activity: None   Other Topics Concern    None   Social History Narrative    Always uses seatbelt    Denied:  History of daily caffeinated cola consumption    Denied:  History of daily coffee consumption    Daily tea consumption    Denied:  History of dental care, regularly    Exercise:  Walking    Living will in place    No Holiness beliefs    Power of  in existence    Water intake, adequate (per day)       Medications and Allergies:     Current Outpatient Medications   Medication Sig Dispense Refill    acetaminophen-codeine (TYLENOL/CODEINE #3) 300-30 MG per tablet Take 1 tablet by mouth 3 (three) times a day as needed for moderate pain 30 tablet 0    albuterol (2 5 mg/3 mL) 0 083 % nebulizer solution Take 1 vial (2 5 mg total) by nebulization every 6 (six) hours as needed for wheezing or shortness of breath 42 mL 0    ALPRAZolam (XANAX) 0 25 mg tablet TAKE ONE TABLET BY MOUTH FOUR TIMES A  tablet 3    amiodarone 100 mg tablet Take 1 tablet (100 mg total) by mouth daily 90 tablet 3    azithromycin (ZITHROMAX) 250 mg tablet Take 2 tablets (500 mg total) by mouth every 24 hours for 5 days 10 tablet 0    benzonatate (TESSALON PERLES) 100 mg capsule Take 1 capsule (100 mg total) by mouth 3 (three) times a day as needed for cough 20 capsule 0    benzonatate (TESSALON PERLES) 100 mg capsule Take 1 capsule (100 mg total) by mouth 3 (three) times a day as needed for cough 20 capsule 0    Cyanocobalamin (VITAMIN B-12 PO) Take by mouth      Emollient (EUCERIN) lotion Apply topically 2 (two) times a day      furosemide (LASIX) 20 mg tablet TAKE 2 TABLETS BY MOUTH ONCE DAILY 60 tablet 3    guaiFENesin (MUCINEX) 600 mg 12 hr tablet Take 2 tablets (1,200 mg total) by mouth every 12 (twelve) hours 14 tablet 0    hyoscyamine (LEVBID) 0 375 mg 12 hr tablet Take 1 tablet (0 375 mg total) by mouth every 12 (twelve) hours as needed for cramping 60 tablet 0    levothyroxine 25 mcg tablet TAKE ONE TABLET BY MOUTH EVERY DAY 90 tablet 3    levothyroxine 50 mcg tablet Take 1 tablet (50 mcg total) by mouth daily for 30 days 90 tablet 3    loratadine (CLARITIN) 10 mg tablet Take 1 tablet (10 mg total) by mouth daily (Patient taking differently: Take 10 mg by mouth daily as needed  )  0    nystatin-triamcinolone (MYCOLOG-II) cream Apply topically 4 (four) times a day 60 g 3    olopatadine (PATANOL) 0 1 % ophthalmic solution Apply 1 drop to eye 2 (two) times a day      PRADAXA 75 MG capsule TAKE ONE CAPSULE BY MOUTH EVERY 12 HOURS 60 capsule 1    Respiratory Therapy Supplies (NEBULIZER/ADULT MASK) KIT by Does not apply route every 4 (four) hours as needed (wheezing) 1 each 0    rosuvastatin (CRESTOR) 5 mg tablet Take 1 tablet (5 mg total) by mouth daily for 90 days 90 tablet 3    triamcinolone (KENALOG) 0 1 % cream Apply topically Twice daily      zolpidem (AMBIEN CR) 12 5 MG CR tablet TAKE ONE TABLET BY MOUTH AT BEDTIME 30 tablet 3     No current facility-administered medications for this visit        Allergies   Allergen Reactions    Penicillin G Anaphylaxis    Atorvastatin      Other reaction(s): Leg Cramps    Cephalexin Headache    Chocolate     Citalopram Abdominal Pain and Headache    Diltiazem     Fosinopril Sodium-Hctz     Methylprednisolone Nausea Only     Tablet generic only    Monopril [Fosinopril]      Reaction Date: [id number];     Penicillins     Pollen Extract     Pravastatin Myalgia     Other reaction(s): Leg Cramps    Spironolactone     Sporanox [Itraconazole]      Reaction Date: [id number];     Strawberry C [Ascorbate]     Caffeine Palpitations     Tachycardia      Immunizations:     Immunization History   Administered Date(s) Administered    Influenza Split High Dose Preservative Free IM 10/11/2011, 09/27/2012, 10/10/2013, 10/09/2014, 10/14/2015, 10/20/2016, 10/04/2017    Influenza, high dose seasonal 0 5 mL 09/24/2018, 09/20/2019    Pneumococcal Conjugate 13-Valent 12/14/2015    Pneumococcal Polysaccharide PPV23 10/11/2011      Health Maintenance: There are no preventive care reminders to display for this patient  Topic Date Due    HEPATITIS B VACCINES (1 of 3 - Risk 3-dose series) [birth year]/1945    DTaP,Tdap,and Td Vaccines (1 - Tdap) 08/26/1947      Medicare Health Risk Assessment:     /72 (BP Location: Right arm, Patient Position: Sitting, Cuff Size: Standard)   Pulse 64   Temp 97 8 °F (36 6 °C)   Ht 5' 7" (1 702 m)   Wt 80 1 kg (176 lb 9 6 oz)   BMI 27 66 kg/m²      Sabrina Souza is here for her Subsequent Wellness visit  Last Medicare Wellness visit information reviewed, patient interviewed, no change since last AWV  Health Risk Assessment:   Patient rates overall health as good  Patient feels that their physical health rating is slightly worse  Eyesight was rated as slightly worse  Hearing was rated as slightly worse  Patient feels that their emotional and mental health rating is same  Pain experienced in the last 7 days has been none  Patient states that she has experienced no weight loss or gain in last 6 months  Fall Risk Screening: In the past year, patient has experienced: no history of falling in past year      Urinary Incontinence Screening:   Patient has not leaked urine accidently in the last six months       Home Safety:  Patient does not have trouble with stairs inside or outside of their home  Patient has working smoke alarms and has working carbon monoxide detector  Home safety hazards include: none  Nutrition:   Current diet is Regular  Medications:   Patient is currently taking over-the-counter supplements  OTC medications include: see medication list  Patient is able to manage medications  Uses Ensure with breakfast    Activities of Daily Living (ADLs)/Instrumental Activities of Daily Living (IADLs):   Walk and transfer into and out of bed and chair?: Yes  Dress and groom yourself?: Yes    Bathe or shower yourself?: Yes    Feed yourself? Yes  Do your laundry/housekeeping?: No  Manage your money, pay your bills and track your expenses?: Yes  Make your own meals?: Yes    Do your own shopping?: No    ADL comments: Lives with daughter who helps    Previous Hospitalizations:   Any hospitalizations or ED visits within the last 12 months?: No      Advance Care Planning:   Living will: Yes    Durable POA for healthcare: Yes    Advanced directive: Yes    Advanced directive counseling given: Yes    Five wishes given: No    End of Life Decisions reviewed with patient: Yes    Provider agrees with end of life decisions: Yes      Comments:  They will provide us with a copy    Cognitive Screening:   Provider or family/friend/caregiver concerned regarding cognition?: No    PREVENTIVE SCREENINGS      Cardiovascular Screening:    General: Screening Not Indicated and History Lipid Disorder      Diabetes Screening:     General: Screening Current      Colorectal Cancer Screening:     General: Screening Not Indicated      Breast Cancer Screening:     General: Screening Not Indicated      Cervical Cancer Screening:    General: Screening Not Indicated      Osteoporosis Screening:    General: Screening Current      Abdominal Aortic Aneurysm (AAA) Screening:        General: Screening Not Indicated      Lung Cancer Screening:     General: Screening Not Indicated      Hepatitis C Screening:    General: Screening Not Indicated    Other Counseling Topics:   Regular weightbearing exercise         Gio Mccormack MD

## 2019-09-30 ENCOUNTER — TELEPHONE (OUTPATIENT)
Dept: FAMILY MEDICINE CLINIC | Facility: CLINIC | Age: 84
End: 2019-09-30

## 2019-09-30 NOTE — TELEPHONE ENCOUNTER
Dr Kushal Mackey saw her 2 times last week    For URI   She finished all her meds Sat    And felt good sat and Sunday    And today she feels awful again bad cough again and a lot of tightness she cant cough up     Please advise

## 2019-10-01 ENCOUNTER — OFFICE VISIT (OUTPATIENT)
Dept: FAMILY MEDICINE CLINIC | Facility: CLINIC | Age: 84
End: 2019-10-01

## 2019-10-01 VITALS
OXYGEN SATURATION: 93 % | HEIGHT: 67 IN | SYSTOLIC BLOOD PRESSURE: 118 MMHG | TEMPERATURE: 99 F | WEIGHT: 175.5 LBS | HEART RATE: 66 BPM | DIASTOLIC BLOOD PRESSURE: 80 MMHG | BODY MASS INDEX: 27.55 KG/M2

## 2019-10-01 DIAGNOSIS — E78.00 HYPERCHOLESTEROLEMIA: ICD-10-CM

## 2019-10-01 DIAGNOSIS — I50.32 CHRONIC DIASTOLIC HEART FAILURE (HCC): ICD-10-CM

## 2019-10-01 DIAGNOSIS — J20.9 ACUTE BRONCHITIS, UNSPECIFIED ORGANISM: Primary | ICD-10-CM

## 2019-10-01 PROCEDURE — 96372 THER/PROPH/DIAG INJ SC/IM: CPT | Performed by: FAMILY MEDICINE

## 2019-10-01 PROCEDURE — 99213 OFFICE O/P EST LOW 20 MIN: CPT | Performed by: FAMILY MEDICINE

## 2019-10-01 RX ORDER — GUAIFENESIN AND CODEINE PHOSPHATE 100; 10 MG/5ML; MG/5ML
5 SOLUTION ORAL 3 TIMES DAILY PRN
Qty: 120 ML | Refills: 0 | Status: SHIPPED | OUTPATIENT
Start: 2019-10-01 | End: 2020-07-28 | Stop reason: ALTCHOICE

## 2019-10-01 RX ORDER — PREDNISONE 10 MG/1
TABLET ORAL
Qty: 26 TABLET | Refills: 0 | Status: SHIPPED | OUTPATIENT
Start: 2019-10-01 | End: 2019-11-07 | Stop reason: ALTCHOICE

## 2019-10-01 RX ORDER — CEFTRIAXONE 1 G/1
1000 INJECTION, POWDER, FOR SOLUTION INTRAMUSCULAR; INTRAVENOUS ONCE
Status: COMPLETED | OUTPATIENT
Start: 2019-10-01 | End: 2019-10-01

## 2019-10-01 RX ORDER — FUROSEMIDE 20 MG/1
40 TABLET ORAL DAILY
Qty: 60 TABLET | Refills: 3 | Status: SHIPPED | OUTPATIENT
Start: 2019-10-01 | End: 2020-08-25

## 2019-10-01 RX ORDER — ROSUVASTATIN CALCIUM 5 MG/1
5 TABLET, COATED ORAL DAILY
Qty: 90 TABLET | Refills: 3 | Status: SHIPPED | OUTPATIENT
Start: 2019-10-01 | End: 2020-03-25 | Stop reason: SDUPTHER

## 2019-10-01 RX ORDER — METHYLPREDNISOLONE ACETATE 80 MG/ML
80 INJECTION, SUSPENSION INTRA-ARTICULAR; INTRALESIONAL; INTRAMUSCULAR; SOFT TISSUE ONCE
Status: COMPLETED | OUTPATIENT
Start: 2019-10-01 | End: 2019-10-01

## 2019-10-01 RX ORDER — DOXYCYCLINE HYCLATE 100 MG/1
100 CAPSULE ORAL EVERY 12 HOURS SCHEDULED
Qty: 20 CAPSULE | Refills: 0 | Status: SHIPPED | OUTPATIENT
Start: 2019-10-01 | End: 2019-10-11

## 2019-10-01 RX ADMIN — CEFTRIAXONE 1000 MG: 1 INJECTION, POWDER, FOR SOLUTION INTRAMUSCULAR; INTRAVENOUS at 10:31

## 2019-10-01 RX ADMIN — METHYLPREDNISOLONE ACETATE 80 MG: 80 INJECTION, SUSPENSION INTRA-ARTICULAR; INTRALESIONAL; INTRAMUSCULAR; SOFT TISSUE at 10:31

## 2019-10-01 NOTE — PROGRESS NOTES
Patient ID: Eliza Bonilla is a 80 y o  female  HPI: 80 y  o female presenting with symptoms of chest congestion and productive cough  She finished a zpak but is still coughing up yellow phlegm  She denies any wheezing or dyspnea with exertion  SUBJECTIVE    Family History   Problem Relation Age of Onset    Heart disease Family     Hypertension Family     Cancer Family      Social History     Socioeconomic History    Marital status:       Spouse name: Not on file    Number of children: Not on file    Years of education: Less than high school    Highest education level: Not on file   Occupational History    Occupation: Retired   Social Needs    Financial resource strain: Not on file    Food insecurity:     Worry: Not on file     Inability: Not on file   [employer] needs:     Medical: Not on file     Non-medical: Not on file   Tobacco Use    Smoking status: Never Smoker    Smokeless tobacco: Never Used   Substance and Sexual Activity    Alcohol use: No    Drug use: No    Sexual activity: Not Currently   Lifestyle    Physical activity:     Days per week: Not on file     Minutes per session: Not on file    Stress: Not on file   Relationships    Social connections:     Talks on phone: Not on file     Gets together: Not on file     Attends Anabaptism service: Not on file     Active member of club or organization: Not on file     Attends meetings of clubs or organizations: Not on file     Relationship status: Not on file    Intimate partner violence:     Fear of current or ex partner: Not on file     Emotionally abused: Not on file     Physically abused: Not on file     Forced sexual activity: Not on file   Other Topics Concern    Not on file   Social History Narrative    Always uses seatbelt    Denied:  History of daily caffeinated cola consumption    Denied:  History of daily coffee consumption    Daily tea consumption    Denied:  History of dental care, regularly    Exercise: Walking    Living will in place    No Temple beliefs    Power of  in existence    Water intake, adequate (per day)     Past Medical History:   Diagnosis Date    A-fib [facility])     Cardiac disease     Hyperlipidemia     Hypertension      Past Surgical History:   Procedure Laterality Date    APPENDECTOMY      CHOLECYSTECTOMY      HERNIA REPAIR      HYSTERECTOMY      NEPHRECTOMY Right      Allergies   Allergen Reactions    Penicillin G Anaphylaxis    Atorvastatin      Other reaction(s): Leg Cramps    Cephalexin Headache    Chocolate     Citalopram Abdominal Pain and Headache    Diltiazem     Fosinopril Sodium-Hctz     Methylprednisolone Nausea Only     Tablet generic only    Monopril [Fosinopril]      Reaction Date: 28Apr2011;     Penicillins     Pollen Extract     Pravastatin Myalgia     Other reaction(s): Leg Cramps    Spironolactone     Sporanox [Itraconazole]      Reaction Date: 28Apr2011;     Strawberry C [Ascorbate]     Caffeine Palpitations     Tachycardia       Current Outpatient Medications:     acetaminophen-codeine (TYLENOL/CODEINE #3) 300-30 MG per tablet, Take 1 tablet by mouth 3 (three) times a day as needed for moderate pain, Disp: 30 tablet, Rfl: 0    albuterol (2 5 mg/3 mL) 0 083 % nebulizer solution, Take 1 vial (2 5 mg total) by nebulization every 6 (six) hours as needed for wheezing or shortness of breath, Disp: 42 mL, Rfl: 0    ALPRAZolam (XANAX) 0 25 mg tablet, TAKE ONE TABLET BY MOUTH FOUR TIMES A DAY, Disp: 120 tablet, Rfl: 3    amiodarone 100 mg tablet, Take 1 tablet (100 mg total) by mouth daily, Disp: 90 tablet, Rfl: 3    Cyanocobalamin (VITAMIN B-12 PO), Take by mouth, Disp: , Rfl:     Emollient (EUCERIN) lotion, Apply topically 2 (two) times a day, Disp: , Rfl:     furosemide (LASIX) 20 mg tablet, TAKE 2 TABLETS BY MOUTH ONCE DAILY, Disp: 60 tablet, Rfl: 3    guaiFENesin (MUCINEX) 600 mg 12 hr tablet, Take 2 tablets (1,200 mg total) by mouth every 12 (twelve) hours, Disp: 14 tablet, Rfl: 0    hyoscyamine (LEVBID) 0 375 mg 12 hr tablet, Take 1 tablet (0 375 mg total) by mouth every 12 (twelve) hours as needed for cramping, Disp: 60 tablet, Rfl: 0    levothyroxine 25 mcg tablet, TAKE ONE TABLET BY MOUTH EVERY DAY, Disp: 90 tablet, Rfl: 3    levothyroxine 50 mcg tablet, Take 1 tablet (50 mcg total) by mouth daily for 30 days, Disp: 90 tablet, Rfl: 3    loratadine (CLARITIN) 10 mg tablet, Take 1 tablet (10 mg total) by mouth daily (Patient taking differently: Take 10 mg by mouth daily as needed  ), Disp: , Rfl: 0    nystatin-triamcinolone (MYCOLOG-II) cream, Apply topically 4 (four) times a day, Disp: 60 g, Rfl: 3    olopatadine (PATANOL) 0 1 % ophthalmic solution, Apply 1 drop to eye 2 (two) times a day, Disp: , Rfl:     PRADAXA 75 MG capsule, TAKE ONE CAPSULE BY MOUTH EVERY 12 HOURS, Disp: 60 capsule, Rfl: 1    Respiratory Therapy Supplies (NEBULIZER/ADULT MASK) KIT, by Does not apply route every 4 (four) hours as needed (wheezing), Disp: 1 each, Rfl: 0    rosuvastatin (CRESTOR) 5 mg tablet, Take 1 tablet (5 mg total) by mouth daily for 90 days, Disp: 90 tablet, Rfl: 3    triamcinolone (KENALOG) 0 1 % cream, Apply topically Twice daily, Disp: , Rfl:     zolpidem (AMBIEN CR) 12 5 MG CR tablet, TAKE ONE TABLET BY MOUTH AT BEDTIME, Disp: 30 tablet, Rfl: 3    doxycycline hyclate (VIBRAMYCIN) 100 mg capsule, Take 1 capsule (100 mg total) by mouth every 12 (twelve) hours for 10 days, Disp: 20 capsule, Rfl: 0    guaifenesin-codeine (GUAIFENESIN AC) 100-10 MG/5ML liquid, Take 5 mL by mouth 3 (three) times a day as needed for cough, Disp: 120 mL, Rfl: 0    predniSONE 10 mg tablet, 3 tabs po bid x2 days, then 2 tabs po bid x2 days, then 1 tab bid x2 days, then 1 daily until done , Disp: 26 tablet, Rfl: 0  No current facility-administered medications for this visit       Review of Systems    Constitutional:     Denies fever, chills, fatigue, weakness ,weight loss, weight gain     ENT: Denies earache, loss of hearing, nosebleed, nasal discharge,nasal congestion, sore throat,hoarseness  Pulmonary: Denies shortness of breath , dyspnea on exertion, orthopnea ,but complains of cough(productive yellow) and pnd  Cardiovascular:  Denies bradycardia , tachycardia ,palpations, lower extremity, edema leg, claudication  Breast:  Denies new or changing breast lumps,  nipple discharge, nipple changes,  Abdomen:  Denies abdominal pain , anorexia ,indigestion, nausea ,vomiting, constipation , diarrhea  Musculoskeletal: Denies myalgias, arthralgias, joint swelling, joint stiffness ,limb pain, limb swelling  Lymph: denies swollen glands  Gu: no dysuria or urinary frequency  Skin: Denies skin rash, skin lesion, skin wound, itching,dry skin  Neuro: Denies headache, numbness, tingling, confusion, loss of consciousness, dizziness ,vertigo  Psychiatric: Denies feelings of depression, suicidal ideation, anxiety, sleep disturbances    OBJECTIVE  /80   Pulse 66   Temp 99 °F (37 2 °C)   Ht 5' 7" (1 702 m)   Wt 79 6 kg (175 lb 8 oz)   SpO2 93%   BMI 27 49 kg/m²   Constitutional:   NAD, well appearing and well nourished      ENT:   Conjunctiva and lids: no injection, edema, or discharge     Pupils and iris: ANKIT bilaterally    External inspection of ears and nose: normal without deformities or discharge  Otoscopic exam: Canals patent without erythema; tm are dull and erythematous bilaterally       Nasal mucosa, septum and turbinates: Turbinae injection with discharge   Oropharynx:  Moist mucosa, normal tongue and tonsils without lesions  Erythema and injection  of post pharynx with pnd     Pulmonary:Respiratory effort normal rate and rhythm, no increased work of breathing   Auscultation of lungs:  Scattered rhonchi bilaterally      Cardiovascular: regular rate and rhythm, S1 and S2, no murmur, no edema and/or varicosities of LE      Abdomen: Soft and non-distended    Positive bowel sounds    No heptomegaly or splenomegaly    Gu: no suprapubic tenderness, no CVA tenderness, or urethral discharge  Lymphatic: Anterior cervical lymphadenopathy     Muscskeletal:  Gait and station: Normal gait     Digits and nails normal without clubbing or cyanosis      Inspection/palpation of joints, bones, and muscles:  No joint tenderness, swelling, full active and passive range of motion  Skin: Normal skin turgor and no rashes      Neuro:    Normal reflexes       Psych:   alert and oriented to person, place and time     normal mood and affect       Assessment/Plan:Diagnoses and all orders for this visit:    Acute bronchitis, unspecified organism  -     doxycycline hyclate (VIBRAMYCIN) 100 mg capsule; Take 1 capsule (100 mg total) by mouth every 12 (twelve) hours for 10 days  -     predniSONE 10 mg tablet; 3 tabs po bid x2 days, then 2 tabs po bid x2 days, then 1 tab bid x2 days, then 1 daily until done   -     guaifenesin-codeine (GUAIFENESIN AC) 100-10 MG/5ML liquid; Take 5 mL by mouth 3 (three) times a day as needed for cough  -     methylPREDNISolone acetate (DEPO-MEDROL) injection 80 mg  -     cefTRIAXone (ROCEPHIN) injection 1,000 mg        Reviewed with patient plan to treat with above plan      Patient instructed to call in 72 hours if not feeling better or if symptoms worsen

## 2019-10-01 NOTE — TELEPHONE ENCOUNTER
Pt's medication ( Synvisc) is here please call Pt and schedule her for the 3 injections ,or just schedule the 1st one and Pt can then schedule the other 2 when leaving

## 2019-10-06 DIAGNOSIS — G47.00 INSOMNIA, UNSPECIFIED TYPE: ICD-10-CM

## 2019-10-06 DIAGNOSIS — F41.9 ANXIETY: ICD-10-CM

## 2019-10-07 RX ORDER — ZOLPIDEM TARTRATE 12.5 MG/1
TABLET, FILM COATED, EXTENDED RELEASE ORAL
Qty: 30 TABLET | Refills: 3 | Status: SHIPPED | OUTPATIENT
Start: 2019-10-07 | End: 2020-02-03

## 2019-10-07 RX ORDER — ALPRAZOLAM 0.25 MG/1
TABLET ORAL
Qty: 120 TABLET | Refills: 3 | Status: SHIPPED | OUTPATIENT
Start: 2019-10-07 | End: 2020-02-13 | Stop reason: SDUPTHER

## 2019-10-08 ENCOUNTER — TELEPHONE (OUTPATIENT)
Dept: FAMILY MEDICINE CLINIC | Facility: CLINIC | Age: 84
End: 2019-10-08

## 2019-10-09 NOTE — TELEPHONE ENCOUNTER
Contact patient's daughter to let her know we are still in the process of working with insurance to get medication approved  Please suggest [facility] in the meantime

## 2019-10-09 NOTE — TELEPHONE ENCOUNTER
Spoke with daughter and she is greatful for the help   I said I will let her know when we hear back from the insurance company and that the meds are cheaper at the pharmacy with the good rx card

## 2019-10-09 NOTE — TELEPHONE ENCOUNTER
Spoke to patient's daughter and she is not happy  She said she spoke to [employer] and told them they have 72 hours to overturn the denial or she is contacted the board of health  I explained we processed the PA twice with the info they requested and both times it was denied  She said they are not trying anything else because this happens all the time and everytime they have to change or alter her meds she ends up in the hospital sick  I dont know what else to do if it needs a peer to peer or a letter from you   Please advise

## 2019-10-14 ENCOUNTER — TELEPHONE (OUTPATIENT)
Dept: FAMILY MEDICINE CLINIC | Facility: CLINIC | Age: 84
End: 2019-10-14

## 2019-10-31 ENCOUNTER — TELEPHONE (OUTPATIENT)
Dept: FAMILY MEDICINE CLINIC | Facility: CLINIC | Age: 84
End: 2019-10-31

## 2019-10-31 NOTE — TELEPHONE ENCOUNTER
Her legs are feeling better since her last appoint   She says she has appoint on 11/07/19 for shots  Should she cancel her shots or still get them?    Please advise

## 2019-11-30 DIAGNOSIS — I48.91 ATRIAL FIBRILLATION, UNSPECIFIED TYPE (HCC): ICD-10-CM

## 2019-11-30 RX ORDER — AMIODARONE HYDROCHLORIDE 100 MG/1
TABLET ORAL
Qty: 90 TABLET | Refills: 1 | Status: SHIPPED | OUTPATIENT
Start: 2019-11-30 | End: 2020-07-24 | Stop reason: SDUPTHER

## 2019-12-02 ENCOUNTER — APPOINTMENT (EMERGENCY)
Dept: CT IMAGING | Facility: HOSPITAL | Age: 84
End: 2019-12-02
Payer: COMMERCIAL

## 2019-12-02 ENCOUNTER — TELEPHONE (OUTPATIENT)
Dept: FAMILY MEDICINE CLINIC | Facility: CLINIC | Age: 84
End: 2019-12-02

## 2019-12-02 ENCOUNTER — HOSPITAL ENCOUNTER (EMERGENCY)
Facility: HOSPITAL | Age: 84
Discharge: HOME | End: 2019-12-02
Attending: EMERGENCY MEDICINE | Admitting: EMERGENCY MEDICINE
Payer: COMMERCIAL

## 2019-12-02 VITALS
SYSTOLIC BLOOD PRESSURE: 187 MMHG | HEIGHT: 67 IN | OXYGEN SATURATION: 94 % | DIASTOLIC BLOOD PRESSURE: 87 MMHG | WEIGHT: 172.18 LBS | HEART RATE: 52 BPM | TEMPERATURE: 98.5 F | BODY MASS INDEX: 27.02 KG/M2 | RESPIRATION RATE: 16 BRPM

## 2019-12-02 DIAGNOSIS — I10 HTN (HYPERTENSION): Primary | ICD-10-CM

## 2019-12-02 DIAGNOSIS — M25.562 CHRONIC PAIN OF LEFT KNEE: ICD-10-CM

## 2019-12-02 DIAGNOSIS — R10.9 ABDOMINAL PAIN: ICD-10-CM

## 2019-12-02 DIAGNOSIS — D17.71 ANGIOMYOLIPOMA OF LEFT KIDNEY: ICD-10-CM

## 2019-12-02 DIAGNOSIS — G89.29 CHRONIC PAIN OF LEFT KNEE: ICD-10-CM

## 2019-12-02 LAB
ALBUMIN SERPL BCP-MCNC: 3.9 G/DL (ref 3.5–5)
ALP SERPL-CCNC: 79 U/L (ref 46–116)
ALT SERPL W P-5'-P-CCNC: 27 U/L (ref 12–78)
ANION GAP SERPL CALCULATED.3IONS-SCNC: 8 MMOL/L (ref 4–13)
AST SERPL W P-5'-P-CCNC: 25 U/L (ref 5–45)
BASOPHILS # BLD AUTO: 0.04 THOUSANDS/ΜL (ref 0–0.1)
BASOPHILS NFR BLD AUTO: 1 % (ref 0–1)
BILIRUB SERPL-MCNC: 0.53 MG/DL (ref 0.2–1)
BILIRUB UR QL STRIP: NEGATIVE
BUN SERPL-MCNC: 21 MG/DL (ref 5–25)
CALCIUM SERPL-MCNC: 9 MG/DL (ref 8.3–10.1)
CHLORIDE SERPL-SCNC: 101 MMOL/L (ref 100–108)
CLARITY UR: CLEAR
CO2 SERPL-SCNC: 30 MMOL/L (ref 21–32)
COLOR UR: YELLOW
CREAT SERPL-MCNC: 1.93 MG/DL (ref 0.6–1.3)
EOSINOPHIL # BLD AUTO: 0.27 THOUSAND/ΜL (ref 0–0.61)
EOSINOPHIL NFR BLD AUTO: 3 % (ref 0–6)
ERYTHROCYTE [DISTWIDTH] IN BLOOD BY AUTOMATED COUNT: 13.4 % (ref 11.6–15.1)
GFR SERPL CREATININE-BSD FRML MDRD: 22 ML/MIN/1.73SQ M
GLUCOSE SERPL-MCNC: 112 MG/DL (ref 65–140)
GLUCOSE UR STRIP-MCNC: NEGATIVE MG/DL
HCT VFR BLD AUTO: 46.5 % (ref 34.8–46.1)
HGB BLD-MCNC: 15.8 G/DL (ref 11.5–15.4)
HGB UR QL STRIP.AUTO: NEGATIVE
IMM GRANULOCYTES # BLD AUTO: 0.03 THOUSAND/UL (ref 0–0.2)
IMM GRANULOCYTES NFR BLD AUTO: 0 % (ref 0–2)
KETONES UR STRIP-MCNC: NEGATIVE MG/DL
LEUKOCYTE ESTERASE UR QL STRIP: NEGATIVE
LIPASE SERPL-CCNC: 122 U/L (ref 73–393)
LYMPHOCYTES # BLD AUTO: 1.32 THOUSANDS/ΜL (ref 0.6–4.47)
LYMPHOCYTES NFR BLD AUTO: 16 % (ref 14–44)
MCH RBC QN AUTO: 33.8 PG (ref 26.8–34.3)
MCHC RBC AUTO-ENTMCNC: 34 G/DL (ref 31.4–37.4)
MCV RBC AUTO: 99 FL (ref 82–98)
MONOCYTES # BLD AUTO: 0.69 THOUSAND/ΜL (ref 0.17–1.22)
MONOCYTES NFR BLD AUTO: 8 % (ref 4–12)
NEUTROPHILS # BLD AUTO: 6.16 THOUSANDS/ΜL (ref 1.85–7.62)
NEUTS SEG NFR BLD AUTO: 72 % (ref 43–75)
NITRITE UR QL STRIP: NEGATIVE
NRBC BLD AUTO-RTO: 0 /100 WBCS
PH UR STRIP.AUTO: 5.5 [PH] (ref 4.5–8)
PLATELET # BLD AUTO: 201 THOUSANDS/UL (ref 149–390)
PMV BLD AUTO: 10.6 FL (ref 8.9–12.7)
POTASSIUM SERPL-SCNC: 4 MMOL/L (ref 3.5–5.3)
PROT SERPL-MCNC: 7.3 G/DL (ref 6.4–8.2)
PROT UR STRIP-MCNC: NEGATIVE MG/DL
RBC # BLD AUTO: 4.68 MILLION/UL (ref 3.81–5.12)
SODIUM SERPL-SCNC: 139 MMOL/L (ref 136–145)
SP GR UR STRIP.AUTO: 1.01 (ref 1–1.03)
UROBILINOGEN UR QL STRIP.AUTO: 0.2 E.U./DL
WBC # BLD AUTO: 8.51 THOUSAND/UL (ref 4.31–10.16)

## 2019-12-02 PROCEDURE — 99284 EMERGENCY DEPT VISIT MOD MDM: CPT

## 2019-12-02 PROCEDURE — 80053 COMPREHEN METABOLIC PANEL: CPT | Performed by: EMERGENCY MEDICINE

## 2019-12-02 PROCEDURE — 36415 COLL VENOUS BLD VENIPUNCTURE: CPT | Performed by: EMERGENCY MEDICINE

## 2019-12-02 PROCEDURE — 85025 COMPLETE CBC W/AUTO DIFF WBC: CPT | Performed by: EMERGENCY MEDICINE

## 2019-12-02 PROCEDURE — 74176 CT ABD & PELVIS W/O CONTRAST: CPT

## 2019-12-02 PROCEDURE — 99284 EMERGENCY DEPT VISIT MOD MDM: CPT | Performed by: EMERGENCY MEDICINE

## 2019-12-02 PROCEDURE — 81003 URINALYSIS AUTO W/O SCOPE: CPT

## 2019-12-02 PROCEDURE — 83690 ASSAY OF LIPASE: CPT | Performed by: EMERGENCY MEDICINE

## 2019-12-02 RX ORDER — ACETAMINOPHEN AND CODEINE PHOSPHATE 300; 30 MG/1; MG/1
1 TABLET ORAL 3 TIMES DAILY PRN
Qty: 60 TABLET | Refills: 0 | Status: SHIPPED | OUTPATIENT
Start: 2019-12-02 | End: 2020-03-25 | Stop reason: ALTCHOICE

## 2019-12-02 RX ORDER — ACETAMINOPHEN 325 MG/1
650 TABLET ORAL ONCE
Status: COMPLETED | OUTPATIENT
Start: 2019-12-02 | End: 2019-12-02

## 2019-12-02 RX ORDER — DABIGATRAN ETEXILATE MESYLATE 75 MG/1
CAPSULE ORAL
Qty: 60 CAPSULE | Refills: 1 | Status: SHIPPED | OUTPATIENT
Start: 2019-12-02 | End: 2020-01-31

## 2019-12-02 RX ADMIN — ACETAMINOPHEN 650 MG: 325 TABLET, FILM COATED ORAL at 21:28

## 2019-12-02 NOTE — TELEPHONE ENCOUNTER
Please call daughter , Nicki Paez  I can prescribe tylenol with codiene or vicoden in low dose and we can start at 1/2 tab and give up to a full one tid-- what would she prefer    I gave her mom tylenol #3 in Sept

## 2019-12-02 NOTE — TELEPHONE ENCOUNTER
Ongoing Back pain is not better at all   The OTC meds you told them to try are not helping   [facility]

## 2019-12-03 NOTE — ED NOTES
PT awake and alert, no distress noted  No other questions upon d/c       April Diamond Pryor RN  12/02/19 2401

## 2019-12-03 NOTE — TELEPHONE ENCOUNTER
She didn't  med you ordered yesterday, she will  today,  Went to the ER last night,  When do you want to see?

## 2019-12-04 NOTE — TELEPHONE ENCOUNTER
Dtr is aware,   Here BP last night was 168/88, should she be concerned, it was high in the hospital,  Pl adv

## 2019-12-05 ENCOUNTER — CLINICAL SUPPORT (OUTPATIENT)
Dept: FAMILY MEDICINE CLINIC | Facility: CLINIC | Age: 84
End: 2019-12-05
Payer: COMMERCIAL

## 2019-12-05 VITALS
HEIGHT: 67 IN | TEMPERATURE: 98.2 F | BODY MASS INDEX: 27 KG/M2 | HEART RATE: 60 BPM | DIASTOLIC BLOOD PRESSURE: 82 MMHG | WEIGHT: 172 LBS | SYSTOLIC BLOOD PRESSURE: 132 MMHG

## 2019-12-05 DIAGNOSIS — I10 ESSENTIAL HYPERTENSION: Primary | ICD-10-CM

## 2019-12-05 PROCEDURE — 99211 OFF/OP EST MAY X REQ PHY/QHP: CPT | Performed by: FAMILY MEDICINE

## 2019-12-05 NOTE — PROGRESS NOTES
Pt is here today for blood pressure check, she was in the ER on 12/2/19 with hypertension  Daughter had her come in today to double check reading with her home monitor to see if it came down  Reading in office was 132/82  Patient denies any side effects and feels good today  She was experiencing pain and stress at time of high reading in hospital     Per Dr Packer, bp is good and she is to continue same medication  Pt aware to call with any questions or concerns

## 2020-01-30 DIAGNOSIS — I48.91 ATRIAL FIBRILLATION, UNSPECIFIED TYPE (HCC): ICD-10-CM

## 2020-01-31 RX ORDER — DABIGATRAN ETEXILATE MESYLATE 75 MG/1
CAPSULE ORAL
Qty: 60 CAPSULE | Refills: 0 | Status: SHIPPED | OUTPATIENT
Start: 2020-01-31 | End: 2020-03-31

## 2020-02-01 DIAGNOSIS — G47.00 INSOMNIA, UNSPECIFIED TYPE: ICD-10-CM

## 2020-02-01 DIAGNOSIS — F41.9 ANXIETY: ICD-10-CM

## 2020-02-03 RX ORDER — ZOLPIDEM TARTRATE 12.5 MG/1
TABLET, FILM COATED, EXTENDED RELEASE ORAL
Qty: 30 TABLET | Refills: 0 | Status: SHIPPED | OUTPATIENT
Start: 2020-02-03 | End: 2020-03-03

## 2020-02-03 RX ORDER — ALPRAZOLAM 0.25 MG/1
TABLET ORAL
Qty: 120 TABLET | Refills: 0 | Status: SHIPPED | OUTPATIENT
Start: 2020-02-03 | End: 2020-04-13

## 2020-02-13 ENCOUNTER — OFFICE VISIT (OUTPATIENT)
Dept: FAMILY MEDICINE CLINIC | Facility: CLINIC | Age: 85
End: 2020-02-13
Payer: COMMERCIAL

## 2020-02-13 VITALS
SYSTOLIC BLOOD PRESSURE: 122 MMHG | HEART RATE: 60 BPM | HEIGHT: 67 IN | DIASTOLIC BLOOD PRESSURE: 82 MMHG | WEIGHT: 176 LBS | TEMPERATURE: 98.6 F | BODY MASS INDEX: 27.62 KG/M2

## 2020-02-13 DIAGNOSIS — N18.4 CKD (CHRONIC KIDNEY DISEASE), STAGE IV (HCC): ICD-10-CM

## 2020-02-13 DIAGNOSIS — M54.50 LUMBAR BACK PAIN: Primary | ICD-10-CM

## 2020-02-13 PROCEDURE — 3074F SYST BP LT 130 MM HG: CPT | Performed by: FAMILY MEDICINE

## 2020-02-13 PROCEDURE — 1160F RVW MEDS BY RX/DR IN RCRD: CPT | Performed by: FAMILY MEDICINE

## 2020-02-13 PROCEDURE — 4040F PNEUMOC VAC/ADMIN/RCVD: CPT | Performed by: FAMILY MEDICINE

## 2020-02-13 PROCEDURE — 1036F TOBACCO NON-USER: CPT | Performed by: FAMILY MEDICINE

## 2020-02-13 PROCEDURE — 3079F DIAST BP 80-89 MM HG: CPT | Performed by: FAMILY MEDICINE

## 2020-02-13 PROCEDURE — 3008F BODY MASS INDEX DOCD: CPT | Performed by: FAMILY MEDICINE

## 2020-02-13 PROCEDURE — 99214 OFFICE O/P EST MOD 30 MIN: CPT | Performed by: FAMILY MEDICINE

## 2020-02-13 RX ORDER — HYDROCODONE BITARTRATE AND ACETAMINOPHEN 5; 325 MG/1; MG/1
1 TABLET ORAL 3 TIMES DAILY
Qty: 90 TABLET | Refills: 0 | Status: SHIPPED | OUTPATIENT
Start: 2020-02-13 | End: 2021-03-25 | Stop reason: HOSPADM

## 2020-02-13 NOTE — PROGRESS NOTES
Melab JOYNER,    Please assist with this DME order.     Thank you.    Patient ID: Jeannie Quispe is a 80 y o  female  HPI: 80 y  o female presenting with lumbar back pain so severe that patient lays in bed after only being up for 1-2 hrs  She is on Pradaxa so she cannot take NSAIDs  I discussed a plan for pain control with patient , keeping in mind her age  She failed tramadol due to it causing nausea and dizziness  Pain starts in lumbar spine and radiaes down both legs  She denies any paresthesia of legs or weakness  SUBJECTIVE    Family History   Problem Relation Age of Onset    Heart disease Family     Hypertension Family     Cancer Family      Social History     Socioeconomic History    Marital status:       Spouse name: Not on file    Number of children: Not on file    Years of education: Less than high school    Highest education level: Not on file   Occupational History    Occupation: Retired   Social Needs    Financial resource strain: Not on file    Food insecurity:     Worry: Not on file     Inability: Not on file   [employer] needs:     Medical: Not on file     Non-medical: Not on file   Tobacco Use    Smoking status: Never Smoker    Smokeless tobacco: Never Used   Substance and Sexual Activity    Alcohol use: No    Drug use: Never    Sexual activity: Not Currently     Partners: Male     Birth control/protection: Post-menopausal   Lifestyle    Physical activity:     Days per week: Not on file     Minutes per session: Not on file    Stress: Not on file   Relationships    Social connections:     Talks on phone: Not on file     Gets together: Not on file     Attends Sabianism service: Not on file     Active member of club or organization: Not on file     Attends meetings of clubs or organizations: Not on file     Relationship status: Not on file    Intimate partner violence:     Fear of current or ex partner: Not on file     Emotionally abused: Not on file     Physically abused: Not on file     Forced sexual activity: Not on file   Other Topics Concern    Not on file   Social History Narrative    Always uses seatbelt    Denied:  History of daily caffeinated cola consumption    Denied:  History of daily coffee consumption    Daily tea consumption    Denied:  History of dental care, regularly    Exercise:  Walking    Living will in place    No [employer] beliefs    Power of  in existence    Water intake, adequate (per day)     Past Medical History:   Diagnosis Date    A-fib [facility])     Cardiac disease     Hyperlipidemia     Hypertension      Past Surgical History:   Procedure Laterality Date    APPENDECTOMY      CHOLECYSTECTOMY      HERNIA REPAIR      HYSTERECTOMY      NEPHRECTOMY Right      Allergies   Allergen Reactions    Penicillin G Anaphylaxis    Atorvastatin      Other reaction(s): Leg Cramps    Cephalexin Headache    Chocolate     Citalopram Abdominal Pain and Headache    Diltiazem     Fosinopril Sodium-Hctz     Methylprednisolone Nausea Only     Tablet generic only    Monopril [Fosinopril]      Reaction Date: 28Apr2011;     Penicillins     Pollen Extract     Pravastatin Myalgia     Other reaction(s): Leg Cramps    Spironolactone     Sporanox [Itraconazole]      Reaction Date: 28Apr2011;     Strawberry C [Ascorbate]     Caffeine Palpitations     Tachycardia       Current Outpatient Medications:     acetaminophen-codeine (TYLENOL/CODEINE #3) 300-30 MG per tablet, Take 1 tablet by mouth 3 (three) times a day as needed for moderate pain, Disp: 60 tablet, Rfl: 0    albuterol (2 5 mg/3 mL) 0 083 % nebulizer solution, Take 1 vial (2 5 mg total) by nebulization every 6 (six) hours as needed for wheezing or shortness of breath, Disp: 42 mL, Rfl: 0    ALPRAZolam (XANAX) 0 25 mg tablet, TAKE ONE TABLET BY MOUTH FOUR TIMES A DAY, Disp: 120 tablet, Rfl: 0    amiodarone 100 mg tablet, TAKE ONE TABLET BY MOUTH EVERY DAY, Disp: 90 tablet, Rfl: 1    Cyanocobalamin (VITAMIN B-12 PO), Take by mouth, Disp: , Rfl:    Emollient (EUCERIN) lotion, Apply topically 2 (two) times a day, Disp: , Rfl:     furosemide (LASIX) 20 mg tablet, Take 2 tablets (40 mg total) by mouth daily, Disp: 60 tablet, Rfl: 3    guaiFENesin (MUCINEX) 600 mg 12 hr tablet, Take 2 tablets (1,200 mg total) by mouth every 12 (twelve) hours, Disp: 14 tablet, Rfl: 0    guaifenesin-codeine (GUAIFENESIN AC) 100-10 MG/5ML liquid, Take 5 mL by mouth 3 (three) times a day as needed for cough, Disp: 120 mL, Rfl: 0    hyoscyamine (LEVBID) 0 375 mg 12 hr tablet, Take 1 tablet (0 375 mg total) by mouth every 12 (twelve) hours as needed for cramping, Disp: 60 tablet, Rfl: 0    levothyroxine 25 mcg tablet, TAKE ONE TABLET BY MOUTH EVERY DAY, Disp: 90 tablet, Rfl: 3    levothyroxine 50 mcg tablet, Take 1 tablet (50 mcg total) by mouth daily for 30 days, Disp: 90 tablet, Rfl: 3    loratadine (CLARITIN) 10 mg tablet, Take 1 tablet (10 mg total) by mouth daily (Patient taking differently: Take 10 mg by mouth daily as needed  ), Disp: , Rfl: 0    nystatin-triamcinolone (MYCOLOG-II) cream, Apply topically 4 (four) times a day, Disp: 60 g, Rfl: 3    olopatadine (PATANOL) 0 1 % ophthalmic solution, Apply 1 drop to eye 2 (two) times a day, Disp: , Rfl:     PRADAXA 75 MG capsule, TAKE ONE CAPSULE BY MOUTH EVERY 12 HOURS, Disp: 60 capsule, Rfl: 0    Respiratory Therapy Supplies (NEBULIZER/ADULT MASK) KIT, by Does not apply route every 4 (four) hours as needed (wheezing), Disp: 1 each, Rfl: 0    rosuvastatin (CRESTOR) 5 mg tablet, Take 1 tablet (5 mg total) by mouth daily, Disp: 90 tablet, Rfl: 3    triamcinolone (KENALOG) 0 1 % cream, Apply topically Twice daily, Disp: , Rfl:     zolpidem (AMBIEN CR) 12 5 MG CR tablet, TAKE ONE TABLET BY MOUTH AT BEDTIME, Disp: 30 tablet, Rfl: 0    HYDROcodone-acetaminophen (NORCO) 5-325 mg per tablet, Take 1 tablet by mouth 3 (three) times a dayMax Daily Amount: 3 tablets, Disp: 90 tablet, Rfl: 0    Review of Systems  Constitutional: Denies fever, chills ,fatigue ,weakness ,weight loss, weight gain     ENT: Denies earache ,loss of hearing ,nosebleed, nasal discharge,nasal congestion ,sore throat ,hoarseness  Pulmonary: Denies shortness of breath ,cough  ,dyspnea on exertion, orthopnea  ,PND   Cardiovascular:  Denies bradycardia , tachycardia  ,palpations, lower extremity edema leg, claudication  Breast:  Denies new or changing breast lumps ,nipple discharge ,nipple changes  Abdomen:  Denies abdominal pain , anorexia , indigestion, nausea, vomiting, constipation, diarrhea  Musculoskeletal: Denies myalgias,  joint swelling, joint stiffness ,+ lumbar back pain with radiation down both legs , but no paresthesai or weaikness  Skin: Denies skin rash, skin lesion, skin wound, itching, dry skin  Neuro: Denies headache, numbness, tingling, confusion, loss of consciousness, dizziness, vertigo  Psychiatric: Denies feelings of depression, suicidal ideation, anxiety, sleep disturbances    OBJECTIVE  /82   Pulse 60   Temp 98 6 °F (37 °C)   Ht 5' 7" (1 702 m)   Wt 79 8 kg (176 lb)   BMI 27 57 kg/m²   Constitutional:   NAD, well appearing and well nourished      ENT:   Conjunctiva and lids: no injection, edema, or discharge     Pupils and iris: ANKIT bilaterally    External inspection of ears and nose: normal without deformities or discharge  Otoscopic exam: Canals patent without erythema  Nasal mucosa, septum and turbinates: Normal or edema or discharge         Oropharynx:  Moist mucosa, normal tongue and tonsils without lesions  No erythema        Pulmonary:Respiratory effort normal rate and rhythm, no increased work of breathing   Auscultation of lungs:  Clear bilaterally with no adventitious breath sounds       Cardiovascular: regular rate and rhythm, S1 and S2, no murmur, no edema and/or varicosities of LE      Abdomen: Soft and non-distended     Positive bowel sounds      No heptomegaly or splenomegaly      Gu: no suprapubic tenderness or CVA tenderness, no urethral discharge  Lymphatic:  No anterior or posterior cervical lymphadenopathy         Musculoskeletal:  Gait and station: Normal gait      Digits and nails normal without clubbing or cyanosis       Inspection/palpation of joints, bones, and muscles:  + increased lumbar paravertebral muscle tension and tenderness on palpation; unable to assess rom due to pain   Skin: Normal skin turgor and no rashes      Neuro:    Normal  CN 2-12     Normal reflexes     Normal sensation    Psych:   alert and oriented to person, place and time     normal mood and affect       Assessment/Plan:Diagnoses and all orders for this visit:    Lumbar back pain  Comments: If pain persists, discussed using a low dose 1-2 mg of prednisone chronically due to other medication limitations and organ issues  Orders:  -     HYDROcodone-acetaminophen (NORCO) 5-325 mg per tablet; Take 1 tablet by mouth 3 (three) times a dayMax Daily Amount: 3 tablets    CKD (chronic kidney disease), stage IV ([facility] [address] )  Comments: Will be cognisent of patients history of one kidney adn renal insufficiency  Reviewed with patient plan to treat with above plan      Patient instructed to call in 72 hours if not feeling better or if symptoms worsen

## 2020-02-17 ENCOUNTER — TELEPHONE (OUTPATIENT)
Dept: FAMILY MEDICINE CLINIC | Facility: CLINIC | Age: 85
End: 2020-02-17

## 2020-02-17 NOTE — TELEPHONE ENCOUNTER
She was here on 2/13, she is suppose to call and let you know how the new medication is working,  She is not sure yet how it is working, she will call on Wednesday

## 2020-02-19 ENCOUNTER — TELEPHONE (OUTPATIENT)
Dept: FAMILY MEDICINE CLINIC | Facility: CLINIC | Age: 85
End: 2020-02-19

## 2020-02-19 DIAGNOSIS — M54.50 LUMBAR BACK PAIN: Primary | ICD-10-CM

## 2020-02-19 RX ORDER — PREDNISONE 10 MG/1
TABLET ORAL
Qty: 26 TABLET | Refills: 0 | Status: SHIPPED | OUTPATIENT
Start: 2020-02-19 | End: 2020-07-28 | Stop reason: ALTCHOICE

## 2020-02-19 NOTE — TELEPHONE ENCOUNTER
Yes, I will call in a taper  When she gets down to last 2 days, call and I will give lowest dose possible to  Keep symptoms in check

## 2020-02-19 NOTE — TELEPHONE ENCOUNTER
Patients daughter called new medication Hydrocodone  is not helping her  Makes her legs feel heavy  It did help with the pain but she is too dizzy on it  She is asking if predinose could be tried?      Michel Posey to check with pharmacy

## 2020-02-20 ENCOUNTER — OFFICE VISIT (OUTPATIENT)
Dept: CARDIOLOGY CLINIC | Facility: CLINIC | Age: 85
End: 2020-02-20
Payer: COMMERCIAL

## 2020-02-20 VITALS
HEART RATE: 51 BPM | WEIGHT: 176 LBS | HEIGHT: 67 IN | SYSTOLIC BLOOD PRESSURE: 124 MMHG | OXYGEN SATURATION: 92 % | DIASTOLIC BLOOD PRESSURE: 78 MMHG | BODY MASS INDEX: 27.62 KG/M2

## 2020-02-20 DIAGNOSIS — I48.0 PAROXYSMAL ATRIAL FIBRILLATION (HCC): Primary | ICD-10-CM

## 2020-02-20 DIAGNOSIS — E78.2 MIXED HYPERLIPIDEMIA: ICD-10-CM

## 2020-02-20 DIAGNOSIS — I35.0 MILD AORTIC STENOSIS: ICD-10-CM

## 2020-02-20 DIAGNOSIS — R00.1 SINUS BRADYCARDIA: ICD-10-CM

## 2020-02-20 DIAGNOSIS — I10 ESSENTIAL HYPERTENSION: ICD-10-CM

## 2020-02-20 DIAGNOSIS — I50.32 CHRONIC DIASTOLIC CONGESTIVE HEART FAILURE (HCC): ICD-10-CM

## 2020-02-20 PROCEDURE — 1036F TOBACCO NON-USER: CPT | Performed by: INTERNAL MEDICINE

## 2020-02-20 PROCEDURE — 3074F SYST BP LT 130 MM HG: CPT | Performed by: INTERNAL MEDICINE

## 2020-02-20 PROCEDURE — 3008F BODY MASS INDEX DOCD: CPT | Performed by: INTERNAL MEDICINE

## 2020-02-20 PROCEDURE — 3078F DIAST BP <80 MM HG: CPT | Performed by: INTERNAL MEDICINE

## 2020-02-20 PROCEDURE — 93000 ELECTROCARDIOGRAM COMPLETE: CPT | Performed by: INTERNAL MEDICINE

## 2020-02-20 PROCEDURE — 1160F RVW MEDS BY RX/DR IN RCRD: CPT | Performed by: INTERNAL MEDICINE

## 2020-02-20 PROCEDURE — 4040F PNEUMOC VAC/ADMIN/RCVD: CPT | Performed by: INTERNAL MEDICINE

## 2020-02-20 PROCEDURE — 99214 OFFICE O/P EST MOD 30 MIN: CPT | Performed by: INTERNAL MEDICINE

## 2020-02-20 NOTE — PROGRESS NOTES
Cardiology Follow Up    Ashely Recinos  8/26/1926  [id number]  [facility] [address] [address]    1  Paroxysmal atrial fibrillation (HCC)  POCT ECG   2  Chronic diastolic congestive heart failure ([address] )     3  Essential hypertension     4  Sinus bradycardia     5  Mild aortic stenosis     6  Mixed hyperlipidemia         Interval History:     Mrs Cody Parkinson comes in for follow-up given her paroxysmal atrial fibrillation and congestive heart failure  I saw her during hospitalization which she presented in rapid atrial fibrillation  She had an echocardiogram performed that demonstrated an ejection fraction of 35%  There was hypokinesis seen in the anterior and anterior septal walls, with diffuse hypokinesis otherwise  We had her undergo a ALLI guided cardioversion  This successfully converted her back to sinus rhythm  Given her ejection fraction, and moderate left atrial enlargement we felt antiarrhythmic therapy was needed  We started IV amiodarone, and she was discharged on this as well  This did help her symptoms significantly  He also help control her volume status better  She was discharged on low-dose Lasix  She has also been on Pradaxa 75 mg twice daily  In follow-up she was having some low heart rates  She was for the most part asymptomatic from this standpoint, other than some fatigue  At initial follow-up visit with our CHF clinic, her metoprolol was discontinued and her amiodarone was decreased to 100 mg daily  She was having some increasing lower extremity edema, and saw our CHF clinic again  There her amlodipine was decreased to 2 5 mg daily, and has since been stopped  She has also been on daily Lasix  Since I last saw her we repeated an echocardiogram that showed an improvement of her ejection fraction back to 65 percent    Her drop in ejection fraction and cardiomyopathy appeared to be Attends Pentec[employer]ism service: Not on file     Active member of club or organization: Not on file     Attends meetings of clubs or organizations: Not on file     Relationship status: Not on file    Intimate partner violence:     Fear of current or ex partner: Not on file     Emotionally abused: Not on file     Physically abused: Not on file     Forced sexual activity: Not on file   Other Topics Concern    Not on file   Social History Narrative    Always uses seatbelt    Denied:  History of daily caffeinated cola consumption    Denied:  History of daily coffee consumption    Daily tea consumption    Denied:  History of dental care, regularly    Exercise:  Walking    Living will in place    No Pentecostalism beliefs    Power of  in existence    Water intake, adequate (per day)      Family History   Problem Relation Age of Onset    Heart disease Family     Hypertension Family     Cancer Family      Past Surgical History:   Procedure Laterality Date    APPENDECTOMY      CHOLECYSTECTOMY      HERNIA REPAIR      HYSTERECTOMY      NEPHRECTOMY Right        Current Outpatient Medications:     ALPRAZolam (XANAX) 0 25 mg tablet, TAKE ONE TABLET BY MOUTH FOUR TIMES A DAY, Disp: 120 tablet, Rfl: 0    amiodarone 100 mg tablet, TAKE ONE TABLET BY MOUTH EVERY DAY, Disp: 90 tablet, Rfl: 1    Cyanocobalamin (VITAMIN B-12 PO), Take by mouth, Disp: , Rfl:     Emollient (EUCERIN) lotion, Apply topically 2 (two) times a day, Disp: , Rfl:     furosemide (LASIX) 20 mg tablet, Take 2 tablets (40 mg total) by mouth daily, Disp: 60 tablet, Rfl: 3    guaifenesin-codeine (GUAIFENESIN AC) 100-10 MG/5ML liquid, Take 5 mL by mouth 3 (three) times a day as needed for cough, Disp: 120 mL, Rfl: 0    HYDROcodone-acetaminophen (NORCO) 5-325 mg per tablet, Take 1 tablet by mouth 3 (three) times a dayMax Daily Amount: 3 tablets, Disp: 90 tablet, Rfl: 0    levothyroxine 50 mcg tablet, Take 1 tablet (50 mcg total) by mouth daily for 30 days, Disp: 90 tablet, Rfl: 3    nystatin-triamcinolone (MYCOLOG-II) cream, Apply topically 4 (four) times a day, Disp: 60 g, Rfl: 3    olopatadine (PATANOL) 0 1 % ophthalmic solution, Apply 1 drop to eye 2 (two) times a day, Disp: , Rfl:     PRADAXA 75 MG capsule, TAKE ONE CAPSULE BY MOUTH EVERY 12 HOURS, Disp: 60 capsule, Rfl: 0    predniSONE 10 mg tablet, 3 tabs po bid x2 days, then 2 tabs po bid x2 days, then 1 tab bid x2 days, then 1 daily until done , Disp: 26 tablet, Rfl: 0    Respiratory Therapy Supplies (NEBULIZER/ADULT MASK) KIT, by Does not apply route every 4 (four) hours as needed (wheezing), Disp: 1 each, Rfl: 0    rosuvastatin (CRESTOR) 5 mg tablet, Take 1 tablet (5 mg total) by mouth daily, Disp: 90 tablet, Rfl: 3    triamcinolone (KENALOG) 0 1 % cream, Apply topically Twice daily, Disp: , Rfl:     zolpidem (AMBIEN CR) 12 5 MG CR tablet, TAKE ONE TABLET BY MOUTH AT BEDTIME, Disp: 30 tablet, Rfl: 0    acetaminophen-codeine (TYLENOL/CODEINE #3) 300-30 MG per tablet, Take 1 tablet by mouth 3 (three) times a day as needed for moderate pain (Patient not taking: Reported on 2/20/2020), Disp: 60 tablet, Rfl: 0    albuterol (2 5 mg/3 mL) 0 083 % nebulizer solution, Take 1 vial (2 5 mg total) by nebulization every 6 (six) hours as needed for wheezing or shortness of breath (Patient not taking: Reported on 2/20/2020), Disp: 42 mL, Rfl: 0    guaiFENesin (MUCINEX) 600 mg 12 hr tablet, Take 2 tablets (1,200 mg total) by mouth every 12 (twelve) hours (Patient not taking: Reported on 2/20/2020), Disp: 14 tablet, Rfl: 0    hyoscyamine (LEVBID) 0 375 mg 12 hr tablet, Take 1 tablet (0 375 mg total) by mouth every 12 (twelve) hours as needed for cramping, Disp: 60 tablet, Rfl: 0    levothyroxine 25 mcg tablet, TAKE ONE TABLET BY MOUTH EVERY DAY (Patient not taking: Reported on 2/20/2020), Disp: 90 tablet, Rfl: 3    loratadine (CLARITIN) 10 mg tablet, Take 1 tablet (10 mg total) by mouth daily (Patient not taking: Reported on 2/20/2020), Disp: , Rfl: 0  Allergies   Allergen Reactions    Penicillin G Anaphylaxis    Atorvastatin      Other reaction(s): Leg Cramps    Cephalexin Headache    Chocolate     Citalopram Abdominal Pain and Headache    Diltiazem     Fosinopril Sodium-Hctz     Methylprednisolone Nausea Only     Tablet generic only    Monopril [Fosinopril]      Reaction Date: 28Apr2011;     Penicillins     Pollen Extract     Pravastatin Myalgia     Other reaction(s): Leg Cramps    Spironolactone     Sporanox [Itraconazole]      Reaction Date: [id number];     Strawberry C [Ascorbate]     Caffeine Palpitations     Tachycardia       Labs:  Lab Results   Component Value Date    K 4 0 12/02/2019     12/02/2019    CO2 30 12/02/2019    BUN 21 12/02/2019    CREATININE 1 93 (H) 12/02/2019    CALCIUM 9 0 12/02/2019     Lab Results   Component Value Date    WBC 8 51 12/02/2019    HGB 15 8 (H) 12/02/2019    HCT 46 5 (H) 12/02/2019    MCV 99 (H) 12/02/2019     12/02/2019     Lab Results   Component Value Date    TRIG 114 03/25/2019    HDL 42 03/25/2019     Imaging: ECG shows sinus bradycardia with nonspecific ST and T-wave changes and prolonged QTc    ECHO(8/2017):  LEFT VENTRICLE:  Systolic function was vigorous  Ejection fraction was estimated to be 65 %  There were no regional wall motion abnormalities  Wall thickness was increased  There was moderate concentric hypertrophy  Doppler parameters were consistent with abnormal left ventricular relaxation (grade 1 diastolic dysfunction)      AORTIC VALVE:  There was increased LVOT velocity up to 2 m/s due to sigmoid septum with vigorous LV function  Vmax across aortic valve was 2 7 m/s, with mean gradient of 17 mm Hg, maximum gradient 28 mm Hg, suggestive of possible mild stenosis versus  sclerosis  Review of Systems:  Review of Systems   Constitutional: Positive for fatigue  HENT: Negative  Eyes: Negative  Respiratory: Negative  Cardiovascular: Negative  Gastrointestinal: Negative  Musculoskeletal: Positive for arthralgias and back pain  Skin: Negative  Allergic/Immunologic: Negative  Neurological: Positive for tremors  Hematological: Negative  Psychiatric/Behavioral: Negative  All other systems reviewed and are negative  Vitals:    02/20/20 1337   BP: 124/78   BP Location: Right arm   Patient Position: Sitting   Cuff Size: Standard   Pulse: (!) 51   SpO2: 92%   Weight: 79 8 kg (176 lb)   Height: 5' 7" (1 702 m)     Physical Exam:  Physical Exam   Constitutional: She is oriented to person, place, and time  She appears well-developed and well-nourished  HENT:   Head: Normocephalic and atraumatic  Eyes: Pupils are equal, round, and reactive to light  EOM are normal  Right eye exhibits no discharge  Left eye exhibits no discharge  No scleral icterus  Neck: Normal range of motion  Neck supple  No JVD present  No thyromegaly present  Cardiovascular: Normal rate, regular rhythm, S1 normal, S2 normal, intact distal pulses and normal pulses  No extrasystoles are present  Exam reveals no gallop and no friction rub  Murmur heard  Systolic murmur is present with a grade of 2/6  Pulmonary/Chest: Effort normal  No respiratory distress  She has decreased breath sounds  She has no wheezes  She has no rales  Abdominal: Soft  Bowel sounds are normal  She exhibits no distension  There is no tenderness  Musculoskeletal: Normal range of motion  She exhibits edema  She exhibits no tenderness or deformity  Neurological: She is alert and oriented to person, place, and time  No cranial nerve deficit  Skin: Skin is warm and dry  No rash noted  Psychiatric: She has a normal mood and affect  Judgment and thought content normal    Nursing note and vitals reviewed  Discussion/Summary:    1  GENARO Jefferson remains in sinus rhythm   Her heart rate does run low at times however it is improved with coming down on some of her medications  She is asymptomatic at this time  We will continue low-dose amiodarone and her Pradaxa for stroke prevention  We will see her back in 6 months    2  Chronic diastolic CHF - In the setting of her hospitalization she was found to have a reduced ejection fraction down about 35%  Her ejection fraction has since come back to normal, as 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  No changes were made and she appears at her baseline today  She knows to watch her sodium intake and weigh herself regularly  3   HTN - Her blood pressure is controlled  She should periodically check this at home  4   Dysipidemia - She remains on low-dose Crestor  No changes were made today  5   Mild aortic stenosis - This appears to be at most mild, by her last echocardiogram   Around our next visit since it will be a few years since her last echo, we will check another 1  Counseling / Coordination of Care  Total floor / unit time spent today 25 minutes  Greater than 50% of total time was spent with the patient and / or family counseling and / or coordination of care

## 2020-02-20 NOTE — PATIENT INSTRUCTIONS
Low-Sodium Diet   AMBULATORY CARE:   A low-sodium diet  limits foods that are high in sodium (salt)  You will need to follow a low-sodium diet if you have high blood pressure, kidney disease, or heart failure  You may also need to follow this diet if you have a condition that is causing your body to retain (hold) extra fluid  You may need to limit the amount of sodium you eat to 1,500 mg  Ask your healthcare provider how much sodium you can have each day  How to use food labels to choose foods that are low in sodium:  Read food labels to find the amount of sodium they contain  The amount of sodium is listed in milligrams (mg)  The % Daily Value (DV) column tells you how much of your daily needs are met by 1 serving of the food for each nutrient listed  Choose foods that have less than 5% of the DV of sodium  These foods are considered low in sodium  Foods that have 20% or more of the DV of sodium are considered high in sodium  Some food labels may also list any of the following terms that tell you about the sodium content in the food:  · Sodium-free:  Less than 5 mg in each serving    · Very low sodium:  35 mg of sodium or less in each serving    · Low sodium:  140 mg of sodium or less in each serving    · Reduced sodium: At least 25% less sodium in each serving than the regular type    · Light in sodium:  50% less sodium in each serving    · Unsalted or no added salt:  No extra salt is added during processing (the food may still contain sodium)  Foods to avoid:  Salty foods are high in sodium   You should avoid the following:  · Processed foods:      ¨ Mixes for cornbread, biscuits, cake, and pudding     ¨ Instant foods, such as potatoes, cereals, noodles, and rice     ¨ Packaged foods, such as bread stuffing, rice and pasta mixes, snack dip mixes, and macaroni and cheese     ¨ Canned foods, such as canned vegetables, soups, broths, sauces, and vegetable or tomato juice    ¨ Snack foods, such as salted chips, popcorn, pretzels, pork rinds, salted crackers, and salted nuts    ¨ Frozen foods, such as dinners, entrees, vegetables with sauces, and breaded meats    ¨ Sauerkraut, pickled vegetables, and other foods prepared in brine    · Meats and cheeses:      ¨ Smoked or cured meat, such as corned beef, chester, ham, hot dogs, and sausage    ¨ Canned meats or spreads, such as potted meats, sardines, anchovies, and imitation seafood    ¨ Deli or lunch meats, such as bologna, ham, turkey, and roast beef    ¨ Processed cheese, such as American cheese and cheese spreads    · Condiments, sauces, and seasonings:      ¨ Salt (¼ teaspoon of salt contains 575 mg of sodium)    ¨ Seasonings made with salt, such as garlic salt, celery salt, onion salt, and seasoned salt    ¨ Regular soy sauce, barbecue sauce, teriyaki sauce, steak sauce, Worcestershire sauce, and most flavored vinegars    ¨ Canned gravy and mixes     ¨ Regular condiments, such as mustard, ketchup, and salad dressings    ¨ Pickles and olives    ¨ Meat tenderizers and monosodium glutamate (MSG)  Foods to include:  Read the food label to find the exact amount of sodium in each serving  · Bread and cereal:  Try to choose breads with less than 80 mg of sodium per serving  ¨ Bread, roll, josue, tortilla, or unsalted crackers  ¨ Ready-to-eat cereals with less than 5% DV of sodium (examples include shredded wheat and puffed rice)    ¨ Pasta    · Vegetables and fruits:      ¨ Unsalted fresh, frozen, or canned vegetables    ¨ Fresh, frozen, or canned fruits    ¨ Fruit juice    · Dairy:  One serving has about 150 mg of sodium  ¨ Milk, all types    ¨ Yogurt    ¨ Hard cheese, such as cheddar, Swiss, [employer], or mozzarella    · Meat and other protein foods:  Some raw meats may have added sodium       ¨ Plain meats, fish, and poultry     ¨ Eggs    · Other foods:      ¨ Homemade pudding    ¨ Unsalted nuts, popcorn, or pretzels    ¨ Unsalted butter or margarine  Ways to decrease sodium:   · Add spices and herbs to foods instead of salt during cooking  Use salt-free seasonings to add flavor to foods  Examples include onion powder, garlic powder, basil, tiwari powder, paprika, and parsley  Try lemon or lime juice or vinegar to give foods a tart flavor  Use hot peppers, pepper, or cayenne pepper to add a spicy flavor to foods  · Do not keep a salt shaker at your kitchen table  This may help keep you from adding salt to food at the table  It may take time to get used to enjoying the natural flavor of food instead of adding salt  Talk to your healthcare provider before you use salt substitutes  Some salt substitutes have a high amount of potassium and need to be avoided if you have kidney disease  · Choose low-sodium foods at restaurants  Meals from restaurants are often high in sodium  Some restaurants have nutrition information on the menu that tells you the amount of sodium in their foods  If possible, ask for your food to be prepared with less, or no salt  · Shop for unsalted or low-sodium foods and snacks at the grocery store  Examples include unsalted or low-sodium broths, soups, and canned vegetables  Choose fresh or frozen vegetables instead  Choose unsalted nuts or seeds or fresh fruits or vegetables as snacks  Read food labels and choose salt-free, very low-sodium, or low-sodium foods  © 2017 [address] Information is for End User's use only and may not be sold, redistributed or otherwise used for commercial purposes  All illustrations and images included in CareNotes® are the copyrighted property of [employer]  or Gust[employer]  The above information is an  only  It is not intended as medical advice for individual conditions or treatments  Talk to your doctor, nurse or pharmacist before following any medical regimen to see if it is safe and effective for you

## 2020-02-27 DIAGNOSIS — B37.0 ORAL THRUSH: Primary | ICD-10-CM

## 2020-02-27 DIAGNOSIS — M79.10 MYALGIA: ICD-10-CM

## 2020-02-27 RX ORDER — CLOTRIMAZOLE 10 MG/1
10 LOZENGE ORAL; TOPICAL
Qty: 50 TABLET | Refills: 0 | Status: SHIPPED | OUTPATIENT
Start: 2020-02-27 | End: 2020-03-08

## 2020-02-27 RX ORDER — PREDNISONE 1 MG/1
TABLET ORAL
Qty: 90 TABLET | Refills: 3 | Status: SHIPPED | OUTPATIENT
Start: 2020-02-27 | End: 2021-02-07

## 2020-02-27 NOTE — TELEPHONE ENCOUNTER
1) she is down to the last 2 days of Prednisone, she was told to call and you would send a low dose for her to continue it  2) she thinks she has thrush again, has tongue pain, white stuff,  in 2017 a mouth rinse was given,  Can something be called to [facility]  She will ck with pharm if no cb

## 2020-02-27 NOTE — TELEPHONE ENCOUNTER
I will call in 1 mg tabs to take 5 daily and every few days go down by a mg  The lowest possible dose that keeps symtpoms in check is what she will stay on    i'll also call in meds for thrush

## 2020-03-02 DIAGNOSIS — G47.00 INSOMNIA, UNSPECIFIED TYPE: ICD-10-CM

## 2020-03-03 RX ORDER — ZOLPIDEM TARTRATE 12.5 MG/1
TABLET, FILM COATED, EXTENDED RELEASE ORAL
Qty: 30 TABLET | Refills: 0 | Status: SHIPPED | OUTPATIENT
Start: 2020-03-03 | End: 2020-03-31

## 2020-03-10 DIAGNOSIS — B37.0 ORAL THRUSH: Primary | ICD-10-CM

## 2020-03-10 NOTE — TELEPHONE ENCOUNTER
She still has thrush, her mouth really hurts,  When she was in the hospital [facility] in 2017 they gave her a liquid, can you look this up and call in the same thing?

## 2020-03-23 DIAGNOSIS — B37.0 ORAL THRUSH: ICD-10-CM

## 2020-03-25 ENCOUNTER — OFFICE VISIT (OUTPATIENT)
Dept: FAMILY MEDICINE CLINIC | Facility: CLINIC | Age: 85
End: 2020-03-25
Payer: COMMERCIAL

## 2020-03-25 VITALS
BODY MASS INDEX: 27.78 KG/M2 | HEIGHT: 67 IN | TEMPERATURE: 98 F | DIASTOLIC BLOOD PRESSURE: 80 MMHG | SYSTOLIC BLOOD PRESSURE: 118 MMHG | WEIGHT: 177 LBS | HEART RATE: 60 BPM

## 2020-03-25 DIAGNOSIS — E78.00 HYPERCHOLESTEROLEMIA: ICD-10-CM

## 2020-03-25 DIAGNOSIS — K12.1 STOMATITIS: Primary | ICD-10-CM

## 2020-03-25 DIAGNOSIS — E03.9 HYPOTHYROIDISM, UNSPECIFIED TYPE: ICD-10-CM

## 2020-03-25 DIAGNOSIS — I10 ESSENTIAL HYPERTENSION: ICD-10-CM

## 2020-03-25 PROCEDURE — 1160F RVW MEDS BY RX/DR IN RCRD: CPT | Performed by: FAMILY MEDICINE

## 2020-03-25 PROCEDURE — 3074F SYST BP LT 130 MM HG: CPT | Performed by: FAMILY MEDICINE

## 2020-03-25 PROCEDURE — 4040F PNEUMOC VAC/ADMIN/RCVD: CPT | Performed by: FAMILY MEDICINE

## 2020-03-25 PROCEDURE — 3079F DIAST BP 80-89 MM HG: CPT | Performed by: FAMILY MEDICINE

## 2020-03-25 PROCEDURE — 1036F TOBACCO NON-USER: CPT | Performed by: FAMILY MEDICINE

## 2020-03-25 PROCEDURE — 3008F BODY MASS INDEX DOCD: CPT | Performed by: FAMILY MEDICINE

## 2020-03-25 PROCEDURE — 99214 OFFICE O/P EST MOD 30 MIN: CPT | Performed by: FAMILY MEDICINE

## 2020-03-25 RX ORDER — CLINDAMYCIN HYDROCHLORIDE 150 MG/1
150 CAPSULE ORAL 2 TIMES DAILY
Qty: 14 CAPSULE | Refills: 0 | Status: SHIPPED | OUTPATIENT
Start: 2020-03-25 | End: 2020-04-01

## 2020-03-25 RX ORDER — ROSUVASTATIN CALCIUM 5 MG/1
5 TABLET, COATED ORAL DAILY
Qty: 90 TABLET | Refills: 3 | Status: SHIPPED | OUTPATIENT
Start: 2020-03-25 | End: 2020-12-10 | Stop reason: SDUPTHER

## 2020-03-26 NOTE — PROGRESS NOTES
Patient ID: Musa Hernandez is a 80 y o  female  HPI: 80 y  o female presents for folllow up of hypertension, hypercholesterolemia, and hypothyroidism  She complains of a sore mouth and tongue, and has been treated twice for oral thrush at a walk in [place]  She has not recently been on any steroids or antibiotics  She denies seeing white plaques in her mouth  No pain with swallowing  SUBJECTIVE    Family History   Problem Relation Age of Onset    Heart disease Family     Hypertension Family     Cancer Family      Social History     Socioeconomic History    Marital status:       Spouse name: Not on file    Number of children: Not on file    Years of education: Less than high school    Highest education level: Not on file   Occupational History    Occupation: Retired   Social Needs    Financial resource strain: Not on file    Food insecurity:     Worry: Not on file     Inability: Not on file   [place] needs:     Medical: Not on file     Non-medical: Not on file   Tobacco Use    Smoking status: Never Smoker    Smokeless tobacco: Never Used   Substance and Sexual Activity    Alcohol use: No    Drug use: Never    Sexual activity: Not Currently     Partners: Male     Birth control/protection: Post-menopausal   Lifestyle    Physical activity:     Days per week: Not on file     Minutes per session: Not on file    Stress: Not on file   Relationships    Social connections:     Talks on phone: Not on file     Gets together: Not on file     Attends Zoroastrian service: Not on file     Active member of club or organization: Not on file     Attends meetings of clubs or organizations: Not on file     Relationship status: Not on file    Intimate partner violence:     Fear of current or ex partner: Not on file     Emotionally abused: Not on file     Physically abused: Not on file     Forced sexual activity: Not on file   Other Topics Concern    Not on file   Social History Narrative    Always uses seatbelt    Denied:  History of daily caffeinated cola consumption    Denied:  History of daily coffee consumption    Daily tea consumption    Denied:  History of dental care, regularly    Exercise:  Walking    Living will in place    No Spiritism beliefs    Power of  in existence    Water intake, adequate (per day)     Past Medical History:   Diagnosis Date    [facility])     Cardiac disease     Hyperlipidemia     Hypertension      Past Surgical History:   Procedure Laterality Date    APPENDECTOMY      CHOLECYSTECTOMY      HERNIA REPAIR      HYSTERECTOMY      NEPHRECTOMY Right      Allergies   Allergen Reactions    Penicillin G Anaphylaxis    Atorvastatin      Other reaction(s): Leg Cramps    Cephalexin Headache    Chocolate     Citalopram Abdominal Pain and Headache    Diltiazem     Fosinopril Sodium-Hctz     Methylprednisolone Nausea Only     Tablet generic only    Monopril [Fosinopril]      Reaction Date: 28Apr2011;     Penicillins     Pollen Extract     Pravastatin Myalgia     Other reaction(s): Leg Cramps    Spironolactone     Sporanox [Itraconazole]      Reaction Date: 28Apr2011;     Strawberry C [Ascorbate]     Caffeine Palpitations     Tachycardia       Current Outpatient Medications:     ALPRAZolam (XANAX) 0 25 mg tablet, TAKE ONE TABLET BY MOUTH FOUR TIMES A DAY, Disp: 120 tablet, Rfl: 0    amiodarone 100 mg tablet, TAKE ONE TABLET BY MOUTH EVERY DAY, Disp: 90 tablet, Rfl: 1    Cyanocobalamin (VITAMIN B-12 PO), Take by mouth, Disp: , Rfl:     Emollient (EUCERIN) lotion, Apply topically 2 (two) times a day, Disp: , Rfl:     furosemide (LASIX) 20 mg tablet, Take 2 tablets (40 mg total) by mouth daily, Disp: 60 tablet, Rfl: 3    guaifenesin-codeine (GUAIFENESIN AC) 100-10 MG/5ML liquid, Take 5 mL by mouth 3 (three) times a day as needed for cough, Disp: 120 mL, Rfl: 0    HYDROcodone-acetaminophen (NORCO) 5-325 mg per tablet, Take 1 tablet by mouth 3 (three) times a dayMax Daily Amount: 3 tablets, Disp: 90 tablet, Rfl: 0    hyoscyamine (LEVBID) 0 375 mg 12 hr tablet, Take 1 tablet (0 375 mg total) by mouth every 12 (twelve) hours as needed for cramping, Disp: 60 tablet, Rfl: 0    levothyroxine 50 mcg tablet, Take 1 tablet (50 mcg total) by mouth daily for 30 days, Disp: 90 tablet, Rfl: 3    nystatin (MYCOSTATIN) 500,000 units/5 mL suspension, SWISH AROUND MOUTH AND SWALLOW 1 TEASPOONFUL(5ML) FOUR TIMES A DAY, Disp: 200 mL, Rfl: 0    nystatin-triamcinolone (MYCOLOG-II) cream, Apply topically 4 (four) times a day, Disp: 60 g, Rfl: 3    olopatadine (PATANOL) 0 1 % ophthalmic solution, Apply 1 drop to eye 2 (two) times a day, Disp: , Rfl:     PRADAXA 75 MG capsule, TAKE ONE CAPSULE BY MOUTH EVERY 12 HOURS, Disp: 60 capsule, Rfl: 0    predniSONE 1 mg tablet, Start at 5 tabs daily and slowly taper down to lowest possible dose that keeps muscle pain in check , Disp: 90 tablet, Rfl: 3    predniSONE 10 mg tablet, 3 tabs po bid x2 days, then 2 tabs po bid x2 days, then 1 tab bid x2 days, then 1 daily until done , Disp: 26 tablet, Rfl: 0    Respiratory Therapy Supplies (NEBULIZER/ADULT MASK) KIT, by Does not apply route every 4 (four) hours as needed (wheezing), Disp: 1 each, Rfl: 0    rosuvastatin (CRESTOR) 5 mg tablet, Take 1 tablet (5 mg total) by mouth daily, Disp: 90 tablet, Rfl: 3    triamcinolone (KENALOG) 0 1 % cream, Apply topically Twice daily, Disp: , Rfl:     zolpidem (AMBIEN CR) 12 5 MG CR tablet, TAKE ONE TABLET BY MOUTH AT BEDTIME, Disp: 30 tablet, Rfl: 0    clindamycin (CLEOCIN) 150 mg capsule, Take 1 capsule (150 mg total) by mouth 2 (two) times a day for 7 days, Disp: 14 capsule, Rfl: 0    Review of Systems  Constitutional:     Denies fever, chills ,fatigue ,weakness ,weight loss, weight gain     ENT: Denies earache ,loss of hearing ,nosebleed, nasal discharge,nasal congestion ,sore throat ,hoarseness+ soreness of tongue and inside of o mouth  Pulmonary: Denies shortness of breath ,cough  ,dyspnea on exertion, orthopnea  ,PND   Cardiovascular:  Denies bradycardia , tachycardia  ,palpations, lower extremity edema leg, claudication  Breast:  Denies new or changing breast lumps ,nipple discharge ,nipple changes  Abdomen:  Denies abdominal pain , anorexia , indigestion, nausea, vomiting, constipation, diarrhea  Musculoskeletal: Denies myalgias, arthralgias, joint swelling, joint stiffness , limb pain, limb swelling  Gu: denies dysuria, polyuria  Skin: Denies skin rash, skin lesion, skin wound, itching, dry skin  Neuro: Denies headache, numbness, tingling, confusion, loss of consciousness, dizziness, vertigo  Psychiatric: Denies feelings of depression, suicidal ideation, anxiety, sleep disturbances    OBJECTIVE  /80   Pulse 60   Temp 98 °F (36 7 °C)   Ht 5' 7" (1 702 m)   Wt 80 3 kg (177 lb)   BMI 27 72 kg/m²   Constitutional:   NAD, well appearing and well nourished      ENT:   Conjunctiva and lids: no injection, edema, or discharge     Pupils and iris: ANKIT bilaterally    External inspection of ears and nose: normal without deformities or discharge  Otoscopic exam: Canals patent without erythema  Nasal mucosa, septum and turbinates: Normal or edema or discharge         Oropharynx:  Moist mucosa, normal tongue and tonsils without lesions  No erythema    Tongue with a yellow coating, no plaues on oral mucosa; no plaques on tongue noted    Pulmonary:Respiratory effort normal rate and rhythm, no increased work of breathing   Auscultation of lungs:  Clear bilaterally with no adventitious breath sounds       Cardiovascular: regular rate and rhythm, S1 and S2, no murmur, no edema and/or varicosities of LE      Abdomen: Soft and non-distended     Positive bowel sounds      No heptomegaly or splenomegaly      Gu: no suprapubic tenderness or CVA tenderness, no urethral discharge  Lymphatic:  No anterior or posterior cervical lymphadenopathy Musculoskeletal:  Gait and station: Normal gait      Digits and nails normal without clubbing or cyanosis       Inspection/palpation of joints, bones, and muscles:  No joint tenderness, swelling, full active and passive range of motion       Skin: Normal skin turgor and no rashes      Neuro:    Normal reflexes     Psych:   alert and oriented to person, place and time     normal mood and affect       Assessment/Plan:Diagnoses and all orders for this visit:    Stomatitis  -     clindamycin (CLEOCIN) 150 mg capsule; Take 1 capsule (150 mg total) by mouth 2 (two) times a day for 7 days    Essential hypertension  -     Comprehensive metabolic panel; Future    Hypercholesterolemia  -     rosuvastatin (CRESTOR) 5 mg tablet; Take 1 tablet (5 mg total) by mouth daily  -     Lipid Panel with Direct LDL reflex; Future    Hypothyroidism, unspecified type  -     TSH, 3rd generation; Future        Reviewed with patient plan to treat with above plan      Patient instructed to call in 72 hours if not feeling better or if symptoms worsen

## 2020-03-31 DIAGNOSIS — G47.00 INSOMNIA, UNSPECIFIED TYPE: ICD-10-CM

## 2020-03-31 DIAGNOSIS — I48.91 ATRIAL FIBRILLATION, UNSPECIFIED TYPE (HCC): ICD-10-CM

## 2020-03-31 RX ORDER — ZOLPIDEM TARTRATE 12.5 MG/1
TABLET, FILM COATED, EXTENDED RELEASE ORAL
Qty: 30 TABLET | Refills: 0 | Status: SHIPPED | OUTPATIENT
Start: 2020-03-31 | End: 2020-05-08

## 2020-03-31 RX ORDER — DABIGATRAN ETEXILATE MESYLATE 75 MG/1
CAPSULE ORAL
Qty: 60 CAPSULE | Refills: 0 | Status: SHIPPED | OUTPATIENT
Start: 2020-03-31 | End: 2020-04-28

## 2020-04-01 ENCOUNTER — TELEPHONE (OUTPATIENT)
Dept: FAMILY MEDICINE CLINIC | Facility: CLINIC | Age: 85
End: 2020-04-01

## 2020-04-01 DIAGNOSIS — B37.0 ORAL THRUSH: Primary | ICD-10-CM

## 2020-04-12 DIAGNOSIS — F41.9 ANXIETY: ICD-10-CM

## 2020-04-13 RX ORDER — ALPRAZOLAM 0.25 MG/1
TABLET ORAL
Qty: 120 TABLET | Refills: 3 | Status: SHIPPED | OUTPATIENT
Start: 2020-04-13 | End: 2020-08-10

## 2020-04-16 DIAGNOSIS — B37.2 CUTANEOUS CANDIDIASIS: ICD-10-CM

## 2020-04-24 NOTE — PROGRESS NOTES
BMI Counseling: Body mass index is 27 72 kg/m²  The BMI is above normal  Nutrition recommendations include reducing portion sizes, decreasing overall calorie intake and 3-5 servings of fruits/vegetables daily  Exercise recommendations include exercising 3-5 times per week

## 2020-04-28 DIAGNOSIS — I48.91 ATRIAL FIBRILLATION, UNSPECIFIED TYPE (HCC): ICD-10-CM

## 2020-04-28 RX ORDER — DABIGATRAN ETEXILATE MESYLATE 75 MG/1
CAPSULE ORAL
Qty: 60 CAPSULE | Refills: 0 | Status: SHIPPED | OUTPATIENT
Start: 2020-04-28 | End: 2020-05-26

## 2020-05-07 DIAGNOSIS — G47.00 INSOMNIA, UNSPECIFIED TYPE: ICD-10-CM

## 2020-05-08 RX ORDER — ZOLPIDEM TARTRATE 12.5 MG/1
TABLET, FILM COATED, EXTENDED RELEASE ORAL
Qty: 30 TABLET | Refills: 0 | Status: SHIPPED | OUTPATIENT
Start: 2020-05-08 | End: 2020-06-10

## 2020-05-26 DIAGNOSIS — I48.91 ATRIAL FIBRILLATION, UNSPECIFIED TYPE (HCC): ICD-10-CM

## 2020-05-26 RX ORDER — DABIGATRAN ETEXILATE MESYLATE 75 MG/1
CAPSULE ORAL
Qty: 60 CAPSULE | Refills: 0 | Status: SHIPPED | OUTPATIENT
Start: 2020-05-26 | End: 2020-07-16

## 2020-06-03 DIAGNOSIS — E03.9 HYPOTHYROIDISM, UNSPECIFIED TYPE: ICD-10-CM

## 2020-06-03 RX ORDER — LEVOTHYROXINE SODIUM 0.05 MG/1
TABLET ORAL
Qty: 90 TABLET | Refills: 3 | Status: SHIPPED | OUTPATIENT
Start: 2020-06-03 | End: 2020-08-17 | Stop reason: SDUPTHER

## 2020-06-10 DIAGNOSIS — G47.00 INSOMNIA, UNSPECIFIED TYPE: ICD-10-CM

## 2020-06-10 RX ORDER — ZOLPIDEM TARTRATE 12.5 MG/1
TABLET, FILM COATED, EXTENDED RELEASE ORAL
Qty: 30 TABLET | Refills: 0 | Status: SHIPPED | OUTPATIENT
Start: 2020-06-10 | End: 2020-07-09

## 2020-07-08 DIAGNOSIS — G47.00 INSOMNIA, UNSPECIFIED TYPE: ICD-10-CM

## 2020-07-09 RX ORDER — ZOLPIDEM TARTRATE 12.5 MG/1
TABLET, FILM COATED, EXTENDED RELEASE ORAL
Qty: 30 TABLET | Refills: 0 | Status: SHIPPED | OUTPATIENT
Start: 2020-07-09 | End: 2020-08-10

## 2020-07-15 DIAGNOSIS — I48.91 ATRIAL FIBRILLATION, UNSPECIFIED TYPE (HCC): ICD-10-CM

## 2020-07-16 RX ORDER — DABIGATRAN ETEXILATE MESYLATE 75 MG/1
CAPSULE ORAL
Qty: 60 CAPSULE | Refills: 0 | Status: SHIPPED | OUTPATIENT
Start: 2020-07-16 | End: 2020-08-10

## 2020-07-24 DIAGNOSIS — I48.91 ATRIAL FIBRILLATION, UNSPECIFIED TYPE (HCC): ICD-10-CM

## 2020-07-24 RX ORDER — AMIODARONE HYDROCHLORIDE 100 MG/1
100 TABLET ORAL DAILY
Qty: 90 TABLET | Refills: 3 | Status: SHIPPED | OUTPATIENT
Start: 2020-07-24 | End: 2021-02-09 | Stop reason: SDUPTHER

## 2020-07-24 NOTE — TELEPHONE ENCOUNTER
Patient requesting 90 day script for Amiodarone 100mg q d  To [facility] in [place]   She will be completely out of the medication by Monday

## 2020-07-28 ENCOUNTER — OFFICE VISIT (OUTPATIENT)
Dept: FAMILY MEDICINE CLINIC | Facility: CLINIC | Age: 85
End: 2020-07-28
Payer: COMMERCIAL

## 2020-07-28 VITALS
BODY MASS INDEX: 28.25 KG/M2 | TEMPERATURE: 98.5 F | WEIGHT: 180 LBS | SYSTOLIC BLOOD PRESSURE: 130 MMHG | HEART RATE: 60 BPM | HEIGHT: 67 IN | DIASTOLIC BLOOD PRESSURE: 80 MMHG

## 2020-07-28 DIAGNOSIS — I48.0 PAROXYSMAL ATRIAL FIBRILLATION (HCC): ICD-10-CM

## 2020-07-28 DIAGNOSIS — E78.00 HYPERCHOLESTEROLEMIA: ICD-10-CM

## 2020-07-28 DIAGNOSIS — E03.9 HYPOTHYROIDISM, UNSPECIFIED TYPE: ICD-10-CM

## 2020-07-28 DIAGNOSIS — I10 ESSENTIAL HYPERTENSION: Primary | ICD-10-CM

## 2020-07-28 PROCEDURE — 3288F FALL RISK ASSESSMENT DOCD: CPT | Performed by: FAMILY MEDICINE

## 2020-07-28 PROCEDURE — 99213 OFFICE O/P EST LOW 20 MIN: CPT | Performed by: FAMILY MEDICINE

## 2020-07-28 PROCEDURE — 3075F SYST BP GE 130 - 139MM HG: CPT | Performed by: FAMILY MEDICINE

## 2020-07-28 PROCEDURE — 4040F PNEUMOC VAC/ADMIN/RCVD: CPT | Performed by: FAMILY MEDICINE

## 2020-07-28 PROCEDURE — 1160F RVW MEDS BY RX/DR IN RCRD: CPT | Performed by: FAMILY MEDICINE

## 2020-07-28 PROCEDURE — 3079F DIAST BP 80-89 MM HG: CPT | Performed by: FAMILY MEDICINE

## 2020-07-28 PROCEDURE — 1101F PT FALLS ASSESS-DOCD LE1/YR: CPT | Performed by: FAMILY MEDICINE

## 2020-07-28 PROCEDURE — 1036F TOBACCO NON-USER: CPT | Performed by: FAMILY MEDICINE

## 2020-08-03 ENCOUNTER — APPOINTMENT (OUTPATIENT)
Dept: LAB | Facility: CLINIC | Age: 85
End: 2020-08-03
Payer: COMMERCIAL

## 2020-08-03 DIAGNOSIS — I10 ESSENTIAL HYPERTENSION: ICD-10-CM

## 2020-08-03 DIAGNOSIS — E78.00 HYPERCHOLESTEROLEMIA: ICD-10-CM

## 2020-08-03 DIAGNOSIS — E03.9 HYPOTHYROIDISM, UNSPECIFIED TYPE: ICD-10-CM

## 2020-08-03 LAB
ALBUMIN SERPL BCP-MCNC: 3.8 G/DL (ref 3.5–5)
ALP SERPL-CCNC: 74 U/L (ref 46–116)
ALT SERPL W P-5'-P-CCNC: 30 U/L (ref 12–78)
ANION GAP SERPL CALCULATED.3IONS-SCNC: 5 MMOL/L (ref 4–13)
AST SERPL W P-5'-P-CCNC: 23 U/L (ref 5–45)
BILIRUB SERPL-MCNC: 1.1 MG/DL (ref 0.2–1)
BUN SERPL-MCNC: 19 MG/DL (ref 5–25)
CALCIUM SERPL-MCNC: 9.9 MG/DL (ref 8.3–10.1)
CHLORIDE SERPL-SCNC: 105 MMOL/L (ref 100–108)
CHOLEST SERPL-MCNC: 119 MG/DL (ref 50–200)
CO2 SERPL-SCNC: 27 MMOL/L (ref 21–32)
CREAT SERPL-MCNC: 1.89 MG/DL (ref 0.6–1.3)
GFR SERPL CREATININE-BSD FRML MDRD: 23 ML/MIN/1.73SQ M
GLUCOSE P FAST SERPL-MCNC: 94 MG/DL (ref 65–99)
HDLC SERPL-MCNC: 46 MG/DL
LDLC SERPL CALC-MCNC: 42 MG/DL (ref 0–100)
POTASSIUM SERPL-SCNC: 4.6 MMOL/L (ref 3.5–5.3)
PROT SERPL-MCNC: 6.9 G/DL (ref 6.4–8.2)
SODIUM SERPL-SCNC: 137 MMOL/L (ref 136–145)
TRIGL SERPL-MCNC: 153 MG/DL
TSH SERPL DL<=0.05 MIU/L-ACNC: 4.86 UIU/ML (ref 0.36–3.74)

## 2020-08-03 PROCEDURE — 36415 COLL VENOUS BLD VENIPUNCTURE: CPT

## 2020-08-03 PROCEDURE — 80053 COMPREHEN METABOLIC PANEL: CPT

## 2020-08-03 PROCEDURE — 84443 ASSAY THYROID STIM HORMONE: CPT

## 2020-08-03 PROCEDURE — 80061 LIPID PANEL: CPT

## 2020-08-03 NOTE — PROGRESS NOTES
Patient ID: Ricki Parsons is a 80 y o  female  HPI: 80 y  o female presents for follow up of hypertension,hypothyroidsim, hypercholesterolemia, and afib  She denies any dyspnea, palpitaitons, or chest pain  SUBJECTIVE    Family History   Problem Relation Age of Onset    Heart disease Family     Hypertension Family     Cancer Family      Social History     Socioeconomic History    Marital status:       Spouse name: Not on file    Number of children: Not on file    Years of education: Less than high school    Highest education level: Not on file   Occupational History    Occupation: Retired   Social Needs    Financial resource strain: Not on file    Food insecurity     Worry: Not on file     Inability: Not on file   [employer] needs     Medical: Not on file     Non-medical: Not on file   Tobacco Use    Smoking status: Never Smoker    Smokeless tobacco: Never Used   Substance and Sexual Activity    Alcohol use: No    Drug use: Never    Sexual activity: Not Currently     Partners: Male     Birth control/protection: Post-menopausal   Lifestyle    Physical activity     Days per week: Not on file     Minutes per session: Not on file    Stress: Not on file   Relationships    Social connections     Talks on phone: Not on file     Gets together: Not on file     Attends Episcopalian service: Not on file     Active member of club or organization: Not on file     Attends meetings of clubs or organizations: Not on file     Relationship status: Not on file    Intimate partner violence     Fear of current or ex partner: Not on file     Emotionally abused: Not on file     Physically abused: Not on file     Forced sexual activity: Not on file   Other Topics Concern    Not on file   Social History Narrative    Always uses seatbelt    Denied:  History of daily caffeinated cola consumption    Denied:  History of daily coffee consumption    Daily tea consumption    Denied:  History of dental care, regularly    Exercise:  Walking    Living will in place    No Religion beliefs    Power of  in existence    Water intake, adequate (per day)     Past Medical History:   Diagnosis Date    [facility]fib [facility])     Cardiac disease     Hyperlipidemia     Hypertension      Past Surgical History:   Procedure Laterality Date    APPENDECTOMY      CHOLECYSTECTOMY      HERNIA REPAIR      HYSTERECTOMY      NEPHRECTOMY Right      Allergies   Allergen Reactions    Penicillin G Anaphylaxis    Atorvastatin      Other reaction(s): Leg Cramps    Cephalexin Headache    Chocolate     Citalopram Abdominal Pain and Headache    Diltiazem     Fosinopril Sodium-Hctz     Methylprednisolone Nausea Only     Tablet generic only    Monopril [Fosinopril]      Reaction Date: 28Apr2011;     Penicillins     Pollen Extract     Pravastatin Myalgia     Other reaction(s): Leg Cramps    Spironolactone     Sporanox [Itraconazole]      Reaction Date: 28Apr2011;     Strawberry C [Ascorbate]     Caffeine Palpitations     Tachycardia       Current Outpatient Medications:     ALPRAZolam (XANAX) 0 25 mg tablet, TAKE ONE TABLET BY MOUTH FOUR TIMES A DAY, Disp: 120 tablet, Rfl: 3    amiodarone 100 mg tablet, Take 1 tablet (100 mg total) by mouth daily, Disp: 90 tablet, Rfl: 3    Emollient (EUCERIN) lotion, Apply topically 2 (two) times a day, Disp: , Rfl:     furosemide (LASIX) 20 mg tablet, Take 2 tablets (40 mg total) by mouth daily, Disp: 60 tablet, Rfl: 3    HYDROcodone-acetaminophen (NORCO) 5-325 mg per tablet, Take 1 tablet by mouth 3 (three) times a dayMax Daily Amount: 3 tablets, Disp: 90 tablet, Rfl: 0    levothyroxine 50 mcg tablet, TAKE ONE TABLET BY MOUTH EVERY DAY, Disp: 90 tablet, Rfl: 3    nystatin (MYCOSTATIN) 500,000 units/5 mL suspension, SWISH AROUND MOUTH AND SWALLOW 1 TEASPOONFUL(5ML) FOUR TIMES A DAY, Disp: 200 mL, Rfl: 0    nystatin-triamcinolone (MYCOLOG-II) cream, Apply topically 4 (four) times a day, Disp: 60 g, Rfl: 3    PRADAXA 75 MG capsule, TAKE 1 CAPSULE BY MOUTH EVERY 12 HOURS, Disp: 60 capsule, Rfl: 0    predniSONE 1 mg tablet, Start at 5 tabs daily and slowly taper down to lowest possible dose that keeps muscle pain in check , Disp: 90 tablet, Rfl: 3    Respiratory Therapy Supplies (NEBULIZER/ADULT MASK) KIT, by Does not apply route every 4 (four) hours as needed (wheezing), Disp: 1 each, Rfl: 0    rosuvastatin (CRESTOR) 5 mg tablet, Take 1 tablet (5 mg total) by mouth daily, Disp: 90 tablet, Rfl: 3    triamcinolone (KENALOG) 0 1 % cream, Apply topically Twice daily, Disp: , Rfl:     zolpidem (AMBIEN CR) 12 5 MG CR tablet, TAKE 1 TABLET BY MOUTH DAILY AT BEDTIME , Disp: 30 tablet, Rfl: 0    Review of Systems  Constitutional:     Denies fever, chills ,fatigue ,weakness ,weight loss, weight gain     ENT: Denies earache ,loss of hearing ,nosebleed, nasal discharge,nasal congestion ,sore throat ,hoarseness  Pulmonary: Denies shortness of breath ,cough  ,dyspnea on exertion, orthopnea  ,PND   Cardiovascular:  Denies bradycardia , tachycardia  ,palpations, lower extremity edema leg, claudication  Breast:  Denies new or changing breast lumps ,nipple discharge ,nipple changes  Abdomen:  Denies abdominal pain , anorexia , indigestion, nausea, vomiting, constipation, diarrhea  Musculoskeletal: Denies myalgias, arthralgias, joint swelling, joint stiffness , limb pain, limb swelling  Gu: denies dysuria, polyuria  Skin: Denies skin rash, skin lesion, skin wound, itching, dry skin  Neuro: Denies headache, numbness, tingling, confusion, loss of consciousness, dizziness, vertigo  Psychiatric: Denies feelings of depression, suicidal ideation, anxiety, sleep disturbances    OBJECTIVE  /80   Pulse 60   Temp 98 5 °F (36 9 °C)   Ht 5' 7"   Wt 81 6 kg (180 lb)   BMI 28 19 kg/m²   Constitutional:   NAD, well appearing and well nourished      ENT:   Conjunctiva and lids: no injection, edema, or discharge Pupils and iris: ANKIT bilaterally    External inspection of ears and nose: normal without deformities or discharge  Otoscopic exam: Canals patent without erythema  Nasal mucosa, septum and turbinates: Normal or edema or discharge         Oropharynx:  Moist mucosa, normal tongue and tonsils without lesions  No erythema      Pulmonary:Respiratory effort normal rate and rhythm, no increased work of breathing  Auscultation of lungs:  Clear bilaterally with no adventitious breath sounds       Cardiovascular: regular rate and rhythm, S1 and S2, no murmur, no edema and/or varicosities of LE      Abdomen: Soft and non-distended     Positive bowel sounds      No heptomegaly or splenomegaly      Gu: no suprapubic tenderness or CVA tenderness, no urethral discharge  Lymphatic:  No anterior or posterior cervical lymphadenopathy         Musculoskeletal:  Gait and station: Normal gait      Digits and nails normal without clubbing or cyanosis       Inspection/palpation of joints, bones, and muscles:  No joint tenderness, swelling, full active and passive range of motion       Skin: Normal skin turgor and no rashes      Neuro:      Normal reflexes     Psych:   alert and oriented to person, place and time     normal mood and affect       Assessment/Plan:Diagnoses and all orders for this visit:    Essential hypertension  -     Comprehensive metabolic panel; Future    Hypothyroidism, unspecified type  -     TSH, 3rd generation; Future    Hypercholesterolemia  -     Lipid Panel with Direct LDL reflex; Future    Paroxysmal atrial fibrillation (HCC)  Comments:  Stable on current therapy  I will see patient back in 4 mos or sooner prn

## 2020-08-05 ENCOUNTER — TELEPHONE (OUTPATIENT)
Dept: FAMILY MEDICINE CLINIC | Facility: CLINIC | Age: 85
End: 2020-08-05

## 2020-08-05 NOTE — TELEPHONE ENCOUNTER
I reviewed this patients results on behalf of Dr Miguel Mclean  Please call patient with results  Her labs are stable from previous  Her kidney function is stable and she should continue to avoid NSAID's

## 2020-08-09 DIAGNOSIS — G47.00 INSOMNIA, UNSPECIFIED TYPE: ICD-10-CM

## 2020-08-09 DIAGNOSIS — F41.9 ANXIETY: ICD-10-CM

## 2020-08-09 DIAGNOSIS — I48.91 ATRIAL FIBRILLATION, UNSPECIFIED TYPE (HCC): ICD-10-CM

## 2020-08-10 ENCOUNTER — TELEPHONE (OUTPATIENT)
Dept: FAMILY MEDICINE CLINIC | Facility: CLINIC | Age: 85
End: 2020-08-10

## 2020-08-10 RX ORDER — DABIGATRAN ETEXILATE MESYLATE 75 MG/1
CAPSULE ORAL
Qty: 60 CAPSULE | Refills: 0 | Status: SHIPPED | OUTPATIENT
Start: 2020-08-10 | End: 2020-09-10

## 2020-08-10 RX ORDER — ZOLPIDEM TARTRATE 12.5 MG/1
TABLET, FILM COATED, EXTENDED RELEASE ORAL
Qty: 30 TABLET | Refills: 0 | Status: SHIPPED | OUTPATIENT
Start: 2020-08-10 | End: 2020-09-10

## 2020-08-10 RX ORDER — ALPRAZOLAM 0.25 MG/1
TABLET ORAL
Qty: 120 TABLET | Refills: 3 | Status: SHIPPED | OUTPATIENT
Start: 2020-08-10 | End: 2020-12-08

## 2020-08-10 NOTE — TELEPHONE ENCOUNTER
She is on Levothyroxine sodium, she has a 50 mg tab, she is cutting it in half, she feels better on that, if you want her to con't on the 25 mg she wants a new rx called to pharm  She does have plenty left to con't to cut the 50 in half,   Do you want to give new rx for the 25 mg or should she con't to cut the 50 mg in half?   Pl adv

## 2020-08-17 DIAGNOSIS — E03.9 HYPOTHYROIDISM, UNSPECIFIED TYPE: ICD-10-CM

## 2020-08-17 RX ORDER — LEVOTHYROXINE SODIUM 0.03 MG/1
25 TABLET ORAL DAILY
Qty: 90 TABLET | Refills: 3 | Status: SHIPPED | OUTPATIENT
Start: 2020-08-17 | End: 2020-11-23

## 2020-08-25 DIAGNOSIS — I50.32 CHRONIC DIASTOLIC HEART FAILURE (HCC): ICD-10-CM

## 2020-08-25 RX ORDER — FUROSEMIDE 20 MG/1
TABLET ORAL
Qty: 60 TABLET | Refills: 3 | Status: SHIPPED | OUTPATIENT
Start: 2020-08-25 | End: 2021-03-04 | Stop reason: SDUPTHER

## 2020-09-10 DIAGNOSIS — I48.91 ATRIAL FIBRILLATION, UNSPECIFIED TYPE (HCC): ICD-10-CM

## 2020-09-10 DIAGNOSIS — G47.00 INSOMNIA, UNSPECIFIED TYPE: ICD-10-CM

## 2020-09-10 RX ORDER — DABIGATRAN ETEXILATE MESYLATE 75 MG/1
CAPSULE ORAL
Qty: 60 CAPSULE | Refills: 0 | Status: SHIPPED | OUTPATIENT
Start: 2020-09-10 | End: 2020-10-11

## 2020-09-10 RX ORDER — ZOLPIDEM TARTRATE 12.5 MG/1
TABLET, FILM COATED, EXTENDED RELEASE ORAL
Qty: 30 TABLET | Refills: 0 | Status: SHIPPED | OUTPATIENT
Start: 2020-09-10 | End: 2020-10-11

## 2020-10-06 ENCOUNTER — IMMUNIZATIONS (OUTPATIENT)
Dept: FAMILY MEDICINE CLINIC | Facility: CLINIC | Age: 85
End: 2020-10-06
Payer: COMMERCIAL

## 2020-10-06 VITALS — TEMPERATURE: 98.9 F

## 2020-10-06 DIAGNOSIS — Z23 NEED FOR INFLUENZA VACCINATION: Primary | ICD-10-CM

## 2020-10-06 PROCEDURE — 90662 IIV NO PRSV INCREASED AG IM: CPT

## 2020-10-06 PROCEDURE — G0008 ADMIN INFLUENZA VIRUS VAC: HCPCS

## 2020-10-10 DIAGNOSIS — G47.00 INSOMNIA, UNSPECIFIED TYPE: ICD-10-CM

## 2020-10-10 DIAGNOSIS — I48.91 ATRIAL FIBRILLATION, UNSPECIFIED TYPE (HCC): ICD-10-CM

## 2020-10-11 RX ORDER — ZOLPIDEM TARTRATE 12.5 MG/1
TABLET, FILM COATED, EXTENDED RELEASE ORAL
Qty: 30 TABLET | Refills: 0 | Status: SHIPPED | OUTPATIENT
Start: 2020-10-11 | End: 2020-11-09

## 2020-10-11 RX ORDER — DABIGATRAN ETEXILATE MESYLATE 75 MG/1
CAPSULE ORAL
Qty: 60 CAPSULE | Refills: 0 | Status: SHIPPED | OUTPATIENT
Start: 2020-10-11 | End: 2020-11-09

## 2020-11-08 DIAGNOSIS — I48.91 ATRIAL FIBRILLATION, UNSPECIFIED TYPE (HCC): ICD-10-CM

## 2020-11-08 DIAGNOSIS — G47.00 INSOMNIA, UNSPECIFIED TYPE: ICD-10-CM

## 2020-11-09 RX ORDER — ZOLPIDEM TARTRATE 12.5 MG/1
TABLET, FILM COATED, EXTENDED RELEASE ORAL
Qty: 30 TABLET | Refills: 0 | Status: SHIPPED | OUTPATIENT
Start: 2020-11-09 | End: 2020-12-08

## 2020-11-09 RX ORDER — DABIGATRAN ETEXILATE MESYLATE 75 MG/1
CAPSULE ORAL
Qty: 60 CAPSULE | Refills: 0 | Status: SHIPPED | OUTPATIENT
Start: 2020-11-09 | End: 2020-12-08

## 2020-11-11 ENCOUNTER — TRANSCRIBE ORDERS (OUTPATIENT)
Dept: LAB | Facility: CLINIC | Age: 85
End: 2020-11-11

## 2020-11-13 ENCOUNTER — OFFICE VISIT (OUTPATIENT)
Dept: CARDIOLOGY CLINIC | Facility: CLINIC | Age: 85
End: 2020-11-13
Payer: COMMERCIAL

## 2020-11-13 VITALS
DIASTOLIC BLOOD PRESSURE: 68 MMHG | HEART RATE: 54 BPM | WEIGHT: 191 LBS | HEIGHT: 67 IN | SYSTOLIC BLOOD PRESSURE: 140 MMHG | OXYGEN SATURATION: 94 % | BODY MASS INDEX: 29.98 KG/M2

## 2020-11-13 DIAGNOSIS — I10 ESSENTIAL HYPERTENSION: ICD-10-CM

## 2020-11-13 DIAGNOSIS — I48.0 PAROXYSMAL ATRIAL FIBRILLATION (HCC): Primary | ICD-10-CM

## 2020-11-13 DIAGNOSIS — I50.32 CHRONIC DIASTOLIC CONGESTIVE HEART FAILURE (HCC): ICD-10-CM

## 2020-11-13 DIAGNOSIS — E78.2 MIXED HYPERLIPIDEMIA: ICD-10-CM

## 2020-11-13 DIAGNOSIS — R00.1 SINUS BRADYCARDIA: ICD-10-CM

## 2020-11-13 DIAGNOSIS — I35.0 MILD AORTIC STENOSIS: ICD-10-CM

## 2020-11-13 PROCEDURE — 1160F RVW MEDS BY RX/DR IN RCRD: CPT | Performed by: INTERNAL MEDICINE

## 2020-11-13 PROCEDURE — 99214 OFFICE O/P EST MOD 30 MIN: CPT | Performed by: INTERNAL MEDICINE

## 2020-11-13 PROCEDURE — 93000 ELECTROCARDIOGRAM COMPLETE: CPT | Performed by: INTERNAL MEDICINE

## 2020-11-13 PROCEDURE — 1036F TOBACCO NON-USER: CPT | Performed by: INTERNAL MEDICINE

## 2020-11-23 ENCOUNTER — OFFICE VISIT (OUTPATIENT)
Dept: FAMILY MEDICINE CLINIC | Facility: CLINIC | Age: 85
End: 2020-11-23
Payer: COMMERCIAL

## 2020-11-23 VITALS
TEMPERATURE: 98 F | WEIGHT: 179 LBS | SYSTOLIC BLOOD PRESSURE: 122 MMHG | HEIGHT: 67 IN | HEART RATE: 54 BPM | DIASTOLIC BLOOD PRESSURE: 80 MMHG | OXYGEN SATURATION: 94 % | BODY MASS INDEX: 28.09 KG/M2

## 2020-11-23 DIAGNOSIS — E78.00 HYPERCHOLESTEROLEMIA: ICD-10-CM

## 2020-11-23 DIAGNOSIS — E03.9 HYPOTHYROIDISM, UNSPECIFIED TYPE: ICD-10-CM

## 2020-11-23 DIAGNOSIS — I10 ESSENTIAL HYPERTENSION: ICD-10-CM

## 2020-11-23 DIAGNOSIS — Z00.00 MEDICARE ANNUAL WELLNESS VISIT, SUBSEQUENT: Primary | ICD-10-CM

## 2020-11-23 PROCEDURE — 99213 OFFICE O/P EST LOW 20 MIN: CPT | Performed by: FAMILY MEDICINE

## 2020-11-23 PROCEDURE — 1036F TOBACCO NON-USER: CPT | Performed by: FAMILY MEDICINE

## 2020-11-23 PROCEDURE — G0439 PPPS, SUBSEQ VISIT: HCPCS | Performed by: FAMILY MEDICINE

## 2020-11-23 RX ORDER — LEVOTHYROXINE SODIUM 0.03 MG/1
25 TABLET ORAL DAILY
Qty: 30 TABLET | Refills: 3 | Status: SHIPPED | OUTPATIENT
Start: 2020-11-23 | End: 2021-03-11

## 2020-12-07 DIAGNOSIS — G47.00 INSOMNIA, UNSPECIFIED TYPE: ICD-10-CM

## 2020-12-07 DIAGNOSIS — F41.9 ANXIETY: ICD-10-CM

## 2020-12-07 DIAGNOSIS — I48.91 ATRIAL FIBRILLATION, UNSPECIFIED TYPE (HCC): ICD-10-CM

## 2020-12-08 RX ORDER — ALPRAZOLAM 0.25 MG/1
TABLET ORAL
Qty: 120 TABLET | Refills: 3 | Status: SHIPPED | OUTPATIENT
Start: 2020-12-08 | End: 2021-01-07

## 2020-12-08 RX ORDER — ZOLPIDEM TARTRATE 12.5 MG/1
TABLET, FILM COATED, EXTENDED RELEASE ORAL
Qty: 30 TABLET | Refills: 0 | Status: SHIPPED | OUTPATIENT
Start: 2020-12-08 | End: 2021-01-07

## 2020-12-08 RX ORDER — DABIGATRAN ETEXILATE MESYLATE 75 MG/1
CAPSULE ORAL
Qty: 60 CAPSULE | Refills: 0 | Status: SHIPPED | OUTPATIENT
Start: 2020-12-08 | End: 2021-01-07

## 2020-12-10 DIAGNOSIS — E78.00 HYPERCHOLESTEROLEMIA: ICD-10-CM

## 2020-12-10 RX ORDER — ROSUVASTATIN CALCIUM 5 MG/1
5 TABLET, COATED ORAL DAILY
Qty: 90 TABLET | Refills: 1 | Status: SHIPPED | OUTPATIENT
Start: 2020-12-10 | End: 2020-12-10 | Stop reason: SDUPTHER

## 2020-12-10 RX ORDER — ROSUVASTATIN CALCIUM 5 MG/1
5 TABLET, COATED ORAL DAILY
Qty: 90 TABLET | Refills: 1 | Status: SHIPPED | OUTPATIENT
Start: 2020-12-10 | End: 2021-06-01

## 2020-12-11 ENCOUNTER — TELEPHONE (OUTPATIENT)
Dept: FAMILY MEDICINE CLINIC | Facility: CLINIC | Age: 85
End: 2020-12-11

## 2021-01-07 DIAGNOSIS — G47.00 INSOMNIA, UNSPECIFIED TYPE: ICD-10-CM

## 2021-01-07 DIAGNOSIS — F41.9 ANXIETY: ICD-10-CM

## 2021-01-07 DIAGNOSIS — I48.91 ATRIAL FIBRILLATION, UNSPECIFIED TYPE (HCC): ICD-10-CM

## 2021-01-07 RX ORDER — ZOLPIDEM TARTRATE 12.5 MG/1
TABLET, FILM COATED, EXTENDED RELEASE ORAL
Qty: 30 TABLET | Refills: 0 | Status: SHIPPED | OUTPATIENT
Start: 2021-01-07 | End: 2021-02-10 | Stop reason: SDUPTHER

## 2021-01-07 RX ORDER — ALPRAZOLAM 0.25 MG/1
TABLET ORAL
Qty: 120 TABLET | Refills: 3 | Status: SHIPPED | OUTPATIENT
Start: 2021-01-07 | End: 2021-03-25 | Stop reason: HOSPADM

## 2021-01-07 RX ORDER — DABIGATRAN ETEXILATE MESYLATE 75 MG/1
CAPSULE ORAL
Qty: 60 CAPSULE | Refills: 0 | Status: SHIPPED | OUTPATIENT
Start: 2021-01-07 | End: 2021-02-08

## 2021-02-06 DIAGNOSIS — I48.91 ATRIAL FIBRILLATION, UNSPECIFIED TYPE (HCC): ICD-10-CM

## 2021-02-06 DIAGNOSIS — M79.10 MYALGIA: ICD-10-CM

## 2021-02-06 DIAGNOSIS — J06.9 UPPER RESPIRATORY TRACT INFECTION, UNSPECIFIED TYPE: ICD-10-CM

## 2021-02-06 DIAGNOSIS — G47.00 INSOMNIA, UNSPECIFIED TYPE: ICD-10-CM

## 2021-02-07 RX ORDER — DEXTROMETHORPHAN HYDROBROMIDE AND PROMETHAZINE HYDROCHLORIDE 15; 6.25 MG/5ML; MG/5ML
SOLUTION ORAL
Qty: 3 ML | Refills: 0 | Status: SHIPPED | OUTPATIENT
Start: 2021-02-07 | End: 2021-03-25 | Stop reason: HOSPADM

## 2021-02-07 RX ORDER — PREDNISONE 1 MG/1
TABLET ORAL
Qty: 90 TABLET | Refills: 3 | Status: SHIPPED | OUTPATIENT
Start: 2021-02-07 | End: 2021-03-25 | Stop reason: HOSPADM

## 2021-02-08 RX ORDER — ZOLPIDEM TARTRATE 12.5 MG/1
TABLET, FILM COATED, EXTENDED RELEASE ORAL
Qty: 30 TABLET | Refills: 0 | OUTPATIENT
Start: 2021-02-08

## 2021-02-08 RX ORDER — DABIGATRAN ETEXILATE MESYLATE 75 MG/1
CAPSULE ORAL
Qty: 60 CAPSULE | Refills: 3 | Status: SHIPPED | OUTPATIENT
Start: 2021-02-08 | End: 2021-03-04

## 2021-02-09 DIAGNOSIS — I48.91 ATRIAL FIBRILLATION, UNSPECIFIED TYPE (HCC): ICD-10-CM

## 2021-02-09 RX ORDER — AMIODARONE HYDROCHLORIDE 100 MG/1
100 TABLET ORAL DAILY
Qty: 90 TABLET | Refills: 3 | Status: SHIPPED | OUTPATIENT
Start: 2021-02-09 | End: 2021-09-17 | Stop reason: SDUPTHER

## 2021-02-10 DIAGNOSIS — G47.00 INSOMNIA, UNSPECIFIED TYPE: ICD-10-CM

## 2021-02-10 RX ORDER — ZOLPIDEM TARTRATE 12.5 MG/1
12.5 TABLET, FILM COATED, EXTENDED RELEASE ORAL
Qty: 30 TABLET | Refills: 0 | Status: SHIPPED | OUTPATIENT
Start: 2021-02-10 | End: 2021-03-11

## 2021-02-10 NOTE — TELEPHONE ENCOUNTER
Per PDMP filled on 01/22/21 but daughter states that it was actually filled in the beginning of the month  I called [facility] and spoke to [place] and it was filled as a 30 day supply on 01/07/21 and patient is actually due for medication

## 2021-03-03 DIAGNOSIS — I48.91 ATRIAL FIBRILLATION, UNSPECIFIED TYPE (HCC): ICD-10-CM

## 2021-03-04 DIAGNOSIS — I50.32 CHRONIC DIASTOLIC HEART FAILURE (HCC): ICD-10-CM

## 2021-03-04 RX ORDER — FUROSEMIDE 20 MG/1
TABLET ORAL
Qty: 60 TABLET | Refills: 3 | Status: SHIPPED | OUTPATIENT
Start: 2021-03-04 | End: 2021-04-22

## 2021-03-04 RX ORDER — DABIGATRAN ETEXILATE MESYLATE 75 MG/1
CAPSULE ORAL
Qty: 60 CAPSULE | Refills: 0 | Status: SHIPPED | OUTPATIENT
Start: 2021-03-04 | End: 2021-03-25 | Stop reason: HOSPADM

## 2021-03-10 DIAGNOSIS — G47.00 INSOMNIA, UNSPECIFIED TYPE: ICD-10-CM

## 2021-03-10 DIAGNOSIS — E03.9 HYPOTHYROIDISM, UNSPECIFIED TYPE: ICD-10-CM

## 2021-03-11 RX ORDER — ZOLPIDEM TARTRATE 12.5 MG/1
TABLET, FILM COATED, EXTENDED RELEASE ORAL
Qty: 30 TABLET | Refills: 0 | Status: SHIPPED | OUTPATIENT
Start: 2021-03-11 | End: 2021-04-19

## 2021-03-11 RX ORDER — LEVOTHYROXINE SODIUM 25 MCG
TABLET ORAL
Qty: 30 TABLET | Refills: 3 | Status: SHIPPED | OUTPATIENT
Start: 2021-03-11 | End: 2021-04-01

## 2021-03-23 ENCOUNTER — APPOINTMENT (EMERGENCY)
Dept: CT IMAGING | Facility: HOSPITAL | Age: 86
End: 2021-03-23
Payer: COMMERCIAL

## 2021-03-23 ENCOUNTER — APPOINTMENT (OUTPATIENT)
Dept: LAB | Facility: CLINIC | Age: 86
End: 2021-03-23
Payer: COMMERCIAL

## 2021-03-23 ENCOUNTER — APPOINTMENT (EMERGENCY)
Dept: RADIOLOGY | Facility: HOSPITAL | Age: 86
End: 2021-03-23
Payer: COMMERCIAL

## 2021-03-23 ENCOUNTER — HOSPITAL ENCOUNTER (OUTPATIENT)
Facility: HOSPITAL | Age: 86
Setting detail: OBSERVATION
Discharge: HOME HEALTH | End: 2021-03-25
Attending: SURGERY | Admitting: SURGERY
Payer: COMMERCIAL

## 2021-03-23 DIAGNOSIS — E78.00 HYPERCHOLESTEROLEMIA: ICD-10-CM
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
## 2021-03-23 DIAGNOSIS — S70.01XA HEMATOMA OF RIGHT HIP, INITIAL ENCOUNTER: ICD-10-CM

## 2021-03-23 DIAGNOSIS — I10 ESSENTIAL HYPERTENSION: ICD-10-CM

## 2021-03-23 DIAGNOSIS — F41.9 ANXIETY: ICD-10-CM

## 2021-03-23 DIAGNOSIS — E03.9 HYPOTHYROIDISM, UNSPECIFIED TYPE: ICD-10-CM

## 2021-03-23 LAB
ALBUMIN SERPL BCP-MCNC: 3.9 G/DL (ref 3.5–5)
ALP SERPL-CCNC: 78 U/L (ref 46–116)
ALT SERPL W P-5'-P-CCNC: 24 U/L (ref 12–78)
ANION GAP SERPL CALCULATED.3IONS-SCNC: 5 MMOL/L (ref 4–13)
ANION GAP SERPL CALCULATED.3IONS-SCNC: 7 MMOL/L (ref 4–13)
AST SERPL W P-5'-P-CCNC: 20 U/L (ref 5–45)
BASOPHILS # BLD AUTO: 0.04 THOUSANDS/ΜL (ref 0–0.1)
BASOPHILS NFR BLD AUTO: 0 % (ref 0–1)
BILIRUB SERPL-MCNC: 1.46 MG/DL (ref 0.2–1)
BUN SERPL-MCNC: 24 MG/DL (ref 5–25)
BUN SERPL-MCNC: 30 MG/DL (ref 5–25)
CALCIUM SERPL-MCNC: 8.9 MG/DL (ref 8.3–10.1)
CALCIUM SERPL-MCNC: 9 MG/DL (ref 8.3–10.1)
CHLORIDE SERPL-SCNC: 102 MMOL/L (ref 100–108)
CHLORIDE SERPL-SCNC: 104 MMOL/L (ref 100–108)
CHOLEST SERPL-MCNC: 120 MG/DL (ref 50–200)
CO2 SERPL-SCNC: 26 MMOL/L (ref 21–32)
CO2 SERPL-SCNC: 27 MMOL/L (ref 21–32)
CREAT SERPL-MCNC: 2.02 MG/DL (ref 0.6–1.3)
CREAT SERPL-MCNC: 2.21 MG/DL (ref 0.6–1.3)
EOSINOPHIL # BLD AUTO: 0.46 THOUSAND/ΜL (ref 0–0.61)
EOSINOPHIL NFR BLD AUTO: 4 % (ref 0–6)
ERYTHROCYTE [DISTWIDTH] IN BLOOD BY AUTOMATED COUNT: 12.9 % (ref 11.6–15.1)
GFR SERPL CREATININE-BSD FRML MDRD: 19 ML/MIN/1.73SQ M
GFR SERPL CREATININE-BSD FRML MDRD: 21 ML/MIN/1.73SQ M
GLUCOSE P FAST SERPL-MCNC: 85 MG/DL (ref 65–99)
GLUCOSE SERPL-MCNC: 124 MG/DL (ref 65–140)
HCT VFR BLD AUTO: 45.8 % (ref 34.8–46.1)
HCT VFR BLD AUTO: 45.9 % (ref 34.8–46.1)
HDLC SERPL-MCNC: 44 MG/DL
HGB BLD-MCNC: 15.4 G/DL (ref 11.5–15.4)
HGB BLD-MCNC: 15.5 G/DL (ref 11.5–15.4)
IMM GRANULOCYTES # BLD AUTO: 0.04 THOUSAND/UL (ref 0–0.2)
IMM GRANULOCYTES NFR BLD AUTO: 0 % (ref 0–2)
INR PPP: 1.64 (ref 0.84–1.19)
LDLC SERPL CALC-MCNC: 45 MG/DL (ref 0–100)
LYMPHOCYTES # BLD AUTO: 1.72 THOUSANDS/ΜL (ref 0.6–4.47)
LYMPHOCYTES NFR BLD AUTO: 15 % (ref 14–44)
MCH RBC QN AUTO: 33.3 PG (ref 26.8–34.3)
MCHC RBC AUTO-ENTMCNC: 33.6 G/DL (ref 31.4–37.4)
MCV RBC AUTO: 99 FL (ref 82–98)
MONOCYTES # BLD AUTO: 0.84 THOUSAND/ΜL (ref 0.17–1.22)
MONOCYTES NFR BLD AUTO: 7 % (ref 4–12)
NEUTROPHILS # BLD AUTO: 8.21 THOUSANDS/ΜL (ref 1.85–7.62)
NEUTS SEG NFR BLD AUTO: 74 % (ref 43–75)
NRBC BLD AUTO-RTO: 0 /100 WBCS
PLATELET # BLD AUTO: 199 THOUSANDS/UL (ref 149–390)
PMV BLD AUTO: 10.4 FL (ref 8.9–12.7)
POTASSIUM SERPL-SCNC: 4.3 MMOL/L (ref 3.5–5.3)
POTASSIUM SERPL-SCNC: 4.4 MMOL/L (ref 3.5–5.3)
PROT SERPL-MCNC: 7.2 G/DL (ref 6.4–8.2)
PROTHROMBIN TIME: 19.5 SECONDS (ref 11.6–14.5)
RBC # BLD AUTO: 4.62 MILLION/UL (ref 3.81–5.12)
SODIUM SERPL-SCNC: 135 MMOL/L (ref 136–145)
SODIUM SERPL-SCNC: 136 MMOL/L (ref 136–145)
TRIGL SERPL-MCNC: 153 MG/DL
TSH SERPL DL<=0.05 MIU/L-ACNC: 4.6 UIU/ML (ref 0.36–3.74)
WBC # BLD AUTO: 11.31 THOUSAND/UL (ref 4.31–10.16)

## 2021-03-23 PROCEDURE — 82803 BLOOD GASES ANY COMBINATION: CPT

## 2021-03-23 PROCEDURE — 82947 ASSAY GLUCOSE BLOOD QUANT: CPT

## 2021-03-23 PROCEDURE — 84443 ASSAY THYROID STIM HORMONE: CPT

## 2021-03-23 PROCEDURE — 80048 BASIC METABOLIC PNL TOTAL CA: CPT | Performed by: SURGERY

## 2021-03-23 PROCEDURE — 85014 HEMATOCRIT: CPT

## 2021-03-23 PROCEDURE — 36415 COLL VENOUS BLD VENIPUNCTURE: CPT

## 2021-03-23 PROCEDURE — NC001 PR NO CHARGE: Performed by: EMERGENCY MEDICINE

## 2021-03-23 PROCEDURE — 84295 ASSAY OF SERUM SODIUM: CPT

## 2021-03-23 PROCEDURE — 80061 LIPID PANEL: CPT

## 2021-03-23 PROCEDURE — 72170 X-RAY EXAM OF PELVIS: CPT

## 2021-03-23 PROCEDURE — 84132 ASSAY OF SERUM POTASSIUM: CPT

## 2021-03-23 PROCEDURE — 70450 CT HEAD/BRAIN W/O DYE: CPT

## 2021-03-23 PROCEDURE — 73130 X-RAY EXAM OF HAND: CPT

## 2021-03-23 PROCEDURE — 85610 PROTHROMBIN TIME: CPT | Performed by: SURGERY

## 2021-03-23 PROCEDURE — 85014 HEMATOCRIT: CPT | Performed by: PHYSICIAN ASSISTANT

## 2021-03-23 PROCEDURE — 99285 EMERGENCY DEPT VISIT HI MDM: CPT

## 2021-03-23 PROCEDURE — 85018 HEMOGLOBIN: CPT | Performed by: PHYSICIAN ASSISTANT

## 2021-03-23 PROCEDURE — 74176 CT ABD & PELVIS W/O CONTRAST: CPT

## 2021-03-23 PROCEDURE — 80053 COMPREHEN METABOLIC PANEL: CPT

## 2021-03-23 PROCEDURE — 71045 X-RAY EXAM CHEST 1 VIEW: CPT

## 2021-03-23 PROCEDURE — 99219 PR INITIAL OBSERVATION CARE/DAY 50 MINUTES: CPT | Performed by: SURGERY

## 2021-03-23 PROCEDURE — 85025 COMPLETE CBC W/AUTO DIFF WBC: CPT | Performed by: SURGERY

## 2021-03-23 RX ORDER — SODIUM CHLORIDE, SODIUM GLUCONATE, SODIUM ACETATE, POTASSIUM CHLORIDE, MAGNESIUM CHLORIDE, SODIUM PHOSPHATE, DIBASIC, AND POTASSIUM PHOSPHATE .53; .5; .37; .037; .03; .012; .00082 G/100ML; G/100ML; G/100ML; G/100ML; G/100ML; G/100ML; G/100ML
75 INJECTION, SOLUTION INTRAVENOUS CONTINUOUS
Status: DISCONTINUED | OUTPATIENT
Start: 2021-03-23 | End: 2021-03-25

## 2021-03-23 RX ORDER — OXYCODONE HYDROCHLORIDE 5 MG/1
2.5 TABLET ORAL EVERY 4 HOURS PRN
Status: DISCONTINUED | OUTPATIENT
Start: 2021-03-23 | End: 2021-03-25 | Stop reason: HOSPADM

## 2021-03-23 RX ORDER — LIDOCAINE 50 MG/G
1 PATCH TOPICAL DAILY
Status: COMPLETED | OUTPATIENT
Start: 2021-03-24 | End: 2021-03-24

## 2021-03-23 RX ORDER — ONDANSETRON 2 MG/ML
4 INJECTION INTRAMUSCULAR; INTRAVENOUS EVERY 6 HOURS PRN
Status: DISCONTINUED | OUTPATIENT
Start: 2021-03-23 | End: 2021-03-25 | Stop reason: HOSPADM

## 2021-03-23 RX ORDER — ACETAMINOPHEN 325 MG/1
975 TABLET ORAL EVERY 8 HOURS SCHEDULED
Status: DISCONTINUED | OUTPATIENT
Start: 2021-03-23 | End: 2021-03-25 | Stop reason: HOSPADM

## 2021-03-23 RX ORDER — DOCUSATE SODIUM 100 MG/1
100 CAPSULE, LIQUID FILLED ORAL 2 TIMES DAILY
Status: DISCONTINUED | OUTPATIENT
Start: 2021-03-23 | End: 2021-03-25 | Stop reason: HOSPADM

## 2021-03-23 RX ORDER — FUROSEMIDE 40 MG/1
40 TABLET ORAL DAILY
Status: DISCONTINUED | OUTPATIENT
Start: 2021-03-24 | End: 2021-03-25 | Stop reason: HOSPADM

## 2021-03-23 RX ORDER — ZOLPIDEM TARTRATE 5 MG/1
5 TABLET ORAL
Status: DISCONTINUED | OUTPATIENT
Start: 2021-03-23 | End: 2021-03-25 | Stop reason: HOSPADM

## 2021-03-23 RX ORDER — ALPRAZOLAM 0.25 MG/1
0.25 TABLET ORAL 3 TIMES DAILY PRN
Status: DISCONTINUED | OUTPATIENT
Start: 2021-03-23 | End: 2021-03-25 | Stop reason: HOSPADM

## 2021-03-23 RX ORDER — LEVOTHYROXINE SODIUM 0.03 MG/1
25 TABLET ORAL
Status: DISCONTINUED | OUTPATIENT
Start: 2021-03-24 | End: 2021-03-25 | Stop reason: HOSPADM

## 2021-03-23 RX ORDER — AMIODARONE HYDROCHLORIDE 200 MG/1
100 TABLET ORAL DAILY
Status: DISCONTINUED | OUTPATIENT
Start: 2021-03-24 | End: 2021-03-25 | Stop reason: HOSPADM

## 2021-03-23 RX ORDER — POLYETHYLENE GLYCOL 3350 17 G/17G
17 POWDER, FOR SOLUTION ORAL DAILY PRN
Status: DISCONTINUED | OUTPATIENT
Start: 2021-03-23 | End: 2021-03-25 | Stop reason: HOSPADM

## 2021-03-23 RX ADMIN — DOCUSATE SODIUM 100 MG: 100 CAPSULE, LIQUID FILLED ORAL at 23:15

## 2021-03-23 RX ADMIN — SODIUM CHLORIDE, SODIUM GLUCONATE, SODIUM ACETATE, POTASSIUM CHLORIDE, MAGNESIUM CHLORIDE, SODIUM PHOSPHATE, DIBASIC, AND POTASSIUM PHOSPHATE 75 ML/HR: .53; .5; .37; .037; .03; .012; .00082 INJECTION, SOLUTION INTRAVENOUS at 23:15

## 2021-03-23 RX ADMIN — ALPRAZOLAM 0.25 MG: 0.25 TABLET ORAL at 23:15

## 2021-03-24 DIAGNOSIS — E03.9 HYPOTHYROIDISM, UNSPECIFIED TYPE: Primary | ICD-10-CM

## 2021-03-24 LAB
ANION GAP SERPL CALCULATED.3IONS-SCNC: 6 MMOL/L (ref 4–13)
BUN SERPL-MCNC: 26 MG/DL (ref 5–25)
CALCIUM SERPL-MCNC: 9 MG/DL (ref 8.3–10.1)
CHLORIDE SERPL-SCNC: 105 MMOL/L (ref 100–108)
CO2 SERPL-SCNC: 24 MMOL/L (ref 21–32)
CREAT SERPL-MCNC: 1.92 MG/DL (ref 0.6–1.3)
ERYTHROCYTE [DISTWIDTH] IN BLOOD BY AUTOMATED COUNT: 12.8 % (ref 11.6–15.1)
GFR SERPL CREATININE-BSD FRML MDRD: 22 ML/MIN/1.73SQ M
GLUCOSE SERPL-MCNC: 112 MG/DL (ref 65–140)
HCT VFR BLD AUTO: 37.4 % (ref 34.8–46.1)
HCT VFR BLD AUTO: 40.4 % (ref 34.8–46.1)
HCT VFR BLD AUTO: 41.5 % (ref 34.8–46.1)
HGB BLD-MCNC: 12.6 G/DL (ref 11.5–15.4)
HGB BLD-MCNC: 13.3 G/DL (ref 11.5–15.4)
HGB BLD-MCNC: 13.4 G/DL (ref 11.5–15.4)
MCH RBC QN AUTO: 32.9 PG (ref 26.8–34.3)
MCHC RBC AUTO-ENTMCNC: 32.3 G/DL (ref 31.4–37.4)
MCV RBC AUTO: 102 FL (ref 82–98)
PLATELET # BLD AUTO: 181 THOUSANDS/UL (ref 149–390)
PMV BLD AUTO: 10.8 FL (ref 8.9–12.7)
POTASSIUM SERPL-SCNC: 4.1 MMOL/L (ref 3.5–5.3)
RBC # BLD AUTO: 4.07 MILLION/UL (ref 3.81–5.12)
SODIUM SERPL-SCNC: 135 MMOL/L (ref 136–145)
WBC # BLD AUTO: 8.69 THOUSAND/UL (ref 4.31–10.16)

## 2021-03-24 PROCEDURE — 85014 HEMATOCRIT: CPT | Performed by: PHYSICIAN ASSISTANT

## 2021-03-24 PROCEDURE — 97163 PT EVAL HIGH COMPLEX 45 MIN: CPT

## 2021-03-24 PROCEDURE — 80048 BASIC METABOLIC PNL TOTAL CA: CPT | Performed by: PHYSICIAN ASSISTANT

## 2021-03-24 PROCEDURE — 85018 HEMOGLOBIN: CPT | Performed by: PHYSICIAN ASSISTANT

## 2021-03-24 PROCEDURE — 99214 OFFICE O/P EST MOD 30 MIN: CPT | Performed by: INTERNAL MEDICINE

## 2021-03-24 PROCEDURE — 97167 OT EVAL HIGH COMPLEX 60 MIN: CPT

## 2021-03-24 PROCEDURE — 85027 COMPLETE CBC AUTOMATED: CPT | Performed by: PHYSICIAN ASSISTANT

## 2021-03-24 PROCEDURE — 99225 PR SBSQ OBSERVATION CARE/DAY 25 MINUTES: CPT | Performed by: SURGERY

## 2021-03-24 RX ADMIN — ZOLPIDEM TARTRATE 5 MG: 5 TABLET ORAL at 21:43

## 2021-03-24 RX ADMIN — LEVOTHYROXINE SODIUM 25 MCG: 25 TABLET ORAL at 06:10

## 2021-03-24 RX ADMIN — LIDOCAINE 5% 1 PATCH: 700 PATCH TOPICAL at 08:18

## 2021-03-24 RX ADMIN — ACETAMINOPHEN 975 MG: 325 TABLET, FILM COATED ORAL at 01:11

## 2021-03-24 RX ADMIN — ZOLPIDEM TARTRATE 5 MG: 5 TABLET ORAL at 01:11

## 2021-03-24 RX ADMIN — ALPRAZOLAM 0.25 MG: 0.25 TABLET ORAL at 18:06

## 2021-03-24 RX ADMIN — FUROSEMIDE 40 MG: 40 TABLET ORAL at 08:17

## 2021-03-24 RX ADMIN — ACETAMINOPHEN 975 MG: 325 TABLET, FILM COATED ORAL at 14:20

## 2021-03-24 RX ADMIN — ACETAMINOPHEN 975 MG: 325 TABLET, FILM COATED ORAL at 21:43

## 2021-03-24 RX ADMIN — AMIODARONE HYDROCHLORIDE 100 MG: 200 TABLET ORAL at 08:17

## 2021-03-24 RX ADMIN — SODIUM CHLORIDE, SODIUM GLUCONATE, SODIUM ACETATE, POTASSIUM CHLORIDE, MAGNESIUM CHLORIDE, SODIUM PHOSPHATE, DIBASIC, AND POTASSIUM PHOSPHATE 75 ML/HR: .53; .5; .37; .037; .03; .012; .00082 INJECTION, SOLUTION INTRAVENOUS at 10:27

## 2021-03-24 RX ADMIN — OXYCODONE HYDROCHLORIDE 2.5 MG: 5 TABLET ORAL at 18:05

## 2021-03-24 RX ADMIN — DOCUSATE SODIUM 100 MG: 100 CAPSULE, LIQUID FILLED ORAL at 08:17

## 2021-03-24 RX ADMIN — ACETAMINOPHEN 975 MG: 325 TABLET, FILM COATED ORAL at 06:10

## 2021-03-24 NOTE — ASSESSMENT & PLAN NOTE
· Non-contrast ct showing right hip soft tissue hematoma  · S/p fall on pradaxa - hold  · Admit to med/surg   · Pressure dressing to right hip with frequent skin checks  · Recheck hemoglobin at 12 and in am

## 2021-03-24 NOTE — CASE MANAGEMENT
LOS: 0 DAYS  PATIENT IS NOT A BUNDLE  PATIENT IS NOT A READMISSION  UNPLANNED RISK OF READMISSION: N/A  PATIENT IS OBS  CM met with the Patient at the bedside, Patient was alert and oriented sitting in the recliner  CM introduced self and role  Patient resides in a single floor home with her daughter, Gio Delvalle  There are 2 JOSE with a railing  Patient reports that she has no difficulty with these steps as they are small and have the railing  Prior to admission, Patient was independent with dressing and bathing, and reports that her daughter completes the cooking and laundry  Patient states that she will assist with some household chores, like dish washing and folding laundry  Patient reports that inside of her home, she used no AD, but utilized a cane when outside of her home  Patient also has a RW, WC, and shower bench  Patient states that she has had VNA services in the past, but was unable to recall the agency  Patient denies any STR history  Patient utilizes [facility] in [place] and is able to afford all of her medications  Patient denies any MH/substance use history or concerns  Patient identifies her daughter, Gio Delvalle, as her health care representative  Patient states that she has a POA and AD/LW; no copy present on the chart  Patient sees her PCP, Dr Packer, every 6 months  Patient is retired and receives [employer] [place] as her main source of income  Patient no longer drives and reports that her daughter provides all necessary transportation  CM reviewed the recommendation for VNA services upon dc  Patient states that she feels that she will only need this for a short time and would appreciate the service to assist her in knowing what to do, and what not to do  Patient requested a referral to [facility] for SN/PT/OT  CM sent referral to [facility] for SN/PT/OT via [address]  CM informed by [facility] that they are able to accept the Patient for their services  CM updated AVS to reflect the same       CM reviewed discharge planning process including the following: identifying caregivers at home, preference for d/c planning needs, availability of treatment team to discuss questions or concerns patient and/or family may have regarding diagnosis, plan of care, old or new medications and discharge planning   CM will continue to follow for care coordination and update assessment as appropriate

## 2021-03-24 NOTE — ASSESSMENT & PLAN NOTE
· Mechanical fall striking her right lateral head on linoleum floor  · No LOC or mental status changes  · Below noted injuries  · Geriatric consult  · PT/OT

## 2021-03-24 NOTE — H&P
H&P Exam - Trauma   Ez Nayak 80 y o  female MRN: [id number]  Unit/Bed#: ED 29 Encounter: [id number]    Hematoma of right hip  Assessment & Plan  · Non-contrast ct showing right hip soft tissue hematoma  · S/p fall on pradaxa - hold  · Admit to med/surg   · Pressure dressing to right hip with frequent skin checks  · Recheck hemoglobin at 12 and in am     Fall  Assessment & Plan  · Mechanical fall striking her right lateral head on linoleum floor  · No LOC or mental status changes  · Below noted injuries  · Geriatric consult  · PT/OT    Chronic diastolic congestive heart failure (HCC)  Assessment & Plan  Wt Readings from Last 3 Encounters:   03/23/21 86 1 kg (189 lb 13 1 oz)   11/23/20 81 2 kg (179 lb)   11/13/20 86 6 kg (191 lb)       · Appears to be at baseline weight  · Continue home lasix 40mg daily  · Monitor weights      CKD (chronic kidney disease), stage IV [facility])  Assessment & Plan  Lab Results   Component Value Date    EGFR 21 03/23/2021    EGFR 23 08/03/2020    EGFR 22 12/02/2019    CREATININE 2 02 (H) 03/23/2021    CREATININE 1 89 (H) 08/03/2020    CREATININE 1 93 (H) 12/02/2019   · Cr at baseline to slightly elevated, baseline appears to be about 1 9  · Gentle hydration today and monitor cr    Paroxysmal atrial fibrillation (HCC)  Assessment & Plan  · On Eliquis and amiodarone  · Currently in sinus rhythm - reports no afib since cardioversion 3 years ago  · With asymptomatic sinus bradycardia  · Hold eliquis with right hip hematoma  · Initiated risk vs benefit discussion regarding pradaxa in a 79 yo in sinus rhythm   Will revisit tomorrow      Assessment/Plan   Trauma Alert: Level B  Model of Arrival: Self  Trauma Team: Attending Jazzy Parkinson and JORGE Arreola 49  Consultants: None    Trauma Active Problems: See above    Trauma Plan: See above    Chief Complaint: Right hip and hand pain    History of Present Illness   HPI:  Ez Nayak is a 80 y o  female with PMH HTN, Afib on pradaxa, CKD stage 3, CHF who presents s/p mechanical fall landing on her right side and striking her head on the linoleum floor  She reports she was standing behind her daughter who was on a step stool when the stool broke and her daughter ran into her causing her to fall to the ground  She has had right hip pain since that time  She noted a bruise on her right hand which grew in size rapidly and became concerned that it would not stop bleeding  She denies LOC, headache, dizziness, blurry vision, chest pain, neck pain, abdominal pain, nausea, or vomiting  Mechanism:Fall    Review of Systems   Constitutional: Negative for activity change, appetite change, chills, diaphoresis, fatigue and fever  HENT: Negative for congestion, ear pain, facial swelling, nosebleeds, postnasal drip, rhinorrhea, sinus pressure and sinus pain  Eyes: Negative for photophobia, pain, redness and visual disturbance  Respiratory: Negative for cough, choking, chest tightness, shortness of breath and wheezing  Cardiovascular: Negative for chest pain and palpitations  Gastrointestinal: Negative for abdominal distention, abdominal pain, blood in stool, constipation, diarrhea, nausea and vomiting  Genitourinary: Negative for difficulty urinating, dysuria, flank pain, frequency, hematuria, pelvic pain and urgency  Musculoskeletal: Positive for arthralgias (chronic)  Negative for back pain and neck pain  Skin:        Right 5th MTP hematoma     Neurological: Negative for dizziness, seizures, syncope, weakness, light-headedness, numbness and headaches  Hematological: Bruises/bleeds easily  Psychiatric/Behavioral: Negative for agitation and confusion  12-point, complete review of systems was reviewed and negative except as stated above         Historical Information       Past Medical History:   Diagnosis Date    A-fib [facility])     Cardiac disease     Hyperlipidemia     Hypertension      Past Surgical History:   Procedure Laterality Date    APPENDECTOMY      CHOLECYSTECTOMY      HERNIA REPAIR      HYSTERECTOMY      NEPHRECTOMY Right      Social History   Social History     Substance and Sexual Activity   Alcohol Use No     Social History     Substance and Sexual Activity   Drug Use Never     Social History     Tobacco Use   Smoking Status Never Smoker   Smokeless Tobacco Never Used     E-Cigarette/Vaping     E-Cigarette/Vaping Substances     Immunization History   Administered Date(s) Administered    Influenza Split High Dose Preservative Free IM 10/11/2011, 09/27/2012, 10/10/2013, 10/09/2014, 10/14/2015, 10/20/2016, 10/04/2017    Influenza, high dose seasonal 0 7 mL 09/24/2018, 09/20/2019, 10/06/2020    Pneumococcal Conjugate 13-Valent 12/14/2015    Pneumococcal Polysaccharide PPV23 10/11/2011    SARS-CoV-2 / COVID-19 mRNA IM (Pf[employer]) 03/08/2021     Last Tetanus:n/a  Family History: Non-contributory      Meds/Allergies   all current active meds have been reviewed    Allergies   Allergen Reactions    Penicillin G Anaphylaxis    Atorvastatin      Other reaction(s): Leg Cramps    Cephalexin Headache    Chocolate     Citalopram Abdominal Pain and Headache    Diltiazem     Fosinopril Sodium-Hctz     Methylprednisolone Nausea Only     Tablet generic only    Monopril [Fosinopril]      Reaction Date: 28Apr2011;     Penicillins     Pollen Extract     Pravastatin Myalgia     Other reaction(s): Leg Cramps    Spironolactone     Sporanox [Itraconazole]      Reaction Date: 28Apr2011;     Strawberry C [Ascorbate]     Caffeine Palpitations     Tachycardia         PHYSICAL EXAM        Objective   Vitals:   First set: Temperature: 98 °F (36 7 °C) (03/23/21 2100)  Pulse: (!) 54 (03/23/21 2100)  Respirations: 18 (03/23/21 2100)  Blood Pressure: (!) 228/88 (03/23/21 2100)    Primary Survey:   (A) Airway: Intact  (B) Breathing: equal bilaterally  (C) Circulation: Pulses:   pedal  2/4, radial  2/4 and femoral  2/4  (D) Disabliity:  GCS Total:  15  (E) Expose:  Completed    Secondary Survey: (Click on Physical Exam tab above)  Physical Exam  Vitals signs and nursing note reviewed  Constitutional:       General: She is not in acute distress  Appearance: Normal appearance  She is not ill-appearing or toxic-appearing  HENT:      Head: Normocephalic and atraumatic  Right Ear: Tympanic membrane normal       Left Ear: Tympanic membrane normal       Nose: Nose normal       Mouth/Throat:      Mouth: Mucous membranes are moist       Pharynx: No oropharyngeal exudate  Eyes:      Extraocular Movements: Extraocular movements intact  Pupils: Pupils are equal, round, and reactive to light  Neck:      Musculoskeletal: Normal range of motion and neck supple  No muscular tenderness  Cardiovascular:      Rate and Rhythm: Bradycardia present  Rhythm irregular  Heart sounds: No murmur  No friction rub  No gallop  Pulmonary:      Effort: Pulmonary effort is normal       Breath sounds: No wheezing, rhonchi or rales  Abdominal:      General: Abdomen is flat  There is no distension  Palpations: Abdomen is soft  Tenderness: There is no abdominal tenderness  There is no guarding or rebound  Musculoskeletal:      Right hip: She exhibits tenderness and swelling  She exhibits normal range of motion and normal strength  Hands:    Skin:     General: Skin is warm and dry  Capillary Refill: Capillary refill takes less than 2 seconds  Neurological:      General: No focal deficit present  Mental Status: She is alert and oriented to person, place, and time  Motor: No weakness           Invasive Devices     Peripheral Intravenous Line            Peripheral IV 03/23/21 Right Wrist less than 1 day                Lab Results:   BMP/CMP:   Lab Results   Component Value Date    SODIUM 135 (L) 03/23/2021    K 4 4 03/23/2021     03/23/2021    CO2 26 03/23/2021    BUN 30 (H) 03/23/2021    CREATININE 2 21 (H) 03/23/2021 CALCIUM 8 9 03/23/2021    AST 20 03/23/2021    ALT 24 03/23/2021    ALKPHOS 78 03/23/2021    EGFR 19 03/23/2021   , CBC:   Lab Results   Component Value Date    WBC 11 31 (H) 03/23/2021    HGB 15 4 03/23/2021    HCT 45 9 03/23/2021    MCV 99 (H) 03/23/2021     03/23/2021    MCH 33 3 03/23/2021    MCHC 33 6 03/23/2021    RDW 12 9 03/23/2021    MPV 10 4 03/23/2021    NRBC 0 03/23/2021    and ISTAT: No components found for: VBG  Imaging/EKG Studies: Chest X-Ray: No acute traumatic injuries, Extremities: Right hand: no obvious fractures or dislocations, CT Scan Head: No acute intracranial hemorrhage, Other: Pelvis xray: no acute fracture or dislocation  Other Studies: none    Code Status: Prior

## 2021-03-24 NOTE — ED NOTES
Patients daughter at [id number] [place] [address], [address] [place] [address]  03/23/21 1367

## 2021-03-24 NOTE — PHYSICAL THERAPY NOTE
Physical Therapy Evaluation    Patient's Name: Jojo Avila    Admitting Diagnosis  Hip injury [S79 919A]  Head injury [S09 90XA]  Hand injury, right, initial encounter [S69 91XA]    Problem List  Patient Active Problem List   Diagnosis    Oral pain of unknown etiology    Paroxysmal atrial fibrillation (HCC)    Hypothyroidism    Anxiety    Cardiomyopathy ([address] )    CKD (chronic kidney disease), stage IV (HCC)    Arthritis    Chronic diastolic congestive heart failure (HCC)    Hyperlipidemia    Hypertension    Insomnia    Contact dermatitis    Sinus bradycardia    Mild aortic stenosis    Bronchitis    Fall    Hematoma of right hip       Past Medical History  Past Medical History:   Diagnosis Date    A-fib [facility])     Cardiac disease     Hyperlipidemia     Hypertension        Past Surgical History  Past Surgical History:   Procedure Laterality Date    APPENDECTOMY      CHOLECYSTECTOMY      HERNIA REPAIR      HYSTERECTOMY      NEPHRECTOMY Right         03/24/21 0904   PT Last Visit   PT Visit Date 03/24/21   Note Type   Note type Evaluation   Pain Assessment   Pain Assessment Tool Pain Assessment not indicated - pt denies pain   Pain Score No Pain   Home Living   Type of [address] One level;Performs ADLs on one level;Stairs to enter with rails  (3 JOSE)   Bathroom Shower/Tub Tub/shower unit   [employer] Raised   Bathroom Equipment Grab bars in shower; Tub transfer bench   Bathroom Accessibility Accessible   Home Equipment Walker;Cane;Wheelchair-manual   Additional Comments pt admits to furniture walking at times   Prior Function   Lives With Daughter  (74 y/o dtr home throughout day)   Preet in the last 6 months 1 to 4  (1 fall )   Vocational Retired   Restrictions/Precautions   Other Precautions Chair Alarm; Bed Alarm;Telemetry; Fall Risk   General   Additional Pertinent History Pt states she fell at home when standing behind her daughter who was on a step stool  Patient currently denies pain however "I know its there "   Family/Caregiver Present No   Cognition   Orientation Level Oriented X4   Comments pt ID by name and    RLE Assessment   RLE Assessment WFL   LLE Assessment   LLE Assessment WFL   Bed Mobility   Rolling L 5  Supervision   Additional items Bedrails; Increased time required   Supine to Sit 5  Supervision   Additional items Increased time required;Verbal cues   Transfers   Sit to Stand 5  Supervision   Additional items Increased time required   Stand to Sit 5  Supervision   Additional items Increased time required   Ambulation/Elevation   Gait pattern Narrow VLADISLAV; Forward Flexion;Decreased foot clearance  (increased sway)   Gait Assistance 4  Minimal assist   Additional items Assist x 1;Verbal cues; Tactile cues  (for RW management, increased proximity)   Assistive Device Rolling walker   Distance 150 ft x1   (0 60 m/s walking speed)   Stair Management Assistance Not tested   Balance   Static Sitting Fair +   Static Standing Fair +   Ambulatory Poor +  (RW)   Activity Tolerance   Activity Tolerance Patient limited by fatigue   Medical Staff [address] OT present   Nurse Made Aware cleared by Chanell Powell   Assessment   Prognosis Good   Problem List Decreased strength;Decreased endurance; Impaired balance;Decreased mobility; Decreased coordination; Impaired judgement;Pain   Assessment Pt is a 80 y o  female seen for PT evaluation s/p admit to [address] [place] [address] on 3/23/2021  Pt was admitted with a primary dx of: hip injury, and hand injury   PT now consulted for assessment of mobility and d/c needs  Pt with Up in chair orders  Pts current comorbidities effecting treatment include: chronic A-Fib, HTN, Hyperlipidemia, OA, CKD,  Personal factors include history of fall and reliance on AD    Pts current clinical presentation is Unstable/ Unpredictable (high complexity) due to Ongoing medical management for primary dx, Increased reliance on more restrictive AD compared to baseline, Decreased activity tolerance compared to baseline, Fall risk, Increased assistance needed from caregiver at current time, Ongoing telemetry monitoring  Prior to admission, pt was modified independent at home with assistance from her daughter  Virginia Montesinos Upon evaluation, pt currently is requiring supervision for bed mobility; supervision for transfers and minimal assitance for ambulation 150 ft w/ RW  Pt presents at PT eval functioning below baseline and currently w/ overall mobility deficits 2* to: BLE weakness, impaired balance, decreased endurance, gait deviations, pain, decreased activity tolerance compared to baseline, decreased functional mobility tolerance compared to baseline, impaired judgement, fall risk  Pt currently at a fall risk 2* to impairments listed above  Pt will continue to benefit from skilled acute PT interventions to address stated impairments; to maximize functional mobility; for ongoing pt/ family training; and DME needs  At conclusion of PT session all needs in reach, RN notified of session findings/recommendations and pt returned back in recliner chair with phone and call bell within reach  Pt denies any further questions at this time  Recommend home with HHPT upon hospital D/C  Goals   Patient Goals to go home    STG Expiration Date 04/03/21   Short Term Goal #1 In 10 days pt will be able to: 1  Demonstrate ability to perform all aspects of bed mobility independently to increase functional independence  2  Perform functional transfers independently to facilitate safe return to previous living environment  3   Ambulate 220 ft with a RW modified independently  with stable vitals to improve safety with household distances and reduce fall risk  4  Improve LE strength grades by 1 to increase ease of functional mobility with transfers and gait  5  Pt will demonstrate improved balance by one grade in order to decrease risk of falls  7  Patient will improve gait speed to at least 0 8 m/s for  Improved household and community ambulation  Plan   Treatment/Interventions Functional transfer training;LE strengthening/ROM; Therapeutic exercise; Endurance training;Equipment eval/education; Bed mobility;Gait training   PT Frequency 3-5x/wk   Recommendation   PT Discharge Recommendation Home with skilled therapy   AM-[facility] Basic Mobility Inpatient   Turning in Bed Without Bedrails 4   Lying on Back to Sitting on Edge of Flat Bed 4   Moving Bed to Chair 3   Standing Up From Chair 3   Walk in Room 3   Climb 3-5 Stairs 3   Basic Mobility Inpatient Raw Score 20   Basic Mobility Standardized Score 43 99   Barthel Index   Feeding 10   Bathing 5   Grooming Score 5   Dressing Score 5   Bladder Score 10   Bowels Score 10   Toilet Use Score 5   Transfers (Bed/Chair) Score 10   Mobility (Level Surface) Score 10   Stairs Score 5   Barthel Index Score 75   The patient's [facility] Basic Mobility Inpatient Short Form Raw Score is 20, Standardized Score is 43 99  A standardized score greater than 42 9 suggests the patient may benefit from discharge to home  Please also refer to the recommendation of the Physical Therapist for safe discharge planning        Gait Speed Interpretation:  Gain of 0 1 m/s is a predictor of well-being in those w/ abnormal walking speed compared to age-patched peers  <0 7m/s is at an increased risk for falls    Household ambulator: <0 4 m/s  Limited community ambulator: 0 4-0 8 m/s  Community ambulator: 0 8-1 2 m/s  Able to safely cross streets: >1 2 m/s          Nadia Peck, PT, DPT

## 2021-03-24 NOTE — ASSESSMENT & PLAN NOTE
Lab Results   Component Value Date    EGFR 21 03/23/2021    EGFR 23 08/03/2020    EGFR 22 12/02/2019    CREATININE 2 02 (H) 03/23/2021    CREATININE 1 89 (H) 08/03/2020    CREATININE 1 93 (H) 12/02/2019   · Cr at baseline to slightly elevated, baseline appears to be about 1 9  · Gentle hydration today and monitor cr

## 2021-03-24 NOTE — ASSESSMENT & PLAN NOTE
· Non-contrast ct showing right hip soft tissue hematoma  · S/p fall on pradaxa - hold  · Admit to med/surg   · Pressure dressing to right hip with frequent skin checks  · hgb stable at 2300  · Am labs pending if hgb stable will start chemical dvt prophylaxis

## 2021-03-24 NOTE — ASSESSMENT & PLAN NOTE
Lab Results   Component Value Date    EGFR 19 03/23/2021    EGFR 21 03/23/2021    EGFR 23 08/03/2020    CREATININE 2 21 (H) 03/23/2021    CREATININE 2 02 (H) 03/23/2021    CREATININE 1 89 (H) 08/03/2020   · Cr at baseline to slightly elevated, baseline appears to be about 1 9  · Gentle hydration today and monitor cr  · Am labs pending will d/c ivf if cr returns to baseline

## 2021-03-24 NOTE — ASSESSMENT & PLAN NOTE
Wt Readings from Last 3 Encounters:   03/23/21 86 1 kg (189 lb 13 1 oz)   11/23/20 81 2 kg (179 lb)   11/13/20 86 6 kg (191 lb)       · Appears to be at baseline weight  · Continue home lasix 40mg daily  · Monitor weights

## 2021-03-24 NOTE — PLAN OF CARE
Problem: OCCUPATIONAL THERAPY ADULT  Goal: Performs self-care activities at highest level of function for planned discharge setting  See evaluation for individualized goals  Description: Treatment Interventions: ADL retraining, Functional transfer training, Endurance training, Cognitive reorientation, Patient/family training, Equipment evaluation/education, Compensatory technique education, Energy conservation, Activityengagement          See flowsheet documentation for full assessment, interventions and recommendations  Note: Limitation: Decreased ADL status, Decreased UE strength, Decreased Safe judgement during ADL, Decreased endurance, Decreased self-care trans, Decreased high-level ADLs  Prognosis: Good  Assessment: Patient is a 80 y o  female admitted to [facility] on 3/23/2021 due to Hematoma of right hip  Pt admitted under trauma team s/p fall at home  Pt does not have any WB status at this time and no fractures noted in imaging  Comorbidities affecting pt's physical performance at time of assessment include a fib, CKD, fall  Patient has active OT orders and activity orders for Up in chair  PTA pt living with daughter in [facility], pt (I) with ADLs and requires (A) with IADLs, no use of AD in home, use of SPC in community  (+)fall, (-)drives  Personal factors affecting pt at time of IE include:steps to enter environment, difficulty performing ADLS and difficulty performing IADLS   At the time of evaluation patient currently requires (S) for UB ADLs, (S) for LB ADLs, (S) for functional transfers, and min A for functional mobility  The following deficits affected patient's occupational performance weakness, decreased functional strength, decreased functional balance, decreased activity tolerance, decreased safety awareness, increased pain and decreased endurance  Patient would benefit from skilled OT services while in the hospital to address above deficits   Occupational performance areas to be addressed include ADL retraining, bed mobility, functional transfer training, endurance training, patient/family training, equipment evaluation/education, compensatory technique education, activity engagement and activity tolerance in order to maximize patient's level of function  The patient's raw score on the AM-PAC Daily Activity inpatient short form is 19, standardized score is 40 22, greater than 39 4  Patients at this level are likely to benefit from discharge to home  Recommend d/c to home with Jessica Sotelo'JONO Jauregui, will continue to follow 3-5x/wk to address goals listed below        OT Discharge Recommendation: Home with skilled therapy

## 2021-03-24 NOTE — CONSULTS
Consultation - Geriatric Medicine   Kirt Noble 80 y o  female MRN: [id number]  Unit/Bed#: S -01 Encounter: [id number]        Inpatient consult to Gerontology for [address] performed by: Rocio Dunne MD  Consult ordered by: Raheel Ware PA-C            Assessment/Plan   1 -fall -patient states that her daughter was in a stair step when that gave way the daughter fell into patient causing her to fall  Subsequent evaluation revealed a right hip soft tissue hematoma and injury to her right wrist   The patient is on Pradaxa and this medication was held until she stabilizes  2 -history of chronic diastolic congestive heart failure  -stable at present patient's previous echocardiogram revealed a 30% ejection fraction patient with evidence of cardiomyopathy  Echocardiogram was performed in 2017 revealing dilatation of the ventricle with moderate diffuse hypokinesis and distinct regional wall motion abnormalities  There was more severe hypokinesis seen in the anterior and anteroseptal walls  3  -holosystolic murmur -patient with a grade 3/6 holosystolic murmur heard best over the mitral focus with radiation to the left axilla  Previous echocardiogram had revealed evidence of mild regurgitation that was approximately 4 years ago     4  -chronic renal failure stage 4 - GFR on admission at 21 with a creatinine of 2 02 patient needs to avoid nephrotoxins such as NSAIDs or CT scans with contrast     5 -history of paroxysmal atrial fibrillation  -patient had cardioversion in the past has been placed on Pradaxa  The patient also is on amiodarone for rate control  6 -history of anxiety  -patient has been taking Xanax 4 times daily they have attempted to reduce medications in the past with little success  7 -insomnia  -patient also takes Ambien 12 5 mg at bedtime    I highly discouraged patients her age taking this medication however they have attempted multiple times to discontinue the medication with little success  8 -hypothyroidism  -patient had been taking 25 mcg of levothyroxine TSH was noted to be slightly elevated on the 23rd at 4 6 had received a call from primary care to increased dosage of levothyroxine  9 -sensorineural hearing loss -she did quite well with the [facility]  Recommendations    1 -patient should resume her Pradaxa once her hematoma has stabilized    2 -continue active follow-up with cardiology given the fact that she has cardiomyopathy history of systolic-diastolic heart failure  3 -I strongly recommend to the patient to try discontinuing Ambien and decreasing her Xanax by 20% every 2 weeks  Patient does not wish to do this  This medications will increase her risk of falling  History of Present Illness   Physician Requesting Consult: Gladys Mendoza DO  Reason for Consult / Principal Problem: Fall                                                                      Hx and PE limited by:  No limitations  Additional history obtained from:  Patient's daughter Leo Prajapati phone number [phone]  HPI: Jojo Avila is a 80y o  year old  female who presents with a history of a fall she states that her daughter was on a ladder that gave way the daughter fell into the patient causing her to fall  She developed right hip discomfort and had a bruise in her right hand that expanded rapidly causing her to seek evaluation  Patient has multiple comorbidities which include a history of hypertension, paroxysmal atrial fib, chronic renal failure stage 4, hypothyroidism, insomnia, anxiety  Patient had a previous echocardiogram that revealed evidence of cardiomyopathy with an ejection fraction of 30% this was performed in 2017  Review of Systems   Constitutional: Negative  HENT: Positive for hearing loss  Patient is edentulous   Eyes:        Patient wears glasses   Respiratory: Negative  Cardiovascular: Negative      Endocrine: Negative  Genitourinary: Negative  Musculoskeletal:        Patient has ecchymotic area in her right arm and wrist also has right hip hematoma  Skin: Positive for color change  Ecchymotic area right arm and hip   Neurological: Negative  Hematological: Negative  Psychiatric/Behavioral:        Patient with history of anxiety and insomnia       Memory/Cognitive screening:  Patient with excellent cognition according to the daughter  Mobility:  Her gait has gotten slower according to the daughter but she is able to ambulate  Falls:  She did not have a history of falls until the unfortunate accident that resulted in current admission  Assistive Devices:  Patient uses a single-point cane when she gets out of the house inside the house she is able to utilize the furniture to stabilize her gait  Fraility:  A side of her slower gait she has been fairly stable  Nutrition/weight loss/grocery shopping/meal preparation:  Patient has a great appetite  Vision impairment:  No evidence of macular degeneration or glaucoma she does utilize glasses for reading  Hearing impairment:  Patient with sensorineural hearing loss she does not have hearing aids  Incontinence:  No evidence of incontinence  Delirium:  No evidence of delirium  Polypharmacy:  Patient taking amiodarone 100 mg orally daily, Lasix 40 mg orally daily, takes 325 mg of Tylenol up to twice a day for her back pain, she had been on Pradaxa 75 mg twice a day but this was discontinued on present admission, Crestor 5 mg orally daily, levothyroxine 25 mcg orally daily, alprazolam 0 25 mg 4 times daily, zolpidem 12 5 mg at bedtime  This information was obtained from her daughter who lives with the patient  She does not recall the patient ever being on Eliquis    Patients primary residence:  Patient lives in a [address] Lives with:  Daughter  iADL's:  Patient balances her own checkbook and gets annoyed with the bank when they miss on the interest payments and she is usually right  ADL's:  Patient enjoys all her basic activities of daily living    Historical Information   Past medical history:  Hypertension hyperlipidemia, cardiac disease, atrial fibrillation  Past surgical history:  History of right nephrectomy, hysterectomy, hernia repair, cholecystectomy, appendectomy  Social history:  Patient has been  since  she was in her 3rd marriage, patient has an 8th grade education, patient worked as a  and raised her family  She had 2 children a daughter and a son  Patient did smoke in the distant past in the 62s did not drink  Family history:  Both parents  in her 80s from natural causes  Meds/Allergies   All current active meds have been reviewed  Allergies   Allergen Reactions    Penicillin G Anaphylaxis    Atorvastatin      Other reaction(s): Leg Cramps    Cephalexin Headache    Chocolate     Citalopram Abdominal Pain and Headache    Diltiazem     Fosinopril Sodium-Hctz     Methylprednisolone Nausea Only     Tablet generic only    Monopril [Fosinopril]      Reaction Date: 2011;     Penicillins     Pollen Extract     Pravastatin Myalgia     Other reaction(s): Leg Cramps    Spironolactone     Sporanox [Itraconazole]      Reaction Date: ;     Strawberry C [Ascorbate]     Caffeine Palpitations     Tachycardia       Objective   Vitals:    21 1439   BP: 130/72   Pulse: 63   Resp: 18   Temp: 97 9 °F (36 6 °C)   SpO2: 95%       Intake/Output Summary (Last 24 hours) at 3/24/2021 1527  Last data filed at 3/24/2021 1447  Gross per 24 hour   Intake 1338 ml   Output 975 ml   Net 363 ml     Invasive Devices     Peripheral Intravenous Line            Peripheral IV 21 Right Antecubital less than 1 day                Physical Exam  Constitutional:       Appearance: Normal appearance  HENT:      Head: Normocephalic and atraumatic        Right Ear: Tympanic membrane, ear canal and external ear normal       Left Ear: Tympanic membrane, ear canal and external ear normal       Nose: Nose normal       Mouth/Throat:      Mouth: Mucous membranes are moist    Eyes:      Conjunctiva/sclera: Conjunctivae normal       Pupils: Pupils are equal, round, and reactive to light  Neck:      Musculoskeletal: Normal range of motion  Cardiovascular:      Rate and Rhythm: Normal rate and regular rhythm  Heart sounds: Murmur present  Comments: Patient with a grade 3/6 holosystolic murmur heard best over the mitral area with radiation to the left axilla  Abdominal:      General: Abdomen is flat  Bowel sounds are normal    Musculoskeletal: Normal range of motion  Skin:     General: Skin is warm  Neurological:      General: No focal deficit present  Mental Status: She is alert  Mental status is at baseline  Psychiatric:         Mood and Affect: Mood normal          Behavior: Behavior normal          Thought Content: Thought content normal          Judgment: Judgment normal          Lab Results:   I have personally reviewed pertinent lab and imaging results       VTE Prophylaxis: Sequential compression device Rizwan Adams     Code Status: Level 3 - DNAR and DNI  Advance Directive and Living Will:      Power of :    POLST:      Family and Social Support:  Patient has a very supportive daughter  No data recorded

## 2021-03-24 NOTE — OCCUPATIONAL THERAPY NOTE
Occupational Therapy Evaluation     Patient Name: Musa Hernandez  Today's Date: 3/24/2021  Problem List  Principal Problem:    Hematoma of right hip  Active Problems:    Paroxysmal atrial fibrillation (HCC)    CKD (chronic kidney disease), stage IV (HCC)    Chronic diastolic congestive heart failure ([address] )    Fall    Past Medical History  Past Medical History:   Diagnosis Date    A-fib [facility])     Cardiac disease     Hyperlipidemia     Hypertension      Past Surgical History  Past Surgical History:   Procedure Laterality Date    APPENDECTOMY      CHOLECYSTECTOMY      HERNIA REPAIR      HYSTERECTOMY      NEPHRECTOMY Right              03/24/21 0842   OT Last Visit   OT Visit Date 03/24/21   Note Type   Note type Evaluation   Restrictions/Precautions   Other Precautions Chair Alarm; Bed Alarm; Fall Risk;Fluid restriction   Pain Assessment   Pain Assessment Tool Pain Assessment not indicated - pt denies pain   Pain Score No Pain   Home Living   Type of [address] One level;Performs ADLs on one level;Stairs to enter with rails  (3STE)   Bathroom Shower/Tub Tub/shower unit   [employer] Raised   Bathroom Equipment Grab bars in shower; Tub transfer bench   Bathroom Accessibility Accessible   Home Equipment Walker;Cane;Wheelchair-manual   Additional Comments use of furniture use of SPC incommunit   Prior Function   Lives With Daughter  (home throughout day)   Receives Help From Family   ADL Assistance Independent   IADLs Needs assistance  ((I) with meds, A cooking and cleaning)   Falls in the last 6 months 1 to 4  (reason for admission)   Vocational Retired   Comments (-) drives   Lifestyle   Autonomy PTA pt living with daughter in [facility], pt (I) with ADLs and requires (A) with IADLs, no use of AD in home, use of SPC in community  (+)fall, (-)drives   Reciprocal Relationships supportive daughter, home throughout the day   Service to Others retired   Intrinsic Gratification enjoys being outside, likes to pull weeds   ADL   Eating Assistance 6  Modified independent   Grooming Assistance 5  Supervision/Setup   UB Bathing Assistance 5  Supervision/Setup   LB Bathing Assistance 5  Supervision/Setup   UB Dressing Assistance 5  Supervision/Setup   LB Dressing Assistance 5  Supervision/Setup   LB Dressing Deficit Thread RLE into pants; Thread LLE into pants;Pull up over hips   Toileting Assistance  5  Supervision/Setup   Bed Mobility   Rolling L 5  Supervision   Additional items Increased time required;Verbal cues   Supine to Sit 5  Supervision   Additional items Increased time required;Verbal cues   Additional Comments OOB and in chair at end of session   Transfers   Sit to Stand 5  Supervision   Additional items Increased time required;Verbal cues   Stand to Sit 5  Supervision   Additional items Increased time required;Verbal cues   Additional Comments no use of AD   Functional Mobility   Functional Mobility 4  Minimal assistance   Additional Comments Ax1, with RW, pt steering into walls and requiring min VC for safety   Additional items Rolling walker   Balance   Static Sitting Fair +   Dynamic Sitting Fair +   Static Standing Fair   Dynamic Standing Fair -   Ambulatory Poor +   Activity Tolerance   Activity Tolerance Patient limited by fatigue   Medical Staff Made Aware PT Rose To, spoke with CM Lance Echeverria and PA Emily Marmolejo   RUE Assessment   RUE Assessment WFL   LUE Assessment   LUE Assessment WFL   Hand Function   Gross Motor Coordination Functional   Fine Motor Coordination Functional   Cognition   Overall Cognitive Status WFL   Arousal/Participation Alert; Cooperative   Attention Attends with cues to redirect   Orientation Level Oriented X4   Memory Within functional limits   Following Commands Follows one step commands without difficulty   Comments Pleasant and cooperative   Assessment   Limitation Decreased ADL status; Decreased UE strength;Decreased Safe judgement during ADL;Decreased endurance;Decreased self-care trans;Decreased high-level ADLs   Prognosis Good   Assessment Patient is a 80 y o  female [facility] due to Hematoma of right hip  Pt admitted under trauma team s/p fall at home  Pt does not have any WB status at this time and no fractures noted in imaging  Comorbidities affecting pt's physical performance at time of assessment include a fib, CKD, fall  Patient has active OT orders and activity orders for Up in chair  PTA pt living with daughter in [facility], pt (I) with ADLs and requires (A) with IADLs, no use of AD in home, use of SPC in community  (+)fall, (-)drives  Personal factors affecting pt at time of IE include:steps to enter environment, difficulty performing ADLS and difficulty performing IADLS   At the time of evaluation patient currently requires (S) for UB ADLs, (S) for LB ADLs, (S) for functional transfers, and min A for functional mobility  The following deficits affected patient's occupational performance weakness, decreased functional strength, decreased functional balance, decreased activity tolerance, decreased safety awareness, increased pain and decreased endurance  Patient would benefit from skilled OT services while in the hospital to address above deficits  Occupational performance areas to be addressed include ADL retraining, bed mobility, functional transfer training, endurance training, patient/family training, equipment evaluation/education, compensatory technique education, activity engagement and activity tolerance in order to maximize patient's level of function  The patient's raw score on the AM-PAC Daily Activity inpatient short form is 19, standardized score is 40 22, greater than 39 4  Patients at this level are likely to benefit from discharge to home  Recommend d/c to home with [address] [place] [address], will continue to follow 3-5x/wk to address goals listed below      Goals   Patient Goals to go home   LTG Time Frame 10-14   Long Term Goal see goals listed below   Plan Treatment Interventions ADL retraining;Functional transfer training; Endurance training;Cognitive reorientation;Patient/family training;Equipment evaluation/education; Compensatory technique education; Energy conservation; Activityengagement   Goal Expiration Date 04/07/21   OT Treatment Day 0   OT Frequency 3-5x/wk   Recommendation   OT Discharge Recommendation Home with skilled therapy   AM-PAC Daily Activity Inpatient   Lower Body Dressing 3   Bathing 3   Toileting 3   Upper Body Dressing 3   Grooming 3   Eating 4   Daily Activity Raw Score 19   Daily Activity Standardized Score (Calc for Raw Score >=11) 40 22   AM-PAC Applied Cognition Inpatient   Following a Speech/Presentation 3   Understanding Ordinary Conversation 4   Taking Medications 3   Remembering Where Things Are Placed or Put Away 2   Remembering List of 4-5 Errands 2   Taking Care of Complicated Tasks 2   Applied Cognition Raw Score 16   Applied Cognition Standardized Score 35 03   Barthel Index   Feeding 10   Bathing 0   Grooming Score 5   Dressing Score 5   Bladder Score 10   Bowels Score 10   Toilet Use Score 5   Transfers (Bed/Chair) Score 10   Mobility (Level Surface) Score 10   Stairs Score 5   Barthel Index Score 70        Goals    -Patient will complete UB ADLs w/ mod I using AE and AD as needed    -Patient will complete LB ADLs w/ mod I using AE and AD as needed    -Patient will complete toileting w/ mod I w/ G hygiene/thoroughness    -Patient will complete bed mobility with Mod I without use of bed rails    -Patient will tolerate therapeutic activities for greater than 15 min, in order to increase tolerance for functional activities      -Patient will perform functional transfers with Mod I to/from all surfaces using DME as needed    -Patient will complete functional mobility during ADL/IADL/leisure tasks with Mod I     -Patient will follow multistep instructions with no cuing to increase cognitive function and independence with tasks -Patient will demonstrate 100% carryover of energy conservation techniques t/o functional I/ADL/leisure tasks w/o cues s/p skilled education to increase endurance during functional tasks    -Patient will increase BUE strength to 4+/5 via AROM/AAROM/PROM exercises to increase independence in ADLs and transfers    -Patient will be attentive 100% of the time during ongoing cognitive assessment w/ G participation to assist w/ safe d/c planning/recommendations     -Patient will participate in simulated IADL management task to increase independence to Mod I w/ G safety and endurance          At the end of the session, all needs met and pt seated in bedside chair, chair alarm activated, LEs elevated  and call bell within reach    [facility], OTR/L

## 2021-03-24 NOTE — ASSESSMENT & PLAN NOTE
· On Pradaxa and amiodarone  · Currently in sinus rhythm - reports no afib since cardioversion 3 years ago  · With asymptomatic sinus bradycardia  · Hold Pradaxa with right hip hematoma  · Consider stopping Pradaxa given risk benefit, discussed with patient who is interested in stopping  · [id number] score 5 with 7 5% per year risk of CVA however patient is not in afib making her risk much lower

## 2021-03-24 NOTE — PLAN OF CARE
Problem: PHYSICAL THERAPY ADULT  Goal: Performs mobility at highest level of function for planned discharge setting  See evaluation for individualized goals  Outcome: Progressing  Note: Prognosis: Good  Problem List: Decreased strength, Decreased endurance, Impaired balance, Decreased mobility, Decreased coordination, Impaired judgement, Pain  Assessment: Pt is a 80 y o  female seen for PT evaluation s/p admit to [facility] on 3/23/2021  Pt was admitted with a primary dx of: hip injury, and hand injury   PT now consulted for assessment of mobility and d/c needs  Pt with Up in chair orders  Pts current comorbidities effecting treatment include: chronic A-Fib, HTN, Hyperlipidemia, OA, CKD,  Personal factors include history of fall and reliance on AD  Pts current clinical presentation is Unstable/ Unpredictable (high complexity) due to Ongoing medical management for primary dx, Increased reliance on more restrictive AD compared to baseline, Decreased activity tolerance compared to baseline, Fall risk, Increased assistance needed from caregiver at current time, Ongoing telemetry monitoring  Prior to admission, pt was modified independent at home with assistance from her daughter  Zoila Walter Upon evaluation, pt currently is requiring supervision for bed mobility; supervision for transfers and minimal assitance for ambulation 150 ft w/ RW  Pt presents at PT eval functioning below baseline and currently w/ overall mobility deficits 2* to: BLE weakness, impaired balance, decreased endurance, gait deviations, pain, decreased activity tolerance compared to baseline, decreased functional mobility tolerance compared to baseline, impaired judgement, fall risk  Pt currently at a fall risk 2* to impairments listed above  Pt will continue to benefit from skilled acute PT interventions to address stated impairments; to maximize functional mobility; for ongoing pt/ family training; and DME needs   At conclusion of PT session all needs in reach, RN notified of session findings/recommendations and pt returned back in recliner chair with phone and call bell within reach  Pt denies any further questions at this time  Recommend home with HHPT upon hospital D/C  PT Discharge Recommendation: Home with skilled therapy          See flowsheet documentation for full assessment

## 2021-03-24 NOTE — PROGRESS NOTES
[facility]  Progress Note - Ez Nayak 8/26/1926, 80 y o  female MRN: [id number]  Unit/Bed#: S -01 Encounter: [id number]  Primary Care Provider: Kimberlyn Ruffin DO   Date and time admitted to hospital: 3/23/2021  9:00 PM    Fall  Assessment & Plan  · Mechanical fall striking her right lateral head on linoleum floor  · No LOC or mental status changes  · Below noted injuries  · Geriatric consult  · PT/OT    Chronic diastolic congestive heart failure (HCC)  Assessment & Plan  Wt Readings from Last 3 Encounters:   03/23/21 86 1 kg (189 lb 13 1 oz)   11/23/20 81 2 kg (179 lb)   11/13/20 86 6 kg (191 lb)       · Appears to be at baseline weight  · Continue home lasix 40mg daily  · Monitor weights      CKD (chronic kidney disease), stage IV [facility])  Assessment & Plan  Lab Results   Component Value Date    EGFR 19 03/23/2021    EGFR 21 03/23/2021    EGFR 23 08/03/2020    CREATININE 2 21 (H) 03/23/2021    CREATININE 2 02 (H) 03/23/2021    CREATININE 1 89 (H) 08/03/2020   · Cr at baseline to slightly elevated, baseline appears to be about 1 9  · Gentle hydration today and monitor cr  · Am labs pending will d/c ivf if cr returns to baseline    Paroxysmal atrial fibrillation (HCC)  Assessment & Plan  · On Pradaxa and amiodarone  · Currently in sinus rhythm - reports no afib since cardioversion 3 years ago  · With asymptomatic sinus bradycardia  · Hold Pradaxa with right hip hematoma  · Consider stopping Pradaxa given risk benefit, discussed with patient who is interested in stopping  · [id number] score 5 with 7 5% per year risk of CVA however patient is not in afib making her risk much lower       * Hematoma of right hip  Assessment & Plan  · Non-contrast ct showing right hip soft tissue hematoma  · S/p fall on pradaxa - hold  · Admit to med/surg   · Pressure dressing to right hip with frequent skin checks  · hgb stable at 2300  · Am labs pending         TERTIARY TRAUMA SURVEY NOTE    Prophylaxis: Reason for no pharmacologic prophylaxis right gluteal hematoma    Disposition: Pending Pt/OT eval anticipate d/c to  home    Code status:  Level 3 - DNAR and DNI    Consultants: Geriatrics    Is the patient 72 years or older?: YES:    1  Before the illness or injury that brought you to the Emergency, did you need someone to help you on a regular basis? 1=Yes   2  Since the illness or injury that brought you to the Emergency, have you needed more help than usual to take care of yourself? 0=No   3  Have you been hospitalized for one or more nights during the past 6 months (excluding a stay in the Emergency Department)? 0=No   4  In general, do you see well? 0=Yes   5  In general, do you have serious problems with your memory? 0=No   6  Do you take more than three different medications everyday? 1=Yes   TOTAL   2     Did you order a geriatric consult if the score was 2 or greater?: yes          SUBJECTIVE:     Transfer from: home  Outside Films Received: not applicable  Tertiary Exam Due on: 3/34/21    Mechanism of Injury:Fall    Details related to Injury: mechanical fall from standing with head strike on linoleum floor    Chief Complaint: Right hip and hand pain    HPI/Last 24 hour events: Reports the bruise on her right hand grew overnight and she developed bruising on her buttock  She reports PM mouth pain since transitioning brands of thyroid medication  She denies other pain, weakness, blurry vision, dizziness, or headaches       Active medications:           Current Facility-Administered Medications:     acetaminophen (TYLENOL) tablet 975 mg, 975 mg, Oral, Q8H LATHA, 975 mg at 03/24/21 0610    ALPRAZolam (XANAX) tablet 0 25 mg, 0 25 mg, Oral, TID PRN, 0 25 mg at 03/23/21 2315    amiodarone tablet 100 mg, 100 mg, Oral, Daily    docusate sodium (COLACE) capsule 100 mg, 100 mg, Oral, BID, 100 mg at 03/23/21 2315    furosemide (LASIX) tablet 40 mg, 40 mg, Oral, Daily    levothyroxine tablet 25 mcg, 25 mcg, Oral, Early Morning, 25 mcg at 03/24/21 0610    lidocaine (LIDODERM) 5 % patch 1 patch, 1 patch, Topical, Daily    multi-electrolyte (PLASMALYTE-A/ISOLYTE-S PH 7 4) IV solution, 75 mL/hr, Intravenous, Continuous, 75 mL/hr at 03/23/21 2315    ondansetron (ZOFRAN) injection 4 mg, 4 mg, Intravenous, Q6H PRN    oxyCODONE (ROXICODONE) IR tablet 2 5 mg, 2 5 mg, Oral, Q4H PRN    polyethylene glycol (MIRALAX) packet 17 g, 17 g, Oral, Daily PRN    zolpidem (AMBIEN) tablet 5 mg, 5 mg, Oral, HS PRN, 5 mg at 03/24/21 0111      OBJECTIVE:     Vitals:   Vitals:    03/24/21 0448   BP: (!) 187/70   Pulse: (!) 46   Resp:    Temp: 97 5 °F (36 4 °C)   SpO2: 96%       Physical Exam:   GENERAL APPEARANCE: No acute distress sitting up in chair  NEURO: AAOX3, GCS 15, no focal neuro deficit  HEENT: PERRL, EOMI, Mucus membranes moist without sores, edema, or thursh  CV: Bradycardic and regular without murmur, rub, or gallop  LUNGS: clear to ausc bilaterally without wheezes, crackles, or rhonchi  GI: soft, non-tender, non-distended  : voiding independently  MSK: non-tender, no deformity, FAROM  SKIN: right hand hematoma with lateral spread, see photo  I/O:   I/O       03/22 0701 - 03/23 0700 03/23 0701 - 03/24 0700    Urine (mL/kg/hr)  175    Total Output  175    Net  -175                Invasive Devices: Invasive Devices     Peripheral Intravenous Line            Peripheral IV 03/23/21 Right Wrist less than 1 day                  Imaging:   Ct Abdomen Pelvis Wo Contrast    Result Date: 3/23/2021  Impression: 1  Large irregular subcutaneous hematoma in the right gluteal region  2   No evidence of acute visceral injury within the abdomen or pelvis  Workstation performed: [id number]     Trauma - Ct Head Wo Contrast    Result Date: 3/23/2021  Impression: No intracranial hemorrhage or calvarial fracture   Workstation performed: [id number]       Labs:   CBC:   Lab Results   Component Value Date    WBC 11 31 (H) 03/23/2021    HGB 15 5 (H) 03/23/2021    HCT 45 8 03/23/2021    MCV 99 (H) 03/23/2021     03/23/2021    MCH 33 3 03/23/2021    MCHC 33 6 03/23/2021    RDW 12 9 03/23/2021    MPV 10 4 03/23/2021    NRBC 0 03/23/2021     CMP:   Lab Results   Component Value Date     03/23/2021    CO2 26 03/23/2021    BUN 30 (H) 03/23/2021    CREATININE 2 21 (H) 03/23/2021    CALCIUM 8 9 03/23/2021    AST 20 03/23/2021    ALT 24 03/23/2021    ALKPHOS 78 03/23/2021    EGFR 19 03/23/2021

## 2021-03-24 NOTE — ED PROVIDER NOTES
Emergency Department Airway Evaluation and Management Form    History  Obtained from: Patient  Penicillin g, Atorvastatin, Cephalexin, Chocolate, Citalopram, Diltiazem, Fosinopril sodium-hctz, Methylprednisolone, Monopril [fosinopril], Penicillins, Pollen extract, Pravastatin, Spironolactone, Sporanox [itraconazole], Strawberry c [ascorbate], and Caffeine  Chief Complaint   Patient presents with    Fall       History provided by:  Patient   used: No    Fall  80year-old female with fall and head strike on Pradaxa  Complains of right hip pain, right hand pain with hematoma over the dorsum of right hand  Airway intact  Breath sounds equal   Pulses normal   Further management per trauma team       Past Medical History:   Diagnosis Date    A-fib ([facility] [address] )     Cardiac disease     Hyperlipidemia     Hypertension      Past Surgical History:   Procedure Laterality Date    APPENDECTOMY      CHOLECYSTECTOMY      HERNIA REPAIR      HYSTERECTOMY      NEPHRECTOMY Right      Family History   Problem Relation Age of Onset    Heart disease Family     Hypertension Family     Cancer Family      Social History     Tobacco Use    Smoking status: Never Smoker    Smokeless tobacco: Never Used   Substance Use Topics    Alcohol use: No    Drug use: Never     I have reviewed and agree with the history as documented      Review of Systems    Physical Exam  BP (!) 228/88   Pulse (!) 54   Temp 98 °F (36 7 °C) (Tympanic)   Resp 18   Wt 86 1 kg (189 lb 13 1 oz)   SpO2 96%   BMI 29 73 kg/m²     Physical Exam    ED Medications  Medications - No data to display    Intubation  Procedures    Notes      Final Diagnosis  Final diagnoses:   None       ED Provider  Electronically Signed by     Farshad Damon MD  03/23/21 9172

## 2021-03-24 NOTE — ASSESSMENT & PLAN NOTE
· On Eliquis and amiodarone  · Currently in sinus rhythm - reports no afib since cardioversion 3 years ago  · With asymptomatic sinus bradycardia  · Hold eliquis with right hip hematoma  · Initiated risk vs benefit discussion regarding pradaxa in a 79 yo in sinus rhythm   Will revisit tomorrow

## 2021-03-25 VITALS
HEART RATE: 52 BPM | BODY MASS INDEX: 29.79 KG/M2 | RESPIRATION RATE: 18 BRPM | WEIGHT: 189.82 LBS | SYSTOLIC BLOOD PRESSURE: 169 MMHG | DIASTOLIC BLOOD PRESSURE: 79 MMHG | TEMPERATURE: 98.3 F | HEIGHT: 67 IN | OXYGEN SATURATION: 95 %

## 2021-03-25 LAB
ANION GAP SERPL CALCULATED.3IONS-SCNC: 8 MMOL/L (ref 4–13)
BUN SERPL-MCNC: 27 MG/DL (ref 5–25)
CALCIUM SERPL-MCNC: 8.6 MG/DL (ref 8.3–10.1)
CHLORIDE SERPL-SCNC: 105 MMOL/L (ref 100–108)
CO2 SERPL-SCNC: 26 MMOL/L (ref 21–32)
CREAT SERPL-MCNC: 1.82 MG/DL (ref 0.6–1.3)
ERYTHROCYTE [DISTWIDTH] IN BLOOD BY AUTOMATED COUNT: 13.2 % (ref 11.6–15.1)
GFR SERPL CREATININE-BSD FRML MDRD: 23 ML/MIN/1.73SQ M
GLUCOSE SERPL-MCNC: 101 MG/DL (ref 65–140)
HCT VFR BLD AUTO: 35.1 % (ref 34.8–46.1)
HCT VFR BLD AUTO: 36.2 % (ref 34.8–46.1)
HGB BLD-MCNC: 11.6 G/DL (ref 11.5–15.4)
HGB BLD-MCNC: 12.2 G/DL (ref 11.5–15.4)
MCH RBC QN AUTO: 33.2 PG (ref 26.8–34.3)
MCHC RBC AUTO-ENTMCNC: 33 G/DL (ref 31.4–37.4)
MCV RBC AUTO: 101 FL (ref 82–98)
PLATELET # BLD AUTO: 168 THOUSANDS/UL (ref 149–390)
PMV BLD AUTO: 10.8 FL (ref 8.9–12.7)
POTASSIUM SERPL-SCNC: 4.3 MMOL/L (ref 3.5–5.3)
RBC # BLD AUTO: 3.49 MILLION/UL (ref 3.81–5.12)
SODIUM SERPL-SCNC: 139 MMOL/L (ref 136–145)
WBC # BLD AUTO: 8.13 THOUSAND/UL (ref 4.31–10.16)

## 2021-03-25 PROCEDURE — 97116 GAIT TRAINING THERAPY: CPT

## 2021-03-25 PROCEDURE — 99225 PR SBSQ OBSERVATION CARE/DAY 25 MINUTES: CPT | Performed by: INTERNAL MEDICINE

## 2021-03-25 PROCEDURE — 85027 COMPLETE CBC AUTOMATED: CPT | Performed by: PHYSICIAN ASSISTANT

## 2021-03-25 PROCEDURE — 99217 PR OBSERVATION CARE DISCHARGE MANAGEMENT: CPT | Performed by: SURGERY

## 2021-03-25 PROCEDURE — 85018 HEMOGLOBIN: CPT | Performed by: PHYSICIAN ASSISTANT

## 2021-03-25 PROCEDURE — NC001 PR NO CHARGE: Performed by: SURGERY

## 2021-03-25 PROCEDURE — 85014 HEMATOCRIT: CPT | Performed by: PHYSICIAN ASSISTANT

## 2021-03-25 PROCEDURE — 80048 BASIC METABOLIC PNL TOTAL CA: CPT | Performed by: PHYSICIAN ASSISTANT

## 2021-03-25 PROCEDURE — 97110 THERAPEUTIC EXERCISES: CPT

## 2021-03-25 RX ORDER — ACETAMINOPHEN 325 MG/1
975 TABLET ORAL EVERY 8 HOURS SCHEDULED
Refills: 0
Start: 2021-03-25 | End: 2022-02-24 | Stop reason: HOSPADM

## 2021-03-25 RX ORDER — DOCUSATE SODIUM 100 MG/1
100 CAPSULE, LIQUID FILLED ORAL 2 TIMES DAILY
Qty: 10 CAPSULE | Refills: 0 | Status: SHIPPED | OUTPATIENT
Start: 2021-03-25 | End: 2022-03-08 | Stop reason: HOSPADM

## 2021-03-25 RX ORDER — ALPRAZOLAM 0.25 MG/1
0.25 TABLET ORAL 3 TIMES DAILY PRN
Qty: 40 TABLET | Refills: 0 | Status: CANCELLED | OUTPATIENT
Start: 2021-03-25 | End: 2021-04-04

## 2021-03-25 RX ORDER — ALPRAZOLAM 0.25 MG/1
0.25 TABLET ORAL 3 TIMES DAILY PRN
Refills: 0
Start: 2021-03-25 | End: 2021-04-12 | Stop reason: SDUPTHER

## 2021-03-25 RX ORDER — OXYCODONE HYDROCHLORIDE 5 MG/1
2.5 TABLET ORAL EVERY 4 HOURS PRN
Qty: 5 TABLET | Refills: 0 | Status: SHIPPED | OUTPATIENT
Start: 2021-03-25 | End: 2021-04-09 | Stop reason: HOSPADM

## 2021-03-25 RX ORDER — POLYETHYLENE GLYCOL 3350 17 G/17G
17 POWDER, FOR SOLUTION ORAL DAILY PRN
Refills: 0
Start: 2021-03-25 | End: 2022-03-08 | Stop reason: HOSPADM

## 2021-03-25 RX ADMIN — ACETAMINOPHEN 975 MG: 325 TABLET, FILM COATED ORAL at 05:39

## 2021-03-25 RX ADMIN — SODIUM CHLORIDE, SODIUM GLUCONATE, SODIUM ACETATE, POTASSIUM CHLORIDE, MAGNESIUM CHLORIDE, SODIUM PHOSPHATE, DIBASIC, AND POTASSIUM PHOSPHATE 75 ML/HR: .53; .5; .37; .037; .03; .012; .00082 INJECTION, SOLUTION INTRAVENOUS at 02:47

## 2021-03-25 RX ADMIN — LEVOTHYROXINE SODIUM 25 MCG: 25 TABLET ORAL at 05:39

## 2021-03-25 RX ADMIN — ACETAMINOPHEN 975 MG: 325 TABLET, FILM COATED ORAL at 13:24

## 2021-03-25 RX ADMIN — FUROSEMIDE 40 MG: 40 TABLET ORAL at 07:35

## 2021-03-25 RX ADMIN — AMIODARONE HYDROCHLORIDE 100 MG: 200 TABLET ORAL at 07:35

## 2021-03-25 NOTE — PROGRESS NOTES
Progress Note - Geriatric Medicine   Jeannie Quispe 80 y o  female MRN: [id number]  Unit/Bed#: S -01 Encounter: [id number]      Assessment/Plan:  1 -paroxysmal atrial fibrillation  -patient will need to call her cardiologist in regards to the Pradaxa I would strongly recommend that she continue on her medication to reduce her risk of stroke  She has an appointment coming up with Dr Marleni Yoo in April she will discuss medication at that time  2 -fall -head injury to her right wrist and right buttock developed a hematoma her H&H has remained stable she had done well with therapy apparently she is going home today  3 -chronic renal failure stage 4 -patient should follow with Nephrology patient's GFR at the time of discharge was 23    4  -history of diastolic congestive heart failure    5 - systolic murmur -patient had history of previous mild aortic stenosis versus sclerosis being followed by Cardiology  Subjective:   Patient states that she will be going home today    Review of Systems   Constitutional: Negative  HENT: Negative  Eyes: Negative  Respiratory: Negative  Cardiovascular: Negative  Gastrointestinal: Negative  Endocrine: Negative  Genitourinary: Negative  Musculoskeletal: Negative  Skin: Negative  Allergic/Immunologic: Negative  Neurological: Negative  Hematological: Negative  Psychiatric/Behavioral: Negative  Objective:     Vitals: Blood pressure 169/79, pulse (!) 52, temperature 98 3 °F (36 8 °C), resp  rate 18, height 5' 7" (1 702 m), weight 86 1 kg (189 lb 13 1 oz), SpO2 95 %, not currently breastfeeding  ,Body mass index is 29 73 kg/m²  Intake/Output Summary (Last 24 hours) at 3/25/2021 1359  Last data filed at 3/25/2021 1313  Gross per 24 hour   Intake 2062 5 ml   Output 2350 ml   Net -287 5 ml       Current Medications: Reviewed    Physical Exam:   Physical Exam  Constitutional:       Appearance: Normal appearance     HENT:      Head: Normocephalic and atraumatic  Right Ear: Tympanic membrane, ear canal and external ear normal       Left Ear: Tympanic membrane, ear canal and external ear normal       Nose: Nose normal       Mouth/Throat:      Mouth: Mucous membranes are moist       Pharynx: Oropharynx is clear  Eyes:      Extraocular Movements: Extraocular movements intact  Conjunctiva/sclera: Conjunctivae normal       Pupils: Pupils are equal, round, and reactive to light  Neck:      Musculoskeletal: Normal range of motion  Cardiovascular:      Rate and Rhythm: Normal rate and regular rhythm  Heart sounds: Murmur present  Comments: Patient with systolic murmur grade 3/6 crescendo in quality heard best over the aortic area  Pulmonary:      Effort: Pulmonary effort is normal    Abdominal:      General: Abdomen is flat  Bowel sounds are normal    Musculoskeletal:      Comments: Ecchymotic area in right wrist and buttock  Skin:     General: Skin is warm  Neurological:      General: No focal deficit present  Mental Status: She is alert  Mental status is at baseline  Psychiatric:         Mood and Affect: Mood normal          Thought Content: Thought content normal           Invasive Devices     None                 Lab, Imaging and other studies: I have personally reviewed pertinent reports

## 2021-03-25 NOTE — ASSESSMENT & PLAN NOTE
· On Pradaxa and amiodarone  · Currently in sinus rhythm - reports no afib since cardioversion 3 years ago  · With asymptomatic sinus bradycardia  · Hold Pradaxa with right hip hematoma  · Consider stopping Pradaxa given risk benefit, discussed with patient who is interested in stopping  She will discuss with PCP  · [id number]-UFCm score 5 with 7 5% per year risk of CVA however patient is not in afib making her risk much lower

## 2021-03-25 NOTE — DISCHARGE SUMMARY
Discharge Summary - Trauma Service   Quiana Para 80 y o  female MRN: [id number]  Unit/Bed#: S -01 Encounter: [id number]    Admission Date: 3/23/2021     Discharge Date: 3/25/2021    Admitting Diagnosis: Hip injury [S79 919A]  Head injury [S09 90XA]  Hand injury, right, initial encounter [S69 91XA]    Discharge Diagnosis:   Fall  Assessment & Plan  · Mechanical fall striking her right lateral head on linoleum floor  · No LOC or mental status changes  · Below noted injuries  · Geriatric consult  · PT/OT     Chronic diastolic congestive heart failure ([facility] [address] )  Assessment & Plan      Wt Readings from Last 3 Encounters:   03/23/21 86 1 kg (189 lb 13 1 oz)   11/23/20 81 2 kg (179 lb)   11/13/20 86 6 kg (191 lb)         · Appears to be at baseline weight  · Continue home lasix 40mg daily           CKD (chronic kidney disease), stage IV ([facility])  Assessment & Plan  Lab Results   · Component · Value · Date   ·   · EGFR · 23 · 03/25/2021   ·   · EGFR · 22 · 03/24/2021   ·   · EGFR · 19 · 03/23/2021   ·   · CREATININE · 1 82 (H) · 03/25/2021   ·   · CREATININE · 1 92 (H) · 03/24/2021   ·   · CREATININE · 2 21 (H) · 03/23/2021   · Cr at baseline to slightly elevated, baseline appears to be about 1 9  · DC IVF today as Cr 1 82        Paroxysmal atrial fibrillation (HCC)  Assessment & Plan  · On Pradaxa and amiodarone  · Currently in sinus rhythm - reports no afib since cardioversion 3 years ago  · With asymptomatic sinus bradycardia  · Hold Pradaxa with right hip hematoma  · Consider stopping Pradaxa given risk benefit, discussed with patient who is interested in stopping   She will discuss with PCP  · HWZ1ZN-ZKPw score 5 with 7 5% per year risk of CVA however patient is not in afib making her risk much lower       * Hematoma of right hip  Assessment & Plan  · Non-contrast ct showing right hip soft tissue hematoma  · S/p fall on pradaxa - hold  · Admit to med/surg   · Pressure dressing to right hip with frequent skin checks  · Trend hgb  · Skin check  · Improve pain control      Attending and Service: Dr Basil Arechiga  Consulting Physician(s):   [facility] Geriatrics    Imaging and Procedures Performed: No orders of the defined types were placed in this encounter  Ct Abdomen Pelvis Wo Contrast    Result Date: 3/23/2021  Impression: 1  Large irregular subcutaneous hematoma in the right gluteal region  2   No evidence of acute visceral injury within the abdomen or pelvis  Workstation performed: [id number]     Xr Hand 3+ Vw Right    Result Date: 3/24/2021  Impression: No acute osseous abnormality  Workstation performed: [id number]     Trauma - Ct Head Wo Contrast    Result Date: 3/23/2021  Impression: No intracranial hemorrhage or calvarial fracture  Workstation performed: [id number]     Xr Chest 1 View    Result Date: 3/24/2021  Impression: Mild subsegmental atelectasis lower lung fields  Otherwise no acute pulmonary disease on this supine view No evidence of pelvic fracture  Please see subsequent report of CT scan abdomen and pelvis for more findings Workstation performed: [id number]     Xr Pelvis Ap Only 1 Or 2 Vw    Result Date: 3/24/2021  Impression: Mild subsegmental atelectasis lower lung fields  Otherwise no acute pulmonary disease on this supine view No evidence of pelvic fracture  Please see subsequent report of CT scan abdomen and pelvis for more findings Workstation performed: [id number]     Xr Trauma Multiple (slb/slra Trauma [place] Only)    Result Date: 3/24/2021  Impression: Mild subsegmental atelectasis lower lung fields  Otherwise no acute pulmonary disease on this supine view No evidence of pelvic fracture  Please see subsequent report of CT scan abdomen and pelvis for more findings Workstation performed: [id number]       Hospital Course: Kirt Noble is a 51-year-old F with PMH of HYN, A fib on Pradaxa, CDK3, CHF presents s/p fall when her daughter fell into her  She denies LOC and sustained right gluteal hematoma  Her hemoglobin has been stable and an abdominal binder has been placed for compression purposes  She tolerated this well  Skin is intact  PT and OT have evaluated her and she is cleared for DC home with home services  Case management has made these arrangements  [facility] Geriatric team has discussed patient discontinuing ambien and xanax but patient refuses  She has agreed to decreasing dose of xanax to TID PRN dosing  A right hand contusion was also found  Xrays are negative for fracture and she has FAROM without pain     On discharge, the patient is instructed to follow-up with the patient's primary care provider to review the events of the patient's recent hospitalization  She is to discuss risk/benefit of Pradaxa  The patient is instructed to follow-up in the Trauma Clinic as scheduled on 4/13/2021 at 1pm for reevaluation  The patient should follow the provided discharge instructions  Condition at Discharge: good     Discharge instructions/Information to patient and family:   See after visit summary for information provided to patient and family  Provisions for Follow-Up Care:  See after visit summary for information related to follow-up care and any pertinent home health orders  Disposition: Home with [facility]    Planned Readmission: No    Discharge Statement   I spent 20 minutes discharging the patient  This time was spent on the day of discharge  I had direct contact with the patient on the day of discharge  Additional documentation is required if more than 30 minutes were spent on discharge  Discharge Medications:  See after visit summary for reconciled discharge medications provided to patient and family        Ana Lilia Blake PA-C  3/25/2021  10:08 AM

## 2021-03-25 NOTE — PHYSICAL THERAPY NOTE
PHYSICAL THERAPY TREATMENT NOTE    Patient Name: Jojo Avila  Today's Date: 3/25/2021     03/25/21 1118   PT Last Visit   PT Visit Date 03/25/21   Note Type   Note Type Treatment   Pain Assessment   Pain Assessment Tool Pain Assessment not indicated - pt denies pain   Pain Score 1   Pain Location/Orientation Orientation: Right;Location: Buttocks   Restrictions/Precautions   Other Precautions Chair Alarm; Bed Alarm; Fall Risk;Pain  ([place])   General   Chart Reviewed Yes   Additional Pertinent History room air resting pulse ox 94% and 54 BPM    Family/Caregiver Present No   Cognition   Overall Cognitive Status WFL   Arousal/Participation Alert; Cooperative   Attention Within functional limits   Orientation Level Oriented to person; Other (Comment)  (pt was identified w/ full name, birth date)   Following Commands Follows one step commands without difficulty   Subjective   Subjective pt was seen supine in bed  pt reports having right buttock pain  Bed Mobility   Supine to Sit 5  Supervision   Additional items HOB elevated; Bedrails; Increased time required   Sit to Supine 5  Supervision  (pt declined remaining out of bed due to fatigue)   Additional items HOB elevated; Bedrails; Increased time required   Transfers   Sit to Stand 5  Supervision   Additional items Increased time required   Stand to Sit 5  Supervision   Additional items Increased time required;Verbal cues  (for hand placement)   Ambulation/Elevation   Gait pattern Narrow VLADISLAV; Forward Flexion;Decreased foot clearance   Gait Assistance 5  Supervision   Additional items Verbal cues  (for roller walker placement, posture)   Assistive Device Rolling walker   Distance 70 feet x2 w/ seated rest break  (additional not possible due to fatigue)   Stair Management Assistance 5  Supervision   Additional items Verbal cues; Increased time required  (for railing use)   Stair Management Technique One rail R;With cane;Nonreciprocal   Number of Stairs 3   Balance   Static Sitting Good   Static Standing Fair +   Ambulatory Fair -  (w/ cane)   Activity Tolerance   Activity Tolerance Patient limited by fatigue;Patient limited by pain   Nurse Made Aware spoke to [id number] Reyna Harrison   Equipment Use   Comments ankle pumps 30  heel slides and supine hip abduction 10 each  quad sets 20  Assessment   Prognosis Good   Problem List Decreased strength;Decreased endurance; Impaired balance;Decreased mobility; Decreased coordination; Impaired judgement;Pain   Assessment pt shows improvement in mobility status w/ introduction of stair use  pt needed standby assist w/ all phases of mobility to maintain safety  continued inpatient PT is needed to reduce fall risk factors and progress mobility training as appropriate  discharge recommendation is for home PT to expedite safe return home  Goals   Patient Goals go home today   STG Expiration Date 04/03/21   Short Term Goal #1 ADDENDUM TO PRESENT GOALS: pt will complete 3 steps w/ railing independently to facilitate safe return home  In 10 days pt will be able to: 1  Demonstrate ability to perform all aspects of bed mobility independently to increase functional independence  2  Perform functional transfers independently to facilitate safe return to previous living environment  3   Ambulate 220 ft with a RW modified independently  with stable vitals to improve safety with household distances and reduce fall risk  4  Improve LE strength grades by 1 to increase ease of functional mobility with transfers and gait  5  Pt will demonstrate improved balance by one grade in order to decrease risk of falls  7  Patient will improve gait speed to at least 0 8 m/s for  Improved household and community ambulation  PT Treatment Day 1   Plan   Treatment/Interventions Functional transfer training;LE strengthening/ROM; Therapeutic exercise; Endurance training;Equipment eval/education; Bed mobility;Gait training   Progress Progressing toward goals   PT Frequency Other (Comment)  (3 to 5x/week)   Recommendation   PT Discharge Recommendation Home with skilled therapy  (home PT)   Additional Comments recommend cane use w/ mobility  AM-PAC Basic Mobility Inpatient   Turning in Bed Without Bedrails 4   Lying on Back to Sitting on Edge of Flat Bed 4   Moving Bed to Chair 4   Standing Up From Chair 4   Walk in Room 3   Climb 3-5 Stairs 3   Basic Mobility Inpatient Raw Score 22   Basic Mobility Standardized Score 47 4     The patient's AM-PAC Basic Mobility Inpatient Short Form Raw Score is 20, Standardized Score is 43 99  A standardized score greater than 42 9 suggests the patient may benefit from discharge to home  Please also refer to the recommendation of the Physical Therapist for safe discharge planning  Skilled inpatient PT recommended while in hospital to progress pt toward treatment goals      Conchita Pavon, PT

## 2021-03-25 NOTE — ASSESSMENT & PLAN NOTE
· Non-contrast ct showing right hip soft tissue hematoma  · S/p fall on pradaxa - hold  · Admit to med/surg   · Pressure dressing to right hip with frequent skin checks  · Trend hgb

## 2021-03-25 NOTE — ASSESSMENT & PLAN NOTE
Lab Results   Component Value Date    EGFR 23 03/25/2021    EGFR 22 03/24/2021    EGFR 19 03/23/2021    CREATININE 1 82 (H) 03/25/2021    CREATININE 1 92 (H) 03/24/2021    CREATININE 2 21 (H) 03/23/2021   · Cr at baseline to slightly elevated, baseline appears to be about 1 9  · DC IVF today as Cr 1 82

## 2021-03-25 NOTE — NURSING NOTE
Patient provided 2 extra abdominal binders to bring home  Patient educated on how to put it on  RN also called daughter to explain how to put it on and she has confidence to be able to help put it on since she saw the ER put it on

## 2021-03-25 NOTE — ASSESSMENT & PLAN NOTE
Wt Readings from Last 3 Encounters:   03/23/21 86 1 kg (189 lb 13 1 oz)   11/23/20 81 2 kg (179 lb)   11/13/20 86 6 kg (191 lb)       · Appears to be at baseline weight  · Continue home lasix 40mg daily

## 2021-03-25 NOTE — PROGRESS NOTES
[facility]  Progress Note - Musa Hernandez 8/26/1926, 80 y o  female MRN: [id number]  Unit/Bed#: S -01 Encounter: [id number]  Primary Care Provider: Kapil Braden DO   Date and time admitted to hospital: 3/23/2021  9:00 PM    Fall  Assessment & Plan  · Mechanical fall striking her right lateral head on linoleum floor  · No LOC or mental status changes  · Below noted injuries  · Geriatric consult  · PT/OT    Chronic diastolic congestive heart failure (HCC)  Assessment & Plan  Wt Readings from Last 3 Encounters:   03/23/21 86 1 kg (189 lb 13 1 oz)   11/23/20 81 2 kg (179 lb)   11/13/20 86 6 kg (191 lb)       · Appears to be at baseline weight  · Continue home lasix 40mg daily        CKD (chronic kidney disease), stage IV [facility])  Assessment & Plan  Lab Results   Component Value Date    EGFR 23 03/25/2021    EGFR 22 03/24/2021    EGFR 19 03/23/2021    CREATININE 1 82 (H) 03/25/2021    CREATININE 1 92 (H) 03/24/2021    CREATININE 2 21 (H) 03/23/2021   · Cr at baseline to slightly elevated, baseline appears to be about 1 9  · DC IVF today as Cr 1 82      Paroxysmal atrial fibrillation (HCC)  Assessment & Plan  · On Pradaxa and amiodarone  · Currently in sinus rhythm - reports no afib since cardioversion 3 years ago  · With asymptomatic sinus bradycardia  · Hold Pradaxa with right hip hematoma  · Consider stopping Pradaxa given risk benefit, discussed with patient who is interested in stopping  She will discuss with PCP  · [id number] score 5 with 7 5% per year risk of CVA however patient is not in afib making her risk much lower       * Hematoma of right hip  Assessment & Plan  · Non-contrast ct showing right hip soft tissue hematoma  · S/p fall on pradaxa - hold  · Admit to med/surg   · Pressure dressing to right hip with frequent skin checks  · Trend hgb  · Skin check  · Improve pain control      Disposition: Home with home services most likely later today or tomorrow      SUBJECTIVE:  Chief Complaint: R buttock pain    Subjective: pain right buttock region  No numbness or weakness    OBJECTIVE:     Meds/Allergies     Current Facility-Administered Medications:     acetaminophen (TYLENOL) tablet 975 mg, 975 mg, Oral, Q8H [address], Marie Faust PA-C, 975 mg at 03/25/21 0539    ALPRAZolam (XANAX) tablet 0 25 mg, 0 25 mg, Oral, TID PRN, Otilio Faust PA-C, 0 25 mg at 03/24/21 1806    amiodarone tablet 100 mg, 100 mg, Oral, Daily, Marie Faust PA-C, 100 mg at 03/25/21 0735    docusate sodium (COLACE) capsule 100 mg, 100 mg, Oral, BID, Marie Faust PA-C, 100 mg at 03/24/21 8698    furosemide (LASIX) tablet 40 mg, 40 mg, Oral, Daily, Marie Faust PA-C, 40 mg at 03/25/21 3873    levothyroxine tablet 25 mcg, 25 mcg, Oral, Early Morning, Marie Faust PA-C, 25 mcg at 03/25/21 0539    ondansetron (ZOFRAN) injection 4 mg, 4 mg, Intravenous, Q6H PRN, Marie Faust PA-C    oxyCODONE (ROXICODONE) IR tablet 2 5 mg, 2 5 mg, Oral, Q4H PRN, Marie Faust PA-C, 2 5 mg at 03/24/21 1805    polyethylene glycol (MIRALAX) packet 17 g, 17 g, Oral, Daily PRN, Gaetano Faust PA-C    zolpidem (AMBIEN) tablet 5 mg, 5 mg, Oral, HS PRN, Otilio Faust PA-C, 5 mg at 03/24/21 2143     Vitals:   Vitals:    03/25/21 0712   BP: 169/79   Pulse: (!) 52   Resp: 18   Temp: 98 3 °F (36 8 °C)   SpO2: 95%       Intake/Output:  I/O       03/23 0701 - 03/24 0700 03/24 0701 - 03/25 0700 03/25 0701 - 03/26 0700    P  O   498 100    I V  (mL/kg)  840 (9 8) 1542 5 (17 9)    Total Intake(mL/kg)  1338 (15 5) 1642 5 (19 1)    Urine (mL/kg/hr) 175 1000 (0 5) 800 (6 9)    Stool  0     Total Output 175 1000 800    Net -175 +338 +842 5           Unmeasured Stool Occurrence  1 x            Nutrition/GI Proph/Bowel Reg: Regular/colace    Physical Exam:   GENERAL APPEARANCE: NAD, sitting in chair  NEURO: GCS 15  HEENT:  NCAT  CV: RRR  LUNGS: CTAB  GI: soft and nontender  :  Voiding well  MSK: BELTRAN X4, NVI  No calf pain  Tender right hip and buttock region  SKIN: warm and dry    Invasive Devices     Peripheral Intravenous Line            Peripheral IV 03/24/21 Right Antecubital less than 1 day                 Lab Results:   BMP/CMP:   Lab Results   Component Value Date    SODIUM 139 03/25/2021    K 4 3 03/25/2021     03/25/2021    CO2 26 03/25/2021    BUN 27 (H) 03/25/2021    CREATININE 1 82 (H) 03/25/2021    CALCIUM 8 6 03/25/2021    EGFR 23 03/25/2021    and CBC:   Lab Results   Component Value Date    WBC 8 13 03/25/2021    HGB 11 6 03/25/2021    HCT 35 1 03/25/2021     (H) 03/25/2021     03/25/2021    MCH 33 2 03/25/2021    MCHC 33 0 03/25/2021    RDW 13 2 03/25/2021    MPV 10 8 03/25/2021     Imaging/EKG Studies: Results: I have personally reviewed pertinent reports      Other Studies:    VTE Prophylaxis: Sequential compression device (Venodyne)

## 2021-03-25 NOTE — UTILIZATION REVIEW
Continued Stay Review    Date:3/25/21                         Current Patient Class: OBS  Current Level of Care: MS     HPI:89 y o  female with hx PAF on Pradaxa initially admitted on 3/23/21 as OBS thru the ED to trauma service with fall at home w/ head strike, w/o LOC, reporting r hip/buttock pain  Imaging showed R hip hematoma  Pt also with hematoma R hand  Plan included R hip pressure dressing, trend Hgb, serial skin exam, neuro checks,  gentle IVF, monitor renal status with creat elevated form baseline of 1 9  3/24 pt bradycardic  GCS=15  Plan is to resume pradaxa once hematoma stabilized per geronntology     Assessment/Plan: 3/25  Pt continues with tenderness R hip/buttock area  GCS=15  Bradycardia continues  IVF d/'ed today with creat returned to baseline  Pt to discuss with her PCP regarding stopping Pradaxa given risk benefit  Plan is to continue to trend Hgb, skin check and pain control  Pt has d/c order placed for today    Pertinent Labs/Diagnostic Results:       Results from last 7 days   Lab Units 03/25/21  0637 03/24/21  1911 03/24/21  1227 03/24/21  0637 03/23/21  2255 03/23/21 2117   WBC Thousand/uL 8 13  --   --  8 69  --  11 31*   HEMOGLOBIN g/dL 11 6 13 3 12 6 13 4 15 5* 15 4   HEMATOCRIT % 35 1 40 4 37 4 41 5 45 8 45 9   PLATELETS Thousands/uL 168  --   --  181  --  199   NEUTROS ABS Thousands/µL  --   --   --   --   --  8 21*         Results from last 7 days   Lab Units 03/25/21  0654 03/24/21  0637 03/23/21 2117 03/23/21  0755   SODIUM mmol/L 139 135* 135* 136   POTASSIUM mmol/L 4 3 4 1 4 4 4 3   CHLORIDE mmol/L 105 105 102 104   CO2 mmol/L 26 24 26 27   ANION GAP mmol/L 8 6 7 5   BUN mg/dL 27* 26* 30* 24   CREATININE mg/dL 1 82* 1 92* 2 21* 2 02*   EGFR ml/min/1 73sq m 23 22 19 21   CALCIUM mg/dL 8 6 9 0 8 9 9 0     Results from last 7 days   Lab Units 03/23/21  0755   AST U/L 20   ALT U/L 24   ALK PHOS U/L 78   TOTAL PROTEIN g/dL 7 2   ALBUMIN g/dL 3 9   TOTAL BILIRUBIN mg/dL 1 46* Results from last 7 days   Lab Units 03/25/21  0654 03/24/21  0637 03/23/21 2117   GLUCOSE RANDOM mg/dL 101 112 124                Results from last 7 days   Lab Units 03/23/21 2117   PROTIME seconds 19 5*   INR  1 64*     Results from last 7 days   Lab Units 03/23/21  0755   TSH 3RD GENERATON uIU/mL 4 600*            Vital Signs:   Date/Time  Temp  Pulse  Resp  BP  MAP (mmHg)  SpO2    03/25/21 07:12:13  98 3 °F (36 8 °C)  52Abnormal   18  169/79  109  95 %    03/25/21 0701  --  --  --  --  --  --    03/24/21 22:17:31  97 7 °F (36 5 °C)  53Abnormal   18  149/67  94  94 %    03/24/21 1535  --  --  --  --  --  --    03/24/21 14:39:59  97 9 °F (36 6 °C)  63  18  130/72  91  95 %    03/24/21 11:50:54  98 2 °F (36 8 °C)  52Abnormal   20  137/66  90  94 %    03/24/21 08:50:38  --  51Abnormal   --  170/73  105  96 %    03/24/21 0800  --  --  --  --  --  --    03/24/21 07:55:10  97 4 °F (36 3 °C)Abnormal   49Abnormal   18  116/81  93  95 %      Date and Time Eye Opening Best Verbal Response Best Motor Response [place] Coma Scale Score   03/25/21 0701 4 5 6 15   03/24/21 1535 4 5 6 15   03/24/21 0800 4 5 6 15           Medications:   Scheduled Medications:  acetaminophen, 975 mg, Oral, Q8H [address]  amiodarone, 100 mg, Oral, Daily  docusate sodium, 100 mg, Oral, BID  furosemide, 40 mg, Oral, Daily  levothyroxine, 25 mcg, Oral, Early Morning      Continuous IV Infusions:multi-electrolyte (PLASMALYTE-A/ISOLYTE-S PH 7 4) IV solution   Rate: 75 mL/hr Dose: 75 mL/hr  Freq: Continuous Route: IV  Indications of Use: IV Hydration  Last Dose: 75 mL/hr (03/25/21 0247)     PRN Meds:  ALPRAZolam, 0 25 mg, Oral, TID PRN x1 3/24  ondansetron, 4 mg, Intravenous, Q6H PRN  oxyCODONE, 2 5 mg, Oral, Q4H PRN x1 3/24  polyethylene glycol, 17 g, Oral, Daily PRN  zolpidem, 5 mg, Oral, HS PRN        Discharge Plan: TBD    Network Utilization Review Department  ATTENTION: Please call with any questions or concerns to [phone] and carefully listen to the prompts so that you are directed to the right person  All voicemails are confidential   Gloria Gutierrez all requests for admission clinical reviews, approved or denied determinations and any other requests to dedicated fax number below belonging to the campus where the patient is receiving treatment   List of dedicated fax numbers for the Facilities:  [address] DENIALS (Administrative/Medical Necessity) [phone]   [address] (Maternity/NICU/Pediatrics) [phone]   [address] Bharti Bryan 6909 (  [place] [address]) [address] [place] [address] Merissa Ugo Cronin 14[address] [place] [address] [phone]

## 2021-03-25 NOTE — PLAN OF CARE
Problem: PHYSICAL THERAPY ADULT  Goal: Performs mobility at highest level of function for planned discharge setting  See evaluation for individualized goals  Outcome: Progressing  Note: Prognosis: Good  Problem List: Decreased strength, Decreased endurance, Impaired balance, Decreased mobility, Decreased coordination, Impaired judgement, Pain  Assessment: pt shows improvement in mobility status w/ introduction of stair use  pt needed standby assist w/ all phases of mobility to maintain safety  continued inpatient PT is needed to reduce fall risk factors and progress mobility training as appropriate  discharge recommendation is for home PT to expedite safe return home  PT Discharge Recommendation: Home with skilled therapy(home PT)          See flowsheet documentation for full assessment

## 2021-03-26 ENCOUNTER — TRANSITIONAL CARE MANAGEMENT (OUTPATIENT)
Dept: FAMILY MEDICINE CLINIC | Facility: CLINIC | Age: 86
End: 2021-03-26

## 2021-03-26 ENCOUNTER — TELEPHONE (OUTPATIENT)
Dept: FAMILY MEDICINE CLINIC | Facility: CLINIC | Age: 86
End: 2021-03-26

## 2021-03-26 LAB
BASE EXCESS BLDA CALC-SCNC: -1 MMOL/L (ref -2–3)
GLUCOSE SERPL-MCNC: 115 MG/DL (ref 65–140)
HCO3 BLDA-SCNC: 23.5 MMOL/L (ref 24–30)
HCT VFR BLD CALC: 46 % (ref 34.8–46.1)
HGB BLDA-MCNC: 15.6 G/DL (ref 11.5–15.4)
PCO2 BLD: 25 MMOL/L (ref 21–32)
PCO2 BLD: 38 MM HG (ref 42–50)
PH BLD: 7.4 [PH] (ref 7.3–7.4)
PO2 BLD: 50 MM HG (ref 35–45)
POTASSIUM BLD-SCNC: 4.4 MMOL/L (ref 3.5–5.3)
SAO2 % BLD FROM PO2: 85 % (ref 60–85)
SODIUM BLD-SCNC: 137 MMOL/L (ref 136–145)
SPECIMEN SOURCE: ABNORMAL

## 2021-03-26 NOTE — TELEPHONE ENCOUNTER
Received call from [address] [place] [address] @ [facility]  Just an Adriana Douglas   They will be starting services for PT two times a week starting next week

## 2021-03-26 NOTE — UTILIZATION REVIEW
Notification of Discharge  This is a Notification of Discharge from our facility [address]  Please be advised that this patient has been discharge from our facility  Below you will find the admission and discharge date and time including the patients disposition  PRESENTATION DATE: 3/23/2021  9:00 PM  OBS ADMISSION DATE:   IP ADMISSION DATE: N/A N/A   DISCHARGE DATE: 3/25/2021  3:22 PM  DISPOSITION: Home with [facility] with [address] [facility]   Admission Orders listed below:  Admission Orders (From admission, onward)     Ordered        03/23/21 2211  Place in Observation  Once                   Please contact the UR Department if additional information is required to close this patient's authorization/case  [address] Utilization Review Department  Main: [phone] x carefully listen to the prompts  All voicemails are confidential   [email or web address]  Send all requests for admission clinical reviews, approved or denied determinations and any other requests to dedicated fax number below belonging to the campus where the patient is receiving treatment   List of dedicated fax numbers:  [address] DENIALS (Administrative/Medical Necessity) [phone]   [address] (Maternity/NICU/Pediatrics) [phone]   Deneise Notice [phone]   Liya Dominguez [phone]   Rasta Gardner [phone]   Makenna Jernigan[address] [phone]   [facility]  [phone]   [address] [facility]  [address] [place] [address] [address] [facility] [phone]

## 2021-04-01 ENCOUNTER — OFFICE VISIT (OUTPATIENT)
Dept: FAMILY MEDICINE CLINIC | Facility: CLINIC | Age: 86
End: 2021-04-01
Payer: COMMERCIAL

## 2021-04-01 VITALS
HEART RATE: 64 BPM | DIASTOLIC BLOOD PRESSURE: 66 MMHG | WEIGHT: 181.8 LBS | BODY MASS INDEX: 28.53 KG/M2 | HEIGHT: 67 IN | TEMPERATURE: 97.4 F | SYSTOLIC BLOOD PRESSURE: 120 MMHG | OXYGEN SATURATION: 96 %

## 2021-04-01 DIAGNOSIS — E03.9 HYPOTHYROIDISM, UNSPECIFIED TYPE: ICD-10-CM

## 2021-04-01 DIAGNOSIS — S70.01XD HEMATOMA OF RIGHT HIP, SUBSEQUENT ENCOUNTER: Primary | ICD-10-CM

## 2021-04-01 PROCEDURE — 99495 TRANSJ CARE MGMT MOD F2F 14D: CPT | Performed by: FAMILY MEDICINE

## 2021-04-01 PROCEDURE — 1111F DSCHRG MED/CURRENT MED MERGE: CPT | Performed by: FAMILY MEDICINE

## 2021-04-01 RX ORDER — LEVOTHYROXINE SODIUM 25 MCG
37.5 TABLET ORAL DAILY
Qty: 45 TABLET | Refills: 3 | Status: SHIPPED | OUTPATIENT
Start: 2021-04-01 | End: 2021-08-13

## 2021-04-03 ENCOUNTER — APPOINTMENT (EMERGENCY)
Dept: RADIOLOGY | Facility: HOSPITAL | Age: 86
DRG: 552 | End: 2021-04-03
Payer: COMMERCIAL

## 2021-04-03 ENCOUNTER — HOSPITAL ENCOUNTER (INPATIENT)
Facility: HOSPITAL | Age: 86
LOS: 5 days | Discharge: HOME HEALTH | DRG: 552 | End: 2021-04-09
Attending: EMERGENCY MEDICINE | Admitting: INTERNAL MEDICINE
Payer: COMMERCIAL

## 2021-04-03 ENCOUNTER — APPOINTMENT (EMERGENCY)
Dept: CT IMAGING | Facility: HOSPITAL | Age: 86
DRG: 552 | End: 2021-04-03
Payer: COMMERCIAL

## 2021-04-03 DIAGNOSIS — R26.2 AMBULATORY DYSFUNCTION: ICD-10-CM

## 2021-04-03 DIAGNOSIS — S22.081D CLOSED STABLE BURST FRACTURE OF TWELFTH THORACIC VERTEBRA WITH ROUTINE HEALING: ICD-10-CM

## 2021-04-03 DIAGNOSIS — R19.7 DIARRHEA, UNSPECIFIED TYPE: ICD-10-CM

## 2021-04-03 DIAGNOSIS — M54.50 LOWER BACK PAIN: Primary | ICD-10-CM

## 2021-04-03 PROBLEM — K59.00 CONSTIPATION: Status: ACTIVE | Noted: 2021-04-03

## 2021-04-03 LAB
ANION GAP SERPL CALCULATED.3IONS-SCNC: 7 MMOL/L (ref 4–13)
BACTERIA UR QL AUTO: NORMAL /HPF
BASOPHILS # BLD AUTO: 0.02 THOUSANDS/ΜL (ref 0–0.1)
BASOPHILS NFR BLD AUTO: 0 % (ref 0–1)
BILIRUB UR QL STRIP: NEGATIVE
BUN SERPL-MCNC: 23 MG/DL (ref 5–25)
CALCIUM SERPL-MCNC: 9 MG/DL (ref 8.3–10.1)
CHLORIDE SERPL-SCNC: 103 MMOL/L (ref 100–108)
CLARITY UR: CLEAR
CO2 SERPL-SCNC: 29 MMOL/L (ref 21–32)
COLOR UR: YELLOW
CREAT SERPL-MCNC: 1.91 MG/DL (ref 0.6–1.3)
EOSINOPHIL # BLD AUTO: 0.72 THOUSAND/ΜL (ref 0–0.61)
EOSINOPHIL NFR BLD AUTO: 7 % (ref 0–6)
ERYTHROCYTE [DISTWIDTH] IN BLOOD BY AUTOMATED COUNT: 14 % (ref 11.6–15.1)
GFR SERPL CREATININE-BSD FRML MDRD: 22 ML/MIN/1.73SQ M
GLUCOSE SERPL-MCNC: 102 MG/DL (ref 65–140)
GLUCOSE UR STRIP-MCNC: NEGATIVE MG/DL
HCT VFR BLD AUTO: 40.1 % (ref 34.8–46.1)
HGB BLD-MCNC: 13.1 G/DL (ref 11.5–15.4)
HGB UR QL STRIP.AUTO: NEGATIVE
IMM GRANULOCYTES # BLD AUTO: 0.05 THOUSAND/UL (ref 0–0.2)
IMM GRANULOCYTES NFR BLD AUTO: 1 % (ref 0–2)
KETONES UR STRIP-MCNC: NEGATIVE MG/DL
LEUKOCYTE ESTERASE UR QL STRIP: ABNORMAL
LYMPHOCYTES # BLD AUTO: 1.02 THOUSANDS/ΜL (ref 0.6–4.47)
LYMPHOCYTES NFR BLD AUTO: 11 % (ref 14–44)
MCH RBC QN AUTO: 33.9 PG (ref 26.8–34.3)
MCHC RBC AUTO-ENTMCNC: 32.7 G/DL (ref 31.4–37.4)
MCV RBC AUTO: 104 FL (ref 82–98)
MONOCYTES # BLD AUTO: 0.73 THOUSAND/ΜL (ref 0.17–1.22)
MONOCYTES NFR BLD AUTO: 8 % (ref 4–12)
NEUTROPHILS # BLD AUTO: 7.15 THOUSANDS/ΜL (ref 1.85–7.62)
NEUTS SEG NFR BLD AUTO: 73 % (ref 43–75)
NITRITE UR QL STRIP: NEGATIVE
NON-SQ EPI CELLS URNS QL MICRO: NORMAL /HPF
NRBC BLD AUTO-RTO: 0 /100 WBCS
OTHER STN SPEC: NORMAL
PH UR STRIP.AUTO: 5.5 [PH] (ref 4.5–8)
PLATELET # BLD AUTO: 213 THOUSANDS/UL (ref 149–390)
PMV BLD AUTO: 10.3 FL (ref 8.9–12.7)
POTASSIUM SERPL-SCNC: 4.7 MMOL/L (ref 3.5–5.3)
PROT UR STRIP-MCNC: NEGATIVE MG/DL
RBC # BLD AUTO: 3.87 MILLION/UL (ref 3.81–5.12)
RBC #/AREA URNS AUTO: NORMAL /HPF
SODIUM SERPL-SCNC: 139 MMOL/L (ref 136–145)
SP GR UR STRIP.AUTO: 1.01 (ref 1–1.03)
UROBILINOGEN UR QL STRIP.AUTO: 0.2 E.U./DL
WBC # BLD AUTO: 9.69 THOUSAND/UL (ref 4.31–10.16)
WBC #/AREA URNS AUTO: NORMAL /HPF

## 2021-04-03 PROCEDURE — 99284 EMERGENCY DEPT VISIT MOD MDM: CPT | Performed by: EMERGENCY MEDICINE

## 2021-04-03 PROCEDURE — 71250 CT THORAX DX C-: CPT

## 2021-04-03 PROCEDURE — 73552 X-RAY EXAM OF FEMUR 2/>: CPT

## 2021-04-03 PROCEDURE — 99285 EMERGENCY DEPT VISIT HI MDM: CPT

## 2021-04-03 PROCEDURE — 81001 URINALYSIS AUTO W/SCOPE: CPT

## 2021-04-03 PROCEDURE — G1004 CDSM NDSC: HCPCS

## 2021-04-03 PROCEDURE — 80048 BASIC METABOLIC PNL TOTAL CA: CPT | Performed by: EMERGENCY MEDICINE

## 2021-04-03 PROCEDURE — 99219 PR INITIAL OBSERVATION CARE/DAY 50 MINUTES: CPT | Performed by: NURSE PRACTITIONER

## 2021-04-03 PROCEDURE — 74176 CT ABD & PELVIS W/O CONTRAST: CPT

## 2021-04-03 PROCEDURE — 85025 COMPLETE CBC W/AUTO DIFF WBC: CPT | Performed by: EMERGENCY MEDICINE

## 2021-04-03 PROCEDURE — 36415 COLL VENOUS BLD VENIPUNCTURE: CPT | Performed by: EMERGENCY MEDICINE

## 2021-04-03 RX ORDER — ACETAMINOPHEN 325 MG/1
650 TABLET ORAL ONCE
Status: COMPLETED | OUTPATIENT
Start: 2021-04-03 | End: 2021-04-03

## 2021-04-03 RX ORDER — OXYCODONE HYDROCHLORIDE 5 MG/1
5 TABLET ORAL EVERY 6 HOURS PRN
Status: DISCONTINUED | OUTPATIENT
Start: 2021-04-03 | End: 2021-04-09 | Stop reason: HOSPADM

## 2021-04-03 RX ORDER — HEPARIN SODIUM 5000 [USP'U]/ML
5000 INJECTION, SOLUTION INTRAVENOUS; SUBCUTANEOUS EVERY 8 HOURS SCHEDULED
Status: DISCONTINUED | OUTPATIENT
Start: 2021-04-03 | End: 2021-04-03

## 2021-04-03 RX ORDER — OXYCODONE HYDROCHLORIDE 5 MG/1
2.5 TABLET ORAL ONCE
Status: COMPLETED | OUTPATIENT
Start: 2021-04-03 | End: 2021-04-03

## 2021-04-03 RX ORDER — OXYCODONE HYDROCHLORIDE 5 MG/1
2.5 TABLET ORAL EVERY 6 HOURS PRN
Status: DISCONTINUED | OUTPATIENT
Start: 2021-04-03 | End: 2021-04-09 | Stop reason: HOSPADM

## 2021-04-03 RX ORDER — ASPIRIN 81 MG/1
81 TABLET ORAL EVERY OTHER DAY
COMMUNITY

## 2021-04-03 RX ORDER — HYDROCODONE BITARTRATE AND ACETAMINOPHEN 5; 325 MG/1; MG/1
1 TABLET ORAL EVERY 6 HOURS PRN
COMMUNITY
End: 2021-04-09 | Stop reason: HOSPADM

## 2021-04-03 RX ORDER — AMIODARONE HYDROCHLORIDE 200 MG/1
100 TABLET ORAL DAILY
Status: DISCONTINUED | OUTPATIENT
Start: 2021-04-04 | End: 2021-04-09 | Stop reason: HOSPADM

## 2021-04-03 RX ORDER — ACETAMINOPHEN 325 MG/1
975 TABLET ORAL EVERY 8 HOURS SCHEDULED
Status: DISCONTINUED | OUTPATIENT
Start: 2021-04-03 | End: 2021-04-09 | Stop reason: HOSPADM

## 2021-04-03 RX ORDER — DOCUSATE SODIUM 100 MG/1
100 CAPSULE, LIQUID FILLED ORAL 2 TIMES DAILY
Status: DISCONTINUED | OUTPATIENT
Start: 2021-04-04 | End: 2021-04-07

## 2021-04-03 RX ORDER — HYDROMORPHONE HCL/PF 1 MG/ML
0.2 SYRINGE (ML) INJECTION ONCE
Status: COMPLETED | OUTPATIENT
Start: 2021-04-03 | End: 2021-04-04

## 2021-04-03 RX ORDER — POLYETHYLENE GLYCOL 3350 17 G/17G
17 POWDER, FOR SOLUTION ORAL 2 TIMES DAILY
Status: DISCONTINUED | OUTPATIENT
Start: 2021-04-03 | End: 2021-04-05

## 2021-04-03 RX ORDER — LIDOCAINE 50 MG/G
1 PATCH TOPICAL DAILY
Status: DISCONTINUED | OUTPATIENT
Start: 2021-04-04 | End: 2021-04-09 | Stop reason: HOSPADM

## 2021-04-03 RX ORDER — FUROSEMIDE 40 MG/1
40 TABLET ORAL DAILY
Status: DISCONTINUED | OUTPATIENT
Start: 2021-04-04 | End: 2021-04-07

## 2021-04-03 RX ORDER — ZOLPIDEM TARTRATE 5 MG/1
10 TABLET ORAL
Status: DISCONTINUED | OUTPATIENT
Start: 2021-04-03 | End: 2021-04-09 | Stop reason: HOSPADM

## 2021-04-03 RX ORDER — ALPRAZOLAM 0.25 MG/1
0.25 TABLET ORAL 3 TIMES DAILY PRN
Status: DISCONTINUED | OUTPATIENT
Start: 2021-04-03 | End: 2021-04-09 | Stop reason: HOSPADM

## 2021-04-03 RX ORDER — LEVOTHYROXINE SODIUM 0.07 MG/1
37.5 TABLET ORAL EVERY OTHER DAY
Status: DISCONTINUED | OUTPATIENT
Start: 2021-04-04 | End: 2021-04-09 | Stop reason: HOSPADM

## 2021-04-03 RX ORDER — ASPIRIN 81 MG/1
81 TABLET ORAL EVERY OTHER DAY
Status: DISCONTINUED | OUTPATIENT
Start: 2021-04-04 | End: 2021-04-09 | Stop reason: HOSPADM

## 2021-04-03 RX ADMIN — ACETAMINOPHEN 650 MG: 325 TABLET, FILM COATED ORAL at 18:43

## 2021-04-03 RX ADMIN — OXYCODONE HYDROCHLORIDE 2.5 MG: 5 TABLET ORAL at 18:43

## 2021-04-04 PROBLEM — F41.9 ANXIETY: Status: ACTIVE | Noted: 2017-03-28

## 2021-04-04 PROBLEM — M54.50 CHRONIC LOW BACK PAIN WITHOUT SCIATICA: Status: ACTIVE | Noted: 2021-04-04

## 2021-04-04 PROBLEM — G89.29 CHRONIC LOW BACK PAIN WITHOUT SCIATICA: Status: ACTIVE | Noted: 2021-04-04

## 2021-04-04 LAB
ANION GAP SERPL CALCULATED.3IONS-SCNC: 9 MMOL/L (ref 4–13)
BUN SERPL-MCNC: 26 MG/DL (ref 5–25)
CALCIUM SERPL-MCNC: 8.2 MG/DL (ref 8.3–10.1)
CHLORIDE SERPL-SCNC: 103 MMOL/L (ref 100–108)
CO2 SERPL-SCNC: 25 MMOL/L (ref 21–32)
CREAT SERPL-MCNC: 2.05 MG/DL (ref 0.6–1.3)
ERYTHROCYTE [DISTWIDTH] IN BLOOD BY AUTOMATED COUNT: 13.8 % (ref 11.6–15.1)
GFR SERPL CREATININE-BSD FRML MDRD: 20 ML/MIN/1.73SQ M
GLUCOSE SERPL-MCNC: 86 MG/DL (ref 65–140)
HCT VFR BLD AUTO: 35.4 % (ref 34.8–46.1)
HGB BLD-MCNC: 11.4 G/DL (ref 11.5–15.4)
MCH RBC QN AUTO: 33.1 PG (ref 26.8–34.3)
MCHC RBC AUTO-ENTMCNC: 32.2 G/DL (ref 31.4–37.4)
MCV RBC AUTO: 103 FL (ref 82–98)
PLATELET # BLD AUTO: 201 THOUSANDS/UL (ref 149–390)
PMV BLD AUTO: 10.5 FL (ref 8.9–12.7)
POTASSIUM SERPL-SCNC: 4.8 MMOL/L (ref 3.5–5.3)
RBC # BLD AUTO: 3.44 MILLION/UL (ref 3.81–5.12)
SODIUM SERPL-SCNC: 137 MMOL/L (ref 136–145)
WBC # BLD AUTO: 8.22 THOUSAND/UL (ref 4.31–10.16)

## 2021-04-04 PROCEDURE — 80048 BASIC METABOLIC PNL TOTAL CA: CPT | Performed by: NURSE PRACTITIONER

## 2021-04-04 PROCEDURE — 85027 COMPLETE CBC AUTOMATED: CPT | Performed by: NURSE PRACTITIONER

## 2021-04-04 PROCEDURE — 99232 SBSQ HOSP IP/OBS MODERATE 35: CPT | Performed by: INTERNAL MEDICINE

## 2021-04-04 RX ORDER — HYDROMORPHONE HCL/PF 1 MG/ML
0.5 SYRINGE (ML) INJECTION EVERY 6 HOURS PRN
Status: DISCONTINUED | OUTPATIENT
Start: 2021-04-04 | End: 2021-04-09 | Stop reason: HOSPADM

## 2021-04-04 RX ORDER — METHOCARBAMOL 500 MG/1
500 TABLET, FILM COATED ORAL EVERY 6 HOURS PRN
Status: DISCONTINUED | OUTPATIENT
Start: 2021-04-04 | End: 2021-04-09 | Stop reason: HOSPADM

## 2021-04-04 RX ORDER — BISACODYL 10 MG
10 SUPPOSITORY, RECTAL RECTAL DAILY PRN
Status: DISCONTINUED | OUTPATIENT
Start: 2021-04-04 | End: 2021-04-07

## 2021-04-04 RX ADMIN — FUROSEMIDE 40 MG: 40 TABLET ORAL at 08:37

## 2021-04-04 RX ADMIN — ACETAMINOPHEN 975 MG: 325 TABLET, FILM COATED ORAL at 00:15

## 2021-04-04 RX ADMIN — POLYETHYLENE GLYCOL 3350 17 G: 17 POWDER, FOR SOLUTION ORAL at 00:16

## 2021-04-04 RX ADMIN — HYDROMORPHONE HYDROCHLORIDE 0.2 MG: 1 INJECTION, SOLUTION INTRAMUSCULAR; INTRAVENOUS; SUBCUTANEOUS at 00:16

## 2021-04-04 RX ADMIN — DOCUSATE SODIUM 100 MG: 100 CAPSULE, LIQUID FILLED ORAL at 08:37

## 2021-04-04 RX ADMIN — ZOLPIDEM TARTRATE 10 MG: 5 TABLET ORAL at 00:16

## 2021-04-04 RX ADMIN — ZOLPIDEM TARTRATE 10 MG: 5 TABLET ORAL at 21:18

## 2021-04-04 RX ADMIN — ACETAMINOPHEN 975 MG: 325 TABLET, FILM COATED ORAL at 21:18

## 2021-04-04 RX ADMIN — POLYETHYLENE GLYCOL 3350 17 G: 17 POWDER, FOR SOLUTION ORAL at 21:18

## 2021-04-04 RX ADMIN — ASPIRIN 81 MG: 81 TABLET, COATED ORAL at 08:37

## 2021-04-04 RX ADMIN — METHYLNALTREXONE BROMIDE 8 MG: 8 INJECTION, SOLUTION SUBCUTANEOUS at 14:17

## 2021-04-04 RX ADMIN — ACETAMINOPHEN 975 MG: 325 TABLET, FILM COATED ORAL at 07:52

## 2021-04-04 RX ADMIN — POLYETHYLENE GLYCOL 3350 17 G: 17 POWDER, FOR SOLUTION ORAL at 08:36

## 2021-04-04 RX ADMIN — LEVOTHYROXINE SODIUM 37.5 MCG: 75 TABLET ORAL at 08:37

## 2021-04-04 RX ADMIN — LIDOCAINE 5% 1 PATCH: 700 PATCH TOPICAL at 08:35

## 2021-04-04 RX ADMIN — AMIODARONE HYDROCHLORIDE 100 MG: 200 TABLET ORAL at 08:36

## 2021-04-04 RX ADMIN — ACETAMINOPHEN 975 MG: 325 TABLET, FILM COATED ORAL at 13:39

## 2021-04-04 NOTE — H&P
[facility]  H&P- Rosa Shefali 8/26/1926, 80 y o  female MRN: [id number]  Unit/Bed#: S -01 Encounter: [id number]  Primary Care Provider: Shanell Neri DO   Date and time admitted to hospital: 4/3/2021  6:06 PM    * Fall  Assessment & Plan  · Mechanical fall that occurred while at home secondary to her wheelchair not being locked  Patient was assisted to the ground  No head strike  Is on low-dose aspirin 81 mg every other day  Reports worsening of chronic back pain which is limiting ability to complete ADLs  · Patient was last hospitalized on 03/23 through 3/25 for mechanical fall  She was admitted under the service of trauma  She was evaluated by Geriatric Medicine  Patient and daughter declined rehab on discharge  · CT chest abdomen pelvis without contrast:  Hematoma as documented below  Mild loss of height of T12 vertebral body, unchanged from prior study  · Pain control:  Scheduled Tylenol, lidocaine  P r n  Oxycodone and Dilaudid  · PT, OT consult  Patient's daughter may be agreeable to rehab on discharge however, wishes for patient to ultimately return home  Constipation  Assessment & Plan  · Reports chronic constipation  · Prominent colonic stool without evidence of bowel obstruction on CT imaging  · Colace b i d , MiraLax b i d  Hematoma of right hip  Assessment & Plan  · Right gluteal subcutaneous hematoma is decreased in size compared to prior imaging    Insomnia  Assessment & Plan  · Takes Ambien 12 5 mg CR daily HS  While inpatient, this will be substituted for formulary Ambien 10 mg  Patient's daughter is not interested in reducing dose    Chronic diastolic congestive heart failure [facility])  Assessment & Plan  Wt Readings from Last 3 Encounters:   04/03/21 82 5 kg (181 lb 14 1 oz)   04/01/21 82 5 kg (181 lb 12 8 oz)   03/23/21 86 1 kg (189 lb 13 1 oz)       · Appears euvolemic  Continue home dose of Lasix    · I&O, weight  · Echo 2017:  EF 65%, G1 DD   Mild aortic stenosis versus sclerosis  CKD (chronic kidney disease), stage IV ([facility])  Assessment & Plan  · Creatinine stable and at baseline  · Managed by PCP  · History of right nephrectomy    Anxiety  Assessment & Plan  · Continue Xanax 0 25 mg t i d  P r n  [place] Patient's daughter is not interested and adjusting dose    Paroxysmal atrial fibrillation [facility])  Assessment & Plan  · S/p cardioversion approximately 4 years ago  Is maintained on amiodarone  · No longer taking Pradaxa due to fall risk  Patient daughter reports she is only taking low-dose aspirin every other day  VTE Prophylaxis: Pharmacologic VTE Prophylaxis contraindicated due to hematoma  / sequential compression device   Code Status: level 3   POLST: POLST is not applicable to this patient  Discussion with family: patient and daughter at bedside     Anticipated Length of Stay:  Patient will be admitted on an Observation basis with an anticipated length of stay of  < 2 midnights  Justification for Hospital Stay: PT OT consult, DC to STR? Total Time for Visit, including Counseling / Coordination of Care: 45 minutes  Greater than 50% of this total time spent on direct patient counseling and coordination of care  Chief Complaint:   fall    History of Present Illness:    Janessa Hand is a 80 y o  female with pmh significant for atrial fibrillation maintained on ASA, R nephrectomy, CKD, anxiety, diastolic heart failure, who presents with mechanical fall that occurred at home  Patient's daughter reports patient attempted to get out of wheelchair before it was locked  Patient was assisted to floor  There was no head strike or LOC  She is on ASA 81 mg every other day  Pt reports she had difficulty sleeping and was told by VNA she may take Ambien dose a litter earlier in the day  Patient's daughter thinks she was a bit drowsy at the time of the fall  Patient has chronic low back pain    Low back pain has been ongoing for quite some time, but patient and daughter report difficulty with ADLs such as getting out of bed  She also reports she does not have rails on her bed to assist her  She still has home PT and VNA  Patient was last hospitalized end of March due to mechanical fall  She has a healing hematoma to right hip  Patient and daughter may be interested in inpt therapy, but ultimately wishes for her to return home  Pain admitted as observation for PT OT consult and discharge planning  Review of Systems:    Review of Systems   Gastrointestinal: Positive for constipation  Negative for abdominal distention, abdominal pain, anal bleeding, blood in stool, diarrhea, nausea, rectal pain and vomiting  Musculoskeletal: Positive for back pain and gait problem  Skin: Positive for color change  All other systems reviewed and are negative  Past Medical and Surgical History:     Past Medical History:   Diagnosis Date    A-fib [facility])     Cardiac disease     Hyperlipidemia     Hypertension        Past Surgical History:   Procedure Laterality Date    APPENDECTOMY      CHOLECYSTECTOMY      HERNIA REPAIR      HYSTERECTOMY      NEPHRECTOMY Right        Meds/Allergies:    Prior to Admission medications    Medication Sig Start Date End Date Taking?  Authorizing Provider   aspirin (ECOTRIN LOW STRENGTH) 81 mg EC tablet Take 81 mg by mouth every other day   Yes Historical Provider, MD   HYDROcodone-acetaminophen (NORCO) 5-325 mg per tablet Take 1 tablet by mouth every 6 (six) hours as needed for pain   Yes Historical Provider, MD   acetaminophen (TYLENOL) 325 mg tablet Take 3 tablets (975 mg total) by mouth every 8 (eight) hours 3/25/21   Cassy LowerIZABELA   ALPRAZolam Heladio Dunne) 0 25 mg tablet Take 1 tablet (0 25 mg total) by mouth 3 (three) times a day as needed for anxiety for up to 10 days 3/25/21 4/4/21  Cassy IZABELA Tsang   amiodarone 100 mg tablet Take 1 tablet (100 mg total) by mouth daily 2/9/21   Antonio Gracia MD docusate sodium (COLACE) 100 mg capsule Take 1 capsule (100 mg total) by mouth 2 (two) times a day 3/25/21   Shun Rodriguez PA-C   Emollient (EUCERIN) lotion Apply topically 2 (two) times a day 4/23/15   Historical Provider, MD   furosemide (LASIX) 20 mg tablet TAKE 2 TABLETS (40MG) BY MOUTH DAILY 3/4/21   Carlene Shah DO   oxyCODONE (ROXICODONE) 5 mg immediate release tablet Take 0 5 tablets (2 5 mg total) by mouth every 4 (four) hours as needed for moderate pain for up to 10 daysMax Daily Amount: 15 mg 3/25/21 4/4/21  Shun Rodriguez PA-C   polyethylene glycol (MIRALAX) 17 g packet Take 17 g by mouth daily as needed (constipation) 3/25/21   Shun Rodriguez PA-C   Respiratory Therapy Supplies (NEBULIZER/ADULT MASK) KIT by Does not apply route every 4 (four) hours as needed (wheezing) 9/26/19   Lily Malone MD   rosuvastatin (CRESTOR) 5 mg tablet Take 1 tablet (5 mg total) by mouth daily 12/10/20 6/8/21  Carlene Shah DO   Synthroid 25 MCG tablet Take 1 5 tablets (37 5 mcg total) by mouth daily  Patient taking differently: Take 37 5 mcg by mouth daily Takes 37 5 mcg every other day  4/1/21 5/1/21  Carlene Shah DO   triamcinolone (KENALOG) 0 1 % cream Apply topically Twice daily 1/3/18   Historical Provider, MD   zolpidem (AMBIEN CR) 12 5 MG CR tablet TAKE ONE TABLET BY MOUTH DAILY AT BEDTIME 3/11/21   Carlene Shah DO   HYDROcodone-acetaminophen [facility]) 5-325 mg per tablet Take 1 tablet by mouth every 6 (six) hours as needed for pain for up to 4 daysMax Daily Amount: 4 tablets 4/2/21 4/3/21  ANGELA Pop     I have reviewed home medications with patient family member  Allergies:    Allergies   Allergen Reactions    Penicillin G Anaphylaxis    Spironolactone Shortness Of Breath    Atorvastatin      Other reaction(s): Leg Cramps    Cephalexin Headache    Chocolate - Food Allergy     Citalopram Abdominal Pain and Headache    Diltiazem     Fosinopril Sodium-Hctz     Methylprednisolone Nausea Only     Tablet generic only    Monopril [Fosinopril]      Reaction Date: [id number];     Penicillins     Pollen Extract     Pravastatin Myalgia     Other reaction(s): Leg Cramps    Sporanox [Itraconazole]      Reaction Date: [id number];     Strawberry C [Ascorbate - Food Allergy]     Caffeine - Food Allergy Palpitations     Tachycardia       Social History:     Marital Status:    Patient Pre-hospital Living Situation: daughter  Patient Pre-hospital Level of Mobility: walker, wheelchair at home  Substance Use History:   Social History     Substance and Sexual Activity   Alcohol Use No     Social History     Tobacco Use   Smoking Status Never Smoker   Smokeless Tobacco Never Used     Social History     Substance and Sexual Activity   Drug Use Never       Family History:    non-contributory    Physical Exam:     Vitals:   Blood Pressure: 128/68 (04/03/21 2308)  Pulse: (!) 53 (04/03/21 2308)  Temperature: 98 °F (36 7 °C) (04/03/21 2308)  Temp Source: Oral (04/03/21 2308)  Respirations: 18 (04/03/21 2308)  Height: 5' 7" (170 2 cm) (04/03/21 1817)  Weight - Scale: 82 5 kg (181 lb 14 1 oz) (04/03/21 1817)  SpO2: 92 % (04/03/21 2308)    Physical Exam  Constitutional:       General: She is not in acute distress  Appearance: Normal appearance  She is not ill-appearing  HENT:      Head: Normocephalic and atraumatic  Right Ear: External ear normal       Left Ear: External ear normal       Nose: Nose normal       Mouth/Throat:      Mouth: Mucous membranes are moist       Pharynx: Oropharynx is clear  Eyes:      General: No scleral icterus  Extraocular Movements: Extraocular movements intact  Conjunctiva/sclera: Conjunctivae normal       Pupils: Pupils are equal, round, and reactive to light  Neck:      Musculoskeletal: Normal range of motion and neck supple  No neck rigidity or muscular tenderness     Cardiovascular:      Rate and Rhythm: Normal rate and regular rhythm  Pulses: Normal pulses  Heart sounds: Murmur present  Pulmonary:      Effort: Pulmonary effort is normal       Breath sounds: Normal breath sounds  Abdominal:      General: Bowel sounds are normal  There is no distension  Palpations: Abdomen is soft  Tenderness: There is no abdominal tenderness  There is no right CVA tenderness, left CVA tenderness, guarding or rebound  Musculoskeletal: Normal range of motion  Lumbar back: She exhibits tenderness  She exhibits no deformity  Right lower leg: No edema  Left lower leg: No edema  Skin:     General: Skin is warm and dry  Capillary Refill: Capillary refill takes less than 2 seconds  Findings: Bruising (r hip/thigh, healing ) present  Neurological:      Mental Status: She is alert and oriented to person, place, and time  Psychiatric:         Mood and Affect: Mood normal          Behavior: Behavior normal              Additional Data:     Lab Results: I have personally reviewed pertinent reports  Results from last 7 days   Lab Units 04/03/21  1843   WBC Thousand/uL 9 69   HEMOGLOBIN g/dL 13 1   HEMATOCRIT % 40 1   PLATELETS Thousands/uL 213   NEUTROS PCT % 73   LYMPHS PCT % 11*   MONOS PCT % 8   EOS PCT % 7*     Results from last 7 days   Lab Units 04/03/21  1843   SODIUM mmol/L 139   POTASSIUM mmol/L 4 7   CHLORIDE mmol/L 103   CO2 mmol/L 29   BUN mg/dL 23   CREATININE mg/dL 1 91*   ANION GAP mmol/L 7   CALCIUM mg/dL 9 0   GLUCOSE RANDOM mg/dL 102                       Imaging: I have personally reviewed pertinent reports  CT chest abdomen pelvis wo contrast   Final Result by Tobin Collins MD (04/03 1944)      No new injury in the chest abdomen or pelvis        Previously seen right gluteal subcutaneous hematoma is decreased in size            Workstation performed: [id number]         XR femur 2 views RIGHT   ED Interpretation by Musa Silvestre MD (04/03 1929)   No acute abnormalities          EKG, Pathology, and Other Studies Reviewed on Admission:       Allscripts / Epic Records Reviewed: Yes     ** Please Note: This note has been constructed using a voice recognition system   **

## 2021-04-04 NOTE — ASSESSMENT & PLAN NOTE
· Continue Xanax 0 25 mg t i d  P r n  Hayes Sutton   Patient's daughter is not interested and adjusting dose

## 2021-04-04 NOTE — ASSESSMENT & PLAN NOTE
· Patient complaining of severe back pain  Unable to ambulate because of pain  She reports it is chronic but gotten worse since fall  Denies any radiation of pain to legs  · May benefit from MRI, can consider epidural steroids  Will order lumbar spine MRI

## 2021-04-04 NOTE — PROGRESS NOTES
[facility]  Progress Note - Musa Carver 8/26/1926, 80 y o  female MRN: [id number]  Unit/Bed#: S -01 Encounter: [id number]  Primary Care Provider: Elida Najera DO   Date and time admitted to hospital: 4/3/2021  6:06 PM    * Fall  Assessment & Plan  · Mechanical fall that occurred while at home secondary to her wheelchair not being locked  Patient was assisted to the ground  No head strike  Is on low-dose aspirin 81 mg every other day  Reports worsening of chronic back pain which is limiting ability to complete ADLs  · Patient was last hospitalized on 03/23 through 3/25 for mechanical fall  She was admitted under the service of trauma  She was evaluated by Geriatric Medicine  Patient and daughter declined rehab on discharge  · CT chest abdomen pelvis without contrast:  Hematoma as documented below  Mild loss of height of T12 vertebral body, unchanged from prior study  · Pain control:  Scheduled Tylenol, lidocaine  P r n  Oxycodone and Dilaudid  · PT, OT consult  Patient's daughter may be agreeable to rehab on discharge however, wishes for patient to ultimately return home  Chronic low back pain without sciatica  Assessment & Plan  · Patient complaining of severe back pain  Unable to ambulate because of pain  She reports it is chronic but gotten worse since fall  Denies any radiation of pain to legs  · May benefit from MRI, can consider epidural steroids  Will order lumbar spine MRI      Hematoma of right hip  Assessment & Plan  · Right gluteal subcutaneous hematoma is decreased in size compared to prior imaging    CKD (chronic kidney disease), stage IV (HCC)  Assessment & Plan  · Creatinine stable and at baseline  · Managed by PCP  · History of right nephrectomy    Chronic diastolic congestive heart failure [facility])  Assessment & Plan  Wt Readings from Last 3 Encounters:   04/04/21 83 kg (182 lb 15 7 oz)   04/01/21 82 5 kg (181 lb 12 8 oz)   03/23/21 86 1 kg (189 lb 13 1 oz)       · Appears euvolemic  Continue home dose of Lasix  · I&O, weight  · Echo 2017:  EF 65%, G1 DD  Mild aortic stenosis versus sclerosis  Paroxysmal atrial fibrillation (HCC)  Assessment & Plan  · S/p cardioversion approximately 4 years ago  Is maintained on amiodarone  · No longer taking Pradaxa due to fall risk  Patient daughter reports she is only taking low-dose aspirin every other day  Insomnia  Assessment & Plan  · Takes Ambien 12 5 mg CR daily HS  While inpatient, this will be substituted for formulary Ambien 10 mg  Patient's daughter is not interested in reducing dose    Constipation  Assessment & Plan  · Reports chronic constipation  · Prominent colonic stool without evidence of bowel obstruction on CT imaging  · Colace b i d , MiraLax b i d  Anxiety  Assessment & Plan  · Continue Xanax 0 25 mg t i d  P r n  Silverio Rodyer Patient's daughter is not interested and adjusting dose    VTE Pharmacologic Prophylaxis:   Pharmacologic: Pharmacologic VTE Prophylaxis contraindicated due to Hematoma  Mechanical VTE Prophylaxis in Place: Yes    Patient Centered Rounds: I have performed bedside rounds with nursing staff today  Discussions with Specialists or Other Care Team Provider:    Education and Discussions with Family / Patient:  Discussed with daughter over phone    Time Spent for Care: 30 minutes  More than 50% of total time spent on counseling and coordination of care as described above  Current Length of Stay: 0 day(s)    Current Patient Status: Observation   Certification Statement: The patient will continue to require additional inpatient hospital stay due to Back pain requiring further workup    Discharge Plan:  pending PT OT evaluation and MRI    Code Status: Level 3 - DNAR and DNI      Subjective:     Patient feels okay today    Complaining of low back pain    Objective:     Vitals:   Temp (24hrs), Av 2 °F (36 8 °C), Min:98 °F (36 7 °C), Max:98 7 °F (37 1 °C)    Temp:  [85 °F (36 7 °C)-98 7 °F (37 1 °C)] 98 °F (36 7 °C)  HR:  [48-56] 48  Resp:  [18-22] 22  BP: (128-214)/(66-91) 141/66  SpO2:  [92 %-95 %] 94 %  Body mass index is 28 66 kg/m²  Input and Output Summary (last 24 hours): Intake/Output Summary (Last 24 hours) at 4/4/2021 1118  Last data filed at 4/3/2021 2301  Gross per 24 hour   Intake --   Output 300 ml   Net -300 ml       Physical Exam:     Physical Exam    Gen -Patient comfortable at rest  Neck- Supple  No thyromegaly or lymphadenopathy  Lungs-Clear bilaterally without any wheeze or rales   Heart S1-S2, regular rate and rhythm, no murmurs  Abdomen-soft nontender, no organomegaly  Bowel sounds present  Extremities-no cyanosi,  clubbing or edema  Skin- no rash  Neuro-awake alert and oriented x3  Straight leg raising limited by pain        Additional Data:     Labs:    Results from last 7 days   Lab Units 04/04/21  0533 04/03/21  1843   WBC Thousand/uL 8 22 9 69   HEMOGLOBIN g/dL 11 4* 13 1   HEMATOCRIT % 35 4 40 1   PLATELETS Thousands/uL 201 213   NEUTROS PCT %  --  73   LYMPHS PCT %  --  11*   MONOS PCT %  --  8   EOS PCT %  --  7*     Results from last 7 days   Lab Units 04/04/21  0533   SODIUM mmol/L 137   POTASSIUM mmol/L 4 8   CHLORIDE mmol/L 103   CO2 mmol/L 25   BUN mg/dL 26*   CREATININE mg/dL 2 05*   ANION GAP mmol/L 9   CALCIUM mg/dL 8 2*   GLUCOSE RANDOM mg/dL 86                           * I Have Reviewed All Lab Data Listed Above  * Additional Pertinent Lab Tests Reviewed:  Joey 66 Admission Reviewed    Imaging:    Imaging Reports Reviewed Today Include:   Imaging Personally Reviewed by Myself Includes:      Recent Cultures (last 7 days):           Last 24 Hours Medication List:   Current Facility-Administered Medications   Medication Dose Route Frequency Provider Last Rate    acetaminophen  975 mg Oral Count includes the [facility] [place], CRNP      ALPRAZolam  0 25 mg Oral TID PRN Osiris, CRNP      amiodarone  100 mg Oral Daily Maryfrances Hollow [place], CRNP      aspirin  81 mg Oral Every Other Day ANGELA Le      docusate sodium  100 mg Oral BID ANGELA Le      furosemide  40 mg Oral Daily Radha Messer, [address]      levothyroxine  37 5 mcg Oral Every Other Day ANGELA Le      lidocaine  1 patch Topical Daily ANGELA Le      methocarbamol  500 mg Oral Q6H PRN Seda Johnson MD      oxyCODONE  2 5 mg Oral Q6H PRN ANGELA Le      oxyCODONE  5 mg Oral Q6H PRN ANGELA Le      polyethylene glycol  17 g Oral BID ANGELA Le      zolpidem  10 mg Oral HS ANGELA Le          Today, Patient Was Seen By: Guadalupe Cormier MD    ** Please Note: Dictation voice to text software may have been used in the creation of this document   **

## 2021-04-04 NOTE — UTILIZATION REVIEW
Initial Clinical Review    Admission: Date/Time/Statement:   Admission Orders (From admission, onward)     Ordered        04/03/21 2137  Place in Observation  Once                   Orders Placed This Encounter   Procedures    Place in Observation     Standing Status:   Standing     Number of Occurrences:   1     Order Specific Question:   Level of Care     Answer:   Med Surg [16]     ED Arrival Information     Expected Arrival Acuity Means of Arrival Escorted By Service Admission Type    - 4/3/2021 18:06 Urgent Ambulance [facility] Urgent    Arrival Complaint    fall        Chief Complaint   Patient presents with   Tomie Coop Fall     pt was gently guided to floor by daughter as she was falling  pt reports chronic back pain "for 70 years " pt was wiping herself a few days ago and twisted her back      Assessment/Plan: 81 yo female presents to ED from home via EMS due to mechanical fall  Patient was assisted to floor after standing before wheelchair was locked  No head strike  On ASA 81 mg every other day Pt c/o low back pain, ongoing for some time  Patient was hospitalized march after fall  Healing hematoma right hip CT chest abd, pelvis shows healing hematoma  Admitted as observation to med surg for fall pain control tylenol, lidocaine, prn oxycodone, dilaudid  PT/OT consult  Daughter previously declined rehab placement, may be agreeable to rehab on DC, wishes for pt to ultimately return home   Constipation colace miralax Continue home lasix       ED Triage Vitals   Temperature Pulse Respirations Blood Pressure SpO2   04/03/21 1817 04/03/21 1817 04/03/21 1817 04/03/21 1817 04/03/21 1817   98 7 °F (37 1 °C) 56 18 (!) 214/91 94 %      Temp Source Heart Rate Source Patient Position - Orthostatic VS BP Location FiO2 (%)   04/03/21 1817 04/03/21 2032 04/03/21 2032 04/03/21 2032 --   Oral Monitor Lying Right arm       Pain Score       04/03/21 1843       Worst Possible Pain          Wt Readings from Last 1 Encounters:   04/04/21 83 kg (182 lb 15 7 oz)     Additional Vital Signs:  Date/Time  Temp  Pulse  Resp  BP  SpO2  O2 Device  Patient Position - Orthostatic VS   04/04/21 0752  98 °F (36 7 °C)  48Abnormal   22  141/66  94 %  None (Room air)  Lying   04/03/21 2308  98 °F (36 7 °C)  53Abnormal   18  128/68  92 %  None (Room air)  Sitting   04/03/21 2032  --  52Abnormal   18  167/74  95 %  None (Room air)  Lying   04/03/21 1817  98 7 °F (37 1 °C)  56  18  214/91Abnormal   94 %  None (Room air)  --       Pertinent Labs/Diagnostic Test Results:        4/3 CT AP No new injury in the chest abdomen or pelvis       Previously seen right gluteal subcutaneous hematoma is decreased in size     4/3 xray right femur  Pending     Results from last 7 days   Lab Units 04/04/21  0533 04/03/21  1843   WBC Thousand/uL 8 22 9 69   HEMOGLOBIN g/dL 11 4* 13 1   HEMATOCRIT % 35 4 40 1   PLATELETS Thousands/uL 201 213   NEUTROS ABS Thousands/µL  --  7 15         Results from last 7 days   Lab Units 04/04/21  0533 04/03/21  1843   SODIUM mmol/L 137 139   POTASSIUM mmol/L 4 8 4 7   CHLORIDE mmol/L 103 103   CO2 mmol/L 25 29   ANION GAP mmol/L 9 7   BUN mg/dL 26* 23   CREATININE mg/dL 2 05* 1 91*   EGFR ml/min/1 73sq m 20 22   CALCIUM mg/dL 8 2* 9 0             Results from last 7 days   Lab Units 04/04/21  0533 04/03/21  1843   GLUCOSE RANDOM mg/dL 86 102             No results found for: BETA-HYDROXYBUTYRATE                                                                           Results from last 7 days   Lab Units 04/03/21  1931   CLARITY UA  Clear   COLOR UA  Yellow   SPEC GRAV UA  1 015   PH UA  5 5   GLUCOSE UA mg/dl Negative   KETONES UA mg/dl Negative   BLOOD UA  Negative   PROTEIN UA mg/dl Negative   NITRITE UA  Negative   BILIRUBIN UA  Negative   UROBILINOGEN UA E U /dl 0 2   LEUKOCYTES UA  Small*   WBC UA /hpf 0-5   RBC UA /hpf 2-4   BACTERIA UA /hpf None Seen   EPITHELIAL CELLS WET PREP /hpf Occasional ED Treatment:   Medication Administration from 04/03/2021 1806 to 04/03/2021 4149       Date/Time Order Dose Route Action Action by Comments     04/03/2021 1843 oxyCODONE (ROXICODONE) IR tablet 2 5 mg 2 5 mg Oral Given Robin Bass RN      04/03/2021 1843 acetaminophen (TYLENOL) tablet 650 mg 650 mg Oral Given Robin Bass RN         Past Medical History:   Diagnosis Date    A-fib [facility])     Cardiac disease     Hyperlipidemia     Hypertension      Present on Admission:   Fall   Paroxysmal atrial fibrillation (HCC)   Anxiety   Insomnia   Chronic diastolic congestive heart failure (HCC)   Hematoma of right hip   CKD (chronic kidney disease), stage IV (HCC)      Admitting Diagnosis: Back pain [M54 9]  Lower back pain [M54 5]  Ambulatory dysfunction [R26 2]  Age/Sex: 80 y o  female  Admission Orders:  Scheduled Medications:  acetaminophen, 975 mg, Oral, Q8H [facility]  amiodarone, 100 mg, Oral, Daily  aspirin, 81 mg, Oral, Every Other Day  docusate sodium, 100 mg, Oral, BID  furosemide, 40 mg, Oral, Daily  levothyroxine, 37 5 mcg, Oral, Every Other Day  lidocaine, 1 patch, Topical, Daily  polyethylene glycol, 17 g, Oral, BID  zolpidem, 10 mg, Oral, HS      Continuous IV Infusions:     PRN Meds:  ALPRAZolam, 0 25 mg, Oral, TID PRN  oxyCODONE, 2 5 mg, Oral, Q6H PRN  oxyCODONE, 5 mg, Oral, Q6H PRN    cardiac diet   Up with assist    Aqua K   Incentive spirometry    Daily weight   I/O         None    Network Utilization Review Department  ATTENTION: Please call with any questions or concerns to [phone] and carefully listen to the prompts so that you are directed to the right person  All voicemails are confidential   Viktoriya Skipper all requests for admission clinical reviews, approved or denied determinations and any other requests to dedicated fax number below belonging to the campus where the patient is receiving treatment   List of dedicated fax numbers for the Facilities:  [facility] Emergency Department ADMISSION DENIALS (Administrative/Medical Necessity) [phone]   [address] (Maternity/NICU/Pediatrics) 261 [facility],[address] Floor [facility] 40 125 [facility]  [phone]   Mamta Bryan [address] (  [place] [address]) [address] [address] [place] [address] [phone]   [place] [address] [phone]

## 2021-04-04 NOTE — ED PROVIDER NOTES
History  Chief Complaint   Patient presents with    Fall     pt was gently guided to floor by daughter as she was falling  pt reports chronic back pain "for 70 years " pt was wiping herself a few days ago and twisted her back        History provided by:  Patient   used: No    Fall  Associated symptoms: back pain    Associated symptoms: no abdominal pain, no chest pain, no headaches, no nausea, no neck pain and no vomiting      Patient is a 75-year-old female presenting to emergency department after falling at home  Patient is of chronic lower back pain exacerbated with recent falls  Today daughter tried to brace her fall to the ground  Did not hit head  No LOC  No chest or abdominal pain  Complaining of back pain  Bruising noted over the right side of the body  Was recently in the hospital for falls  Lives with daughter at home  No fevers  No urine complaints  No vomiting  MDM will get baseline labs, UA, CT chest abdomen pelvis    Patient with difficulty getting around at home  Will admit for PT OT evaluation and continued treatment for lower back pain  Likely need placement for rehab  Prior to Admission Medications   Prescriptions Last Dose Informant Patient Reported? Taking? ALPRAZolam (XANAX) 0 25 mg tablet  Child No No   Sig: Take 1 tablet (0 25 mg total) by mouth 3 (three) times a day as needed for anxiety for up to 10 days   Emollient (EUCERIN) lotion  Child Yes No   Sig: Apply topically 2 (two) times a day   HYDROcodone-acetaminophen (NORCO) 5-325 mg per tablet   Yes Yes   Sig: Take 1 tablet by mouth every 6 (six) hours as needed for pain   Respiratory Therapy Supplies (NEBULIZER/ADULT MASK) KIT  Child No No   Sig: by Does not apply route every 4 (four) hours as needed (wheezing)   Synthroid 25 MCG tablet   No No   Sig: Take 1 5 tablets (37 5 mcg total) by mouth daily   Patient taking differently: Take 37 5 mcg by mouth daily Takes 37 5 mcg every other day  acetaminophen (TYLENOL) 325 mg tablet  Child No No   Sig: Take 3 tablets (975 mg total) by mouth every 8 (eight) hours   amiodarone 100 mg tablet  Child No No   Sig: Take 1 tablet (100 mg total) by mouth daily   aspirin (ECOTRIN LOW STRENGTH) 81 mg EC tablet   Yes Yes   Sig: Take 81 mg by mouth every other day   docusate sodium (COLACE) 100 mg capsule  Child No No   Sig: Take 1 capsule (100 mg total) by mouth 2 (two) times a day   furosemide (LASIX) 20 mg tablet  Child No No   Sig: TAKE 2 TABLETS (40MG) BY MOUTH DAILY   oxyCODONE (ROXICODONE) 5 mg immediate release tablet  Child No No   Sig: Take 0 5 tablets (2 5 mg total) by mouth every 4 (four) hours as needed for moderate pain for up to 10 daysMax Daily Amount: 15 mg   polyethylene glycol (MIRALAX) 17 g packet  Child No No   Sig: Take 17 g by mouth daily as needed (constipation)   rosuvastatin (CRESTOR) 5 mg tablet  Child No No   Sig: Take 1 tablet (5 mg total) by mouth daily   triamcinolone (KENALOG) 0 1 % cream  Child Yes No   Sig: Apply topically Twice daily   zolpidem (AMBIEN CR) 12 5 MG CR tablet  Child No No   Sig: TAKE ONE TABLET BY MOUTH DAILY AT BEDTIME      Facility-Administered Medications: None       Past Medical History:   Diagnosis Date    [facility]     Cardiac disease     Hyperlipidemia     Hypertension        Past Surgical History:   Procedure Laterality Date    APPENDECTOMY      CHOLECYSTECTOMY      HERNIA REPAIR      HYSTERECTOMY      NEPHRECTOMY Right        Family History   Problem Relation Age of Onset    Heart disease Family     Hypertension Family     Cancer Family      I have reviewed and agree with the history as documented      E-Cigarette/Vaping     E-Cigarette/Vaping Substances     Social History     Tobacco Use    Smoking status: Never Smoker    Smokeless tobacco: Never Used   Substance Use Topics    Alcohol use: No    Drug use: Never       Review of Systems   Constitutional: Negative for chills, diaphoresis and fever    HENT: Negative for congestion and sore throat  Respiratory: Negative for cough, shortness of breath, wheezing and stridor  Cardiovascular: Negative for chest pain, palpitations and leg swelling  Gastrointestinal: Negative for abdominal pain, blood in stool, diarrhea, nausea and vomiting  Genitourinary: Negative for dysuria, frequency and urgency  Musculoskeletal: Positive for back pain  Negative for neck pain and neck stiffness  Skin: Negative for pallor and rash  Neurological: Negative for dizziness, syncope, weakness, light-headedness and headaches  All other systems reviewed and are negative  Physical Exam  Physical Exam  Vitals signs reviewed  Constitutional:       Appearance: Normal appearance  She is well-developed  HENT:      Head: Normocephalic and atraumatic  Eyes:      Pupils: Pupils are equal, round, and reactive to light  Neck:      Musculoskeletal: Normal range of motion and neck supple  Cardiovascular:      Rate and Rhythm: Normal rate and regular rhythm  Heart sounds: Normal heart sounds  Pulmonary:      Effort: Pulmonary effort is normal  No respiratory distress  Breath sounds: Normal breath sounds  Abdominal:      General: Bowel sounds are normal       Palpations: Abdomen is soft  Tenderness: There is no abdominal tenderness  Musculoskeletal: Normal range of motion  Comments: Bruising noted on the right upper extremity, right hip  T and L-spine tenderness  Skin:     General: Skin is warm and dry  Capillary Refill: Capillary refill takes less than 2 seconds  Neurological:      General: No focal deficit present  Mental Status: She is alert and oriented to person, place, and time           Vital Signs  ED Triage Vitals   Temperature Pulse Respirations Blood Pressure SpO2   04/03/21 1817 04/03/21 1817 04/03/21 1817 04/03/21 1817 04/03/21 1817   98 7 °F (37 1 °C) 56 18 (!) 214/91 94 %      Temp Source Heart Rate Source Patient Position - Orthostatic VS BP Location FiO2 (%)   04/03/21 1817 04/03/21 2032 04/03/21 2032 04/03/21 2032 --   Oral Monitor Lying Right arm       Pain Score       04/03/21 1843       Worst Possible Pain           Vitals:    04/03/21 1817 04/03/21 2032 04/03/21 2308 04/04/21 0752   BP: (!) 214/91 167/74 128/68 141/66   Pulse: 56 (!) 52 (!) 53 (!) 48   Patient Position - Orthostatic VS:  Lying Sitting Lying         Visual Acuity  Visual Acuity      Most Recent Value   L Pupil Size (mm)  2   R Pupil Size (mm)  2          ED Medications  Medications   acetaminophen (TYLENOL) tablet 975 mg (975 mg Oral Given 4/4/21 1339)   amiodarone tablet 100 mg (100 mg Oral Given 4/4/21 0836)   docusate sodium (COLACE) capsule 100 mg (100 mg Oral Given 4/4/21 0837)   levothyroxine tablet 37 5 mcg (37 5 mcg Oral Given 4/4/21 0837)   ALPRAZolam (XANAX) tablet 0 25 mg (has no administration in time range)   oxyCODONE (ROXICODONE) IR tablet 2 5 mg (has no administration in time range)   oxyCODONE (ROXICODONE) IR tablet 5 mg (has no administration in time range)   polyethylene glycol (MIRALAX) packet 17 g (17 g Oral Given 4/4/21 0836)   lidocaine (LIDODERM) 5 % patch 1 patch (1 patch Topical Medication Applied 4/4/21 0835)   furosemide (LASIX) tablet 40 mg (40 mg Oral Given 4/4/21 0837)   zolpidem (AMBIEN) tablet 10 mg (10 mg Oral Given 4/4/21 0016)   aspirin (ECOTRIN LOW STRENGTH) EC tablet 81 mg (81 mg Oral Given 4/4/21 0837)   methocarbamol (ROBAXIN) tablet 500 mg (has no administration in time range)   HYDROmorphone (DILAUDID) injection 0 5 mg (has no administration in time range)   bisacodyl (DULCOLAX) rectal suppository 10 mg ( Rectal Canceled Entry 4/4/21 1417)   oxyCODONE (ROXICODONE) IR tablet 2 5 mg (2 5 mg Oral Given 4/3/21 1843)   acetaminophen (TYLENOL) tablet 650 mg (650 mg Oral Given 4/3/21 1843)   HYDROmorphone (DILAUDID) injection 0 2 mg (0 2 mg Intravenous Given 4/4/21 0016)   methylnaltrexone (RELISTOR) subcutaneous injection 8 mg (8 mg Subcutaneous Given 4/4/21 1417)       Diagnostic Studies  Results Reviewed     Procedure Component Value Units Date/Time    Urine Microscopic [[id number]] Collected: 04/03/21 1931    Lab Status: Final result Specimen: Urine Updated: 04/03/21 2053     RBC, UA 2-4 /hpf      WBC, UA 0-5 /hpf      Epithelial Cells Occasional /hpf      Bacteria, UA None Seen /hpf      OTHER OBSERVATIONS Renal Epithelial Cells Present    Urine Macroscopic, POC [[id number]]  (Abnormal) Collected: 04/03/21 1931    Lab Status: Final result Specimen: Urine Updated: 04/03/21 1932     Color, UA Yellow     Clarity, UA Clear     pH, UA 5 5     Leukocytes, UA Small     Nitrite, UA Negative     Protein, UA Negative mg/dl      Glucose, UA Negative mg/dl      Ketones, UA Negative mg/dl      Urobilinogen, UA 0 2 E U /dl      Bilirubin, UA Negative     Blood, UA Negative     Specific Gravity, UA 1 015    Narrative:      CLINITEK RESULT    Basic metabolic panel [[id number]]  (Abnormal) Collected: 04/03/21 1843    Lab Status: Final result Specimen: Blood from Arm, Left Updated: 04/03/21 1910     Sodium 139 mmol/L      Potassium 4 7 mmol/L      Chloride 103 mmol/L      CO2 29 mmol/L      ANION GAP 7 mmol/L      BUN 23 mg/dL      Creatinine 1 91 mg/dL      Glucose 102 mg/dL      Calcium 9 0 mg/dL      eGFR 22 ml/min/1 73sq m     Narrative:      [facility] guidelines for Chronic Kidney Disease (CKD):     Stage 1 with normal or high GFR (GFR > 90 mL/min/1 73 square meters)    Stage 2 Mild CKD (GFR = 60-89 mL/min/1 73 square meters)    Stage 3A Moderate CKD (GFR = 45-59 mL/min/1 73 square meters)    Stage 3B Moderate CKD (GFR = 30-44 mL/min/1 73 square meters)    Stage 4 Severe CKD (GFR = 15-29 mL/min/1 73 square meters)    Stage 5 End Stage CKD (GFR <15 mL/min/1 73 square meters)  Note: GFR calculation is accurate only with a steady state creatinine    CBC and differential [[id number]]  (Abnormal) Collected: 04/03/21 1843    Lab Status: Final result Specimen: Blood from Arm, Left Updated: 04/03/21 1900     WBC 9 69 Thousand/uL      RBC 3 87 Million/uL      Hemoglobin 13 1 g/dL      Hematocrit 40 1 %       fL      MCH 33 9 pg      MCHC 32 7 g/dL      RDW 14 0 %      MPV 10 3 fL      Platelets 926 Thousands/uL      nRBC 0 /100 WBCs      Neutrophils Relative 73 %      Immat GRANS % 1 %      Lymphocytes Relative 11 %      Monocytes Relative 8 %      Eosinophils Relative 7 %      Basophils Relative 0 %      Neutrophils Absolute 7 15 Thousands/µL      Immature Grans Absolute 0 05 Thousand/uL      Lymphocytes Absolute 1 02 Thousands/µL      Monocytes Absolute 0 73 Thousand/µL      Eosinophils Absolute 0 72 Thousand/µL      Basophils Absolute 0 02 Thousands/µL                  CT chest abdomen pelvis wo contrast   Final Result by Deepa Parsons MD (04/03 1944)      No new injury in the chest abdomen or pelvis  Previously seen right gluteal subcutaneous hematoma is decreased in size            Workstation performed: [id number]         XR femur 2 views RIGHT   ED Interpretation by Kait Sun MD (04/03 1929)   No acute abnormalities      Final Result by Min López MD (04/04 1330)      No acute osseous abnormality  Workstation performed: [id number]         MRI inpatient order    (Results Pending)              Procedures  Procedures         ED Course  ED Course as of Apr 04 1516   Sat Apr 03, 2021 2129 Case discussed with trauma  Recommend medical admission                                SBIRT 20yo+      Most Recent Value   SBIRT (24 yo +)   In order to provide better care to our patients, we are screening all of our patients for alcohol and drug use  Would it be okay to ask you these screening questions? Yes Filed at: 04/03/2021 1857   Initial Alcohol Screen: US AUDIT-C    1  How often do you have a drink containing alcohol?  0 Filed at: 04/03/2021 1857   2   How many drinks containing alcohol do you have on a typical day you are drinking? 0 Filed at: 04/03/2021 1857   3a  Male UNDER 65: How often do you have five or more drinks on one occasion? 0 Filed at: 04/03/2021 1857   3b  FEMALE Any Age, or MALE 65+: How often do you have 4 or more drinks on one occassion? 0 Filed at: 04/03/2021 1857   Audit-C Score  0 Filed at: 04/03/2021 1857   CHARAN: How many times in the past year have you    Used an illegal drug or used a prescription medication for non-medical reasons? Never Filed at: 04/03/2021 1857                    MDM    Disposition  Final diagnoses:   Lower back pain   Ambulatory dysfunction     Time reflects when diagnosis was documented in both MDM as applicable and the Disposition within this note     Time User Action Codes Description Comment    4/3/2021  9:37 PM Melania Ordaz [M54 5] Lower back pain     4/3/2021  9:37 PM Melania Ordaz [R26 2] Ambulatory dysfunction       ED Disposition     ED Disposition Condition Date/Time Comment    Admit Stable Sat Apr 3, 2021  9:37 PM Case was discussed with medicine and the patient's admission status was agreed to be Admission Status: observation status to the service of Dr Alec Chang           Follow-up Information    None         Current Discharge Medication List      CONTINUE these medications which have NOT CHANGED    Details   aspirin (ECOTRIN LOW STRENGTH) 81 mg EC tablet Take 81 mg by mouth every other day      HYDROcodone-acetaminophen (NORCO) 5-325 mg per tablet Take 1 tablet by mouth every 6 (six) hours as needed for pain      acetaminophen (TYLENOL) 325 mg tablet Take 3 tablets (975 mg total) by mouth every 8 (eight) hours  Refills: 0    Associated Diagnoses: Hematoma of right hip, initial encounter      ALPRAZolam (XANAX) 0 25 mg tablet Take 1 tablet (0 25 mg total) by mouth 3 (three) times a day as needed for anxiety for up to 10 days  Qty:  , Refills: 0    Associated Diagnoses: Anxiety      amiodarone 100 mg tablet Take 1 tablet (100 mg total) by mouth daily  Qty: 90 tablet, Refills: 3    Associated Diagnoses: Atrial fibrillation, unspecified type (HCC)      docusate sodium (COLACE) 100 mg capsule Take 1 capsule (100 mg total) by mouth 2 (two) times a day  Qty: 10 capsule, Refills: 0    Associated Diagnoses: Hematoma of right hip, initial encounter      Emollient (EUCERIN) lotion Apply topically 2 (two) times a day      furosemide (LASIX) 20 mg tablet TAKE 2 TABLETS (40MG) BY MOUTH DAILY  Qty: 60 tablet, Refills: 3    Associated Diagnoses: Chronic diastolic heart failure (HCC)      oxyCODONE (ROXICODONE) 5 mg immediate release tablet Take 0 5 tablets (2 5 mg total) by mouth every 4 (four) hours as needed for moderate pain for up to 10 daysMax Daily Amount: 15 mg  Qty: 5 tablet, Refills: 0    Comments: Ongoing therapy  Associated Diagnoses: Hematoma of right hip, initial encounter      polyethylene glycol (MIRALAX) 17 g packet Take 17 g by mouth daily as needed (constipation)  Refills: 0    Associated Diagnoses: Hematoma of right hip, initial encounter      Respiratory Therapy Supplies (NEBULIZER/ADULT MASK) KIT by Does not apply route every 4 (four) hours as needed (wheezing)  Qty: 1 each, Refills: 0    Associated Diagnoses: Bronchitis      rosuvastatin (CRESTOR) 5 mg tablet Take 1 tablet (5 mg total) by mouth daily  Qty: 90 tablet, Refills: 1    Associated Diagnoses: Hypercholesterolemia      Synthroid 25 MCG tablet Take 1 5 tablets (37 5 mcg total) by mouth daily  Qty: 45 tablet, Refills: 3    Associated Diagnoses: Hypothyroidism, unspecified type      triamcinolone (KENALOG) 0 1 % cream Apply topically Twice daily      zolpidem (AMBIEN CR) 12 5 MG CR tablet TAKE ONE TABLET BY MOUTH DAILY AT BEDTIME  Qty: 30 tablet, Refills: 0    Associated Diagnoses: Insomnia, unspecified type           No discharge procedures on file      PDMP Review       Value Time User    PDMP Reviewed  Yes 4/2/2021  4:11 PM ANGELA Lewis ED Provider  Electronically Signed by           Rosemarie Coppola MD  04/04/21 7485

## 2021-04-04 NOTE — ASSESSMENT & PLAN NOTE
· Continue Xanax 0 25 mg t i d  P r n  Karen Thorne   Patient's daughter is not interested and adjusting dose

## 2021-04-04 NOTE — ASSESSMENT & PLAN NOTE
Wt Readings from Last 3 Encounters:   04/04/21 83 kg (182 lb 15 7 oz)   04/01/21 82 5 kg (181 lb 12 8 oz)   03/23/21 86 1 kg (189 lb 13 1 oz)       · Appears euvolemic  Continue home dose of Lasix  · I&O, weight  · Echo 2017:  EF 65%, G1 DD  Mild aortic stenosis versus sclerosis

## 2021-04-04 NOTE — ASSESSMENT & PLAN NOTE
· Mechanical fall that occurred while at home secondary to her wheelchair not being locked  Patient was assisted to the ground  No head strike  Is on low-dose aspirin 81 mg every other day  Reports worsening of chronic back pain which is limiting ability to complete ADLs  · Patient was last hospitalized on 03/23 through 3/25 for mechanical fall  She was admitted under the service of trauma  She was evaluated by Geriatric Medicine  Patient and daughter declined rehab on discharge  · CT chest abdomen pelvis without contrast:  Hematoma as documented below  Mild loss of height of T12 vertebral body, unchanged from prior study  · Pain control:  Scheduled Tylenol, lidocaine  P r n  Oxycodone and Dilaudid  · PT, OT consult  Patient's daughter may be agreeable to rehab on discharge however, wishes for patient to ultimately return home

## 2021-04-04 NOTE — ASSESSMENT & PLAN NOTE
· S/p cardioversion approximately 4 years ago  Is maintained on amiodarone  · No longer taking Pradaxa due to fall risk  Patient daughter reports she is only taking low-dose aspirin every other day

## 2021-04-04 NOTE — ASSESSMENT & PLAN NOTE
· Reports chronic constipation  · Prominent colonic stool without evidence of bowel obstruction on CT imaging  · Colace b i d , MiraLax b i d

## 2021-04-04 NOTE — ASSESSMENT & PLAN NOTE
Wt Readings from Last 3 Encounters:   04/03/21 82 5 kg (181 lb 14 1 oz)   04/01/21 82 5 kg (181 lb 12 8 oz)   03/23/21 86 1 kg (189 lb 13 1 oz)       · Appears euvolemic  Continue home dose of Lasix  · I&O, weight  · Echo 2017:  EF 65%, G1 DD  Mild aortic stenosis versus sclerosis

## 2021-04-04 NOTE — ASSESSMENT & PLAN NOTE
· Takes Ambien 12 5 mg CR daily HS  While inpatient, this will be substituted for formulary Ambien 10 mg    Patient's daughter is not interested in reducing dose

## 2021-04-04 NOTE — PHYSICIAN ADVISOR
Current patient class: Observation  The patient is currently on Hospital Day: 2 at [address]      This patient was originally admitted to the hospital under observation status  After admission the patient was reevaluated and determined to require further hospitalization  The patient is now documented to require at least a 2nd midnight in the hospital  As such the patient is now expected to satisfy the 2 midnight benchmark and is therefore eligible for inpatient admission  After review of the relevant documentation, labs, vital signs and test results, the patient is appropriate for INPATIENT ADMISSION  Admission to the hospital as an inpatient is a complex decision making process which requires the practitioner to consider the patients presenting complaint, history and physical examination and all relevant testing  With this in mind, in this case, the patient was deemed appropriate for INPATIENT ADMISSION  After review of the documentation and testing available at the time of the admission I concur with this clinical determination of medical necessity  Rationale is as follows: The patient is a 80-year-old female who presented to the emergency room on 04/03/2021 with a chief complaint  Of a fall  Patient has history of chronic back pain but since to full this is significantly worsened  As of today patient complained of severe pain   And inability to walk as per hospitalist's  Progress note  She requires intravenous Dilaudid for pain control  MRI of the lumbar spine has been ordered  Patient is waiting PT OT evaluation as well  Patient will remain hospitalized over 2 midnights for further evaluation and treatment  Inpatient level of care is appropriate      The patients vitals on arrival were   ED Triage Vitals   Temperature Pulse Respirations Blood Pressure SpO2   04/03/21 1817 04/03/21 1817 04/03/21 1817 04/03/21 1817 04/03/21 1817   98 7 °F (37 1 °C) 56 18 (!) 214/91 94 %      Temp Source Heart Rate Source Patient Position - Orthostatic VS BP Location FiO2 (%)   04/03/21 1817 04/03/21 2032 04/03/21 2032 04/03/21 2032 --   Oral Monitor Lying Right arm       Pain Score       04/03/21 1843       Worst Possible Pain           Past Medical History:   Diagnosis Date    A-fib [facility])     Cardiac disease     Hyperlipidemia     Hypertension      Past Surgical History:   Procedure Laterality Date    APPENDECTOMY      CHOLECYSTECTOMY      HERNIA REPAIR      HYSTERECTOMY      NEPHRECTOMY Right        The patient was admitted to the hospital at N/A on N/A for the following diagnosis:  Back pain [M54 9]  Lower back pain [M54 5]  Ambulatory dysfunction [R26 2]    Consults have been placed to:   None    Vitals:    04/03/21 2335 04/04/21 0600 04/04/21 0752 04/04/21 1500   BP:   141/66 123/70   BP Location:   Right arm Right arm   Pulse:   (!) 48 55   Resp:   22 18   Temp:   98 °F (36 7 °C) 97 8 °F (36 6 °C)   TempSrc:   Oral Oral   SpO2:   94% 95%   Weight: 83 kg (182 lb 15 7 oz) 83 kg (182 lb 15 7 oz)     Height:           Most recent labs:    Recent Labs     04/04/21  0533   WBC 8 22   HGB 11 4*   HCT 35 4      K 4 8   CALCIUM 8 2*   BUN 26*   CREATININE 2 05*       Scheduled Meds:  Current Facility-Administered Medications   Medication Dose Route Frequency Provider Last Rate    acetaminophen  975 mg Oral [facility] ANGELA Velazquez      ALPRAZolam  0 25 mg Oral TID PRN ANGELA Velazquez      amiodarone  100 mg Oral Daily ANGELA Velazquez      aspirin  81 mg Oral Every Other Day ANGELA Velazquez      bisacodyl  10 mg Rectal Daily PRN Seda Johnson MD      docusate sodium  100 mg Oral BID ANGELA Velazquez      furosemide  40 mg Oral Daily ANGELA Velazquez      HYDROmorphone  0 5 mg Intravenous Q6H PRN Seda Johnson MD      levothyroxine  37 5 mcg Oral Every Other Day ANGELA Velazquez      lidocaine  1 patch Topical Daily ANGELA Velazquez      methocarbamol 500 mg Oral Q6H PRN Seda Johnson MD      oxyCODONE  2 5 mg Oral Q6H PRN ANGELA Bonilla      oxyCODONE  5 mg Oral Q6H PRN ANGELA Bonilla      polyethylene glycol  17 g Oral BID ANGELA Bonilla      zolpidem  10 mg Oral HS ANGELA Bonilla       Continuous Infusions:   PRN Meds:  ALPRAZolam    bisacodyl    HYDROmorphone    methocarbamol    oxyCODONE    oxyCODONE    Surgical procedures (if appropriate):

## 2021-04-05 ENCOUNTER — APPOINTMENT (INPATIENT)
Dept: MRI IMAGING | Facility: HOSPITAL | Age: 86
DRG: 552 | End: 2021-04-05
Payer: COMMERCIAL

## 2021-04-05 PROCEDURE — G1004 CDSM NDSC: HCPCS

## 2021-04-05 PROCEDURE — 99232 SBSQ HOSP IP/OBS MODERATE 35: CPT | Performed by: PHYSICIAN ASSISTANT

## 2021-04-05 PROCEDURE — 72148 MRI LUMBAR SPINE W/O DYE: CPT

## 2021-04-05 RX ORDER — XYLITOL/YERBA SANTA
5 AEROSOL, SPRAY WITH PUMP (ML) MUCOUS MEMBRANE 4 TIMES DAILY PRN
Status: DISCONTINUED | OUTPATIENT
Start: 2021-04-05 | End: 2021-04-09 | Stop reason: HOSPADM

## 2021-04-05 RX ORDER — POLYETHYLENE GLYCOL 3350 17 G/17G
17 POWDER, FOR SOLUTION ORAL DAILY PRN
Status: DISCONTINUED | OUTPATIENT
Start: 2021-04-05 | End: 2021-04-07

## 2021-04-05 RX ADMIN — AMIODARONE HYDROCHLORIDE 100 MG: 200 TABLET ORAL at 08:29

## 2021-04-05 RX ADMIN — DOCUSATE SODIUM 100 MG: 100 CAPSULE, LIQUID FILLED ORAL at 08:29

## 2021-04-05 RX ADMIN — POLYETHYLENE GLYCOL 3350 17 G: 17 POWDER, FOR SOLUTION ORAL at 08:29

## 2021-04-05 RX ADMIN — ZOLPIDEM TARTRATE 10 MG: 5 TABLET ORAL at 22:04

## 2021-04-05 RX ADMIN — ACETAMINOPHEN 975 MG: 325 TABLET, FILM COATED ORAL at 22:11

## 2021-04-05 RX ADMIN — ACETAMINOPHEN 975 MG: 325 TABLET, FILM COATED ORAL at 06:28

## 2021-04-05 RX ADMIN — Medication 5 SPRAY: at 23:35

## 2021-04-05 RX ADMIN — ALPRAZOLAM 0.25 MG: 0.25 TABLET ORAL at 18:45

## 2021-04-05 RX ADMIN — FUROSEMIDE 40 MG: 40 TABLET ORAL at 08:29

## 2021-04-05 RX ADMIN — LIDOCAINE 5% 1 PATCH: 700 PATCH TOPICAL at 08:28

## 2021-04-05 RX ADMIN — ACETAMINOPHEN 975 MG: 325 TABLET, FILM COATED ORAL at 14:38

## 2021-04-05 NOTE — PLAN OF CARE
Problem: Potential for Falls  Goal: Patient will remain free of falls  Description: INTERVENTIONS:  - Assess patient frequently for physical needs  -  Identify cognitive and physical deficits and behaviors that affect risk of falls    -  [place] fall precautions as indicated by assessment   - Educate patient/family on patient safety including physical limitations  - Instruct patient to call for assistance with activity based on assessment  - Modify environment to reduce risk of injury  - Consider OT/PT consult to assist with strengthening/mobility  Outcome: Progressing     Problem: Prexisting or High Potential for Compromised Skin Integrity  Goal: Skin integrity is maintained or improved  Description: INTERVENTIONS:  - Identify patients at risk for skin breakdown  - Assess and monitor skin integrity  - Assess and monitor nutrition and hydration status  - Monitor labs   - Assess for incontinence   - Turn and reposition patient  - Assist with mobility/ambulation  - Relieve pressure over bony prominences  - Avoid friction and shearing  - Provide appropriate hygiene as needed including keeping skin clean and dry  - Evaluate need for skin moisturizer/barrier cream  - Collaborate with interdisciplinary team   - Patient/family teaching  - Consider wound care consult   Outcome: Progressing

## 2021-04-05 NOTE — UTILIZATION REVIEW
Continued Stay Review  Admission: Date/Time/Statement:  4/3/2021  2137 Observation AND CHANGED 4/4/2021 1810 INPATIENT RE: PATIENT NEEDS > 2 MIDNIGHT STAY DUE TO SEVERE BACK PAIN  CAUSING AMBULATION DYSFUNCTION AND REQUIRING IV ANALGESIA  Start   Ordered   04/04/21 1811  Inpatient Admission Once     Question Answer Comment   Level of Care Med Surg    Estimated length of stay More than 2 Midnights    Certification I certify that inpatient services are medically necessary for this patient for a duration of greater than two midnights  See H&P and MD Progress Notes for additional information about the patient's course of treatment  04/04/21 1810       Date: 4/5/2021                        Current Patient Class: inpatient   Current Level of Care: med surg     HPI:89 y o  female initially admitted on  4/3/2021 via ems from home and admitted ot observation and [facility] 1268 due to fall with worsening of chronic back pain  History of atrial fibrillation maintained on ASA, R nephrectomy, CKD, anxiety, diastolic heart failure  Recent hospitalization 3/23 for fall  Presented after a fall just prior to arrival with daughter bracing fall to ground, feels back pain is worse  Has had  increased difficulty with ADLS and getting out of bed  On exam bruising over right side of body  Thoracic and lumbar spine tenderness  Bun 23, creatinine 1 91 with creatinine of 1 89 on 8/3/2020  Ct abdomen showed decreasing gluteal hematoma; Mild loss of height of T12 vertebral body, unchanged from prior study  In the ED given oxycodone and tylenol  Plan is pain control with scheduled tylenol and Lidocaine, as needed oxycodone and Dilaudid  Assessment/Plan:  On 4/4/2021 CHANGED TO INPATIENT:  Patient with ongoing low back pain despite scheduled tylenol  And Lidoderm has used dose IV Dilaudid  Has ongoing work up with MRI lumbar spine ordered  , PT/OT     On 4/5/2021:  Has severe low back pain with movement     MRI lumbar spine pending  On exam bruising right hip and right hand  PT unable to evaluate until after MRI   (will send update on 4/6/2021, no MRI report as of 1925)    Pertinent Labs/Diagnostic Results:   4/3/2021 Ct chest/abdomen No new injury in the chest abdomen or pelvis  Previously seen right gluteal subcutaneous hematoma is decreased in size    4/4/2021 Xray right femur - No acute osseous abnormality    4/5/2021 Mri lumbar spine -    Minor edema associated with recently documented superior T12 endplate deformity suggesting at least some element of acute disease  Correlate for point tenderness      As lumbar spondylosis and osteoarthritis with potentially significant left greater than right lateral recess stenosis at the L3-4 level, correlate for left greater than right L4 radiculitis      Results from last 7 days   Lab Units 04/04/21  0533 04/03/21  1843   WBC Thousand/uL 8 22 9 69   HEMOGLOBIN g/dL 11 4* 13 1   HEMATOCRIT % 35 4 40 1   PLATELETS Thousands/uL 201 213   NEUTROS ABS Thousands/µL  --  7 15     Results from last 7 days   Lab Units 04/04/21  0533 04/03/21  1843   SODIUM mmol/L 137 139   POTASSIUM mmol/L 4 8 4 7   CHLORIDE mmol/L 103 103   CO2 mmol/L 25 29   ANION GAP mmol/L 9 7   BUN mg/dL 26* 23   CREATININE mg/dL 2 05* 1 91*   EGFR ml/min/1 73sq m 20 22   CALCIUM mg/dL 8 2* 9 0     Results from last 7 days   Lab Units 04/04/21  0533 04/03/21  1843   GLUCOSE RANDOM mg/dL 86 102     Results from last 7 days   Lab Units 04/03/21  1931   CLARITY UA  Clear   COLOR UA  Yellow   SPEC GRAV UA  1 015   PH UA  5 5   GLUCOSE UA mg/dl Negative   KETONES UA mg/dl Negative   BLOOD UA  Negative   PROTEIN UA mg/dl Negative   NITRITE UA  Negative   BILIRUBIN UA  Negative   UROBILINOGEN UA E U /dl 0 2   LEUKOCYTES UA  Small*   WBC UA /hpf 0-5   RBC UA /hpf 2-4   BACTERIA UA /hpf None Seen   EPITHELIAL CELLS WET PREP /hpf Occasional       Vital Signs:   04/05/21 0732  98 °F (36 7 °C)  51Abnormal   18  151/69  -- 93 %  None (Room air)  Lying   04/04/21 2159  98 °F (36 7 °C)  56  18  133/63  91  92 %  None (Room air)  Lying   04/04/21 1500  97 8 °F (36 6 °C)  55  18  123/70  92  95 %  None (Room air)  Sitting   04/04/21 0752  98 °F (36 7 °C)  48Abnormal   22  141/66  --  94 %  None (Room air)  Lying   04/03/21 2308  98 °F (36 7 °C)  53Abnormal   18  128/68  --  92 %  None (Room air)  Sitting   04/03/21 2032  --  52Abnormal   18  167/74  --  95 %  None (Room air)           ED Treatment:               Medication Administration from 04/03/2021 1806 to 04/03/2021 2245        Date/Time Order Dose Route Action Action by Comments       04/03/2021 1843 oxyCODONE (ROXICODONE) IR tablet 2 5 mg 2 5 mg Oral Given Radha Caceres RN         04/03/2021 1843 acetaminophen (TYLENOL) tablet 650 mg 650 mg Oral Given Radha Caceres RN       Past Medical History:   Diagnosis Date    A-fib [facility])      Cardiac disease      Hyperlipidemia      Hypertension      Medications:   Scheduled Medications:  acetaminophen, 975 mg, Oral, Q8H [facility]  amiodarone, 100 mg, Oral, Daily  aspirin, 81 mg, Oral, Every Other Day  docusate sodium, 100 mg, Oral, BID  furosemide, 40 mg, Oral, Daily  levothyroxine, 37 5 mcg, Oral, Every Other Day  lidocaine, 1 patch, Topical, Daily  polyethylene glycol, 17 g, Oral, BID  zolpidem, 10 mg, Oral, HS    HYDROmorphone (DILAUDID) injection 0 2 mg   Dose: 0 2 mg  Freq: Once Route: IV  Start: 04/03/21 2345 End: 04/04/21 0016    Continuous IV Infusions: none     PRN Meds: not used   ALPRAZolam, 0 25 mg, Oral, TID PRN  bisacodyl, 10 mg, Rectal, Daily PRN  HYDROmorphone, 0 5 mg, Intravenous, Q6H PRN  methocarbamol, 500 mg, Oral, Q6H PRN  oxyCODONE, 2 5 mg, Oral, Q6H PRN  oxyCODONE, 5 mg, Oral, Q6H PRN        Discharge Plan: To be determined  Network Utilization Review Department  ATTENTION: Please call with any questions or concerns to [phone] and carefully listen to the prompts so that you are directed to the right person  All voicemails are confidential   Maame Roque all requests for admission clinical reviews, approved or denied determinations and any other requests to dedicated fax number below belonging to the campus where the patient is receiving treatment   List of dedicated fax numbers for the Facilities:  [address] DENIALS (Administrative/Medical Necessity) [phone]   [address] (Maternity/NICU/Pediatrics) [phone]   [address] [place] [facility] [address] Bharti [address] (  [place] [address]) [address] [place] [address] Merissa Cronin [address] Dolores Kanner) [address] [phone]

## 2021-04-05 NOTE — ASSESSMENT & PLAN NOTE
Wt Readings from Last 3 Encounters:   04/05/21 79 5 kg (175 lb 3 2 oz)   04/04/21 79 8 kg (176 lb)   04/01/21 82 5 kg (181 lb 12 8 oz)       · Appears euvolemic  Continue home dose of Lasix  · I&O, weight  · Echo 2017:  EF 65%, G1 DD  Mild aortic stenosis versus sclerosis

## 2021-04-05 NOTE — ASSESSMENT & PLAN NOTE
· Continue Xanax 0 25 mg t i d  P r n  Rigoberto Tom   Patient's daughter is not interested and adjusting dose

## 2021-04-05 NOTE — PROGRESS NOTES
[facility]  Progress Note - Harry Cavanaugh 8/26/1926, 80 y o  female MRN: [id number]  Unit/Bed#: S -01 Encounter: [id number]  Primary Care Provider: Neyda Dixon DO   Date and time admitted to hospital: 4/3/2021  6:06 PM    * Fall  Assessment & Plan  · Mechanical fall that occurred while at home secondary to her wheelchair not being locked  Patient was assisted to the ground  No head strike  Is on low-dose aspirin 81 mg every other day  Reports worsening of chronic back pain which is limiting ability to complete ADLs  · Patient was last hospitalized on 03/23 through 3/25 for mechanical fall  She was admitted under the service of trauma  She was evaluated by Geriatric Medicine  Patient and daughter declined rehab on discharge  · CT chest abdomen pelvis without contrast:  Hematoma as documented below  Mild loss of height of T12 vertebral body, unchanged from prior study  · Pain control:  Scheduled Tylenol, lidocaine  P r n  Oxycodone and Dilaudid  · PT, OT consult  Patient's daughter may be agreeable to rehab on discharge however, wishes for patient to ultimately return home  Chronic low back pain without sciatica  Assessment & Plan  · Patient complaining of severe back pain  Unable to ambulate because of pain  She reports it is chronic but gotten worse since fall  Denies any radiation of pain to legs  · Continue Lidoderm patch, will add aqua k pad  · May benefit from MRI, can consider epidural steroids      · Lumbar spine MRI ordered and still pending    Hematoma of right hip  Assessment & Plan  · Right gluteal subcutaneous hematoma is decreased in size compared to prior imaging    CKD (chronic kidney disease), stage IV (HCC)  Assessment & Plan  · Creatinine stable and at baseline  · Managed by PCP  · History of right nephrectomy    Chronic diastolic congestive heart failure (HCC)  Assessment & Plan  Wt Readings from Last 3 Encounters:   04/05/21 79 5 kg (175 lb 3 2 oz)   04/04/21 79 8 kg (176 lb)   04/01/21 82 5 kg (181 lb 12 8 oz)       · Appears euvolemic  Continue home dose of Lasix  · I&O, weight  · Echo 2017:  EF 65%, G1 DD  Mild aortic stenosis versus sclerosis  Paroxysmal atrial fibrillation (HCC)  Assessment & Plan  · S/p cardioversion approximately 4 years ago  Is maintained on amiodarone  · No longer taking Pradaxa due to fall risk  Patient's daughter reports she is only taking low-dose aspirin every other day  Insomnia  Assessment & Plan  · Takes Ambien 12 5 mg CR daily HS  While inpatient, this will be substituted for formulary Ambien 10 mg  Patient's daughter is not interested in reducing dose    Constipation  Assessment & Plan  · Reports chronic constipation  · Prominent colonic stool without evidence of bowel obstruction on CT imaging  · Patient reporting diarrhea today  · Continue Colace b i d , will change MiraLax to prn  Anxiety  Assessment & Plan  · Continue Xanax 0 25 mg t i d  P r n  Paolo Kuldipy Patient's daughter is not interested and adjusting dose      VTE Pharmacologic Prophylaxis:   Pharmacologic: Pharmacologic VTE Prophylaxis contraindicated due to hematoma   Mechanical VTE Prophylaxis in Place: Yes    Patient Centered Rounds: I have performed bedside rounds with nursing staff today  Discussions with Specialists or Other Care Team Provider: nursing    Education and Discussions with Family / Patient: daughter updated via telephone    Time Spent for Care: 20 minutes  More than 50% of total time spent on counseling and coordination of care as described above      Current Length of Stay: 1 day(s)    Current Patient Status: Inpatient   Certification Statement: The patient will continue to require additional inpatient hospital stay due to continued treatment and workup of severe low back pain with pain control, MRI L spine, PT/OT and possible placement to STR    Discharge Plan: TBD    Code Status: Level 3 - DNAR and DNI      Subjective: The patient was seen and examined  The patient states she continues to have severe low back pain with movement  She states when lying still she does not have the pain  Objective:     Vitals:   Temp (24hrs), Av °F (36 7 °C), Min:98 °F (36 7 °C), Max:98 °F (36 7 °C)    Temp:  [98 °F (36 7 °C)] 98 °F (36 7 °C)  HR:  [51-56] 51  Resp:  [18] 18  BP: (133-151)/(63-69) 151/69  SpO2:  [92 %-93 %] 93 %  Body mass index is 27 44 kg/m²  Input and Output Summary (last 24 hours): Intake/Output Summary (Last 24 hours) at 2021 1654  Last data filed at 2021 0940  Gross per 24 hour   Intake 540 ml   Output 750 ml   Net -210 ml       Physical Exam:     Physical Exam  Vitals signs and nursing note reviewed  Constitutional:       General: She is awake  Appearance: Normal appearance  Cardiovascular:      Rate and Rhythm: Normal rate and regular rhythm  Pulmonary:      Effort: Pulmonary effort is normal       Breath sounds: Normal breath sounds  No wheezing, rhonchi or rales  Comments: Anterior only  Abdominal:      General: Bowel sounds are normal       Palpations: Abdomen is soft  Tenderness: There is no abdominal tenderness  Skin:     General: Skin is warm and dry  Neurological:      General: No focal deficit present  Mental Status: She is alert and oriented to person, place, and time  Psychiatric:         Attention and Perception: Attention normal          Mood and Affect: Mood normal          Speech: Speech normal          Behavior: Behavior is cooperative           Additional Data:     Labs:    Results from last 7 days   Lab Units 21  0533 21  1843   WBC Thousand/uL 8 22 9 69   HEMOGLOBIN g/dL 11 4* 13 1   HEMATOCRIT % 35 4 40 1   PLATELETS Thousands/uL 201 213   NEUTROS PCT %  --  73   LYMPHS PCT %  --  11*   MONOS PCT %  --  8   EOS PCT %  --  7*     Results from last 7 days   Lab Units 21  0533   SODIUM mmol/L 137   POTASSIUM mmol/L 4  8   CHLORIDE mmol/L 103   CO2 mmol/L 25   BUN mg/dL 26*   CREATININE mg/dL 2 05*   ANION GAP mmol/L 9   CALCIUM mg/dL 8 2*   GLUCOSE RANDOM mg/dL 86                           * I Have Reviewed All Lab Data Listed Above  * Additional Pertinent Lab Tests Reviewed: All Labs Within Last 24 Hours Reviewed    Imaging:    Imaging Reports Reviewed Today Include: none  Imaging Personally Reviewed by Myself Includes:  none    Recent Cultures (last 7 days):           Last 24 Hours Medication List:   Current Facility-Administered Medications   Medication Dose Route Frequency Provider Last Rate    acetaminophen  975 mg Oral [facility] Michael Pretty, CRNP      ALPRAZolam  0 25 mg Oral TID PRN Michael Pretty, CRNP      amiodarone  100 mg Oral Daily Michael Pretty, CRNP      aspirin  81 mg Oral Every Other Day Michael Pretty, CRNP      bisacodyl  10 mg Rectal Daily PRN Seda Johnson MD      docusate sodium  100 mg Oral BID Michael Pretty, CRNP      furosemide  40 mg Oral Daily Michael Pretty, CRNP      HYDROmorphone  0 5 mg Intravenous Q6H PRN Seda Johnson MD      levothyroxine  37 5 mcg Oral Every Other Day Michael Pretty, CRNP      lidocaine  1 patch Topical Daily Michael Pretty, CRNP      methocarbamol  500 mg Oral Q6H PRN Seda Johnson MD      oxyCODONE  2 5 mg Oral Q6H PRN Michael Pretty, CRNP      oxyCODONE  5 mg Oral Q6H PRN Michael Pretty, CRNP      polyethylene glycol  17 g Oral Daily PRN Annette Hinton PA-C      zolpidem  10 mg Oral HS Michael Pretty, CRNP          Today, Patient Was Seen By: Annette Hinton PA-C    ** Please Note: Dictation voice to text software may have been used in the creation of this document   **

## 2021-04-05 NOTE — ASSESSMENT & PLAN NOTE
· Patient complaining of severe back pain  Unable to ambulate because of pain  She reports it is chronic but gotten worse since fall  Denies any radiation of pain to legs  · Continue Lidoderm patch, will add aqua k pad  · May benefit from MRI, can consider epidural steroids      · Lumbar spine MRI ordered and still pending

## 2021-04-05 NOTE — ASSESSMENT & PLAN NOTE
· Reports chronic constipation  · Prominent colonic stool without evidence of bowel obstruction on CT imaging  · Patient reporting diarrhea today  · Continue Colace b i d , will change MiraLax to prn

## 2021-04-05 NOTE — ASSESSMENT & PLAN NOTE
· S/p cardioversion approximately 4 years ago  Is maintained on amiodarone  · No longer taking Pradaxa due to fall risk  Patient's daughter reports she is only taking low-dose aspirin every other day

## 2021-04-06 ENCOUNTER — TELEPHONE (OUTPATIENT)
Dept: RADIOLOGY | Facility: HOSPITAL | Age: 86
End: 2021-04-06

## 2021-04-06 PROBLEM — S22.081D CLOSED STABLE BURST FRACTURE OF TWELFTH THORACIC VERTEBRA WITH ROUTINE HEALING: Status: ACTIVE | Noted: 2021-04-06

## 2021-04-06 LAB
ANION GAP SERPL CALCULATED.3IONS-SCNC: 8 MMOL/L (ref 4–13)
BASOPHILS # BLD AUTO: 0.01 THOUSANDS/ΜL (ref 0–0.1)
BASOPHILS NFR BLD AUTO: 0 % (ref 0–1)
BUN SERPL-MCNC: 26 MG/DL (ref 5–25)
CALCIUM SERPL-MCNC: 8.4 MG/DL (ref 8.3–10.1)
CHLORIDE SERPL-SCNC: 100 MMOL/L (ref 100–108)
CO2 SERPL-SCNC: 24 MMOL/L (ref 21–32)
CREAT SERPL-MCNC: 1.76 MG/DL (ref 0.6–1.3)
EOSINOPHIL # BLD AUTO: 1.15 THOUSAND/ΜL (ref 0–0.61)
EOSINOPHIL NFR BLD AUTO: 12 % (ref 0–6)
ERYTHROCYTE [DISTWIDTH] IN BLOOD BY AUTOMATED COUNT: 13.6 % (ref 11.6–15.1)
GFR SERPL CREATININE-BSD FRML MDRD: 24 ML/MIN/1.73SQ M
GLUCOSE SERPL-MCNC: 126 MG/DL (ref 65–140)
HCT VFR BLD AUTO: 36.3 % (ref 34.8–46.1)
HGB BLD-MCNC: 12 G/DL (ref 11.5–15.4)
IMM GRANULOCYTES # BLD AUTO: 0.04 THOUSAND/UL (ref 0–0.2)
IMM GRANULOCYTES NFR BLD AUTO: 0 % (ref 0–2)
LYMPHOCYTES # BLD AUTO: 1.05 THOUSANDS/ΜL (ref 0.6–4.47)
LYMPHOCYTES NFR BLD AUTO: 11 % (ref 14–44)
MCH RBC QN AUTO: 33.5 PG (ref 26.8–34.3)
MCHC RBC AUTO-ENTMCNC: 33.1 G/DL (ref 31.4–37.4)
MCV RBC AUTO: 101 FL (ref 82–98)
MONOCYTES # BLD AUTO: 0.87 THOUSAND/ΜL (ref 0.17–1.22)
MONOCYTES NFR BLD AUTO: 9 % (ref 4–12)
NEUTROPHILS # BLD AUTO: 6.7 THOUSANDS/ΜL (ref 1.85–7.62)
NEUTS SEG NFR BLD AUTO: 68 % (ref 43–75)
NRBC BLD AUTO-RTO: 0 /100 WBCS
PLATELET # BLD AUTO: 201 THOUSANDS/UL (ref 149–390)
PMV BLD AUTO: 10.5 FL (ref 8.9–12.7)
POTASSIUM SERPL-SCNC: 4.1 MMOL/L (ref 3.5–5.3)
RBC # BLD AUTO: 3.58 MILLION/UL (ref 3.81–5.12)
SODIUM SERPL-SCNC: 132 MMOL/L (ref 136–145)
WBC # BLD AUTO: 9.82 THOUSAND/UL (ref 4.31–10.16)

## 2021-04-06 PROCEDURE — 97116 GAIT TRAINING THERAPY: CPT

## 2021-04-06 PROCEDURE — 97163 PT EVAL HIGH COMPLEX 45 MIN: CPT

## 2021-04-06 PROCEDURE — 99222 1ST HOSP IP/OBS MODERATE 55: CPT | Performed by: PHYSICIAN ASSISTANT

## 2021-04-06 PROCEDURE — 85025 COMPLETE CBC W/AUTO DIFF WBC: CPT | Performed by: PHYSICIAN ASSISTANT

## 2021-04-06 PROCEDURE — 80048 BASIC METABOLIC PNL TOTAL CA: CPT | Performed by: PHYSICIAN ASSISTANT

## 2021-04-06 PROCEDURE — 99232 SBSQ HOSP IP/OBS MODERATE 35: CPT | Performed by: PHYSICIAN ASSISTANT

## 2021-04-06 RX ADMIN — ACETAMINOPHEN 975 MG: 325 TABLET, FILM COATED ORAL at 13:22

## 2021-04-06 RX ADMIN — ZOLPIDEM TARTRATE 10 MG: 5 TABLET ORAL at 22:17

## 2021-04-06 RX ADMIN — ACETAMINOPHEN 975 MG: 325 TABLET, FILM COATED ORAL at 22:17

## 2021-04-06 RX ADMIN — ACETAMINOPHEN 975 MG: 325 TABLET, FILM COATED ORAL at 05:34

## 2021-04-06 RX ADMIN — DOCUSATE SODIUM 100 MG: 100 CAPSULE, LIQUID FILLED ORAL at 12:54

## 2021-04-06 RX ADMIN — AMIODARONE HYDROCHLORIDE 100 MG: 200 TABLET ORAL at 08:43

## 2021-04-06 RX ADMIN — FUROSEMIDE 40 MG: 40 TABLET ORAL at 08:45

## 2021-04-06 RX ADMIN — LIDOCAINE 5% 1 PATCH: 700 PATCH TOPICAL at 08:43

## 2021-04-06 RX ADMIN — LEVOTHYROXINE SODIUM 37.5 MCG: 75 TABLET ORAL at 08:45

## 2021-04-06 RX ADMIN — ASPIRIN 81 MG: 81 TABLET, COATED ORAL at 08:46

## 2021-04-06 NOTE — ASSESSMENT & PLAN NOTE
S/p fall on 4/3 out of wheelchair with acute on chronic back pain, found to have T12 compression fracture with acute component on MRI, TLICS 1  · Prior hospitalization in March 2021 s/p fall, at that time T12 mild loss of height was called on imaging however no brace was given as patient was not having any pain    Imaging reviewed personally and with attending, results are as follows:   MRI lumbar spine wo contrast 4/5/2021:  Minor edema associated with recently documented superior T12 endplate deformity suggesting at least some element of acute disease  Correlate for point tenderness  As lumbar spondylosis and osteoarthritis with potentially significant left greater than right lateral recess stenosis at the L3-4 level, correlate for left greater than right L4 radiculitis   CT chest abdomen pelvis wo contrast 4/3/2021:  No acute fracture or destructive osseous lesion  Mild loss of height of the T12 vertebral body unchanged from the prior study  o CT abdomen pelvis completed 3/23/2021 also reported mild loss of height at T12  Plan:   Will attempt conservative management  o TLSO backpack brace when OOB and HOB greater than 45 degrees  o Upright thoracolumbar XR to be completed in brace to assess spinal alignment  o Continue with multimodal pain regimen  o Brace will be worn for 6-12 weeks  o Discussed possible kyphoplasty should she fail brace use   Medical management and pain control per primary team   DVT ppx:  SCDs   Mobilize as tolerated with assistance, PT / OT evaluation with brace in place    Neurosurgery will follow as needed during hospitalization and review imaging once stable  If imaging is stable and patient is tolerating brace well, she may follow up in the office in 2 weeks with upright imaging  No surgery warranted  Please call with questions or concerns, signed off

## 2021-04-06 NOTE — ASSESSMENT & PLAN NOTE
Wt Readings from Last 3 Encounters:   04/06/21 83 1 kg (183 lb 1 6 oz)   04/04/21 79 8 kg (176 lb)   04/01/21 82 5 kg (181 lb 12 8 oz)       · Appears euvolemic  Continue home dose of Lasix  · I&O, weight  · Echo 2017:  EF 65%, G1 DD  Mild aortic stenosis versus sclerosis

## 2021-04-06 NOTE — PROGRESS NOTES
Patient ID: Yousif Duncan is a 80 y o  female  HPI: 80 y  o female presents for a post hospital check after being admitted after falling and developing a right hip hematoma  Pt was sitting and her daughter slipped off a ladder and fell into her  Pt was knocked off her chair and fell on the floor striking her right hip and developed a hematoma and was hospitalized  Presently, she denies any hip pain and bruising is resolving  SUBJECTIVE  TCM Call (since 3/6/2021)     Date and time call was made  3/26/2021 12:54 PM    Hospital care reviewed  Records reviewed    Patient was hospitialized at  [address] [place] [address]        Date of Admission  03/23/21    Date of discharge  03/25/21    Diagnosis  Hematoma of right hip    Disposition  Home    Were the patients medications reviewed and updated  No    Current Symptoms  None      TCM Call (since 3/6/2021)     Post hospital issues  None    Should patient be enrolled in anticoag monitoring? No    Scheduled for follow up? Yes    Did you obtain your prescribed medications  Yes    Do you need help managing your prescriptions or medications  No    Is transportation to your appointment needed  No    I have advised the patient to call PCP with any new or worsening symptoms  TAVARES Jones     Living Arrangements  Family members    Are you recieving any outpatient services  No    Are you recieving home care services  Yes    Types of home care services  Nurse visit    Are you using any community resources  No    Current waiver services  No    Have you fallen in the last 12 months  No    Interperter language line needed  No    Counseling  Family        Family History   Problem Relation Age of Onset    Heart disease Family     Hypertension Family     Cancer Family      Social History     Socioeconomic History    Marital status:       Spouse name: Not on file    Number of children: Not on file    Years of education: Less than high school    Highest education level: Not on file   Occupational History    Occupation: Retired   Social Needs    Financial resource strain: Not on file    Food insecurity     Worry: Not on file     Inability: Not on file   [employer] needs     Medical: Not on file     Non-medical: Not on file   Tobacco Use    Smoking status: Never Smoker    Smokeless tobacco: Never Used   Substance and Sexual Activity    Alcohol use: No    Drug use: Never    Sexual activity: Not Currently     Partners: Male     Birth control/protection: Post-menopausal   Lifestyle    Physical activity     Days per week: Not on file     Minutes per session: Not on file    Stress: Not on file   Relationships    Social connections     Talks on phone: Not on file     Gets together: Not on file     Attends Confucianist service: Not on file     Active member of club or organization: Not on file     Attends meetings of clubs or organizations: Not on file     Relationship status: Not on file    Intimate partner violence     Fear of current or ex partner: Not on file     Emotionally abused: Not on file     Physically abused: Not on file     Forced sexual activity: Not on file   Other Topics Concern    Not on file   Social History Narrative    Always uses seatbelt    Denied:  History of daily caffeinated cola consumption    Denied:  History of daily coffee consumption    Daily tea consumption    Denied:  History of dental care, regularly    Exercise:  Walking    Living will in place    No Confucianist beliefs    Power of  in existence    Water intake, adequate (per day)     Past Medical History:   Diagnosis Date    A-fib [facility])     Cardiac disease     Hyperlipidemia     Hypertension      Past Surgical History:   Procedure Laterality Date    APPENDECTOMY      CHOLECYSTECTOMY      HERNIA REPAIR      HYSTERECTOMY      NEPHRECTOMY Right      Allergies   Allergen Reactions    Penicillin G Anaphylaxis    Spironolactone Shortness Of Breath    Atorvastatin Other reaction(s): Leg Cramps    Cephalexin Headache    Chocolate - Food Allergy     Citalopram Abdominal Pain and Headache    Diltiazem     Fosinopril Sodium-Hctz     Methylprednisolone Nausea Only     Tablet generic only    Monopril [Fosinopril]      Reaction Date: [id number];     Penicillins     Pollen Extract     Pravastatin Myalgia     Other reaction(s): Leg Cramps    Sporanox [Itraconazole]      Reaction Date: [id number];     Strawberry C [Ascorbate - Food Allergy]     Caffeine - Food Allergy Palpitations     Tachycardia     No current facility-administered medications for this visit  No current outpatient medications on file      Facility-Administered Medications Ordered in Other Visits:     acetaminophen (TYLENOL) tablet 975 mg, 975 mg, Oral, Q8H [address], Midland Bake, CRNP, 975 mg at 04/06/21 0534    ALPRAZolam Danielle Buys) tablet 0 25 mg, 0 25 mg, Oral, TID PRN, Mid[place] Bake, CRNP, 0 25 mg at 04/05/21 1845    amiodarone tablet 100 mg, 100 mg, Oral, Daily, Midland Bake, CRNP, 100 mg at 04/05/21 8954    aspirin (ECOTRIN LOW STRENGTH) EC tablet 81 mg, 81 mg, Oral, Every Other Day, Midland Bake, CRNP, 81 mg at 04/04/21 7238    bisacodyl (DULCOLAX) rectal suppository 10 mg, 10 mg, Rectal, Daily PRN, Seda Johnson MD    docusate sodium (COLACE) capsule 100 mg, 100 mg, Oral, BID, Midland Bake, CRNP, 100 mg at 04/05/21 2614    furosemide (LASIX) tablet 40 mg, 40 mg, Oral, Daily, Midland Bake, CRNP, 40 mg at 04/05/21 8534    HYDROmorphone (DILAUDID) injection 0 5 mg, 0 5 mg, Intravenous, Q6H PRN, Seda Johnson MD    levothyroxine tablet 37 5 mcg, 37 5 mcg, Oral, Every Other Day, Midland Bake, CRNP, 37 5 mcg at 04/04/21 0837    lidocaine (LIDODERM) 5 % patch 1 patch, 1 patch, Topical, Daily, ANGELA Stoddard, 1 patch at 04/05/21 0828    methocarbamol (ROBAXIN) tablet 500 mg, 500 mg, Oral, Q6H PRN, Seda Johnson MD    oxyCODONE (ROXICODONE) IR tablet 2 5 mg, 2 5 mg, Oral, Q6H PRN, Vishal Hummel, CRNP    oxyCODONE (ROXICODONE) IR tablet 5 mg, 5 mg, Oral, Q6H PRN, Jack Aravind, CRNP    polyethylene glycol (MIRALAX) packet 17 g, 17 g, Oral, Daily PRN, Aparna Goodrich PA-C    saliva substitute (MOUTH KOTE) mucosal solution 5 spray, 5 spray, Mouth/Throat, 4x Daily PRN, Jose Asif PA-C, 5 spray at 04/05/21 2335    zolpidem (AMBIEN) tablet 10 mg, 10 mg, Oral, HS, Jack Aravind, CRNP, 10 mg at 04/05/21 2204    Review of Systems  Constitutional:     Denies fever, chills ,fatigue ,weakness ,weight loss, weight gain     ENT: Denies earache ,loss of hearing ,nosebleed, nasal discharge,nasal congestion ,sore throat ,hoarseness  Pulmonary: Denies shortness of breath ,cough  ,dyspnea on exertion, orthopnea  ,PND   Cardiovascular:  Denies bradycardia , tachycardia  ,palpations, lower extremity edema leg, claudication  Breast:  Denies new or changing breast lumps ,nipple discharge ,nipple changes  Abdomen:  Denies abdominal pain , anorexia , indigestion, nausea, vomiting, constipation, diarrhea  Musculoskeletal: Denies myalgias, arthralgias, joint swelling, joint stiffness , limb pain, limb swelling  + aright hip hematoma  Gu: denies dysuria, polyuria  Skin: Denies skin rash, skin lesion, skin wound, itching, dry skin  Neuro: Denies headache, numbness, tingling, confusion, loss of consciousness, dizziness, vertigo  Psychiatric: Denies feelings of depression, suicidal ideation, anxiety, sleep disturbances    OBJECTIVE  /66   Pulse 64   Temp (!) 97 4 °F (36 3 °C)   Ht 5' 7" (1 702 m)   Wt 82 5 kg (181 lb 12 8 oz)   SpO2 96%   BMI 28 47 kg/m²   Constitutional:   NAD, well appearing and well nourished      ENT:   Conjunctiva and lids: no injection, edema, or discharge     Pupils and iris: ANKIT bilaterally    External inspection of ears and nose: normal without deformities or discharge  Otoscopic exam: Canals patent without erythema         Nasal mucosa, septum and turbinates: Normal or edema or discharge Oropharynx:  Moist mucosa, normal tongue and tonsils without lesions  No erythema        Pulmonary:Respiratory effort normal rate and rhythm, no increased work of breathing  Auscultation of lungs:  Clear bilaterally with no adventitious breath sounds       Cardiovascular: regular rate and rhythm, S1 and S2, no murmur, no edema and/or varicosities of LE      Abdomen: Soft and non-distended     Positive bowel sounds      No heptomegaly or splenomegaly      Gu: no suprapubic tenderness or CVA tenderness, no urethral discharge  Lymphatic:  No anterior or posterior cervical lymphadenopathy         Musculoskeletal:  Gait and station: Normal gait      Digits and nails normal without clubbing or cyanosis       Inspection/palpation of joints, bones, and muscles:  No joint tenderness, swelling, full active and passive range of motion       Skin: Normal skin turgor and no rashes      Neuro:      Normal reflexes      Psych:   alert and oriented to person, place and time     normal mood and affect       Assessment/Plan:Diagnoses and all orders for this visit:    Hematoma of right hip, subsequent encounter    Hypothyroidism, unspecified type  -     Synthroid 25 MCG tablet; Take 1 5 tablets (37 5 mcg total) by mouth daily (Patient taking differently: Take 37 5 mcg by mouth daily Takes 37 5 mcg every other day )        Reviewed with patient plan to treat with above plan      Patient instructed to call in 72 hours if not feeling better or if symptoms worsen

## 2021-04-06 NOTE — ASSESSMENT & PLAN NOTE
· Patient complaining of severe back pain  Unable to ambulate because of pain  She reports it is chronic but gotten worse since fall  Denies any radiation of pain to legs  · Continue Lidoderm patch, will add aqua k pad  ·   · Lumbar spine MRI:  Minor edema associated with recently documented superior T12 endplate deformity suggesting at least some element of acute disease  Correlate for point tenderness      As lumbar spondylosis and osteoarthritis with potentially significant left greater than right lateral recess stenosis at the L3-4 level, correlate for left greater than right L4 radiculitis  · Consult neurosurgery

## 2021-04-06 NOTE — ASSESSMENT & PLAN NOTE
· Mechanical fall that occurred while at home secondary to her wheelchair not being locked  Patient was assisted to the ground  No head strike  Is on low-dose aspirin 81 mg every other day  Reports worsening of chronic back pain which is limiting ability to complete ADLs  · Patient was last hospitalized on 03/23 through 3/25 for mechanical fall  She was admitted under the service of trauma  She was evaluated by Geriatric Medicine  Patient and daughter declined rehab on discharge  · CT chest abdomen pelvis without contrast:  Hematoma as documented below  Mild loss of height of T12 vertebral body, unchanged from prior study  · Pain control:  Scheduled Tylenol, lidocaine  P r n  Oxycodone and Dilaudid  · MRI L spine:   Minor edema associated with recently documented superior T12 endplate deformity suggesting at least some element of acute disease  Correlate for point tenderness      As lumbar spondylosis and osteoarthritis with potentially significant left greater than right lateral recess stenosis at the L3-4 level, correlate for left greater than right L4 radiculitis    · PT, OT consulted- recommended home PT/OT  · Neurosurgery consult

## 2021-04-06 NOTE — ASSESSMENT & PLAN NOTE
· Reports chronic constipation  · Prominent colonic stool without evidence of bowel obstruction on CT imaging  · Patient reported diarrhea 4/5, now resolved  · Continue Colace b i d , MiraLax  prn

## 2021-04-06 NOTE — PLAN OF CARE
Problem: Potential for Falls  Goal: Patient will remain free of falls  Description: INTERVENTIONS:  - Assess patient frequently for physical needs  -  Identify cognitive and physical deficits and behaviors that affect risk of falls    -  [place] fall precautions as indicated by assessment   - Educate patient/family on patient safety including physical limitations  - Instruct patient to call for assistance with activity based on assessment  - Modify environment to reduce risk of injury  - Consider OT/PT consult to assist with strengthening/mobility  Outcome: Progressing     Problem: Prexisting or High Potential for Compromised Skin Integrity  Goal: Skin integrity is maintained or improved  Description: INTERVENTIONS:  - Identify patients at risk for skin breakdown  - Assess and monitor skin integrity  - Assess and monitor nutrition and hydration status  - Monitor labs   - Assess for incontinence   - Turn and reposition patient  - Assist with mobility/ambulation  - Relieve pressure over bony prominences  - Avoid friction and shearing  - Provide appropriate hygiene as needed including keeping skin clean and dry  - Evaluate need for skin moisturizer/barrier cream  - Collaborate with interdisciplinary team   - Patient/family teaching  - Consider wound care consult   Outcome: Progressing

## 2021-04-06 NOTE — PHYSICAL THERAPY NOTE
PHYSICAL THERAPY EVALUATION  NAME:  Ciara Rebolledo  DATE: 04/06/21    AGE:   80 y o  Mrn:   [id number]  ADMIT DX:  Back pain [M54 9]  Lower back pain [M54 5]  Ambulatory dysfunction [R26 2]  Problem List:   Patient Active Problem List   Diagnosis    Oral pain of unknown etiology    Paroxysmal atrial fibrillation (HCC)    Hypothyroidism    Anxiety    Cardiomyopathy ([address] )    CKD (chronic kidney disease), stage IV (HCC)    Arthritis    Chronic diastolic congestive heart failure (HCC)    Hyperlipidemia    Hypertension    Insomnia    Contact dermatitis    Sinus bradycardia    Mild aortic stenosis    Bronchitis    Fall    Hematoma of right hip    Constipation    Chronic low back pain without sciatica         Length Of Stay: 2  Performed at least 2 patient identifiers during session: Name and Birthday  PHYSICAL THERAPY EVALUATION :    04/06/21 0842   PT Last Visit   PT Visit Date 04/06/21   Note Type   Note type Evaluation   Pain Assessment   Pain Assessment Tool 0-10   Pain Score 2   Pain Location/Orientation Location: Back;Orientation: Lower  (along belt line)   Effect of Pain on Daily Activities limits speed and indep of mobility, especially transitional movements  Patient's Stated Pain Goal No pain   Hospital Pain Intervention(s) Repositioned; Ambulation/increased activity; Emotional support;Medication (See MAR)   Home Living   Type of [address] [place] [address] Ave One level;Performs ADLs on one level;Stairs to enter with rails   Home Equipment Cane;Walker   Additional Comments Pt reports not using DME in home between last admission--The walker is "too clumsy" to use in the house   Prior Function   Lives With Daughter  (Pt reports daughter unable to assist much "After her 3 CVAs")   Falls in the last 6 months 1 to 4  (at least 2 documented)   Comments PT reports having HHPT x 2 sessions between hospital admissions      Restrictions/Precautions   Weight Bearing Precautions Per Order No   Other Precautions Cognitive; Fall Risk;Pain   General   Additional Pertinent History MRI IMPRESSION: Minor edema associated with recently documented superior T12 endplate deformity suggesting at least some element of acute disease  Correlate for point tenderness  As lumbar spondylosis and osteoarthritis with potentially significant left greater than right lateral recess stenosis at the L3-4 level, correlate for left greater than right L4 radiculitis  Family/Caregiver Present No   Cognition   Overall Cognitive Status WFL   Arousal/Participation Alert   Following Commands Follows one step commands with increased time or repetition  (w/MMT)   Strength RLE   R Hip Flexion 4/5   R Knee Extension 4/5   R Ankle Dorsiflexion 4/5   Strength LLE   L Hip Flexion 4/5   L Knee Extension 4/5   L Ankle Dorsiflexion 4/5   Coordination   Movements are Fluid and Coordinated 0   Coordination and Movement Description +tartive dyskinesia movements @ jaw   Sensation   (mild [place] limitations)   Light Touch   RLE Light Touch Grossly intact  (as per pt)   LLE Light Touch Grossly intact  (as per pt)   Bed Mobility   Supine to Sit Unable to assess  (as pt OOB in chair upon entering room  Nursing reports w/o A)   Transfers   Sit to Stand 4  Minimal assistance   Additional items Assist x 1; Increased time required;Verbal cues  (instruction for hand placement)   Stand to Sit 5  Supervision   Additional items Assist x 1; Increased time required;Verbal cues;Armrests  (instruction for hand placement)   Ambulation/Elevation   Gait pattern Excessively slow; Forward Flexion   Gait Assistance 4  Minimal assist   Additional items Assist x 1;Verbal cues  (for 3 point gait, walker propulsion)   Assistive Device Rolling walker   Distance 20'   Stair Management Assistance Not tested   Balance   Static Sitting Good   Static Standing Fair -   Ambulatory Poor +; TUG w/ walker 1min 42 seconds   Endurance Deficit   Endurance Deficit Yes   Endurance Deficit Description self reported fatigue   Activity Tolerance   Activity Tolerance Patient limited by fatigue;Patient limited by pain   Medical Staff Made Aware tien Alonso from case management   Nurse Made Aware spoke to Rohit Rivera RN   Assessment   Prognosis Fair   Problem List Decreased strength;Decreased range of motion;Decreased endurance; Impaired balance;Decreased mobility; Decreased coordination;Decreased cognition; Impaired hearing;Pain  (gait deviations)   Barriers to Discharge Decreased caregiver support; Inaccessible home environment  (JOSE, ?caregiver support per pt)   Goals   Patient Goals to get back home, have less back pain, go out and tend to flowers   [facility] Expiration Date 04/16/21   PT Treatment Day 1  (treatment documented in note)   Plan   Treatment/Interventions Functional transfer training;LE strengthening/ROM; Endurance training;Patient/family training;Cognitive reorientation; Therapeutic exercise;Elevations;Gait training;Bed mobility; Equipment eval/education;Spoke to nursing;Spoke to case management   PT Frequency Other (Comment)  (4-5x/wk)   Recommendation   PT Discharge Recommendation Home with skilled therapy  (if daughter can provide A pending stairs, otherwise rehab)   Equipment Recommended Walker  (including in home environment, reports has rolling walker)   [address] walker   [facility] Without Bedrails [facility]   Lying on Back to Sitting on Edge of Flat Bed 3   Moving Bed to Chair 3   Standing Up From Chair 3   Walk in Room 3   Climb 3-5 Stairs 3   Basic Mobility Inpatient Raw Score 18   Basic Mobility Standardized Score 41 05   (Please find full objective findings from PT assessment regarding body systems outlined above)  Addendum: Spine: - SLR BLEs, pain w/ transitional movements and twisting  No severe pain with flexion and extension  Assessment: Pt is a 80 y o  female seen for PT evaluation s/p admit to [facility] on 4/3/2021 w/ Fall    Order placed for PT  Upon evaluation: Pt requires intermittent min assistance for transfers and min assistance for ambulation with rolling walker inside of room  Pt's clinical presentation is currently unstable/unpredictable given the functional mobility deficits above, especially (but not limited to) gait deviations and pain, coupled with fall risks including worse than cut off score for TUG, hx of falls and impaired balance, and combined with medical complications of multiple readmissions  Pt to benefit from continued skilled PT tx while in hospital and upon DC to address deficits as defined above and maximize level of functional mobility  Recommend trial with walker next 1-2 sessions and stair training, body menchanics and therex for spine  At this time, pt will likely need to use walker in her home environment if DC today    Goals: Pt will: Perform bed mobility tasks with modified I to prepare for transfers and reposition in bed with minimizing twisting  Perform transfers with modified I to improve ease of transfers with <3/10 pain  Perform ambulation with LRAD for 100' with  modified I  to increase Indep in home environment and promote proper use of assistive device  Perform 3 stairs w/ railing and DME and w/consistent min A of 1 to return to home with JOSE  Perform TUG within 1 min using DME to progress pt toward less risk of falls  The patient's AM-PAC Basic Mobility Inpatient Short Form Raw Score is 18, Standardized Score is 41 05  A standardized score less than 42 9 suggests the patient may benefit from discharge to post-acute rehabilitation services, however please refer to therapist recommendation for discharge planning given other factors that may influence destination as pt reports having one floor set up and daughter to assist pt @ home       Comorbidities affecting pt's physical performance at time of assessment include: obesity and limited hearing, recent hematoma s/p fall, A-fib on ASA, Cardiac disease, heart failure Hypertension, Nephrectomy (Right), and Hernia repair  Personal factors affecting pt at time of IE include: anxiety, steps to enter environment, advanced age, past experience, inability to navigate community distances, limited insight into impairments and recent fall(s)  Joan Cage PT, DPT    PHYSICAL THERAPY TREATMENT NOTE  Time In:900  Time Out:917  Total Time: 17 min  S:  Pt agreeable to additional mobility training including stairs  O:  Transfers S and verbal instruction <50% of time for hand placement   · Amb w rolling walker for 20' w/S and no physical A needed for walker management nor uncorrected losses of balance  · Performed 8" curb step w/one cane UE support and bed railing for other UE support STEADYING min A x 2 trials, taking more than reasonable time  A:  Pt requires less physical assistance to perform transfers and amb, and requires less verbal instruction to perform mobility with proper form and technique     P:  Recommend addition of therapeutic exercises to current functional mobility program for back    Joan Cage PT, DPT

## 2021-04-06 NOTE — CASE MANAGEMENT
LOS 2   NOT A BUNDLE;  NOT A READMISSION;  GREEN UNPLANNED READMISSION RISK  Cm contacted pt's daughter to review role and introduce self  Daughter reports pt lives with her in a one story home  Daughter reports there are 5 JOSE the home  She states pt is not able to manage the stairs at this time  Pt is having worsening pain which has been limiting her ability to perform her ADLs  Pt has a WC, raised toilet, shower chair, RW and cane  Pt uses a RW to ambulate  Pt has had [facility] in the past   Daughter states pt has not been to rehab  Pt is able to afford her medications with her SSI and RX coverage  She uses [facility] in [place]  No hx of MH/D&A  Her daughter would make decisions for her however there is no formal POA  Pt has a LW  PCP is Alexia Rojas,    Pt's daughter will normally transport her however daughter states she will need transport home when discharged  Cm reviewed with daughter the recommendations of the care team for [facility] services  [place] of choice was reviewed  Daughter states she is currently being [facility] and would like a [facility] referral made  Referral has been made  [facility] contact information has been placed in pt's chart as they are able to resume services  [facility]  Daughter does not feel as though she would be able to get pt in and out of the car  Per daughter there are 5 JOSE that pt would not be able to manage  Daughter is requesting a hospital bed for pt at home  Referral has been placed in [place] [facility] will contact the daughter in order to arrange a time to deliver the bed to the home  CM reviewed the availability of treatment team to discuss questions or concerns patient and/or family may have regarding  understanding medications and recognizing signs and symptoms at discharge  CM also encouraged patient to follow up with all recommended appointments after discharge   CM reviewed the information that will be provided to pt/family on the discharge instructions  Patient advised of importance for patient and family to participate in managing patient's medical well being

## 2021-04-06 NOTE — PLAN OF CARE
Problem: PHYSICAL THERAPY ADULT  Goal: Performs mobility at highest level of function for planned discharge setting  See evaluation for individualized goals  Description: Treatment/Interventions: Functional transfer training, LE strengthening/ROM, Endurance training, Patient/family training, Cognitive reorientation, Therapeutic exercise, Elevations, Gait training, Bed mobility, Equipment eval/education, Spoke to nursing, Spoke to case management  Equipment Recommended: Walker(including in home environment, reports has rolling walker)       See flowsheet documentation for full assessment, interventions and recommendations  Note: Prognosis: Fair  Problem List: Decreased strength, Decreased range of motion, Decreased endurance, Impaired balance, Decreased mobility, Decreased coordination, Decreased cognition, Impaired hearing, Pain(gait deviations)  Assessment: Pt is a 80 y o  female seen for PT evaluation s/p admit to [facility] on 4/3/2021 w/ Fall  Order placed for PT  Upon evaluation: Pt requires intermittent min assistance for transfers and min assistance for ambulation with rolling walker inside of room  Pt's clinical presentation is currently unstable/unpredictable given the functional mobility deficits above, especially (but not limited to) gait deviations and pain, coupled with fall risks including worse than cut off score for TUG, hx of falls and impaired balance, and combined with medical complications of multiple readmissions  Pt to benefit from continued skilled PT tx while in hospital and upon DC to address deficits as defined above and maximize level of functional mobility  Recommend trial with walker next 1-2 sessions and stair training, body menchanics and therex for spine    At this time, pt will likely need to use walker in her home environment if DC today  Barriers to Discharge: Decreased caregiver support, Inaccessible home environment(JOSE, ?caregiver support per pt)     PT Discharge Recommendation: Home with skilled therapy(if daughter can provide A pending stairs, otherwise rehab)          See flowsheet documentation for full assessment

## 2021-04-06 NOTE — UTILIZATION REVIEW
Notification of Inpatient Admission/Inpatient Authorization Request   This is a Notification of Inpatient Admission for Hilario  Be advised that this patient was admitted to our facility under Inpatient Status  Contact Leslee Kraft at [phone] for additional admission information  [facility] DEPT  DEDICATED -[phone]  Patient Name:   Maye Ibarra   YOB: 1926       [address]   [address]:   Edvin Coelho  Tax ID: [id number]  NPI: [id number] Attending Provider/NPI:  Address:  Phone: Alyssa Covington, Lazaro Rodriguez [[id number]]  Same as the facility  [phone]   Place of Service Code: 24 Place of Service Name:  [address] [place] [address]   Start Date: 4/4/21 1810     Discharge Date & Time: No discharge date for patient encounter  Type of Admission: Inpatient Status Discharge Disposition   (if discharged): Home with [employer]   Patient Diagnoses: Back pain [M54 9]  Lower back pain [M54 5]  Ambulatory dysfunction [R26 2]     Orders: Admission Orders (From admission, onward)     Ordered        04/04/21 1810  Inpatient Admission  Once         04/03/21 2137  Place in Observation  Once                    Assigned Utilization Review Contact: Leslee Kraft  Utilization   Network Utilization Review Department  Phone: [phone]; Fax [phone]  Email: [email or web address]   ATTENTION PAYERS: Please call the assigned Utilization  directly with any questions or concerns ALL voicemails in the department are confidential  Send all requests for admission clinical reviews, approved or denied determinations and any other requests to dedicated fax number belonging to the campus where the patient is receiving treatment

## 2021-04-06 NOTE — PROGRESS NOTES
[facility]  Progress Note - Little Diana 8/26/1926, 80 y o  female MRN: [id number]  Unit/Bed#: S -01 Encounter: [id number]  Primary Care Provider: Raz Fortune DO   Date and time admitted to hospital: 4/3/2021  6:06 PM    * Fall  Assessment & Plan  · Mechanical fall that occurred while at home secondary to her wheelchair not being locked  Patient was assisted to the ground  No head strike  Is on low-dose aspirin 81 mg every other day  Reports worsening of chronic back pain which is limiting ability to complete ADLs  · Patient was last hospitalized on 03/23 through 3/25 for mechanical fall  She was admitted under the service of trauma  She was evaluated by Geriatric Medicine  Patient and daughter declined rehab on discharge  · CT chest abdomen pelvis without contrast:  Hematoma as documented below  Mild loss of height of T12 vertebral body, unchanged from prior study  · Pain control:  Scheduled Tylenol, lidocaine  P r n  Oxycodone and Dilaudid  · MRI L spine:   Minor edema associated with recently documented superior T12 endplate deformity suggesting at least some element of acute disease  Correlate for point tenderness      As lumbar spondylosis and osteoarthritis with potentially significant left greater than right lateral recess stenosis at the L3-4 level, correlate for left greater than right L4 radiculitis  · PT, OT consulted- recommended home PT/OT  · Neurosurgery consult    Chronic low back pain without sciatica  Assessment & Plan  · Patient complaining of severe back pain  Unable to ambulate because of pain  She reports it is chronic but gotten worse since fall  Denies any radiation of pain to legs  · Continue Lidoderm patch, will add aqua k pad  ·   · Lumbar spine MRI:  Minor edema associated with recently documented superior T12 endplate deformity suggesting at least some element of acute disease    Correlate for point tenderness      As lumbar spondylosis and osteoarthritis with potentially significant left greater than right lateral recess stenosis at the L3-4 level, correlate for left greater than right L4 radiculitis  · Consult neurosurgery  Hematoma of right hip  Assessment & Plan  · Right gluteal subcutaneous hematoma is decreased in size compared to prior imaging    CKD (chronic kidney disease), stage IV (HCC)  Assessment & Plan  · Creatinine stable and at baseline  · Managed by PCP  · History of right nephrectomy    Chronic diastolic congestive heart failure [facility])  Assessment & Plan  Wt Readings from Last 3 Encounters:   04/06/21 83 1 kg (183 lb 1 6 oz)   04/04/21 79 8 kg (176 lb)   04/01/21 82 5 kg (181 lb 12 8 oz)       · Appears euvolemic  Continue home dose of Lasix  · I&O, weight  · Echo 2017:  EF 65%, G1 DD  Mild aortic stenosis versus sclerosis  Paroxysmal atrial fibrillation (HCC)  Assessment & Plan  · S/p cardioversion approximately 4 years ago  Is maintained on amiodarone  · No longer taking Pradaxa due to fall risk  Patient's daughter reports she is only taking low-dose aspirin every other day  Insomnia  Assessment & Plan  · Takes Ambien 12 5 mg CR daily HS  While inpatient, this will be substituted for formulary Ambien 10 mg  Patient's daughter is not interested in reducing dose    Constipation  Assessment & Plan  · Reports chronic constipation  · Prominent colonic stool without evidence of bowel obstruction on CT imaging  · Patient reported diarrhea 4/5, now resolved  · Continue Colace b i d , MiraLax  prn  Anxiety  Assessment & Plan  · Continue Xanax 0 25 mg t i d  P r n  [facility] Patient's daughter is not interested and adjusting dose      VTE Pharmacologic Prophylaxis:   Pharmacologic: Pharmacologic VTE Prophylaxis contraindicated due to hematoma   Mechanical VTE Prophylaxis in Place: Yes    Patient Centered Rounds: I have performed bedside rounds with nursing staff today      Discussions with Specialists or Other Care Team Provider: nursing, CM, attending      Time Spent for Care: 20 minutes  More than 50% of total time spent on counseling and coordination of care as described above  Current Length of Stay: 2 day(s)    Current Patient Status: Inpatient   Certification Statement: The patient will continue to require additional inpatient hospital stay due to consult to neurosurgery given MRI findings, monitoring of labs, and discharge planning    Discharge Plan: TBD    Code Status: Level 3 - DNAR and DNI      Subjective: The patient was seen and examined  The patient states she continues to have back pain  She states this am her pain was improved however this afternoon it is feeling worse again  Objective:     Vitals:   Temp (24hrs), Av °F (36 7 °C), Min:97 4 °F (36 3 °C), Max:98 3 °F (36 8 °C)    Temp:  [97 4 °F (36 3 °C)-98 3 °F (36 8 °C)] 97 4 °F (36 3 °C)  HR:  [58-61] 61  Resp:  [17-18] 17  BP: (144-179)/(60-84) 144/68  SpO2:  [91 %-96 %] 91 %  Body mass index is 28 68 kg/m²  Input and Output Summary (last 24 hours): Intake/Output Summary (Last 24 hours) at 2021 1639  Last data filed at 2021 1047  Gross per 24 hour   Intake 300 ml   Output 270 ml   Net 30 ml       Physical Exam:     Physical Exam  Vitals signs and nursing note reviewed  Constitutional:       General: She is awake  Appearance: Normal appearance  Cardiovascular:      Rate and Rhythm: Normal rate and regular rhythm  Pulmonary:      Effort: Pulmonary effort is normal       Breath sounds: Normal breath sounds  No wheezing, rhonchi or rales  Abdominal:      General: Bowel sounds are normal       Palpations: Abdomen is soft  Tenderness: There is no abdominal tenderness  Musculoskeletal:      Lumbar back: She exhibits tenderness  Skin:     General: Skin is warm and dry  Neurological:      General: No focal deficit present  Mental Status: She is alert and oriented to person, place, and time     Psychiatric: Attention and Perception: Attention normal          Mood and Affect: Mood normal          Speech: Speech normal          Behavior: Behavior is cooperative  Additional Data:     Labs:    Results from last 7 days   Lab Units 04/06/21  0530   WBC Thousand/uL 9 82   HEMOGLOBIN g/dL 12 0   HEMATOCRIT % 36 3   PLATELETS Thousands/uL 201   NEUTROS PCT % 68   LYMPHS PCT % 11*   MONOS PCT % 9   EOS PCT % 12*     Results from last 7 days   Lab Units 04/06/21  0530   SODIUM mmol/L 132*   POTASSIUM mmol/L 4 1   CHLORIDE mmol/L 100   CO2 mmol/L 24   BUN mg/dL 26*   CREATININE mg/dL 1 76*   ANION GAP mmol/L 8   CALCIUM mg/dL 8 4   GLUCOSE RANDOM mg/dL 126                           * I Have Reviewed All Lab Data Listed Above  * Additional Pertinent Lab Tests Reviewed:  All Labs Within Last 24 Hours Reviewed    Imaging:    Imaging Reports Reviewed Today Include: MRI L spine  Imaging Personally Reviewed by Myself Includes:  none    Recent Cultures (last 7 days):           Last 24 Hours Medication List:   Current Facility-Administered Medications   Medication Dose Route Frequency Provider Last Rate    acetaminophen  975 mg Oral [facility] Nestleonardo, CURTISNP      ALPRAZolam  0 25 mg Oral TID PRN [facility], CURTISNP      amiodarone  100 mg Oral Daily [facility], CURTISNP      aspirin  81 mg Oral Every Other Day [facility] Nestle, CURTISNP      bisacodyl  10 mg Rectal Daily PRN Seda Johnson MD      docusate sodium  100 mg Oral BID [facility], CURTISNP      furosemide  40 mg Oral Daily [facility], CRNP      HYDROmorphone  0 5 mg Intravenous Q6H PRN Seda Johnson MD      levothyroxine  37 5 mcg Oral Every Other Day [facility], ANGELA      lidocaine  1 patch Topical Daily [facility] Nestle, CURTISNP      methocarbamol  500 mg Oral Q6H PRN Seda Johnson MD      oxyCODONE  2 5 mg Oral Q6H PRN [facility] Nestle, ANGELA      oxyCODONE  5 mg Oral Q6H PRN [facility] Nestle, CURTISNP      polyethylene glycol  17 g Oral Daily PRN Jessi Solomon PA-C      saliva substitute  5 spray Mouth/Throat 4x Daily PRN Trey IZABELA Franklin      zolpidem  10 mg Oral HS ANGELA Connolly          Today, Patient Was Seen By: Rex Morrison PA-C    ** Please Note: Dictation voice to text software may have been used in the creation of this document   **

## 2021-04-06 NOTE — PLAN OF CARE
Problem: Potential for Falls  Goal: Patient will remain free of falls  Description: INTERVENTIONS:  - Assess patient frequently for physical needs  -  Identify cognitive and physical deficits and behaviors that affect risk of falls    -  Vance fall precautions as indicated by assessment   - Educate patient/family on patient safety including physical limitations  - Instruct patient to call for assistance with activity based on assessment  - Modify environment to reduce risk of injury  - Consider OT/PT consult to assist with strengthening/mobility  Outcome: Progressing     Problem: Prexisting or High Potential for Compromised Skin Integrity  Goal: Skin integrity is maintained or improved  Description: INTERVENTIONS:  - Identify patients at risk for skin breakdown  - Assess and monitor skin integrity  - Assess and monitor nutrition and hydration status  - Monitor labs   - Assess for incontinence   - Turn and reposition patient  - Assist with mobility/ambulation  - Relieve pressure over bony prominences  - Avoid friction and shearing  - Provide appropriate hygiene as needed including keeping skin clean and dry  - Evaluate need for skin moisturizer/barrier cream  - Collaborate with interdisciplinary team   - Patient/family teaching  - Consider wound care consult   Outcome: Progressing

## 2021-04-06 NOTE — UTILIZATION REVIEW
Continued Stay Review    Date:  4/6/2021                      Current Patient Class: inpatient  Current Level of Care:  Med surg     HPI:89 y o  female initially admitted on  4/3/2021  To observation and converted to inpatient on 4/4/2021 due to severe back pain with ambulatory dysfunction  Assessment/Plan:  4/6/2021:  Has ongoing back pain with activity  On exam thoracic and lumbar tenderness  Bruising  Neurosurgery consulted due to MRI findings  4/6/2021 per neuro surgery - Patient has closed stable burst fracture of 12th thoracic vertebra  Recommend medical management with TLSO backpack brace when OOB, pain management    Pertinent Labs/Diagnostic Results: /3/2021 Ct chest/abdomen No new injury in the chest abdomen or pelvis  Previously seen right gluteal subcutaneous hematoma is decreased in size     4/4/2021 Xray right femur - No acute osseous abnormality     4/5/2021 Mri lumbar spine -  Minor edema associated with recently documented superior T12 endplate deformity suggesting at least some element of acute disease   Correlate for point tenderness    As lumbar spondylosis and osteoarthritis with potentially significant left greater than right lateral recess stenosis at the L3-4 level, correlate for left greater than right L4 radiculitis      Results from last 7 days   Lab Units 04/06/21  0530 04/04/21  0533 04/03/21  1843   WBC Thousand/uL 9 82 8 22 9 69   HEMOGLOBIN g/dL 12 0 11 4* 13 1   HEMATOCRIT % 36 3 35 4 40 1   PLATELETS Thousands/uL 201 201 213   NEUTROS ABS Thousands/µL 6 70  --  7 15         Results from last 7 days   Lab Units 04/06/21  0530 04/04/21  0533 04/03/21  1843   SODIUM mmol/L 132* 137 139   POTASSIUM mmol/L 4 1 4 8 4 7   CHLORIDE mmol/L 100 103 103   CO2 mmol/L 24 25 29   ANION GAP mmol/L 8 9 7   BUN mg/dL 26* 26* 23   CREATININE mg/dL 1 76* 2 05* 1 91*   EGFR ml/min/1 73sq m 24 20 22   CALCIUM mg/dL 8 4 8 2* 9 0     Results from last 7 days   Lab Units 04/06/21  0530 04/04/21  0533 04/03/21  1843   GLUCOSE RANDOM mg/dL 126 86 102     Results from last 7 days   Lab Units 04/03/21  1931   CLARITY UA  Clear   COLOR UA  Yellow   SPEC GRAV UA  1 015   PH UA  5 5   GLUCOSE UA mg/dl Negative   KETONES UA mg/dl Negative   BLOOD UA  Negative   PROTEIN UA mg/dl Negative   NITRITE UA  Negative   BILIRUBIN UA  Negative   UROBILINOGEN UA E U /dl 0 2   LEUKOCYTES UA  Small*   WBC UA /hpf 0-5   RBC UA /hpf 2-4   BACTERIA UA /hpf None Seen   EPITHELIAL CELLS WET PREP /hpf Occasional       Vital Signs:   04/06/21 1551  --  --  --  144/68  --  --  --  Lying   04/06/21 1550  97 4 °F (36 3 °C)Abnormal   61  17  179/84Abnormal   --  91 %  None (Room air)  Lying   04/06/21 0700  97 9 °F (36 6 °C)  58  18  158/80  --  96 %  None (Room air)         Medications:   Scheduled Medications:  acetaminophen, 975 mg, Oral, Q8H [facility]  amiodarone, 100 mg, Oral, Daily  aspirin, 81 mg, Oral, Every Other Day  docusate sodium, 100 mg, Oral, BID  furosemide, 40 mg, Oral, Daily  levothyroxine, 37 5 mcg, Oral, Every Other Day  lidocaine, 1 patch, Topical, Daily  zolpidem, 10 mg, Oral, HS      Continuous IV Infusions:     PRN Meds: not used   ALPRAZolam, 0 25 mg, Oral, TID PRN  bisacodyl, 10 mg, Rectal, Daily PRN  HYDROmorphone, 0 5 mg, Intravenous, Q6H PRN  methocarbamol, 500 mg, Oral, Q6H PRN  oxyCODONE, 2 5 mg, Oral, Q6H PRN  oxyCODONE, 5 mg, Oral, Q6H PRN  polyethylene glycol, 17 g, Oral, Daily PRN  saliva substitute, 5 spray, Mouth/Throat, 4x Daily PRN        Discharge Plan: To be determined  Network Utilization Review Department  ATTENTION: Please call with any questions or concerns to [phone] and carefully listen to the prompts so that you are directed to the right person  All voicemails are confidential   Maris Vital all requests for admission clinical reviews, approved or denied determinations and any other requests to dedicated fax number below belonging to the campus where the patient is receiving treatment  List of dedicated fax numbers for the Facilities:  [address] [place] [address] DENIALS (Administrative/Medical Necessity) [phone]   [address] (Maternity/NICU/Pediatrics) [phone] [address] Bharti Bryan 8971 (  [place] [address]) [address] [place] [address] Merissa Cronin 14[address] [place] [id number] [phone]

## 2021-04-06 NOTE — CONSULTS
[address] [facility] 8/26/1926, 80 y o  female MRN: [id number]  Unit/Bed#: S -01 Encounter: [id number]  Primary Care Provider: Akash Samuels DO   Date and time admitted to hospital: 4/3/2021  6:06 PM    Inpatient consult to Neurosurgery  Consult performed by: Aylin Oneil PA-C  Consult ordered by: Tacho Carrasco PA-C      consult completed on 4/6/2021 at 1600    Closed stable burst fracture of twelfth thoracic vertebra with routine healing  Assessment & Plan  S/p fall on 4/3 out of wheelchair with acute on chronic back pain, found to have T12 compression fracture with acute component on MRI, TLICS 1  · Prior hospitalization in March 2021 s/p fall, at that time T12 mild loss of height was called on imaging however no brace was given as patient was not having any pain    Imaging reviewed personally and with attending, results are as follows:   MRI lumbar spine wo contrast 4/5/2021:  Minor edema associated with recently documented superior T12 endplate deformity suggesting at least some element of acute disease  Correlate for point tenderness  As lumbar spondylosis and osteoarthritis with potentially significant left greater than right lateral recess stenosis at the L3-4 level, correlate for left greater than right L4 radiculitis   CT chest abdomen pelvis wo contrast 4/3/2021:  No acute fracture or destructive osseous lesion  Mild loss of height of the T12 vertebral body unchanged from the prior study  o CT abdomen pelvis completed 3/23/2021 also reported mild loss of height at T12      Plan:   Will attempt conservative management  o TLSO backpack brace when OOB and HOB greater than 45 degrees  o Upright thoracolumbar XR to be completed in brace to assess spinal alignment  o Continue with multimodal pain regimen  o Brace will be worn for 6-12 weeks  o Discussed possible kyphoplasty should she fail brace use   Medical management and pain control per primary team   DVT ppx:  SCDs   Mobilize as tolerated with assistance, PT / OT evaluation with brace in place    Neurosurgery will follow as needed during hospitalization and review imaging once stable  If imaging is stable and patient is tolerating brace well, she may follow up in the office in 2 weeks with upright imaging  No surgery warranted  Please call with questions or concerns, signed off  Chronic low back pain without sciatica  Assessment & Plan  See plan above    * Fall  Assessment & Plan  See plan above    History of Present Illness   HPI: Yanni Maldonado is a 80y o  year old female with PMH including HTN, HLD, cardiac disease, a-fib, chronic back pain who presents with s/p fall with severe back pain  Found to have likely acute T12 compression fracture  Patient state she was walking to the kitchen to help her daughter set the table for dinner when she fell onto the ground  She could not get up on her own nor could her daughter help her and they called 911  She states pain in her back was so severe, worse than her chronic back pain  She reports a recent fall at the end of March 2021 after her daughter fell off a ladder onto her for which she was hospitalized  At that time she did have imaging of chest abdomen pelvis and although T12 mild loss of height was called she was not having any new back pain at that time, no brace was warranted  Has bruising on her arms and back from last fall  Has some constipation at this time  She denies bowel / bladder issues, saddle anesthesia, numbness / tingling weakness  She lives at home with her daughter  Usually uses a cane when ambulating outside of the house  Review of Systems   Constitutional: Positive for activity change  Negative for chills and fever  HENT: Negative for hearing loss, tinnitus and trouble swallowing  Eyes: Negative for visual disturbance  Respiratory: Negative for chest tightness and shortness of breath  Cardiovascular: Negative for chest pain  Gastrointestinal: Positive for constipation  Negative for abdominal pain, diarrhea, nausea and vomiting  Genitourinary: Negative for difficulty urinating  Musculoskeletal: Positive for back pain  Negative for neck pain  Skin: Positive for wound  Neurological: Negative for dizziness, facial asymmetry, speech difficulty, weakness, numbness and headaches  Hematological: Does not bruise/bleed easily  Psychiatric/Behavioral: Negative for confusion         Historical Information   Past Medical History:   Diagnosis Date    A-fib [facility])     Cardiac disease     Hyperlipidemia     Hypertension      Past Surgical History:   Procedure Laterality Date    APPENDECTOMY      CHOLECYSTECTOMY      HERNIA REPAIR      HYSTERECTOMY      NEPHRECTOMY Right      Social History     Substance and Sexual Activity   Alcohol Use No     Social History     Substance and Sexual Activity   Drug Use Never     Social History     Tobacco Use   Smoking Status Never Smoker   Smokeless Tobacco Never Used     Family History   Problem Relation Age of Onset    Heart disease Family     Hypertension Family     Cancer Family        Meds/Allergies   all current active meds have been reviewed, current meds:   Current Facility-Administered Medications   Medication Dose Route Frequency    acetaminophen (TYLENOL) tablet 975 mg  975 mg Oral Q8H [address]    ALPRAZolam (XANAX) tablet 0 25 mg  0 25 mg Oral TID PRN    amiodarone tablet 100 mg  100 mg Oral Daily    aspirin (ECOTRIN LOW STRENGTH) EC tablet 81 mg  81 mg Oral Every Other Day    bisacodyl (DULCOLAX) rectal suppository 10 mg  10 mg Rectal Daily PRN    docusate sodium (COLACE) capsule 100 mg  100 mg Oral BID    furosemide (LASIX) tablet 40 mg  40 mg Oral Daily    HYDROmorphone (DILAUDID) injection 0 5 mg  0 5 mg Intravenous Q6H PRN    levothyroxine tablet 37 5 mcg  37 5 mcg Oral Every Other Day    lidocaine (LIDODERM) 5 % patch 1 patch  1 patch Topical Daily    methocarbamol (ROBAXIN) tablet 500 mg  500 mg Oral Q6H PRN    oxyCODONE (ROXICODONE) IR tablet 2 5 mg  2 5 mg Oral Q6H PRN    oxyCODONE (ROXICODONE) IR tablet 5 mg  5 mg Oral Q6H PRN    polyethylene glycol (MIRALAX) packet 17 g  17 g Oral Daily PRN    saliva substitute (MOUTH KOTE) mucosal solution 5 spray  5 spray Mouth/Throat 4x Daily PRN    zolpidem (AMBIEN) tablet 10 mg  10 mg Oral HS    and PTA meds:   Prior to Admission Medications   Prescriptions Last Dose Informant Patient Reported? Taking? ALPRAZolam (XANAX) 0 25 mg tablet  Child No No   Sig: Take 1 tablet (0 25 mg total) by mouth 3 (three) times a day as needed for anxiety for up to 10 days   Emollient (EUCERIN) lotion  Child Yes No   Sig: Apply topically 2 (two) times a day   HYDROcodone-acetaminophen (NORCO) 5-325 mg per tablet   Yes Yes   Sig: Take 1 tablet by mouth every 6 (six) hours as needed for pain   Respiratory Therapy Supplies (NEBULIZER/ADULT MASK) KIT  Child No No   Sig: by Does not apply route every 4 (four) hours as needed (wheezing)   Synthroid 25 MCG tablet   No No   Sig: Take 1 5 tablets (37 5 mcg total) by mouth daily   Patient taking differently: Take 37 5 mcg by mouth daily Takes 37 5 mcg every other day     acetaminophen (TYLENOL) 325 mg tablet  Child No No   Sig: Take 3 tablets (975 mg total) by mouth every 8 (eight) hours   amiodarone 100 mg tablet  Child No No   Sig: Take 1 tablet (100 mg total) by mouth daily   aspirin (ECOTRIN LOW STRENGTH) 81 mg EC tablet   Yes Yes   Sig: Take 81 mg by mouth every other day   docusate sodium (COLACE) 100 mg capsule  Child No No   Sig: Take 1 capsule (100 mg total) by mouth 2 (two) times a day   furosemide (LASIX) 20 mg tablet  Child No No   Sig: TAKE 2 TABLETS (40MG) BY MOUTH DAILY   oxyCODONE (ROXICODONE) 5 mg immediate release tablet  Child No No   Sig: Take 0 5 tablets (2 5 mg total) by mouth every 4 (four) hours as needed for moderate pain for up to 10 daysMax Daily Amount: 15 mg   polyethylene glycol (MIRALAX) 17 g packet  Child No No   Sig: Take 17 g by mouth daily as needed (constipation)   rosuvastatin (CRESTOR) 5 mg tablet  Child No No   Sig: Take 1 tablet (5 mg total) by mouth daily   triamcinolone (KENALOG) 0 1 % cream  Child Yes No   Sig: Apply topically Twice daily   zolpidem (AMBIEN CR) 12 5 MG CR tablet  Child No No   Sig: TAKE ONE TABLET BY MOUTH DAILY AT BEDTIME      Facility-Administered Medications: None     Allergies   Allergen Reactions    Penicillin G Anaphylaxis    Spironolactone Shortness Of Breath    Atorvastatin      Other reaction(s): Leg Cramps    Cephalexin Headache    Chocolate - Food Allergy     Citalopram Abdominal Pain and Headache    Diltiazem     Fosinopril Sodium-Hctz     Methylprednisolone Nausea Only     Tablet generic only    Monopril [Fosinopril]      Reaction Date: [id number];     Penicillins     Pollen Extract     Pravastatin Myalgia     Other reaction(s): Leg Cramps    Sporanox [Itraconazole]      Reaction Date: 28Apr2011;     Strawberry C [Ascorbate - Food Allergy]     Caffeine - Food Allergy Palpitations     Tachycardia       Objective   I/O       04/04 0701 - 04/05 0700 04/05 0701 - 04/06 0700 04/06 0701 - 04/07 0700    P  O  1200 540 300    Total Intake(mL/kg) 1200 (15 1) 540 (6 5) 300 (3 6)    Urine (mL/kg/hr) 650 (0 3) 750 (0 4) 270 (0 4)    Total Output 650 750 270    Net +550 -210 +30           Unmeasured Urine Occurrence 2 x 1 x           Physical Exam  Constitutional:       General: She is awake  Appearance: Normal appearance  HENT:      Head: Normocephalic and atraumatic  Eyes:      Extraocular Movements: EOM normal       Conjunctiva/sclera: Conjunctivae normal    Neck:      Musculoskeletal: No spinous process tenderness or muscular tenderness  Cardiovascular:      Rate and Rhythm: Regular rhythm  Pulmonary:      Effort: Pulmonary effort is normal  No respiratory distress     Musculoskeletal: Cervical back: She exhibits no tenderness  Thoracic back: She exhibits tenderness  Lumbar back: She exhibits tenderness  Skin:     General: Skin is warm and dry  Findings: Bruising present  Neurological:      Mental Status: She is alert and oriented to person, place, and time  Coordination: Finger-Nose-Finger Test normal       Deep Tendon Reflexes: Strength normal    Psychiatric:         Attention and Perception: Attention and perception normal          Mood and Affect: Mood and affect normal          Speech: Speech normal          Behavior: Behavior normal  Behavior is cooperative  Thought Content: Thought content normal          Cognition and Memory: Cognition and memory normal          Judgment: Judgment normal        Neurologic Exam     Mental Status   Oriented to person, place, and time  Follows 1 step commands  Attention: normal  Concentration: normal    Speech: speech is normal   Level of consciousness: alert  Knowledge: good  Normal comprehension  Cranial Nerves     CN III, IV, VI   Extraocular motions are normal    CN III: no CN III palsy  CN VI: no CN VI palsy  Nystagmus: none   Diplopia: none  Ophthalmoparesis: none  Upgaze: normal  Downgaze: normal  Conjugate gaze: present    CN V   Right facial sensation deficit: none  Left facial sensation deficit: none    CN VII   Right facial weakness: none  Left facial weakness: none    CN VIII   Hearing: intact    CN IX, X   CN IX normal    CN X normal      CN XI   Right trapezius strength: normal  Left trapezius strength: normal    CN XII   CN XII normal      Motor Exam   Muscle bulk: normal  Overall muscle tone: normal  Right arm pronator drift: absent  Left arm pronator drift: absent    Strength   Strength 5/5 throughout       Sensory Exam   Light touch normal      Gait, Coordination, and Reflexes     Coordination   Finger to nose coordination: normal    Tremor   Resting tremor: absent  Intention tremor: absent  Action tremor: absent    Reflexes   Right : 2+  Left : 2+  Right Bazzi: absent  Left Bazzi: absent  Right ankle clonus: absent  Left ankle clonus: absent      Vitals:Blood pressure 144/68, pulse 61, temperature (!) 97 4 °F (36 3 °C), temperature source Oral, resp  rate 17, height 5' 7" (1 702 m), weight 83 1 kg (183 lb 1 6 oz), SpO2 91 %, not currently breastfeeding  ,Body mass index is 28 68 kg/m²  Lab Results:   Results from last 7 days   Lab Units 04/06/21  0530 04/04/21  0533 04/03/21  1843   WBC Thousand/uL 9 82 8 22 9 69   HEMOGLOBIN g/dL 12 0 11 4* 13 1   HEMATOCRIT % 36 3 35 4 40 1   PLATELETS Thousands/uL 201 201 213   NEUTROS PCT % 68  --  73   MONOS PCT % 9  --  8     Results from last 7 days   Lab Units 04/06/21  0530 04/04/21  0533 04/03/21  1843   POTASSIUM mmol/L 4 1 4 8 4 7   CHLORIDE mmol/L 100 103 103   CO2 mmol/L 24 25 29   BUN mg/dL 26* 26* 23   CREATININE mg/dL 1 76* 2 05* 1 91*   CALCIUM mg/dL 8 4 8 2* 9 0           Imaging Studies: I have personally reviewed pertinent reports  and I have personally reviewed pertinent films in PACS    Ct Chest Abdomen Pelvis Wo Contrast    Result Date: 4/3/2021  Impression: No new injury in the chest abdomen or pelvis  Previously seen right gluteal subcutaneous hematoma is decreased in size Workstation performed: [id number]     Xr Femur 2 Views Right    Result Date: 4/4/2021  Impression: No acute osseous abnormality  Workstation performed: [id number]     Mri Lumbar Spine Wo Contrast    Result Date: 4/6/2021  Impression: Minor edema associated with recently documented superior T12 endplate deformity suggesting at least some element of acute disease  Correlate for point tenderness  As lumbar spondylosis and osteoarthritis with potentially significant left greater than right lateral recess stenosis at the L3-4 level, correlate for left greater than right L4 radiculitis   Workstation performed: [id number]     EKG, Pathology, and Other Studies: I have personally reviewed pertinent reports  VTE Prophylaxis: Sequential compression device Pretty Villafana     Code Status: Level 3 - DNAR and DNI  Advance Directive and Living Will:      Power of :    POLST:      Counseling / Coordination of Care  I spent 20 minutes with the patient

## 2021-04-06 NOTE — ASSESSMENT & PLAN NOTE
· Continue Xanax 0 25 mg t i d  P r n  Brendan Vasquez   Patient's daughter is not interested and adjusting dose

## 2021-04-07 ENCOUNTER — APPOINTMENT (INPATIENT)
Dept: RADIOLOGY | Facility: HOSPITAL | Age: 86
DRG: 552 | End: 2021-04-07
Payer: COMMERCIAL

## 2021-04-07 ENCOUNTER — TELEPHONE (OUTPATIENT)
Dept: NEUROSURGERY | Facility: CLINIC | Age: 86
End: 2021-04-07

## 2021-04-07 PROCEDURE — 97760 ORTHOTIC MGMT&TRAING 1ST ENC: CPT

## 2021-04-07 PROCEDURE — 97530 THERAPEUTIC ACTIVITIES: CPT

## 2021-04-07 PROCEDURE — 99232 SBSQ HOSP IP/OBS MODERATE 35: CPT | Performed by: PHYSICIAN ASSISTANT

## 2021-04-07 PROCEDURE — 72080 X-RAY EXAM THORACOLMB 2/> VW: CPT

## 2021-04-07 RX ORDER — LOPERAMIDE HYDROCHLORIDE 2 MG/1
2 CAPSULE ORAL 3 TIMES DAILY PRN
Status: DISCONTINUED | OUTPATIENT
Start: 2021-04-07 | End: 2021-04-09 | Stop reason: HOSPADM

## 2021-04-07 RX ADMIN — ACETAMINOPHEN 975 MG: 325 TABLET, FILM COATED ORAL at 22:08

## 2021-04-07 RX ADMIN — ACETAMINOPHEN 975 MG: 325 TABLET, FILM COATED ORAL at 06:02

## 2021-04-07 RX ADMIN — ALPRAZOLAM 0.25 MG: 0.25 TABLET ORAL at 19:10

## 2021-04-07 RX ADMIN — ZOLPIDEM TARTRATE 10 MG: 5 TABLET ORAL at 22:08

## 2021-04-07 RX ADMIN — LIDOCAINE 5% 1 PATCH: 700 PATCH TOPICAL at 08:42

## 2021-04-07 RX ADMIN — ACETAMINOPHEN 975 MG: 325 TABLET, FILM COATED ORAL at 15:02

## 2021-04-07 RX ADMIN — FUROSEMIDE 40 MG: 40 TABLET ORAL at 08:42

## 2021-04-07 RX ADMIN — AMIODARONE HYDROCHLORIDE 100 MG: 200 TABLET ORAL at 08:41

## 2021-04-07 NOTE — ASSESSMENT & PLAN NOTE
· Continue Xanax 0 25 mg t i d  P r n  Senia Us   Patient's daughter is not interested and adjusting dose

## 2021-04-07 NOTE — PROGRESS NOTES
[facility]  Progress Note - Esau White 8/26/1926, 80 y o  female MRN: [id number]  Unit/Bed#: S -01 Encounter: [id number]  Primary Care Provider: Kianna Cardona DO   Date and time admitted to hospital: 4/3/2021  6:06 PM    * Fall  Assessment & Plan  · Mechanical fall that occurred while at home secondary to her wheelchair not being locked  Patient was assisted to the ground  No head strike  Is on low-dose aspirin 81 mg every other day  Reports worsening of chronic back pain which is limiting ability to complete ADLs  · Patient was last hospitalized on 03/23 through 3/25 for mechanical fall  She was admitted under the service of trauma  She was evaluated by Geriatric Medicine  Patient and daughter declined rehab on discharge  · CT chest abdomen pelvis without contrast:  Hematoma as documented below  Mild loss of height of T12 vertebral body, unchanged from prior study  · Pain control:  Scheduled Tylenol, lidocaine  P r n  Oxycodone and Dilaudid  · MRI L spine:   · Minor edema associated with recently documented superior T12 endplate deformity suggesting at least some element of acute disease  Correlate for point tenderness  As lumbar spondylosis and osteoarthritis with potentially significant left greater than right lateral recess stenosis at the L3-4 level, correlate for left greater than right L4 radiculitis  · PT, OT consulted- recommended home PT/OT  · Neurosurgery consult-- rec LSO brace w/ upright XR and OP f/u     Chronic low back pain without sciatica  Assessment & Plan  · Patient complaining of severe back pain  Unable to ambulate because of pain  She reports it is chronic but gotten worse since fall  Denies any radiation of pain to legs  · Continue Lidoderm patch, will add aqua k pad  · Lumbar spine MRI:  · Minor edema associated with recently documented superior T12 endplate deformity suggesting at least some element of acute disease    Correlate for point tenderness  · As lumbar spondylosis and osteoarthritis with potentially significant left greater than right lateral recess stenosis at the L3-4 level, correlate for left greater than right L4 radiculitis  · Consult neurosurgery-- LSO brace w/ upright XR, OP f/u    Constipation  Assessment & Plan  · Reports chronic constipation  · Prominent colonic stool without evidence of bowel obstruction on CT imaging  · Patient reported diarrhea 4/5 -4/7-- likely 2/2 stool softeners  · Stop Continue Colace b i d , MiraLax  Prn  · c diff sample collected, though unlikely'  · Start imodium    Hematoma of right hip  Assessment & Plan  · Right gluteal subcutaneous hematoma is decreased in size compared to prior imaging    Insomnia  Assessment & Plan  · Takes Ambien 12 5 mg CR daily HS  While inpatient, this will be substituted for formulary Ambien 10 mg  Patient's daughter is not interested in reducing dose    Chronic diastolic congestive heart failure [facility])  Assessment & Plan  Wt Readings from Last 3 Encounters:   04/07/21 81 5 kg (179 lb 9 6 oz)   04/04/21 79 8 kg (176 lb)   04/01/21 82 5 kg (181 lb 12 8 oz)     · Appears euvolemic  Hold lasix tomorrow given persistent diarrhea  · I&O, weight  · Echo 2017:  EF 65%, G1 DD  Mild aortic stenosis versus sclerosis  CKD (chronic kidney disease), stage IV (HCC)  Assessment & Plan  · Creatinine stable and at baseline  · Managed by PCP  · History of right nephrectomy    Anxiety  Assessment & Plan  · Continue Xanax 0 25 mg t i d  P r n  Karrie Nissen Patient's daughter is not interested and adjusting dose    Paroxysmal atrial fibrillation [facility])  Assessment & Plan  · S/p cardioversion approximately 4 years ago  Is maintained on amiodarone  · No longer taking Pradaxa due to fall risk  Patient's daughter reports she is only taking low-dose aspirin every other day        VTE Pharmacologic Prophylaxis:   Pharmacologic: Pharmacologic VTE Prophylaxis contraindicated due to falls  Mechanical VTE Prophylaxis in Place: Yes    Patient Centered Rounds: I have performed bedside rounds with nursing staff today  Discussions with Specialists or Other Care Team Provider: BROCK, RN     Education and Discussions with Family / Patient: patient; attempted to call daughter x2 w/ no response-- will attempt again tomorrow    Time Spent for Care: 30 minutes  More than 50% of total time spent on counseling and coordination of care as described above  Current Length of Stay: 3 day(s)    Current Patient Status: Inpatient   Certification Statement: The patient will continue to require additional inpatient hospital stay due to ongoing tx of back pain, diarrhea    Discharge Plan: d/c pending improvement in diarrhea    Code Status: Level 3 - DNAR and DNI      Subjective:   Patient does not feel well today  She reportedly had 8 episodes of diarrhea last night and 3 so far today  She is concerned she will become dehydrated and dizzy  She does report that her back pain is actually OK  Objective:     Vitals:   Temp (24hrs), Av 9 °F (36 6 °C), Min:97 4 °F (36 3 °C), Max:98 5 °F (36 9 °C)    Temp:  [97 4 °F (36 3 °C)-98 5 °F (36 9 °C)] 97 9 °F (36 6 °C)  HR:  [57-61] 57  Resp:  [16-18] 16  BP: (138-179)/(68-84) 159/74  SpO2:  [91 %-94 %] 92 %  Body mass index is 28 13 kg/m²  Input and Output Summary (last 24 hours): Intake/Output Summary (Last 24 hours) at 2021 1413  Last data filed at 2021 1336  Gross per 24 hour   Intake 420 ml   Output 425 ml   Net -5 ml       Physical Exam:     Physical Exam  Constitutional:       Appearance: Normal appearance  HENT:      Head: Normocephalic and atraumatic  Mouth/Throat:      Mouth: Mucous membranes are moist    Eyes:      Pupils: Pupils are equal, round, and reactive to light  Cardiovascular:      Rate and Rhythm: Normal rate and regular rhythm  Heart sounds: No murmur  No friction rub  No gallop      Pulmonary:      Effort: Pulmonary effort is normal  Breath sounds: Normal breath sounds  Abdominal:      General: Abdomen is flat  There is no distension  Palpations: Abdomen is soft  Tenderness: There is no abdominal tenderness  Musculoskeletal:         General: Tenderness (midline low back) present  Right lower leg: No edema  Left lower leg: No edema  Skin:     General: Skin is warm and dry  Neurological:      General: No focal deficit present  Mental Status: She is alert and oriented to person, place, and time  Psychiatric:      Comments: Appears anxious       Additional Data:     Labs:    Results from last 7 days   Lab Units 04/06/21  0530   WBC Thousand/uL 9 82   HEMOGLOBIN g/dL 12 0   HEMATOCRIT % 36 3   PLATELETS Thousands/uL 201   NEUTROS PCT % 68   LYMPHS PCT % 11*   MONOS PCT % 9   EOS PCT % 12*     Results from last 7 days   Lab Units 04/06/21  0530   SODIUM mmol/L 132*   POTASSIUM mmol/L 4 1   CHLORIDE mmol/L 100   CO2 mmol/L 24   BUN mg/dL 26*   CREATININE mg/dL 1 76*   ANION GAP mmol/L 8   CALCIUM mg/dL 8 4   GLUCOSE RANDOM mg/dL 126                           * I Have Reviewed All Lab Data Listed Above  * Additional Pertinent Lab Tests Reviewed:  Joey 66 Admission Reviewed    Imaging:    Imaging Reports Reviewed Today Include: MRI L spine  Imaging Personally Reviewed by Myself Includes:  MRI L spine    Recent Cultures (last 7 days):           Last 24 Hours Medication List:   Current Facility-Administered Medications   Medication Dose Route Frequency Provider Last Rate    acetaminophen  975 mg Oral [facility] ANGELA Ashby      ALPRAZolam  0 25 mg Oral TID PRN ANGELA Ashby      amiodarone  100 mg Oral Daily ANGELA Ashby      aspirin  81 mg Oral Every Other Day ANGELA Ashby      HYDROmorphone  0 5 mg Intravenous Q6H PRN Seda Johnson MD      levothyroxine  37 5 mcg Oral Every Other Day ANGELA Ashby      lidocaine  1 patch Topical Daily ANGELA Ashby     [facility] loperamide  2 mg Oral TID PRN Susannah Guevara, IZABELA      methocarbamol  500 mg Oral Q6H PRN Seda Johnson MD      oxyCODONE  2 5 mg Oral Q6H PRN Fan Stage, CRNP      oxyCODONE  5 mg Oral Q6H PRN Fan Stage, CRNP      saliva substitute  5 spray Mouth/Throat 4x Daily PRN Nikhil MixIZABELA      zolpidem  10 mg Oral HS Fan Stage, CRNP          Today, Patient Was Seen By: Marck Turner PA-C    ** Please Note: Dictation voice to text software may have been used in the creation of this document   **

## 2021-04-07 NOTE — ASSESSMENT & PLAN NOTE
· Mechanical fall that occurred while at home secondary to her wheelchair not being locked  Patient was assisted to the ground  No head strike  Is on low-dose aspirin 81 mg every other day  Reports worsening of chronic back pain which is limiting ability to complete ADLs  · Patient was last hospitalized on 03/23 through 3/25 for mechanical fall  She was admitted under the service of trauma  She was evaluated by Geriatric Medicine  Patient and daughter declined rehab on discharge  · CT chest abdomen pelvis without contrast:  Hematoma as documented below  Mild loss of height of T12 vertebral body, unchanged from prior study  · Pain control:  Scheduled Tylenol, lidocaine  P r n  Oxycodone and Dilaudid  · MRI L spine:   · Minor edema associated with recently documented superior T12 endplate deformity suggesting at least some element of acute disease  Correlate for point tenderness  As lumbar spondylosis and osteoarthritis with potentially significant left greater than right lateral recess stenosis at the L3-4 level, correlate for left greater than right L4 radiculitis    · PT, OT consulted- recommended home PT/OT  · Neurosurgery consult-- rec LSO brace w/ upright XR and OP f/u

## 2021-04-07 NOTE — CASE MANAGEMENT
CM followed up on hospital bed  [facility] is not in network with patient's insurance  Script and appropriate clinical faxed to Sara Collins (DME provider in network with insurance) at [phone] requesting upon approval to reach out to daughter to ensure delivery as per care team rounds patient is tentative DC tomorrow pending medical stability  CM met with patient to introduce self, explain role, and verify DC planning  During CM meeting with patient, CM called and spoke to daughter Shannen Thompson at bedside as well per patient preference  CM updated daughter regarding hospital bed and that someone will be in touch with her  Daughter appreciates update  CM discussed that patient was fitted for TSLO brace and seen by therapy today and recommended for home with skilled therapy  CM advised that [facility] has accepted patient and been added to follow up providers per patient and daughter preference  Daughter and patient both confirm the DC plan is to return home with [facility] servicse  CM advised [facility] via [employer] of anticipated DC tomorrow pending medical stability  IMM reviewed with patient at bedside, signed by CM, and placed in medical records bin  Copy provided to patient for her records  Daughter requesting CM arrange transportation at DC for patient  CM discussed [facility] transportation and cost associated  Daughter reports cost is affordable and she would feel safer having that arranged for patient  CM to arrange [facility] upon medical clearance for patient to return home  Daughter discussed transportation for patient's future appointments  CM discussed options of looking into [facility], speaking with the office to inquire about home vs tele visits  Daughter appreciates the suggestions  CM to follow up in AM regarding hospital bed and for further care coordination needs

## 2021-04-07 NOTE — ASSESSMENT & PLAN NOTE
Wt Readings from Last 3 Encounters:   04/07/21 81 5 kg (179 lb 9 6 oz)   04/04/21 79 8 kg (176 lb)   04/01/21 82 5 kg (181 lb 12 8 oz)     · Appears euvolemic  Hold lasix tomorrow given persistent diarrhea  · I&O, weight  · Echo 2017:  EF 65%, G1 DD  Mild aortic stenosis versus sclerosis

## 2021-04-07 NOTE — PHYSICAL THERAPY NOTE
PHYSICAL THERAPY TREATMENT NOTE  NAME:  Little Diana  DATE: 04/07/21    Length Of Stay: 3  Performed at least 2 patient identifiers during session: Name and Birthday    TREATMENT:    04/07/21 1225   PT Last Visit   PT Visit Date 04/07/21   Note Type   Note Type Treatment  (and brace fitting)   Pain Assessment   Pain Assessment Tool FLACC   Pain Location/Orientation Orientation: Bilateral;Location: Back;Orientation: Lower   Effect of Pain on Daily Activities limits speed and indep of mobility   Patient's Stated Pain Goal No pain   Hospital Pain Intervention(s) Repositioned; Ambulation/increased activity; Medication (See MAR); Elevated; Emotional support  (RN has pain patch on pt)   Pain Rating: FLACC (Rest) - Face 1   Pain Rating: FLACC (Rest) - Legs 0   Pain Rating: FLACC (Rest) - Activity 0   Pain Rating: FLACC (Rest) - Cry 1   Pain Rating: FLACC (Rest) - Consolability 0   Score: FLACC (Rest) 2   Pain Rating: FLACC (Activity) - Face 1   Pain Rating: FLACC (Activity) - Legs 1   Pain Rating: FLACC (Activity) - Activity 1   Pain Rating: FLACC (Activity) - Cry 1   Pain Rating: FLACC (Activity) - Consolability 1   Score: FLACC (Activity) 5   Restrictions/Precautions   Weight Bearing Precautions Per Order No   Braces or Orthoses TLSO  (TLso ordered by NS since last session)--  Orthotic Fitting: Pt measured and fit for [place] TLSO (backpack) @ largest size while patient in sitting  Pt requires dependent assistance for donning /doffing brace at this time initially, as well as substantial and repeated verbal instruction  RN aware   Written handout provided  Other Precautions Bed Alarm; Chair Alarm;Cognitive; Fall Risk;Pain   General   Chart Reviewed Yes   Family/Caregiver Present No  (until company from [employer] present prior to completion of ses)   Cognition   Overall Cognitive Status [facility]   Arousal/Participation Alert   Attention Attends with cues to redirect   Memory Decreased short term memory   Following Commands Follows one step commands with increased time or repetition   Subjective   Subjective Pt reports not feeling so well with recent bouts of diarrhea, but agreeable to participate during session, especially for brace management   Bed Mobility   Supine to Sit 5  Supervision   Additional items Assist x 1; Increased time required;Verbal cues; Bedrails;HOB elevated  (sidelying to sit, only needing A for squaring off @ eEOB)   Sit to Supine 3  Moderate assistance   Additional items Assist x 1;Bedrails; Increased time required;Verbal cues   Transfers   Sit to Stand 4  Minimal assistance   Additional items Assist x 1; Increased time required;Verbal cues;Armrests   Stand to Sit 5  Supervision   Additional items Assist x 1; Increased time required;Verbal cues;Armrests   Ambulation/Elevation   Gait pattern Excessively slow; Step to   Gait Assistance 5  Supervision   Additional items Assist x 1  (Pt + recall of "3 point gait" as instructed yesterday)   Assistive Device Rolling walker   Distance 1' forward and backward, 1' sidestepping, limited by fatigue, pain   Stair Management Assistance Not tested   Balance   Static Sitting Good   Static Standing Fair -   Ambulatory Poor +   Endurance Deficit   Endurance Deficit Yes   Endurance Deficit Description needs therapeutic rests between mobility trials   Activity Tolerance   Activity Tolerance Patient limited by fatigue;Patient limited by pain   Nurse Harman coordination w/ [facility] PCA   Exercises   Neuro re-ed extensive time for brace fitting and management   Assessment   Prognosis Fair   Problem List Decreased strength;Decreased range of motion;Decreased endurance; Impaired balance;Decreased mobility;Pain;Orthopedic restrictions; Obesity; Decreased cognition  (gait deviations)   Assessment Pt agreeable to having brace fitted @ start of session   By end of session, pt was able to provide 25-50% A for donning/doffing TLSO backpack, especially to get brace where she "needs more support" @ lower back  Pt did not need more physical A for bed mobility and transfers, but reports more fatigued with diarrhea over multiple hours since last session  Skilled PT Recommended to progress pt toward treatment goals  Recommend trials w/rolling walker for further distances and stair training  Barriers to Discharge Decreased caregiver support; Inaccessible home environment  (JOSE, ?caregiver support per pt)   Barriers to Discharge Comments Did note case management information that family is requesting transport home due to barriers of JOSE, into  out of car   Goals   Patient Goals to get back home, have less back pain, and manage this (brace) myself   STG Expiration Date 04/16/21   Short Term Goal #1 Pt will: Perform bed mobility tasks with modified I to prepare for transfers and reposition in bed with minimizing twisting  Perform transfers with modified I to improve ease of transfers with <3/10 pain  Perform ambulation with LRAD for 100' with  modified I  to increase Indep in home environment and promote proper use of assistive device  Perform 3 stairs w/ railing and DME and w/consistent min A of 1 to return to home with JOSE  Perform TUG within 1 min using DME to progress pt toward less risk of falls  Don/doff  Backpack TLSO w/ only min A to promote compliance and increased comfort with brace   PT Treatment Day 2   Plan   Treatment/Interventions Functional transfer training;LE strengthening/ROM; Elevations; Therapeutic exercise; Endurance training;Cognitive reorientation;Patient/family training;Equipment eval/education; Bed mobility;Gait training;Spoke to nursing   Progress Slow progress, decreased activity tolerance   PT Frequency Other (Comment)  (4-5x/wk)   Recommendation   PT Discharge Recommendation Home with skilled therapy  (daughter can provide A)   [place] walker   AM-PAC Basic Mobility Inpatient   Turning in Bed Without Bedrails 3   Lying on Back to Sitting on Edge of Flat Bed 3   Moving Bed to Chair 3   Standing Up From Chair 3   Walk in Room 3   Climb 3-5 Stairs 3   Basic Mobility Inpatient Raw Score 18   Basic Mobility Standardized Score 41 05       Mickie Pritchard, PT, DPT

## 2021-04-07 NOTE — TELEPHONE ENCOUNTER
4/14/21:  SPOKE TO [facility]  SHE AWARE OF OV/IMAGING   BRITTNEY IS CURRENTLY NOT GETTING IN AND OUT OF THE CAR    XRAY WILL BE DONE THE SAME DAY BUT THEY WILL CALL IF THE APPOINTMENT NEED TO BE RESCHEDULE     4/12/21: INPATIENT  4/9/21: INPATIENT  4/8/21: INPATIENT  4/7/21: INPATIENT    TODAYS DATE: 4/7/21  EMAIL FROM PA: Yoandy Pablo  DATE OF EMAIL:  4/6/21    HOSPITAL FOLLOW UP PLAN    2 WEEKS W/ KEV  4/22/21 / 10:00 / Davida Mercado    IMAGING:   XRAY TC SPINE

## 2021-04-07 NOTE — ASSESSMENT & PLAN NOTE
· Patient complaining of severe back pain  Unable to ambulate because of pain  She reports it is chronic but gotten worse since fall  Denies any radiation of pain to legs  · Continue Lidoderm patch, will add aqua k pad  · Lumbar spine MRI:  · Minor edema associated with recently documented superior T12 endplate deformity suggesting at least some element of acute disease  Correlate for point tenderness    · As lumbar spondylosis and osteoarthritis with potentially significant left greater than right lateral recess stenosis at the L3-4 level, correlate for left greater than right L4 radiculitis  · Consult neurosurgery-- LSO brace w/ upright XR, OP f/u

## 2021-04-07 NOTE — PLAN OF CARE
Problem: PHYSICAL THERAPY ADULT  Goal: Performs mobility at highest level of function for planned discharge setting  See evaluation for individualized goals  Description: Treatment/Interventions: Functional transfer training, LE strengthening/ROM, Endurance training, Patient/family training, Cognitive reorientation, Therapeutic exercise, Elevations, Gait training, Bed mobility, Equipment eval/education, Spoke to nursing, Spoke to case management  Equipment Recommended: Walker(including in home environment, reports has rolling walker)       See flowsheet documentation for full assessment, interventions and recommendations  Outcome: Progressing  Note: Prognosis: Fair  Problem List: Decreased strength, Decreased range of motion, Decreased endurance, Impaired balance, Decreased mobility, Pain, Orthopedic restrictions, Obesity, Decreased cognition(gait deviations)  Assessment: Pt agreeable to having brace fitted @ start of session  By end of session, pt was able to provide 25-50% A for donning/doffing TLSO backpack, especially to get brace where she "needs more support" @ lower back  Pt did not need more physical A for bed mobility and transfers, but reports more fatigued with diarrhea over multiple hours since last session  Skilled PT Recommended to progress pt toward treatment goals  Recommend trials w/rolling walker for further distances and stair training  Goals adjusted to reflect brace fitting  Barriers to Discharge: Decreased caregiver support, Inaccessible home environment(JOSE, ?caregiver support per pt)  Barriers to Discharge Comments: Did note case management information that family is requesting transport home due to barriers of JOSE, into  out of car  PT Discharge Recommendation: Home with skilled therapy(daughter can provide A)          See flowsheet documentation for full assessment

## 2021-04-07 NOTE — ASSESSMENT & PLAN NOTE
· Reports chronic constipation  · Prominent colonic stool without evidence of bowel obstruction on CT imaging  · Patient reported diarrhea 4/5 -4/7-- likely 2/2 stool softeners  · Stop Continue Colace b i d , MiraLax  Prn    · c diff sample collected, though unlikely'  · Start imodium

## 2021-04-07 NOTE — PLAN OF CARE
Problem: Potential for Falls  Goal: Patient will remain free of falls  Description: INTERVENTIONS:  - Assess patient frequently for physical needs  -  Identify cognitive and physical deficits and behaviors that affect risk of falls    -  [place] fall precautions as indicated by assessment   - Educate patient/family on patient safety including physical limitations  - Instruct patient to call for assistance with activity based on assessment  - Modify environment to reduce risk of injury  - Consider OT/PT consult to assist with strengthening/mobility  Outcome: Progressing     Problem: Prexisting or High Potential for Compromised Skin Integrity  Goal: Skin integrity is maintained or improved  Description: INTERVENTIONS:  - Identify patients at risk for skin breakdown  - Assess and monitor skin integrity  - Assess and monitor nutrition and hydration status  - Monitor labs   - Assess for incontinence   - Turn and reposition patient  - Assist with mobility/ambulation  - Relieve pressure over bony prominences  - Avoid friction and shearing  - Provide appropriate hygiene as needed including keeping skin clean and dry  - Evaluate need for skin moisturizer/barrier cream  - Collaborate with interdisciplinary team   - Patient/family teaching  - Consider wound care consult   Outcome: Progressing

## 2021-04-08 PROBLEM — E87.1 HYPONATREMIA: Status: ACTIVE | Noted: 2021-04-08

## 2021-04-08 LAB
ANION GAP SERPL CALCULATED.3IONS-SCNC: 10 MMOL/L (ref 4–13)
ANION GAP SERPL CALCULATED.3IONS-SCNC: 8 MMOL/L (ref 4–13)
ANION GAP SERPL CALCULATED.3IONS-SCNC: 9 MMOL/L (ref 4–13)
BUN SERPL-MCNC: 27 MG/DL (ref 5–25)
BUN SERPL-MCNC: 29 MG/DL (ref 5–25)
BUN SERPL-MCNC: 30 MG/DL (ref 5–25)
CALCIUM SERPL-MCNC: 8.1 MG/DL (ref 8.3–10.1)
CALCIUM SERPL-MCNC: 8.6 MG/DL (ref 8.3–10.1)
CALCIUM SERPL-MCNC: 8.7 MG/DL (ref 8.3–10.1)
CHLORIDE SERPL-SCNC: 93 MMOL/L (ref 100–108)
CHLORIDE SERPL-SCNC: 97 MMOL/L (ref 100–108)
CHLORIDE SERPL-SCNC: 97 MMOL/L (ref 100–108)
CO2 SERPL-SCNC: 22 MMOL/L (ref 21–32)
CO2 SERPL-SCNC: 25 MMOL/L (ref 21–32)
CO2 SERPL-SCNC: 26 MMOL/L (ref 21–32)
CREAT SERPL-MCNC: 1.71 MG/DL (ref 0.6–1.3)
CREAT SERPL-MCNC: 1.82 MG/DL (ref 0.6–1.3)
CREAT SERPL-MCNC: 2 MG/DL (ref 0.6–1.3)
ERYTHROCYTE [DISTWIDTH] IN BLOOD BY AUTOMATED COUNT: 13.7 % (ref 11.6–15.1)
GFR SERPL CREATININE-BSD FRML MDRD: 21 ML/MIN/1.73SQ M
GFR SERPL CREATININE-BSD FRML MDRD: 23 ML/MIN/1.73SQ M
GFR SERPL CREATININE-BSD FRML MDRD: 25 ML/MIN/1.73SQ M
GLUCOSE SERPL-MCNC: 100 MG/DL (ref 65–140)
GLUCOSE SERPL-MCNC: 162 MG/DL (ref 65–140)
GLUCOSE SERPL-MCNC: 84 MG/DL (ref 65–140)
HCT VFR BLD AUTO: 38.7 % (ref 34.8–46.1)
HGB BLD-MCNC: 12.9 G/DL (ref 11.5–15.4)
MAGNESIUM SERPL-MCNC: 2.3 MG/DL (ref 1.6–2.6)
MCH RBC QN AUTO: 33.5 PG (ref 26.8–34.3)
MCHC RBC AUTO-ENTMCNC: 33.3 G/DL (ref 31.4–37.4)
MCV RBC AUTO: 101 FL (ref 82–98)
OSMOLALITY UR/SERPL-RTO: 271 MMOL/KG (ref 282–298)
OSMOLALITY UR: 263 MMOL/KG
PLATELET # BLD AUTO: 162 THOUSANDS/UL (ref 149–390)
PMV BLD AUTO: 11.4 FL (ref 8.9–12.7)
POTASSIUM SERPL-SCNC: 4.1 MMOL/L (ref 3.5–5.3)
RBC # BLD AUTO: 3.85 MILLION/UL (ref 3.81–5.12)
SODIUM 24H UR-SCNC: 22 MOL/L
SODIUM SERPL-SCNC: 127 MMOL/L (ref 136–145)
SODIUM SERPL-SCNC: 129 MMOL/L (ref 136–145)
SODIUM SERPL-SCNC: 131 MMOL/L (ref 136–145)
TSH SERPL DL<=0.05 MIU/L-ACNC: 3.55 UIU/ML (ref 0.36–3.74)
WBC # BLD AUTO: 11.08 THOUSAND/UL (ref 4.31–10.16)

## 2021-04-08 PROCEDURE — 84300 ASSAY OF URINE SODIUM: CPT | Performed by: PHYSICIAN ASSISTANT

## 2021-04-08 PROCEDURE — 85027 COMPLETE CBC AUTOMATED: CPT | Performed by: PHYSICIAN ASSISTANT

## 2021-04-08 PROCEDURE — 99232 SBSQ HOSP IP/OBS MODERATE 35: CPT | Performed by: PHYSICIAN ASSISTANT

## 2021-04-08 PROCEDURE — 83735 ASSAY OF MAGNESIUM: CPT | Performed by: PHYSICIAN ASSISTANT

## 2021-04-08 PROCEDURE — 80048 BASIC METABOLIC PNL TOTAL CA: CPT | Performed by: PHYSICIAN ASSISTANT

## 2021-04-08 PROCEDURE — 84443 ASSAY THYROID STIM HORMONE: CPT | Performed by: PHYSICIAN ASSISTANT

## 2021-04-08 PROCEDURE — 83930 ASSAY OF BLOOD OSMOLALITY: CPT | Performed by: PHYSICIAN ASSISTANT

## 2021-04-08 PROCEDURE — 83935 ASSAY OF URINE OSMOLALITY: CPT | Performed by: PHYSICIAN ASSISTANT

## 2021-04-08 RX ORDER — SODIUM CHLORIDE 9 MG/ML
50 INJECTION, SOLUTION INTRAVENOUS CONTINUOUS
Status: DISCONTINUED | OUTPATIENT
Start: 2021-04-08 | End: 2021-04-09 | Stop reason: HOSPADM

## 2021-04-08 RX ADMIN — SODIUM CHLORIDE 75 ML/HR: 0.9 INJECTION, SOLUTION INTRAVENOUS at 10:48

## 2021-04-08 RX ADMIN — ASPIRIN 81 MG: 81 TABLET, COATED ORAL at 09:12

## 2021-04-08 RX ADMIN — LIDOCAINE 5% 1 PATCH: 700 PATCH TOPICAL at 09:14

## 2021-04-08 RX ADMIN — ACETAMINOPHEN 975 MG: 325 TABLET, FILM COATED ORAL at 22:07

## 2021-04-08 RX ADMIN — ACETAMINOPHEN 975 MG: 325 TABLET, FILM COATED ORAL at 13:48

## 2021-04-08 RX ADMIN — LEVOTHYROXINE SODIUM 37.5 MCG: 75 TABLET ORAL at 09:12

## 2021-04-08 RX ADMIN — ZOLPIDEM TARTRATE 10 MG: 5 TABLET ORAL at 22:07

## 2021-04-08 RX ADMIN — ACETAMINOPHEN 975 MG: 325 TABLET, FILM COATED ORAL at 05:22

## 2021-04-08 RX ADMIN — ALPRAZOLAM 0.25 MG: 0.25 TABLET ORAL at 18:46

## 2021-04-08 RX ADMIN — AMIODARONE HYDROCHLORIDE 100 MG: 200 TABLET ORAL at 09:13

## 2021-04-08 NOTE — QUICK NOTE
Baseline   Upright Thoracic  Spine X-rays in brace demonstrates stable  Burst T12 fracture  From Neurosurgery perspective, no procedure is anticipated  We recommend patient continues with conservative treatment including wearing TLSO brace when upright and at 45 degree head of bed, pain control and PT/OT  Plan:  Follow-up at outpatient Neurosurgery Clinic in 2 weeks with repeat upright thoracolumbar spine  x-rays in brace  Will sign off  Call 4 question or concern

## 2021-04-08 NOTE — ASSESSMENT & PLAN NOTE
· Reports chronic constipation  · Prominent colonic stool without evidence of bowel obstruction on CT imaging  · Patient reported diarrhea 4/5 -4/7-- likely 2/2 stool softeners  · Since starting imodium, BMs are more formed  · Continue imodium

## 2021-04-08 NOTE — ASSESSMENT & PLAN NOTE
· Continue Xanax 0 25 mg t i d  P r n  Mari Manzano   Patient's daughter is not interested and adjusting dose

## 2021-04-08 NOTE — ASSESSMENT & PLAN NOTE
· Mechanical fall that occurred while at home secondary to her wheelchair not being locked  Patient was assisted to the ground  No head strike  Is on low-dose aspirin 81 mg every other day  Reports worsening of chronic back pain which is limiting ability to complete ADLs  · Patient was last hospitalized on 03/23 through 3/25 for mechanical fall  She was admitted under the service of trauma  She was evaluated by Geriatric Medicine  Patient and daughter declined rehab on discharge  · CT chest abdomen pelvis without contrast:  Hematoma as documented below  Mild loss of height of T12 vertebral body, unchanged from prior study  · Pain control:  Scheduled Tylenol, lidocaine  P r n  Oxycodone and Dilaudid  · MRI L spine:   · Minor edema associated with recently documented superior T12 endplate deformity suggesting at least some element of acute disease  Correlate for point tenderness  As lumbar spondylosis and osteoarthritis with potentially significant left greater than right lateral recess stenosis at the L3-4 level, correlate for left greater than right L4 radiculitis    · PT, OT consulted- recommended home PT/OT; family requested hospital bed-- to be delivered today   · Neurosurgery consult-- rec LSO brace w/ upright XR and OP f/u

## 2021-04-08 NOTE — PLAN OF CARE
Problem: Potential for Falls  Goal: Patient will remain free of falls  Description: INTERVENTIONS:  - Assess patient frequently for physical needs  -  Identify cognitive and physical deficits and behaviors that affect risk of falls    -  [place] fall precautions as indicated by assessment   - Educate patient/family on patient safety including physical limitations  - Instruct patient to call for assistance with activity based on assessment  - Modify environment to reduce risk of injury  - Consider OT/PT consult to assist with strengthening/mobility  Outcome: Progressing     Problem: Prexisting or High Potential for Compromised Skin Integrity  Goal: Skin integrity is maintained or improved  Description: INTERVENTIONS:  - Identify patients at risk for skin breakdown  - Assess and monitor skin integrity  - Assess and monitor nutrition and hydration status  - Monitor labs   - Assess for incontinence   - Turn and reposition patient  - Assist with mobility/ambulation  - Relieve pressure over bony prominences  - Avoid friction and shearing  - Provide appropriate hygiene as needed including keeping skin clean and dry  - Evaluate need for skin moisturizer/barrier cream  - Collaborate with interdisciplinary team   - Patient/family teaching  - Consider wound care consult   Outcome: Progressing     Problem: PAIN - ADULT  Goal: Verbalizes/displays adequate comfort level or baseline comfort level  Description: Interventions:  - Encourage patient to monitor pain and request assistance  - Assess pain using appropriate pain scale  - Administer analgesics based on type and severity of pain and evaluate response  - Implement non-pharmacological measures as appropriate and evaluate response  - Consider cultural and social influences on pain and pain management  - Notify physician/advanced practitioner if interventions unsuccessful or patient reports new pain  Outcome: Progressing     Problem: SAFETY ADULT  Goal: Patient will remain free of falls  Description: INTERVENTIONS:  - Assess patient frequently for physical needs  -  Identify cognitive and physical deficits and behaviors that affect risk of falls    -  [place] fall precautions as indicated by assessment   - Educate patient/family on patient safety including physical limitations  - Instruct patient to call for assistance with activity based on assessment  - Modify environment to reduce risk of injury  - Consider OT/PT consult to assist with strengthening/mobility  Outcome: Progressing  Goal: Maintain or return to baseline ADL function  Description: INTERVENTIONS:  -  Assess patient's ability to carry out ADLs; assess patient's baseline for ADL function and identify physical deficits which impact ability to perform ADLs (bathing, care of mouth/teeth, toileting, grooming, dressing, etc )  - Assess/evaluate cause of self-care deficits   - Assess range of motion  - Assess patient's mobility; develop plan if impaired  - Assess patient's need for assistive devices and provide as appropriate  - Encourage maximum independence but intervene and supervise when necessary  - Involve family in performance of ADLs  - Assess for home care needs following discharge   - Consider OT consult to assist with ADL evaluation and planning for discharge  - Provide patient education as appropriate  Outcome: Progressing  Goal: Maintain or return mobility status to optimal level  Description: INTERVENTIONS:  - Assess patient's baseline mobility status (ambulation, transfers, stairs, etc )    - Identify cognitive and physical deficits and behaviors that affect mobility  - Identify mobility aids required to assist with transfers and/or ambulation (gait belt, sit-to-stand, lift, walker, cane, etc )  - [place] fall precautions as indicated by assessment  - Record patient progress and toleration of activity level on Mobility SBAR; progress patient to next Phase/Stage  - Instruct patient to call for assistance with activity based on assessment  - Consider rehabilitation consult to assist with strengthening/weightbearing, etc   Outcome: Progressing     Problem: DISCHARGE PLANNING  Goal: Discharge to home or other facility with appropriate resources  Description: INTERVENTIONS:  - Identify barriers to discharge w/patient and caregiver  - Arrange for needed discharge resources and transportation as appropriate  - Identify discharge learning needs (meds, wound care, etc )  - Arrange for interpretive services to assist at discharge as needed  - Refer to Case Management Department for coordinating discharge planning if the patient needs post-hospital services based on physician/advanced practitioner order or complex needs related to functional status, cognitive ability, or social support system  Outcome: Progressing     Problem: Knowledge Deficit  Goal: Patient/family/caregiver demonstrates understanding of disease process, treatment plan, medications, and discharge instructions  Description: Complete learning assessment and assess knowledge base    Interventions:  - Provide teaching at level of understanding  - Provide teaching via preferred learning methods  Outcome: Progressing     Problem: MUSCULOSKELETAL - ADULT  Goal: Maintain or return mobility to safest level of function  Description: INTERVENTIONS:  - Assess patient's ability to carry out ADLs; assess patient's baseline for ADL function and identify physical deficits which impact ability to perform ADLs (bathing, care of mouth/teeth, toileting, grooming, dressing, etc )  - Assess/evaluate cause of self-care deficits   - Assess range of motion  - Assess patient's mobility  - Assess patient's need for assistive devices and provide as appropriate  - Encourage maximum independence but intervene and supervise when necessary  - Involve family in performance of ADLs  - Assess for home care needs following discharge   - Consider OT consult to assist with ADL evaluation and planning for discharge  - Provide patient education as appropriate  Outcome: Progressing  Goal: Maintain proper alignment of affected body part  Description: INTERVENTIONS:  - Support, maintain and protect limb and body alignment  - Provide patient/ family with appropriate education  Outcome: Progressing

## 2021-04-08 NOTE — PLAN OF CARE
Problem: Potential for Falls  Goal: Patient will remain free of falls  Description: INTERVENTIONS:  - Assess patient frequently for physical needs  -  Identify cognitive and physical deficits and behaviors that affect risk of falls    -  [place] fall precautions as indicated by assessment   - Educate patient/family on patient safety including physical limitations  - Instruct patient to call for assistance with activity based on assessment  - Modify environment to reduce risk of injury  - Consider OT/PT consult to assist with strengthening/mobility  Outcome: Progressing     Problem: Prexisting or High Potential for Compromised Skin Integrity  Goal: Skin integrity is maintained or improved  Description: INTERVENTIONS:  - Identify patients at risk for skin breakdown  - Assess and monitor skin integrity  - Assess and monitor nutrition and hydration status  - Monitor labs   - Assess for incontinence   - Turn and reposition patient  - Assist with mobility/ambulation  - Relieve pressure over bony prominences  - Avoid friction and shearing  - Provide appropriate hygiene as needed including keeping skin clean and dry  - Evaluate need for skin moisturizer/barrier cream  - Collaborate with interdisciplinary team   - Patient/family teaching  - Consider wound care consult   Outcome: Progressing

## 2021-04-08 NOTE — PROGRESS NOTES
[facility]  Progress Note - Ricki Parsons 8/26/1926, 80 y o  female MRN: [id number]  Unit/Bed#: S -01 Encounter: [id number]  Primary Care Provider: Leela Lujan DO   Date and time admitted to hospital: 4/3/2021  6:06 PM    * Fall  Assessment & Plan  · Mechanical fall that occurred while at home secondary to her wheelchair not being locked  Patient was assisted to the ground  No head strike  Is on low-dose aspirin 81 mg every other day  Reports worsening of chronic back pain which is limiting ability to complete ADLs  · Patient was last hospitalized on 03/23 through 3/25 for mechanical fall  She was admitted under the service of trauma  She was evaluated by Geriatric Medicine  Patient and daughter declined rehab on discharge  · CT chest abdomen pelvis without contrast:  Hematoma as documented below  Mild loss of height of T12 vertebral body, unchanged from prior study  · Pain control:  Scheduled Tylenol, lidocaine  P r n  Oxycodone and Dilaudid  · MRI L spine:   · Minor edema associated with recently documented superior T12 endplate deformity suggesting at least some element of acute disease  Correlate for point tenderness  As lumbar spondylosis and osteoarthritis with potentially significant left greater than right lateral recess stenosis at the L3-4 level, correlate for left greater than right L4 radiculitis  · PT, OT consulted- recommended home PT/OT; family requested hospital bed-- to be delivered today   · Neurosurgery consult-- rec LSO brace w/ upright XR and OP f/u     Hyponatremia  Assessment & Plan  · Na this ; likely 2/2 diarrhea over the last 2 days  · NS 75cc/hr today; repeat labs in AM    Chronic low back pain without sciatica  Assessment & Plan  · Patient complaining of severe back pain  Unable to ambulate because of pain  She reports it is chronic but gotten worse since fall  Denies any radiation of pain to legs    · Continue Lidoderm patch, will add aqua k pad  · Lumbar spine MRI:  · Minor edema associated with recently documented superior T12 endplate deformity suggesting at least some element of acute disease  Correlate for point tenderness  · As lumbar spondylosis and osteoarthritis with potentially significant left greater than right lateral recess stenosis at the L3-4 level, correlate for left greater than right L4 radiculitis  · Consult neurosurgery-- LSO brace w/ upright XR, OP f/u    Constipation  Assessment & Plan  · Reports chronic constipation  · Prominent colonic stool without evidence of bowel obstruction on CT imaging  · Patient reported diarrhea 4/5 -4/7-- likely 2/2 stool softeners  · Since starting imodium, BMs are more formed  · Continue imodium    Hematoma of right hip  Assessment & Plan  · Right gluteal subcutaneous hematoma is decreased in size compared to prior imaging    Insomnia  Assessment & Plan  · Takes Ambien 12 5 mg CR daily HS  While inpatient, this will be substituted for formulary Ambien 10 mg  Patient's daughter is not interested in reducing dose    Chronic diastolic congestive heart failure [facility])  Assessment & Plan  Wt Readings from Last 3 Encounters:   04/07/21 81 5 kg (179 lb 9 6 oz)   04/04/21 79 8 kg (176 lb)   04/01/21 82 5 kg (181 lb 12 8 oz)     · Appears euvolemic  Hold lasix tomorrow given persistent diarrhea  · I&O, weight  · Echo 2017:  EF 65%, G1 DD  Mild aortic stenosis versus sclerosis  CKD (chronic kidney disease), stage IV (HCC)  Assessment & Plan  · Creatinine stable and at baseline  · Managed by PCP  · History of right nephrectomy    Anxiety  Assessment & Plan  · Continue Xanax 0 25 mg t i d  P r n  Julio Michael Patient's daughter is not interested and adjusting dose    Paroxysmal atrial fibrillation [facility])  Assessment & Plan  · S/p cardioversion approximately 4 years ago  Is maintained on amiodarone  · No longer taking Pradaxa due to fall risk   Patient's daughter reports she is only taking low-dose aspirin every other day  VTE Pharmacologic Prophylaxis:   Pharmacologic: Pharmacologic VTE Prophylaxis contraindicated due to low risk  Mechanical VTE Prophylaxis in Place: Yes    Patient Centered Rounds: I have performed bedside rounds with nursing staff today  Discussions with Specialists or Other Care Team Provider: BROCK, RN    Education and Discussions with Family / Patient: patient; dtr via phone    Time Spent for Care: 30 minutes  More than 50% of total time spent on counseling and coordination of care as described above  Current Length of Stay: 4 day(s)    Current Patient Status: Inpatient   Certification Statement: The patient will continue to require additional inpatient hospital stay due to tx of hyponatremia    Discharge Plan: d/c likely tomorrow if Na improves    Code Status: Level 3 - DNAR and DNI      Subjective:   Patient is doing OK  She feels a little uncomfortable w/ bracing  She reports that her diarrhea has improved-- only 2 BMs daily  Objective:     Vitals:   Temp (24hrs), Av 9 °F (36 6 °C), Min:97 7 °F (36 5 °C), Max:98 1 °F (36 7 °C)    Temp:  [97 7 °F (36 5 °C)-98 1 °F (36 7 °C)] 97 9 °F (36 6 °C)  HR:  [55-65] 55  Resp:  [18] 18  BP: (135-147)/(67-78) 147/67  SpO2:  [92 %-93 %] 92 %  Body mass index is 28 13 kg/m²  Input and Output Summary (last 24 hours): Intake/Output Summary (Last 24 hours) at 2021 1341  Last data filed at 2021 0852  Gross per 24 hour   Intake 240 ml   Output 490 ml   Net -250 ml       Physical Exam:     Physical Exam  Constitutional:       Appearance: Normal appearance  HENT:      Head: Normocephalic and atraumatic  Eyes:      Pupils: Pupils are equal, round, and reactive to light  Cardiovascular:      Rate and Rhythm: Normal rate and regular rhythm  Heart sounds: No murmur  No friction rub  No gallop  Pulmonary:      Effort: Pulmonary effort is normal       Breath sounds: Normal breath sounds  No wheezing  Abdominal:      General: Abdomen is flat  Palpations: Abdomen is soft  Tenderness: There is no abdominal tenderness  Musculoskeletal:      Comments: LSO brace   Skin:     General: Skin is warm and dry  Neurological:      General: No focal deficit present  Mental Status: She is alert and oriented to person, place, and time  Mental status is at baseline  Additional Data:     Labs:    Results from last 7 days   Lab Units 04/08/21  0518 04/06/21  0530   WBC Thousand/uL 11 08* 9 82   HEMOGLOBIN g/dL 12 9 12 0   HEMATOCRIT % 38 7 36 3   PLATELETS Thousands/uL 162 201   NEUTROS PCT %  --  68   LYMPHS PCT %  --  11*   MONOS PCT %  --  9   EOS PCT %  --  12*     Results from last 7 days   Lab Units 04/08/21  0518   SODIUM mmol/L 129*   POTASSIUM mmol/L 4 1   CHLORIDE mmol/L 97*   CO2 mmol/L 22   BUN mg/dL 30*   CREATININE mg/dL 1 82*   ANION GAP mmol/L 10   CALCIUM mg/dL 8 6   GLUCOSE RANDOM mg/dL 84                           * I Have Reviewed All Lab Data Listed Above  * Additional Pertinent Lab Tests Reviewed:  Joey 66 Admission Reviewed    Imaging:    Imaging Reports Reviewed Today Include: MRI L spine  Imaging Personally Reviewed by Myself Includes:  MRI L spine    Recent Cultures (last 7 days):           Last 24 Hours Medication List:   Current Facility-Administered Medications   Medication Dose Route Frequency Provider Last Rate    acetaminophen  975 mg Oral [facility] Osiris, CRNP      ALPRAZolam  0 25 mg Oral TID PRN Lizemores, CRNP      amiodarone  100 mg Oral Daily Osiris, CRNP      aspirin  81 mg Oral Every Other Day Osiris, CRNP      HYDROmorphone  0 5 mg Intravenous Q6H PRN Seda Johnson MD      levothyroxine  37 5 mcg Oral Every Other Day Osiris, CRNP      lidocaine  1 patch Topical Daily Osiris, CRNP      loperamide  2 mg Oral TID PRN Susannah Guevara PA-C      methocarbamol  500 mg Oral Q6H PRN MD Kimmie Nino oxyCODONE  2 5 mg Oral Q6H PRN ANGELA Guerra      oxyCODONE  5 mg Oral Q6H PRN ANGELA Guerra      saliva substitute  5 spray Mouth/Throat 4x Daily PRN Glorious Eleanor PA-C      sodium chloride  75 mL/hr Intravenous Continuous Susannah Guevara PA-C 75 mL/hr (04/08/21 1048)    zolpidem  10 mg Oral HS ANGELA Guerra          Today, Patient Was Seen By: Jairo Whitt PA-C    ** Please Note: Dictation voice to text software may have been used in the creation of this document   **

## 2021-04-09 VITALS
DIASTOLIC BLOOD PRESSURE: 69 MMHG | RESPIRATION RATE: 18 BRPM | TEMPERATURE: 97.7 F | HEIGHT: 67 IN | HEART RATE: 51 BPM | WEIGHT: 179.23 LBS | BODY MASS INDEX: 28.13 KG/M2 | OXYGEN SATURATION: 93 % | SYSTOLIC BLOOD PRESSURE: 166 MMHG

## 2021-04-09 LAB
ANION GAP SERPL CALCULATED.3IONS-SCNC: 12 MMOL/L (ref 4–13)
BUN SERPL-MCNC: 22 MG/DL (ref 5–25)
CALCIUM SERPL-MCNC: 8.4 MG/DL (ref 8.3–10.1)
CHLORIDE SERPL-SCNC: 98 MMOL/L (ref 100–108)
CO2 SERPL-SCNC: 21 MMOL/L (ref 21–32)
CORTIS AM PEAK SERPL-MCNC: 19.8 UG/DL (ref 4.2–22.4)
CREAT SERPL-MCNC: 1.53 MG/DL (ref 0.6–1.3)
ERYTHROCYTE [DISTWIDTH] IN BLOOD BY AUTOMATED COUNT: 13.5 % (ref 11.6–15.1)
GFR SERPL CREATININE-BSD FRML MDRD: 29 ML/MIN/1.73SQ M
GLUCOSE SERPL-MCNC: 98 MG/DL (ref 65–140)
HCT VFR BLD AUTO: 35.9 % (ref 34.8–46.1)
HGB BLD-MCNC: 12 G/DL (ref 11.5–15.4)
MCH RBC QN AUTO: 33.4 PG (ref 26.8–34.3)
MCHC RBC AUTO-ENTMCNC: 33.4 G/DL (ref 31.4–37.4)
MCV RBC AUTO: 100 FL (ref 82–98)
PLATELET # BLD AUTO: 229 THOUSANDS/UL (ref 149–390)
PMV BLD AUTO: 10.1 FL (ref 8.9–12.7)
POTASSIUM SERPL-SCNC: 4.2 MMOL/L (ref 3.5–5.3)
RBC # BLD AUTO: 3.59 MILLION/UL (ref 3.81–5.12)
SODIUM SERPL-SCNC: 131 MMOL/L (ref 136–145)
WBC # BLD AUTO: 7.97 THOUSAND/UL (ref 4.31–10.16)

## 2021-04-09 PROCEDURE — 99239 HOSP IP/OBS DSCHRG MGMT >30: CPT | Performed by: PHYSICIAN ASSISTANT

## 2021-04-09 PROCEDURE — 85027 COMPLETE CBC AUTOMATED: CPT | Performed by: PHYSICIAN ASSISTANT

## 2021-04-09 PROCEDURE — 82533 TOTAL CORTISOL: CPT | Performed by: PHYSICIAN ASSISTANT

## 2021-04-09 PROCEDURE — 80048 BASIC METABOLIC PNL TOTAL CA: CPT | Performed by: PHYSICIAN ASSISTANT

## 2021-04-09 RX ORDER — OXYCODONE HYDROCHLORIDE 5 MG/1
2.5 TABLET ORAL EVERY 6 HOURS PRN
Qty: 30 TABLET | Refills: 0 | Status: SHIPPED | OUTPATIENT
Start: 2021-04-09 | End: 2021-04-19

## 2021-04-09 RX ORDER — METHOCARBAMOL 500 MG/1
500 TABLET, FILM COATED ORAL EVERY 6 HOURS PRN
Qty: 30 TABLET | Refills: 0 | Status: SHIPPED | OUTPATIENT
Start: 2021-04-09 | End: 2022-02-24 | Stop reason: HOSPADM

## 2021-04-09 RX ORDER — LOPERAMIDE HYDROCHLORIDE 2 MG/1
2 CAPSULE ORAL 3 TIMES DAILY PRN
Qty: 30 CAPSULE | Refills: 0 | Status: SHIPPED | OUTPATIENT
Start: 2021-04-09 | End: 2022-02-24 | Stop reason: HOSPADM

## 2021-04-09 RX ADMIN — LIDOCAINE 5% 1 PATCH: 700 PATCH TOPICAL at 08:17

## 2021-04-09 RX ADMIN — SODIUM CHLORIDE 50 ML/HR: 0.9 INJECTION, SOLUTION INTRAVENOUS at 04:05

## 2021-04-09 RX ADMIN — ACETAMINOPHEN 975 MG: 325 TABLET, FILM COATED ORAL at 13:24

## 2021-04-09 RX ADMIN — ACETAMINOPHEN 975 MG: 325 TABLET, FILM COATED ORAL at 05:38

## 2021-04-09 RX ADMIN — ALPRAZOLAM 0.25 MG: 0.25 TABLET ORAL at 13:24

## 2021-04-09 RX ADMIN — AMIODARONE HYDROCHLORIDE 100 MG: 200 TABLET ORAL at 08:17

## 2021-04-09 NOTE — ASSESSMENT & PLAN NOTE
· Reports chronic constipation  Prominent colonic stool without evidence of bowel obstruction on CT imaging   Patient reported diarrhea a few days ago, but this has improved  · Continue Imodium as needed at home  · Advised patient/daughter to take Colace and/or Senna if no BM in 1-2 days

## 2021-04-09 NOTE — ASSESSMENT & PLAN NOTE
Wt Readings from Last 3 Encounters:   04/09/21 81 3 kg (179 lb 3 7 oz)   04/04/21 79 8 kg (176 lb)   04/01/21 82 5 kg (181 lb 12 8 oz)     · Appears euvolemic    · Continue home regimen

## 2021-04-09 NOTE — ASSESSMENT & PLAN NOTE
· Right gluteal subcutaneous hematoma is decreased in size compared to prior imaging   Hemoglobin remains normal   · No further work up needed

## 2021-04-09 NOTE — ASSESSMENT & PLAN NOTE
· Patient complaining of severe back pain  Unable to ambulate because of pain  She reports it is chronic but gotten worse since fall  Denies any radiation of pain to legs    · Continue LSO brace  · Follow up with Neurosurgery   · Continue current analgesia regimen   · Hospital bed at home

## 2021-04-09 NOTE — DISCHARGE INSTR - AVS FIRST PAGE
Dear Musa Carver,     It was our pleasure to care for you here at [facility]  It is our hope that we were always able to exceed the expected standards for your care during your stay  You were hospitalized due to Low Back Pain  You were cared for on the 4th floor by Guillaume Heredia PA-C under the service of Santo Bamberger, MD with the [facility] who covers for your primary care physician (PCP), Charles Packer DO, while you were hospitalized  If you have any questions or concerns related to this hospitalization, you may contact us at [phone]  For follow up as well as any medication refills, we recommend that you follow up with your primary care physician  A registered nurse will reach out to you by phone within a few days after your discharge to answer any additional questions that you may have after going home    However, at this time we provide for you here, the most important instructions / recommendations at discharge:     · Notable Medication Adjustments -   · START the following medications   · Oxycodone 2 5 mg tablets - Take 1 tablet every 6 hours as needed for back pain   · Methocarbamol (Robaxin) 500 mg Tablets - Take 1 tablet by mouth every 6 hours as needed for back spasm   · Continue Tylenol 650 mg every 6 hours as needed for more mild pain   · Take Colace and/or Senokot over the counter as directed if you experienced constipation  · Loperamide (Imodium) 2 mg Tablets - Take 1 tablet up to three times a day as needed for diarrhea  · Continue the rest of your home medication as you had been taking them  · Testing Required after Discharge -   · Follow up with Neurosurgery at discharge   · Important follow up information -   · As above   · Other Instructions -   · You should wear your brace at all times with ambulation   · Please review this entire after visit summary as additional general instructions including medication list, appointments, activity, diet, any pertinent wound care, and other additional recommendations from your care team that may be provided for you        Sincerely,     Robert Vasquez PA-C

## 2021-04-09 NOTE — CASE MANAGEMENT
Pt is stable for DC and is in need of transportation home  Pt has 5 JOSE and is not able to manage them  She is a high fall risk and has moderate to severe pain  Cm made referral to [facility] via [address]  requesting a BLS at 1530  Cm will continue to follow

## 2021-04-09 NOTE — ASSESSMENT & PLAN NOTE
· Mechanical fall that occurred while at home secondary to her wheelchair not being locked  Patient was assisted to the ground  No head strike  Is on low-dose aspirin 81 mg every other day  Reports worsening of chronic back pain which is limiting ability to complete ADLs  Patient was last hospitalized on 03/23 through 3/25 for mechanical fall  She was admitted under the service of trauma  She was evaluated by Geriatric Medicine  Patient and daughter declined rehab on discharge  · Stable for discharge   · She will return home with daughter  Has hospital bed     · Continue back brace use   · Continue Oxycodone, Robaxin, Tylenol PRN   · Follow up with Neurosurgery

## 2021-04-09 NOTE — CASE MANAGEMENT
Zulema Marvin has been received from [facility] for BLS transport  Zulema Marvin is [id number]  This information has been provided to [facility] via [facility]

## 2021-04-09 NOTE — PLAN OF CARE
Problem: Potential for Falls  Goal: Patient will remain free of falls  Description: INTERVENTIONS:  - Assess patient frequently for physical needs  -  Identify cognitive and physical deficits and behaviors that affect risk of falls    -  [place] fall precautions as indicated by assessment   - Educate patient/family on patient safety including physical limitations  - Instruct patient to call for assistance with activity based on assessment  - Modify environment to reduce risk of injury  - Consider OT/PT consult to assist with strengthening/mobility  Outcome: Progressing     Problem: Prexisting or High Potential for Compromised Skin Integrity  Goal: Skin integrity is maintained or improved  Description: INTERVENTIONS:  - Identify patients at risk for skin breakdown  - Assess and monitor skin integrity  - Assess and monitor nutrition and hydration status  - Monitor labs   - Assess for incontinence   - Turn and reposition patient  - Assist with mobility/ambulation  - Relieve pressure over bony prominences  - Avoid friction and shearing  - Provide appropriate hygiene as needed including keeping skin clean and dry  - Evaluate need for skin moisturizer/barrier cream  - Collaborate with interdisciplinary team   - Patient/family teaching  - Consider wound care consult   Outcome: Progressing     Problem: PAIN - ADULT  Goal: Verbalizes/displays adequate comfort level or baseline comfort level  Description: Interventions:  - Encourage patient to monitor pain and request assistance  - Assess pain using appropriate pain scale  - Administer analgesics based on type and severity of pain and evaluate response  - Implement non-pharmacological measures as appropriate and evaluate response  - Consider cultural and social influences on pain and pain management  - Notify physician/advanced practitioner if interventions unsuccessful or patient reports new pain  Outcome: Progressing     Problem: SAFETY ADULT  Goal: Patient will remain free of falls  Description: INTERVENTIONS:  - Assess patient frequently for physical needs  -  Identify cognitive and physical deficits and behaviors that affect risk of falls    -  [place] fall precautions as indicated by assessment   - Educate patient/family on patient safety including physical limitations  - Instruct patient to call for assistance with activity based on assessment  - Modify environment to reduce risk of injury  - Consider OT/PT consult to assist with strengthening/mobility  Outcome: Progressing  Goal: Maintain or return to baseline ADL function  Description: INTERVENTIONS:  -  Assess patient's ability to carry out ADLs; assess patient's baseline for ADL function and identify physical deficits which impact ability to perform ADLs (bathing, care of mouth/teeth, toileting, grooming, dressing, etc )  - Assess/evaluate cause of self-care deficits   - Assess range of motion  - Assess patient's mobility; develop plan if impaired  - Assess patient's need for assistive devices and provide as appropriate  - Encourage maximum independence but intervene and supervise when necessary  - Involve family in performance of ADLs  - Assess for home care needs following discharge   - Consider OT consult to assist with ADL evaluation and planning for discharge  - Provide patient education as appropriate  Outcome: Progressing  Goal: Maintain or return mobility status to optimal level  Description: INTERVENTIONS:  - Assess patient's baseline mobility status (ambulation, transfers, stairs, etc )    - Identify cognitive and physical deficits and behaviors that affect mobility  - Identify mobility aids required to assist with transfers and/or ambulation (gait belt, sit-to-stand, lift, walker, cane, etc )  - [place] fall precautions as indicated by assessment  - Record patient progress and toleration of activity level on Mobility SBAR; progress patient to next Phase/Stage  - Instruct patient to call for assistance with activity based on assessment  - Consider rehabilitation consult to assist with strengthening/weightbearing, etc   Outcome: Progressing     Problem: DISCHARGE PLANNING  Goal: Discharge to home or other facility with appropriate resources  Description: INTERVENTIONS:  - Identify barriers to discharge w/patient and caregiver  - Arrange for needed discharge resources and transportation as appropriate  - Identify discharge learning needs (meds, wound care, etc )  - Arrange for interpretive services to assist at discharge as needed  - Refer to Case Management Department for coordinating discharge planning if the patient needs post-hospital services based on physician/advanced practitioner order or complex needs related to functional status, cognitive ability, or social support system  Outcome: Progressing     Problem: Knowledge Deficit  Goal: Patient/family/caregiver demonstrates understanding of disease process, treatment plan, medications, and discharge instructions  Description: Complete learning assessment and assess knowledge base    Interventions:  - Provide teaching at level of understanding  - Provide teaching via preferred learning methods  Outcome: Progressing     Problem: MUSCULOSKELETAL - ADULT  Goal: Maintain or return mobility to safest level of function  Description: INTERVENTIONS:  - Assess patient's ability to carry out ADLs; assess patient's baseline for ADL function and identify physical deficits which impact ability to perform ADLs (bathing, care of mouth/teeth, toileting, grooming, dressing, etc )  - Assess/evaluate cause of self-care deficits   - Assess range of motion  - Assess patient's mobility  - Assess patient's need for assistive devices and provide as appropriate  - Encourage maximum independence but intervene and supervise when necessary  - Involve family in performance of ADLs  - Assess for home care needs following discharge   - Consider OT consult to assist with ADL evaluation and planning for discharge  - Provide patient education as appropriate  Outcome: Progressing  Goal: Maintain proper alignment of affected body part  Description: INTERVENTIONS:  - Support, maintain and protect limb and body alignment  - Provide patient/ family with appropriate education  Outcome: Progressing     Problem: SKIN/TISSUE INTEGRITY - ADULT  Goal: Skin integrity remains intact  Description: INTERVENTIONS  - Identify patients at risk for skin breakdown  - Assess and monitor skin integrity  - Assess and monitor nutrition and hydration status  - Monitor labs (i e  albumin)  - Assess for incontinence   - Turn and reposition patient  - Assist with mobility/ambulation  - Relieve pressure over bony prominences  - Avoid friction and shearing  - Provide appropriate hygiene as needed including keeping skin clean and dry  - Evaluate need for skin moisturizer/barrier cream  - Collaborate with interdisciplinary team (i e  Nutrition, Rehabilitation, etc )   - Patient/family teaching  Outcome: Progressing  Goal: Incision(s), wounds(s) or drain site(s) healing without S/S of infection  Description: INTERVENTIONS  - Assess and document risk factors for skin impairment   - Assess and document dressing, incision, wound bed, drain sites and surrounding tissue  - Consider nutrition services referral as needed  - Oral mucous membranes remain intact  - Provide patient/ family education  Outcome: Progressing  Goal: Oral mucous membranes remain intact  Description: INTERVENTIONS  - Assess oral mucosa and hygiene practices  - Implement preventative oral hygiene regimen  - Implement oral medicated treatments as ordered  - Initiate Nutrition services referral as needed  Outcome: Progressing     Problem: GENITOURINARY - ADULT  Goal: Maintains or returns to baseline urinary function  Description: INTERVENTIONS:  - Assess urinary function  - Encourage oral fluids to ensure adequate hydration if ordered  - Administer IV fluids as ordered to ensure adequate hydration  - Administer ordered medications as needed  - Offer frequent toileting  - Follow urinary retention protocol if ordered  Outcome: Progressing  Goal: Absence of urinary retention  Description: INTERVENTIONS:  - Assess patients ability to void and empty bladder  - Monitor I/O  - Bladder scan as needed  - Discuss with physician/AP medications to alleviate retention as needed  - Discuss catheterization for long term situations as appropriate  Outcome: Progressing

## 2021-04-09 NOTE — ASSESSMENT & PLAN NOTE
· Creatinine stable and at baseline, actually improved with fluids overnight   History of right nephrectomy noted   · Follow up with PCP

## 2021-04-09 NOTE — ASSESSMENT & PLAN NOTE
· Sodium had worsened to 129, improved to 131 with fluids   She is eating and drinking well   · Encourage PO intake at home

## 2021-04-09 NOTE — ASSESSMENT & PLAN NOTE
· Status post cardioversion approximately 4 years ago    · Continue current rate/rhythm control   · Not on Pradaxa due to fall risk

## 2021-04-09 NOTE — CASE MANAGEMENT
Pt will be transported home via BLS at 1800  Nursing Jayesh Johnson), pt's daughter aware of DC arrangements  IMM reviewed with patient's caregiver  patient's caregiver agree with discharge determination  LMN has been completed and place in the chart binder  Request for BLJONO Camargo has been made to [facility]  Awaiting auth number

## 2021-04-09 NOTE — DISCHARGE SUMMARY
[facility]  Discharge- Barbara Tobin 8/26/1926, 80 y o  female MRN: [id number]  Unit/Bed#: S -01 Encounter: [id number]  Primary Care Provider: Eamon Lazo DO   Date and time admitted to hospital: 4/3/2021  6:06 PM    * Fall  Assessment & Plan  · Mechanical fall that occurred while at home secondary to her wheelchair not being locked  Patient was assisted to the ground  No head strike  Is on low-dose aspirin 81 mg every other day  Reports worsening of chronic back pain which is limiting ability to complete ADLs  Patient was last hospitalized on 03/23 through 3/25 for mechanical fall  She was admitted under the service of trauma  She was evaluated by Geriatric Medicine  Patient and daughter declined rehab on discharge  · Stable for discharge   · She will return home with daughter  Has hospital bed  · Continue back brace use   · Continue Oxycodone, Robaxin, Tylenol PRN   · Follow up with Neurosurgery       Closed stable burst fracture of twelfth thoracic vertebra with routine healing  Assessment & Plan  · Plan as above     Hematoma of right hip  Assessment & Plan  · Right gluteal subcutaneous hematoma is decreased in size compared to prior imaging  Hemoglobin remains normal   · No further work up needed     CKD (chronic kidney disease), stage IV (HCC)  Assessment & Plan  · Creatinine stable and at baseline, actually improved with fluids overnight  History of right nephrectomy noted   · Follow up with PCP    Chronic diastolic congestive heart failure [facility])  Assessment & Plan  Wt Readings from Last 3 Encounters:   04/09/21 81 3 kg (179 lb 3 7 oz)   04/04/21 79 8 kg (176 lb)   04/01/21 82 5 kg (181 lb 12 8 oz)     · Appears euvolemic  · Continue home regimen     Paroxysmal atrial fibrillation (HCC)  Assessment & Plan  · Status post cardioversion approximately 4 years ago    · Continue current rate/rhythm control   · Not on Pradaxa due to fall risk    Chronic low back pain without sciatica  Assessment & Plan  · Patient complaining of severe back pain  Unable to ambulate because of pain  She reports it is chronic but gotten worse since fall  Denies any radiation of pain to legs  · Continue LSO brace  · Follow up with Neurosurgery   · Continue current analgesia regimen   · Hospital bed at home     Constipation  Assessment & Plan  · Reports chronic constipation  Prominent colonic stool without evidence of bowel obstruction on CT imaging  Patient reported diarrhea a few days ago, but this has improved  · Continue Imodium as needed at home  · Advised patient/daughter to take Colace and/or Senna if no BM in 1-2 days     Hyponatremia  Assessment & Plan  · Sodium had worsened to 129, improved to 131 with fluids  She is eating and drinking well   · Encourage PO intake at home     [address]  · Patient takes Xanax chronically at home   · Continue home dose     Insomnia  Assessment & Plan  · Continue Ambien as needed       Discharging Physician / Practitioner: Maira Conteh PA-C  PCP: Bettina Ibarra, DO  Admission Date:   Admission Orders (From admission, onward)     Ordered        04/04/21 1810  Inpatient Admission  Once         04/03/21 2137  Place in Observation  Once                   Discharge Date: 04/09/21    Resolved Problems  Date Reviewed: 4/9/2021    None          Consultations During Hospital Stay:  · Neurosurgery - Dr Rebel Vallejo    Procedures Performed:   · XR Right Femur   · CT chest, abdomen, pelvis without contrast   · MRI Lumbar Spine  · XR Thoracolumbar Spine    Significant Findings / Test Results:   · XR Right Femur - No acute osseous abnormality  · CT chest, abdomen, pelvis without contrast - No new injury in the chest, abdomen, or pelvis   Previously seen right gluteal subcutaneous hematoma is decreased in size   · MRI Lumbar Spine - Minor edema associated with recently documented superior T12 endplate deformity suggesting at least some element of acute disease  As lumbar spondylosis and osteoarthritis with potentially significant left greater than right lateral recess stenosis at the L3-L4 level  · XR thoracolumbar spine - T12 loss of height unchanged in appearance from 4/3  No subluxation     Incidental Findings:   · None     Test Results Pending at Discharge (will require follow up): · None     Outpatient Tests Requested:  · Follow up with Neurosurgery   · Repeat films at that time     Complications:  None    Reason for Admission: Fall, Low Back Pain, T12 compression fracture     Hospital Course:     Gisela Benson is a 80 y o  female patient who originally presented to the hospital on 4/3/2021 due to a fall and low back pain  Patient had just been admitted for the same  She was started on analgesia and neurosurgery was consulted  They recommended conservative management with bracing and pain control and with these measures the patient's pain improved  She was able to ambulate slightly more with her walker and back brace  She remains having difficulty getting in and out of a car at discharge  She was evaluated by PT and recommended for STR but her and her daughter refused  She was deemed stable for discharge home with her daughter  A hospital bed was acquired  Initially she was treated for constipation, however she then started to have looser bowel movements  She was then given Imodium which helped her stool become formed  She was given IVF for low sodium which also improved, but remained still mildly low on discharge  Given she was eating and drinking well, this was not of a concern  Please see above list of diagnoses and related plan for additional information  Condition at Discharge: stable     Discharge Day Visit / Exam:     Subjective:  Patient reports that she is feeling well day of discharge  She is excited to go home     Vitals: Blood Pressure: 154/74 (04/09/21 1041)  Pulse: 55 (04/09/21 0700)  Temperature: 98 9 °F (37 2 °C) (04/09/21 0700)  Temp Source: Oral (04/09/21 0700)  Respirations: 18 (04/09/21 0700)  Height: 5' 7" (170 2 cm) (04/03/21 1817)  Weight - Scale: 81 3 kg (179 lb 3 7 oz) (04/09/21 0600)  SpO2: 91 % (04/09/21 0700)  Exam:   Physical Exam  Constitutional:       General: She is not in acute distress  Appearance: Normal appearance  She is normal weight  She is not ill-appearing or diaphoretic  HENT:      Head: Normocephalic and atraumatic  Mouth/Throat:      Mouth: Mucous membranes are moist    Eyes:      General: No scleral icterus  Pupils: Pupils are equal, round, and reactive to light  Cardiovascular:      Rate and Rhythm: Normal rate and regular rhythm  Pulses: Normal pulses  Heart sounds: Normal heart sounds, S1 normal and S2 normal  No murmur  No systolic murmur  No diastolic murmur  No gallop  No S3 or S4 sounds  Pulmonary:      Effort: Pulmonary effort is normal  No accessory muscle usage or respiratory distress  Breath sounds: Normal breath sounds  No stridor  No decreased breath sounds, wheezing, rhonchi or rales  Chest:      Chest wall: No tenderness  Abdominal:      General: Bowel sounds are normal  There is no distension  Palpations: Abdomen is soft  Tenderness: There is no abdominal tenderness  There is no guarding  Musculoskeletal:      Lumbar back: She exhibits pain  Right lower leg: No edema  Left lower leg: No edema  Skin:     General: Skin is warm and dry  Coloration: Skin is not jaundiced  Neurological:      General: No focal deficit present  Mental Status: She is alert  Mental status is at baseline  Motor: No tremor or seizure activity  Psychiatric:         Behavior: Behavior is cooperative  Discussion with Family: Called daughter     Discharge instructions/Information to patient and family:   See after visit summary for information provided to patient and family        Provisions for Follow-Up Care:  See after visit summary for information related to follow-up care and any pertinent home health orders  Disposition:     Home with VNA Services (Reminder: Complete face to face encounter)    For Discharges to [facility] SNF:   · Not Applicable to this Patient - Not Applicable to this Patient    Planned Readmission: None     Discharge Statement:  I spent 45 minutes discharging the patient  This time was spent on the day of discharge  I had direct contact with the patient on the day of discharge  Greater than 50% of the total time was spent examining patient, answering all patient questions, arranging and discussing plan of care with patient as well as directly providing post-discharge instructions  Additional time then spent on discharge activities  Discharge Medications:  See after visit summary for reconciled discharge medications provided to patient and family        ** Please Note: This note has been constructed using a voice recognition system **

## 2021-04-12 ENCOUNTER — TRANSITIONAL CARE MANAGEMENT (OUTPATIENT)
Dept: FAMILY MEDICINE CLINIC | Facility: CLINIC | Age: 86
End: 2021-04-12

## 2021-04-12 DIAGNOSIS — F41.9 ANXIETY: ICD-10-CM

## 2021-04-12 RX ORDER — ALPRAZOLAM 0.25 MG/1
0.25 TABLET ORAL 3 TIMES DAILY PRN
Qty: 60 TABLET | Refills: 0 | Status: SHIPPED | OUTPATIENT
Start: 2021-04-12 | End: 2021-04-15 | Stop reason: SDUPTHER

## 2021-04-13 LAB
DME PARACHUTE DELIVERY DATE REQUESTED: NORMAL
DME PARACHUTE ITEM DESCRIPTION: NORMAL
DME PARACHUTE ORDER STATUS: NORMAL
DME PARACHUTE SUPPLIER NAME: NORMAL
DME PARACHUTE SUPPLIER PHONE: NORMAL

## 2021-04-14 ENCOUNTER — TELEPHONE (OUTPATIENT)
Dept: FAMILY MEDICINE CLINIC | Facility: CLINIC | Age: 86
End: 2021-04-14

## 2021-04-14 NOTE — TELEPHONE ENCOUNTER
Cortez Lugo from [facility] Nurse called stating patient's family is concerned because patient has not had a bowel movement in 5 days  As per Luwanna Mohs patient can receive as Fleet enema followed by a stool softener     If no relief in 24 hr -  call the office

## 2021-04-15 ENCOUNTER — TELEPHONE (OUTPATIENT)
Dept: FAMILY MEDICINE CLINIC | Facility: CLINIC | Age: 86
End: 2021-04-15

## 2021-04-15 DIAGNOSIS — F41.9 ANXIETY: ICD-10-CM

## 2021-04-15 RX ORDER — ALPRAZOLAM 0.25 MG/1
0.25 TABLET ORAL 4 TIMES DAILY PRN
Qty: 120 TABLET | Refills: 0
Start: 2021-04-15 | End: 2021-05-07

## 2021-04-15 NOTE — TELEPHONE ENCOUNTER
She is going to have to use up what she has and when she runs through her current supply I can call in a bigger quantity

## 2021-04-15 NOTE — TELEPHONE ENCOUNTER
Just so she is aware, medicare does not cover the motor; just the frame of the chair  [facility] should call around to see who has some [employer] , [employer], [employer] and let me know where to fax slip  Motor can cost quite a bit

## 2021-04-15 NOTE — TELEPHONE ENCOUNTER
She was going to talk to you about getting a script for a lift chair, the area of aging said to get the script from you and you would be able to tell her where to go for it  Pl adv

## 2021-04-15 NOTE — TELEPHONE ENCOUNTER
Medication:Alprazolam 0 25 was called in incorrectly  She is suppose to take the medication 4 times a day and also was only given a total of 60 tablets  She is asking for more tablets       [facility]   Aware to check with pharmacy

## 2021-04-16 ENCOUNTER — TELEPHONE (OUTPATIENT)
Dept: FAMILY MEDICINE CLINIC | Facility: CLINIC | Age: 86
End: 2021-04-16

## 2021-04-16 NOTE — TELEPHONE ENCOUNTER
Stop the coloace and senekot    I'd like her to buy a bottle of magnesium citrate (otc) and drink 1/2 now and wait 2 hrs and drink the other half  Once cleaned out, I'd like her to take miralax one packet daily   (Also otc)

## 2021-04-16 NOTE — TELEPHONE ENCOUNTER
Kathleen from [facility] called with update  Patient is having major constipation  No bowels movements from 4-9-2021 to 4-  On the 15 th she was given an enema and she had a very small bowel movement but nothing again  She stated she is on Senokot and Colace  She is drinking prune juice and eating prunes  She is also eating fiber cereal but is still not having any results  Please advise  She cant be reached with any questions [phone]  Kathleen said she is not I any pain or no cramping  Patient would like to know what her next steps should be

## 2021-04-18 DIAGNOSIS — G47.00 INSOMNIA, UNSPECIFIED TYPE: ICD-10-CM

## 2021-04-19 ENCOUNTER — TELEPHONE (OUTPATIENT)
Dept: NEUROSURGERY | Facility: CLINIC | Age: 86
End: 2021-04-19

## 2021-04-19 RX ORDER — ZOLPIDEM TARTRATE 12.5 MG/1
TABLET, FILM COATED, EXTENDED RELEASE ORAL
Qty: 30 TABLET | Refills: 0 | Status: SHIPPED | OUTPATIENT
Start: 2021-04-19 | End: 2021-05-17

## 2021-04-19 NOTE — UTILIZATION REVIEW
Notification of Discharge   This is a Notification of Discharge from our facility [address]  Please be advised that this patient has been discharge from our facility  Below you will find the admission and discharge date and time including the patients disposition  UTILIZATION REVIEW CONTACT:  Leslee Kraft  Utilization   Network Utilization Review Department  Phone: [phone] x carefully listen to the prompts  All voicemails are confidential   Email: [email or web address]     PHYSICIAN ADVISORY SERVICES:  FOR [id number] REVIEW - MEDICAL NECESSITY DENIAL  Phone: [phone]  Fax: [phone]  Email: [email or web address]     PRESENTATION DATE: 4/3/2021  6:06 PM  OBERVATION ADMISSION DATE:   CHANGE FROM OBSERVATION TO INPATIENT: [unfilled]   INPATIENT ADMISSION DATE: 4/4/21 1810   DISCHARGE DATE: 4/9/2021  5:50 PM  DISPOSITION: Home with [place] with [address] [place] [address] Road INFORMATION:  Send all requests for admission clinical reviews, approved or denied determinations and any other requests to dedicated fax number below belonging to the campus where the patient is receiving treatment   List of dedicated fax numbers:  [address] DENIALS (Administrative/Medical Necessity) [phone]   [address] (Maternity/NICU/Pediatrics) [phone]   Reynaldo Cabral [phone]   Ciarra Jean [phone]   [facility] [phone]   Arpita Michelle [facility] [address] [facility] [phone]   [address] [facility] [phone]   [address] [facility]  [address] [facility] [address] [facility] [phone]

## 2021-04-19 NOTE — TELEPHONE ENCOUNTER
4/19/21 Spoke w/ daughter and she stated that her mom still can't get in /out of a car w/ no pain and they  Will be canceling this appt  They will c/b to reschedule  Diana Bowie Appt for 4/22/21 canceled

## 2021-04-21 ENCOUNTER — TELEPHONE (OUTPATIENT)
Dept: FAMILY MEDICINE CLINIC | Facility: CLINIC | Age: 86
End: 2021-04-21

## 2021-04-21 DIAGNOSIS — K59.00 CONSTIPATION, UNSPECIFIED CONSTIPATION TYPE: Primary | ICD-10-CM

## 2021-04-21 RX ORDER — LINACLOTIDE 72 UG/1
1 CAPSULE, GELATIN COATED ORAL DAILY
Qty: 30 CAPSULE | Refills: 3 | Status: SHIPPED | OUTPATIENT
Start: 2021-04-21

## 2021-04-21 NOTE — TELEPHONE ENCOUNTER
Received call from [facility] @ [address]  1  Patient did just take the [address] so we will see what happens there  2  Services will be increased to 2 times a week  3  Wants to know if you could order  to come out and see if they can help with any long term in home services?

## 2021-04-21 NOTE — TELEPHONE ENCOUNTER
Patient has been having a hard time going to the bathroom  She has been constipated since Saturday  They have been using Murelax for the past three days with no relief  She is asking what the next step is?

## 2021-04-21 NOTE — TELEPHONE ENCOUNTER
1) She is being D/C from OT home care today    2) can you give a verbal order for a medical social worker for home support for ADL      Pl call

## 2021-04-22 ENCOUNTER — TELEPHONE (OUTPATIENT)
Dept: FAMILY MEDICINE CLINIC | Facility: CLINIC | Age: 86
End: 2021-04-22

## 2021-04-22 DIAGNOSIS — I50.32 CHRONIC DIASTOLIC HEART FAILURE (HCC): ICD-10-CM

## 2021-04-22 RX ORDER — FUROSEMIDE 20 MG/1
TABLET ORAL
Qty: 60 TABLET | Refills: 0 | Status: SHIPPED | OUTPATIENT
Start: 2021-04-22 | End: 2021-06-01

## 2021-04-22 NOTE — TELEPHONE ENCOUNTER
Would like a verbal order for home health aid to help bath etc   Call [address] [phone]

## 2021-04-22 NOTE — TELEPHONE ENCOUNTER
Please call daughter; I am getting conflicting info; they refused a  consult today for help for the future  Do they want a bath aide?

## 2021-04-28 ENCOUNTER — APPOINTMENT (OUTPATIENT)
Dept: LAB | Facility: CLINIC | Age: 86
End: 2021-04-28
Payer: COMMERCIAL

## 2021-04-28 ENCOUNTER — TRANSCRIBE ORDERS (OUTPATIENT)
Dept: LAB | Facility: CLINIC | Age: 86
End: 2021-04-28

## 2021-04-28 DIAGNOSIS — E78.5 HYPERLIPIDEMIA, UNSPECIFIED HYPERLIPIDEMIA TYPE: Primary | ICD-10-CM

## 2021-04-28 LAB
CHOLEST SERPL-MCNC: 113 MG/DL (ref 50–200)
HDLC SERPL-MCNC: 45 MG/DL
LDLC SERPL CALC-MCNC: 37 MG/DL (ref 0–100)
LDLC SERPL DIRECT ASSAY-MCNC: 50 MG/DL (ref 0–100)
NONHDLC SERPL-MCNC: 68 MG/DL
TRIGL SERPL-MCNC: 153 MG/DL

## 2021-04-28 PROCEDURE — 80061 LIPID PANEL: CPT

## 2021-04-28 PROCEDURE — 83721 ASSAY OF BLOOD LIPOPROTEIN: CPT

## 2021-04-28 PROCEDURE — 36415 COLL VENOUS BLD VENIPUNCTURE: CPT

## 2021-05-04 ENCOUNTER — TELEPHONE (OUTPATIENT)
Dept: FAMILY MEDICINE CLINIC | Facility: CLINIC | Age: 86
End: 2021-05-04

## 2021-05-06 DIAGNOSIS — F41.9 ANXIETY: ICD-10-CM

## 2021-05-07 RX ORDER — ALPRAZOLAM 0.25 MG/1
TABLET ORAL
Qty: 60 TABLET | Refills: 0 | Status: SHIPPED | OUTPATIENT
Start: 2021-05-07 | End: 2021-06-08

## 2021-05-17 DIAGNOSIS — G47.00 INSOMNIA, UNSPECIFIED TYPE: ICD-10-CM

## 2021-05-17 RX ORDER — ZOLPIDEM TARTRATE 12.5 MG/1
TABLET, FILM COATED, EXTENDED RELEASE ORAL
Qty: 30 TABLET | Refills: 0 | Status: SHIPPED | OUTPATIENT
Start: 2021-05-17 | End: 2021-06-01

## 2021-05-26 ENCOUNTER — TELEPHONE (OUTPATIENT)
Dept: FAMILY MEDICINE CLINIC | Facility: CLINIC | Age: 86
End: 2021-05-26

## 2021-06-01 DIAGNOSIS — E78.00 HYPERCHOLESTEROLEMIA: ICD-10-CM

## 2021-06-01 DIAGNOSIS — I50.32 CHRONIC DIASTOLIC HEART FAILURE (HCC): ICD-10-CM

## 2021-06-01 DIAGNOSIS — G47.00 INSOMNIA, UNSPECIFIED TYPE: ICD-10-CM

## 2021-06-01 RX ORDER — ROSUVASTATIN CALCIUM 5 MG
TABLET ORAL
Qty: 90 TABLET | Refills: 1 | Status: SHIPPED | OUTPATIENT
Start: 2021-06-01

## 2021-06-01 RX ORDER — ZOLPIDEM TARTRATE 12.5 MG/1
TABLET, FILM COATED, EXTENDED RELEASE ORAL
Qty: 30 TABLET | Refills: 0 | Status: SHIPPED | OUTPATIENT
Start: 2021-06-01 | End: 2021-07-12

## 2021-06-01 RX ORDER — FUROSEMIDE 20 MG/1
TABLET ORAL
Qty: 60 TABLET | Refills: 0 | Status: SHIPPED | OUTPATIENT
Start: 2021-06-01 | End: 2021-08-05

## 2021-06-08 ENCOUNTER — TELEPHONE (OUTPATIENT)
Dept: FAMILY MEDICINE CLINIC | Facility: CLINIC | Age: 86
End: 2021-06-08

## 2021-06-08 DIAGNOSIS — M17.12 PRIMARY OSTEOARTHRITIS OF LEFT KNEE: Primary | ICD-10-CM

## 2021-06-08 DIAGNOSIS — F41.9 ANXIETY: ICD-10-CM

## 2021-06-08 RX ORDER — ALPRAZOLAM 0.25 MG/1
TABLET ORAL
Qty: 60 TABLET | Refills: 0 | Status: SHIPPED | OUTPATIENT
Start: 2021-06-08 | End: 2021-06-21 | Stop reason: SDUPTHER

## 2021-06-08 RX ORDER — PREDNISONE 1 MG/1
1 TABLET ORAL DAILY
Qty: 90 TABLET | Refills: 0 | Status: SHIPPED | OUTPATIENT
Start: 2021-06-08

## 2021-06-08 NOTE — TELEPHONE ENCOUNTER
She picked up the Alprazolam, it was suppose to be 4 x daily, it's been messed up since being in the hospital,  Since she picked it up what should she do? Pl adv

## 2021-06-08 NOTE — TELEPHONE ENCOUNTER
Is patient still taking this? She was not discharged on this medication for her April hospitalization

## 2021-06-13 DIAGNOSIS — G47.00 INSOMNIA, UNSPECIFIED TYPE: ICD-10-CM

## 2021-06-14 RX ORDER — ZOLPIDEM TARTRATE 12.5 MG/1
TABLET, FILM COATED, EXTENDED RELEASE ORAL
Qty: 30 TABLET | Refills: 0 | OUTPATIENT
Start: 2021-06-14

## 2021-06-21 DIAGNOSIS — F41.9 ANXIETY: ICD-10-CM

## 2021-06-21 RX ORDER — ALPRAZOLAM 0.25 MG/1
0.25 TABLET ORAL 4 TIMES DAILY PRN
Qty: 120 TABLET | Refills: 2 | Status: SHIPPED | OUTPATIENT
Start: 2021-06-21 | End: 2021-07-12

## 2021-07-11 DIAGNOSIS — G47.00 INSOMNIA, UNSPECIFIED TYPE: ICD-10-CM

## 2021-07-11 DIAGNOSIS — F41.9 ANXIETY: ICD-10-CM

## 2021-07-12 RX ORDER — ALPRAZOLAM 0.25 MG/1
TABLET ORAL
Qty: 60 TABLET | Refills: 0 | Status: SHIPPED | OUTPATIENT
Start: 2021-07-12 | End: 2021-07-27 | Stop reason: SDUPTHER

## 2021-07-12 RX ORDER — ZOLPIDEM TARTRATE 12.5 MG/1
TABLET, FILM COATED, EXTENDED RELEASE ORAL
Qty: 30 TABLET | Refills: 0 | Status: SHIPPED | OUTPATIENT
Start: 2021-07-12 | End: 2021-08-13

## 2021-07-27 DIAGNOSIS — F41.9 ANXIETY: ICD-10-CM

## 2021-07-27 RX ORDER — ALPRAZOLAM 0.25 MG/1
0.25 TABLET ORAL 4 TIMES DAILY PRN
Qty: 120 TABLET | Refills: 0 | Status: SHIPPED | OUTPATIENT
Start: 2021-07-27 | End: 2021-09-24

## 2021-07-27 NOTE — TELEPHONE ENCOUNTER
Patients rx for Alprazolam is wrong is should be 4x days 120 tabs    Please fix this and send a new rx to Pharm and take this old one out

## 2021-08-05 DIAGNOSIS — I50.32 CHRONIC DIASTOLIC HEART FAILURE (HCC): ICD-10-CM

## 2021-08-05 RX ORDER — FUROSEMIDE 20 MG/1
TABLET ORAL
Qty: 60 TABLET | Refills: 0 | Status: SHIPPED | OUTPATIENT
Start: 2021-08-05 | End: 2021-09-13

## 2021-08-13 ENCOUNTER — TELEPHONE (OUTPATIENT)
Dept: FAMILY MEDICINE CLINIC | Facility: CLINIC | Age: 86
End: 2021-08-13

## 2021-08-13 NOTE — TELEPHONE ENCOUNTER
Call from pharmacy  They received RX for Synthroid with conflicting instructions  Please clarify dosing and resend

## 2021-08-16 DIAGNOSIS — E03.9 HYPOTHYROIDISM, UNSPECIFIED TYPE: ICD-10-CM

## 2021-08-16 RX ORDER — LEVOTHYROXINE SODIUM 25 MCG
37.5 TABLET ORAL DAILY
Qty: 45 TABLET | Refills: 3 | Status: SHIPPED | OUTPATIENT
Start: 2021-08-16 | End: 2022-02-24 | Stop reason: HOSPADM

## 2021-09-12 DIAGNOSIS — G47.00 INSOMNIA, UNSPECIFIED TYPE: ICD-10-CM

## 2021-09-13 DIAGNOSIS — I50.32 CHRONIC DIASTOLIC HEART FAILURE (HCC): ICD-10-CM

## 2021-09-13 RX ORDER — FUROSEMIDE 20 MG/1
TABLET ORAL
Qty: 60 TABLET | Refills: 0 | Status: SHIPPED | OUTPATIENT
Start: 2021-09-13 | End: 2021-10-11

## 2021-09-13 RX ORDER — ZOLPIDEM TARTRATE 12.5 MG/1
TABLET, FILM COATED, EXTENDED RELEASE ORAL
Qty: 30 TABLET | Refills: 0 | Status: SHIPPED | OUTPATIENT
Start: 2021-09-13 | End: 2021-10-11

## 2021-09-16 DIAGNOSIS — I48.91 ATRIAL FIBRILLATION, UNSPECIFIED TYPE (HCC): ICD-10-CM

## 2021-09-16 NOTE — TELEPHONE ENCOUNTER
Patients daughter is asking if Parag Pavon can stop seeing Dr Leta Irving   She said that all he does is see her and talk to her for 5 mins and gives her meds     Can you just prescribe her these meds instead?      Please advise

## 2021-09-17 RX ORDER — AMIODARONE HYDROCHLORIDE 100 MG/1
100 TABLET ORAL DAILY
Qty: 90 TABLET | Refills: 3 | Status: SHIPPED | OUTPATIENT
Start: 2021-09-17 | End: 2021-10-05 | Stop reason: SDUPTHER

## 2021-09-24 DIAGNOSIS — F41.9 ANXIETY: ICD-10-CM

## 2021-09-24 RX ORDER — ALPRAZOLAM 0.25 MG/1
TABLET ORAL
Qty: 120 TABLET | Refills: 0 | Status: SHIPPED | OUTPATIENT
Start: 2021-09-24 | End: 2021-10-25

## 2021-10-05 ENCOUNTER — OFFICE VISIT (OUTPATIENT)
Dept: FAMILY MEDICINE CLINIC | Facility: CLINIC | Age: 86
End: 2021-10-05
Payer: COMMERCIAL

## 2021-10-05 VITALS
TEMPERATURE: 97.3 F | SYSTOLIC BLOOD PRESSURE: 130 MMHG | OXYGEN SATURATION: 94 % | HEART RATE: 60 BPM | DIASTOLIC BLOOD PRESSURE: 78 MMHG | RESPIRATION RATE: 18 BRPM | HEIGHT: 67 IN | BODY MASS INDEX: 28.09 KG/M2 | WEIGHT: 179 LBS

## 2021-10-05 DIAGNOSIS — Z23 ENCOUNTER FOR IMMUNIZATION: ICD-10-CM

## 2021-10-05 DIAGNOSIS — H61.23 BILATERAL IMPACTED CERUMEN: ICD-10-CM

## 2021-10-05 DIAGNOSIS — I48.91 ATRIAL FIBRILLATION, UNSPECIFIED TYPE (HCC): ICD-10-CM

## 2021-10-05 DIAGNOSIS — R68.2 DRY MOUTH: ICD-10-CM

## 2021-10-05 DIAGNOSIS — I10 ESSENTIAL HYPERTENSION: ICD-10-CM

## 2021-10-05 DIAGNOSIS — E03.9 HYPOTHYROIDISM, UNSPECIFIED TYPE: ICD-10-CM

## 2021-10-05 DIAGNOSIS — H91.93 BILATERAL HEARING LOSS, UNSPECIFIED HEARING LOSS TYPE: Primary | ICD-10-CM

## 2021-10-05 PROCEDURE — 1160F RVW MEDS BY RX/DR IN RCRD: CPT | Performed by: FAMILY MEDICINE

## 2021-10-05 PROCEDURE — 99214 OFFICE O/P EST MOD 30 MIN: CPT | Performed by: FAMILY MEDICINE

## 2021-10-05 PROCEDURE — 1036F TOBACCO NON-USER: CPT | Performed by: FAMILY MEDICINE

## 2021-10-05 PROCEDURE — 90662 IIV NO PRSV INCREASED AG IM: CPT | Performed by: FAMILY MEDICINE

## 2021-10-05 PROCEDURE — 69210 REMOVE IMPACTED EAR WAX UNI: CPT | Performed by: FAMILY MEDICINE

## 2021-10-05 PROCEDURE — G0008 ADMIN INFLUENZA VIRUS VAC: HCPCS | Performed by: FAMILY MEDICINE

## 2021-10-05 RX ORDER — AMIODARONE HYDROCHLORIDE 100 MG/1
100 TABLET ORAL DAILY
Qty: 90 TABLET | Refills: 3 | Status: SHIPPED | OUTPATIENT
Start: 2021-10-05 | End: 2022-01-27 | Stop reason: SDUPTHER

## 2021-10-06 PROCEDURE — 69210 REMOVE IMPACTED EAR WAX UNI: CPT | Performed by: FAMILY MEDICINE

## 2021-10-11 DIAGNOSIS — G47.00 INSOMNIA, UNSPECIFIED TYPE: ICD-10-CM

## 2021-10-11 DIAGNOSIS — I50.32 CHRONIC DIASTOLIC HEART FAILURE (HCC): ICD-10-CM

## 2021-10-11 RX ORDER — FUROSEMIDE 20 MG/1
TABLET ORAL
Qty: 60 TABLET | Refills: 0 | Status: SHIPPED | OUTPATIENT
Start: 2021-10-11 | End: 2021-11-08

## 2021-10-11 RX ORDER — ZOLPIDEM TARTRATE 12.5 MG/1
TABLET, FILM COATED, EXTENDED RELEASE ORAL
Qty: 30 TABLET | Refills: 0 | Status: SHIPPED | OUTPATIENT
Start: 2021-10-11 | End: 2021-11-01

## 2021-10-12 ENCOUNTER — TELEPHONE (OUTPATIENT)
Dept: FAMILY MEDICINE CLINIC | Facility: CLINIC | Age: 86
End: 2021-10-12

## 2021-10-13 DIAGNOSIS — K29.00 ACUTE GASTRITIS WITHOUT HEMORRHAGE, UNSPECIFIED GASTRITIS TYPE: Primary | ICD-10-CM

## 2021-10-13 DIAGNOSIS — R68.2 DRY MOUTH: ICD-10-CM

## 2021-10-13 RX ORDER — PILOCARPINE HYDROCHLORIDE 5 MG/1
5 TABLET, FILM COATED ORAL 2 TIMES DAILY
Qty: 60 TABLET | Refills: 2 | Status: SHIPPED | OUTPATIENT
Start: 2021-10-13 | End: 2022-02-09 | Stop reason: SDUPTHER

## 2021-10-13 RX ORDER — FAMOTIDINE 40 MG/1
40 TABLET, FILM COATED ORAL DAILY
Qty: 30 TABLET | Refills: 2 | Status: SHIPPED | OUTPATIENT
Start: 2021-10-13 | End: 2022-03-21

## 2021-10-20 DIAGNOSIS — B00.9 HSV INFECTION: Primary | ICD-10-CM

## 2021-10-20 RX ORDER — VALACYCLOVIR HYDROCHLORIDE 500 MG/1
500 TABLET, FILM COATED ORAL 3 TIMES DAILY
Qty: 30 TABLET | Refills: 0 | Status: SHIPPED | OUTPATIENT
Start: 2021-10-20 | End: 2022-02-24 | Stop reason: HOSPADM

## 2021-10-25 DIAGNOSIS — F41.9 ANXIETY: ICD-10-CM

## 2021-10-25 RX ORDER — ALPRAZOLAM 0.25 MG/1
TABLET ORAL
Qty: 120 TABLET | Refills: 0 | Status: SHIPPED | OUTPATIENT
Start: 2021-10-25 | End: 2021-11-30

## 2021-11-05 ENCOUNTER — TELEPHONE (OUTPATIENT)
Dept: FAMILY MEDICINE CLINIC | Facility: CLINIC | Age: 86
End: 2021-11-05

## 2021-11-05 DIAGNOSIS — E03.9 HYPOTHYROIDISM, UNSPECIFIED TYPE: ICD-10-CM

## 2021-11-05 RX ORDER — LEVOTHYROXINE SODIUM 0.03 MG/1
37.5 TABLET ORAL
Qty: 45 TABLET | Refills: 5 | Status: SHIPPED | OUTPATIENT
Start: 2021-11-05 | End: 2022-02-09

## 2021-11-08 DIAGNOSIS — I50.32 CHRONIC DIASTOLIC HEART FAILURE (HCC): ICD-10-CM

## 2021-11-08 RX ORDER — FUROSEMIDE 20 MG/1
TABLET ORAL
Qty: 60 TABLET | Refills: 0 | Status: SHIPPED | OUTPATIENT
Start: 2021-11-08 | End: 2021-12-06

## 2021-12-06 DIAGNOSIS — G47.00 INSOMNIA, UNSPECIFIED TYPE: ICD-10-CM

## 2021-12-06 DIAGNOSIS — I50.32 CHRONIC DIASTOLIC HEART FAILURE (HCC): ICD-10-CM

## 2021-12-06 RX ORDER — FUROSEMIDE 20 MG/1
TABLET ORAL
Qty: 60 TABLET | Refills: 0 | Status: SHIPPED | OUTPATIENT
Start: 2021-12-06 | End: 2022-01-05

## 2021-12-07 RX ORDER — ZOLPIDEM TARTRATE 12.5 MG/1
TABLET, FILM COATED, EXTENDED RELEASE ORAL
Qty: 30 TABLET | Refills: 0 | Status: SHIPPED | OUTPATIENT
Start: 2021-12-07 | End: 2022-01-05

## 2022-01-04 DIAGNOSIS — G47.00 INSOMNIA, UNSPECIFIED TYPE: ICD-10-CM

## 2022-01-04 DIAGNOSIS — I50.32 CHRONIC DIASTOLIC HEART FAILURE (HCC): ICD-10-CM

## 2022-01-05 RX ORDER — ZOLPIDEM TARTRATE 12.5 MG/1
TABLET, FILM COATED, EXTENDED RELEASE ORAL
Qty: 30 TABLET | Refills: 0 | Status: SHIPPED | OUTPATIENT
Start: 2022-01-05 | End: 2022-02-11

## 2022-01-05 RX ORDER — FUROSEMIDE 20 MG/1
TABLET ORAL
Qty: 60 TABLET | Refills: 0 | Status: SHIPPED | OUTPATIENT
Start: 2022-01-05 | End: 2022-02-07

## 2022-01-10 ENCOUNTER — TELEPHONE (OUTPATIENT)
Dept: FAMILY MEDICINE CLINIC | Facility: CLINIC | Age: 87
End: 2022-01-10

## 2022-01-10 NOTE — TELEPHONE ENCOUNTER
Pt's daughter called stating the [facility] paperwork needs to be completed and sent so her moms zolpidem can be released from the pharmacy

## 2022-01-27 ENCOUNTER — NURSE TRIAGE (OUTPATIENT)
Dept: OTHER | Facility: OTHER | Age: 87
End: 2022-01-27

## 2022-01-27 ENCOUNTER — TELEPHONE (OUTPATIENT)
Dept: FAMILY MEDICINE CLINIC | Facility: CLINIC | Age: 87
End: 2022-01-27

## 2022-01-27 DIAGNOSIS — B37.0 ORAL THRUSH: Primary | ICD-10-CM

## 2022-01-27 DIAGNOSIS — I48.91 ATRIAL FIBRILLATION, UNSPECIFIED TYPE (HCC): ICD-10-CM

## 2022-01-27 RX ORDER — AMIODARONE HYDROCHLORIDE 100 MG/1
100 TABLET ORAL DAILY
Qty: 90 TABLET | Refills: 3 | Status: SHIPPED | OUTPATIENT
Start: 2022-01-27

## 2022-01-27 RX ORDER — CLOTRIMAZOLE 10 MG/1
10 LOZENGE ORAL; TOPICAL
Qty: 50 TROCHE | Refills: 0 | Status: SHIPPED | OUTPATIENT
Start: 2022-01-27 | End: 2022-02-24 | Stop reason: HOSPADM

## 2022-01-27 NOTE — TELEPHONE ENCOUNTER
Patients daughter called and stated they have been trying to refill patients amiodarone 100 mg  They stated they have called multiple times and it still has not been refilled   Please send to [facility]

## 2022-01-27 NOTE — TELEPHONE ENCOUNTER
Patients daughter called   She believes Leeann Bradford has thrush   Her tongue is all white and she cant eat from the pain   She is drinking     She is requesting meds to be called into   [facility] in [place]

## 2022-01-27 NOTE — TELEPHONE ENCOUNTER
Regarding: Grady Hutchison  ----- Message from Kelsi  sent at 1/27/2022  8:20 AM EST -----  "My mom has what appears to be thrush  I need some help "  Pt already spoke to doctor and got script ordered   Duplicate encounter

## 2022-02-05 DIAGNOSIS — I50.32 CHRONIC DIASTOLIC HEART FAILURE (HCC): ICD-10-CM

## 2022-02-07 RX ORDER — FUROSEMIDE 20 MG/1
TABLET ORAL
Qty: 60 TABLET | Refills: 0 | Status: SHIPPED | OUTPATIENT
Start: 2022-02-07 | End: 2022-03-09

## 2022-02-09 ENCOUNTER — APPOINTMENT (OUTPATIENT)
Dept: LAB | Facility: CLINIC | Age: 87
End: 2022-02-09
Payer: COMMERCIAL

## 2022-02-09 ENCOUNTER — OFFICE VISIT (OUTPATIENT)
Dept: FAMILY MEDICINE CLINIC | Facility: CLINIC | Age: 87
End: 2022-02-09
Payer: COMMERCIAL

## 2022-02-09 VITALS
OXYGEN SATURATION: 96 % | TEMPERATURE: 98.9 F | HEART RATE: 57 BPM | WEIGHT: 182 LBS | HEIGHT: 67 IN | BODY MASS INDEX: 28.56 KG/M2 | DIASTOLIC BLOOD PRESSURE: 82 MMHG | SYSTOLIC BLOOD PRESSURE: 136 MMHG

## 2022-02-09 DIAGNOSIS — N18.5 CHRONIC KIDNEY DISEASE, STAGE 5 (HCC): ICD-10-CM

## 2022-02-09 DIAGNOSIS — I48.91 ATRIAL FIBRILLATION, UNSPECIFIED TYPE (HCC): ICD-10-CM

## 2022-02-09 DIAGNOSIS — E03.9 HYPOTHYROIDISM, UNSPECIFIED TYPE: ICD-10-CM

## 2022-02-09 DIAGNOSIS — R79.89 AZOTEMIA: ICD-10-CM

## 2022-02-09 DIAGNOSIS — I50.32 CHRONIC DIASTOLIC (CONGESTIVE) HEART FAILURE (HCC): ICD-10-CM

## 2022-02-09 DIAGNOSIS — I10 PRIMARY HYPERTENSION: ICD-10-CM

## 2022-02-09 DIAGNOSIS — Z23 NEED FOR VACCINATION: ICD-10-CM

## 2022-02-09 DIAGNOSIS — R68.2 DRY MOUTH: ICD-10-CM

## 2022-02-09 DIAGNOSIS — E78.2 MIXED HYPERLIPIDEMIA: ICD-10-CM

## 2022-02-09 DIAGNOSIS — I10 ESSENTIAL HYPERTENSION: ICD-10-CM

## 2022-02-09 DIAGNOSIS — Z00.00 MEDICARE ANNUAL WELLNESS VISIT, SUBSEQUENT: Primary | ICD-10-CM

## 2022-02-09 LAB
ALBUMIN SERPL BCP-MCNC: 3.9 G/DL (ref 3.5–5)
ALP SERPL-CCNC: 90 U/L (ref 46–116)
ALT SERPL W P-5'-P-CCNC: 24 U/L (ref 12–78)
ANION GAP SERPL CALCULATED.3IONS-SCNC: 5 MMOL/L (ref 4–13)
AST SERPL W P-5'-P-CCNC: 19 U/L (ref 5–45)
BILIRUB SERPL-MCNC: 1.02 MG/DL (ref 0.2–1)
BUN SERPL-MCNC: 30 MG/DL (ref 5–25)
CALCIUM SERPL-MCNC: 9.4 MG/DL (ref 8.3–10.1)
CHLORIDE SERPL-SCNC: 103 MMOL/L (ref 100–108)
CO2 SERPL-SCNC: 28 MMOL/L (ref 21–32)
CREAT SERPL-MCNC: 2.09 MG/DL (ref 0.6–1.3)
GFR SERPL CREATININE-BSD FRML MDRD: 19 ML/MIN/1.73SQ M
GLUCOSE P FAST SERPL-MCNC: 110 MG/DL (ref 65–99)
POTASSIUM SERPL-SCNC: 4.2 MMOL/L (ref 3.5–5.3)
PROT SERPL-MCNC: 7.6 G/DL (ref 6.4–8.2)
SODIUM SERPL-SCNC: 136 MMOL/L (ref 136–145)
TSH SERPL DL<=0.05 MIU/L-ACNC: 4.57 UIU/ML (ref 0.36–3.74)

## 2022-02-09 PROCEDURE — 99214 OFFICE O/P EST MOD 30 MIN: CPT | Performed by: FAMILY MEDICINE

## 2022-02-09 PROCEDURE — 1170F FXNL STATUS ASSESSED: CPT | Performed by: FAMILY MEDICINE

## 2022-02-09 PROCEDURE — 90732 PPSV23 VACC 2 YRS+ SUBQ/IM: CPT | Performed by: FAMILY MEDICINE

## 2022-02-09 PROCEDURE — 3725F SCREEN DEPRESSION PERFORMED: CPT | Performed by: FAMILY MEDICINE

## 2022-02-09 PROCEDURE — G0439 PPPS, SUBSEQ VISIT: HCPCS | Performed by: FAMILY MEDICINE

## 2022-02-09 PROCEDURE — 1160F RVW MEDS BY RX/DR IN RCRD: CPT | Performed by: FAMILY MEDICINE

## 2022-02-09 PROCEDURE — 3288F FALL RISK ASSESSMENT DOCD: CPT | Performed by: FAMILY MEDICINE

## 2022-02-09 PROCEDURE — 84443 ASSAY THYROID STIM HORMONE: CPT

## 2022-02-09 PROCEDURE — 36415 COLL VENOUS BLD VENIPUNCTURE: CPT

## 2022-02-09 PROCEDURE — 1125F AMNT PAIN NOTED PAIN PRSNT: CPT | Performed by: FAMILY MEDICINE

## 2022-02-09 PROCEDURE — 80053 COMPREHEN METABOLIC PANEL: CPT

## 2022-02-09 PROCEDURE — G0009 ADMIN PNEUMOCOCCAL VACCINE: HCPCS | Performed by: FAMILY MEDICINE

## 2022-02-09 RX ORDER — LEVOTHYROXINE SODIUM 0.03 MG/1
37.5 TABLET ORAL DAILY
Qty: 45 TABLET | Refills: 3 | Status: SHIPPED | OUTPATIENT
Start: 2022-02-09 | End: 2022-06-17

## 2022-02-09 RX ORDER — PILOCARPINE HYDROCHLORIDE 5 MG/1
5 TABLET, FILM COATED ORAL 2 TIMES DAILY
Qty: 60 TABLET | Refills: 2 | Status: SHIPPED | OUTPATIENT
Start: 2022-02-09

## 2022-02-09 NOTE — PATIENT INSTRUCTIONS
Medicare Preventive Visit Patient Instructions  Thank you for completing your Welcome to Medicare Visit or Medicare Annual Wellness Visit today  Your next wellness visit will be due in one year (2/10/2023)  The screening/preventive services that you may require over the next 5-10 years are detailed below  Some tests may not apply to you based off risk factors and/or age  Screening tests ordered at today's visit but not completed yet may show as past due  Also, please note that scanned in results may not display below  Preventive Screenings:  Service Recommendations Previous Testing/Comments   Colorectal Cancer Screening  * Colonoscopy    * Fecal Occult Blood Test (FOBT)/Fecal Immunochemical Test (FIT)  * Fecal DNA/Cologuard Test  * Flexible Sigmoidoscopy Age: 54-65 years old   Colonoscopy: every 10 years (may be performed more frequently if at higher risk)  OR  FOBT/FIT: every 1 year  OR  Cologuard: every 3 years  OR  Sigmoidoscopy: every 5 years  Screening may be recommended earlier than age 48 if at higher risk for colorectal cancer  Also, an individualized decision between you and your healthcare provider will decide whether screening between the ages of 74-80 would be appropriate  Colonoscopy: Not on file  FOBT/FIT: Not on file  Cologuard: Not on file  Sigmoidoscopy: Not on file    Screening Not Indicated     Breast Cancer Screening Age: 36 years old  Frequency: every 1-2 years  Not required if history of left and right mastectomy Mammogram: 04/24/2013        Cervical Cancer Screening Between the ages of 21-29, pap smear recommended once every 3 years  Between the ages of 33-67, can perform pap smear with HPV co-testing every 5 years     Recommendations may differ for women with a history of total hysterectomy, cervical cancer, or abnormal pap smears in past  Pap Smear: Not on file    Screening Not Indicated   Hepatitis C Screening Once for adults born between 1945 and 1965  More frequently in patients at high risk for Hepatitis C Hep C Antibody: Not on file        Diabetes Screening 1-2 times per year if you're at risk for diabetes or have pre-diabetes Fasting glucose: 110 mg/dL   A1C: No results in last 5 years    Screening Current   Cholesterol Screening Once every 5 years if you don't have a lipid disorder  May order more often based on risk factors  Lipid panel: 04/28/2021    Screening Not Indicated  History Lipid Disorder     Other Preventive Screenings Covered by Medicare:  1  Abdominal Aortic Aneurysm (AAA) Screening: covered once if your at risk  You're considered to be at risk if you have a family history of AAA  2  Lung Cancer Screening: covers low dose CT scan once per year if you meet all of the following conditions: (1) Age 50-69; (2) No signs or symptoms of lung cancer; (3) Current smoker or have quit smoking within the last 15 years; (4) You have a tobacco smoking history of at least 30 pack years (packs per day multiplied by number of years you smoked); (5) You get a written order from a healthcare provider  3  Glaucoma Screening: covered annually if you're considered high risk: (1) You have diabetes OR (2) Family history of glaucoma OR (3)  aged 48 and older OR (3)  American aged 72 and older  3  Osteoporosis Screening: covered every 2 years if you meet one of the following conditions: (1) You're estrogen deficient and at risk for osteoporosis based off medical history and other findings; (2) Have a vertebral abnormality; (3) On glucocorticoid therapy for more than 3 months; (4) Have primary hyperparathyroidism; (5) On osteoporosis medications and need to assess response to drug therapy  · Last bone density test (DXA Scan): 06/16/2011  5  HIV Screening: covered annually if you're between the age of 12-76  Also covered annually if you are younger than 13 and older than 72 with risk factors for HIV infection   For pregnant patients, it is covered up to 3 times per pregnancy  Immunizations:  Immunization Recommendations   Influenza Vaccine Annual influenza vaccination during flu season is recommended for all persons aged >= 6 months who do not have contraindications   Pneumococcal Vaccine (Prevnar and Pneumovax)  * Prevnar = PCV13  * Pneumovax = PPSV23   Adults 25-60 years old: 1-3 doses may be recommended based on certain risk factors  Adults 72 years old: Prevnar (PCV13) vaccine recommended followed by Pneumovax (PPSV23) vaccine  If already received PPSV23 since turning 65, then PCV13 recommended at least one year after PPSV23 dose  Hepatitis B Vaccine 3 dose series if at intermediate or high risk (ex: diabetes, end stage renal disease, liver disease)   Tetanus (Td) Vaccine - COST NOT COVERED BY MEDICARE PART B Following completion of primary series, a booster dose should be given every 10 years to maintain immunity against tetanus  Td may also be given as tetanus wound prophylaxis  Tdap Vaccine - COST NOT COVERED BY MEDICARE PART B Recommended at least once for all adults  For pregnant patients, recommended with each pregnancy  Shingles Vaccine (Shingrix) - COST NOT COVERED BY MEDICARE PART B  2 shot series recommended in those aged 48 and above     Health Maintenance Due:  There are no preventive care reminders to display for this patient  Immunizations Due:      Topic Date Due    DTaP,Tdap,and Td Vaccines (1 - Tdap) Never done     Advance Directives   What are advance directives? Advance directives are legal documents that state your wishes and plans for medical care  These plans are made ahead of time in case you lose your ability to make decisions for yourself  Advance directives can apply to any medical decision, such as the treatments you want, and if you want to donate organs  What are the types of advance directives? There are many types of advance directives, and each state has rules about how to use them   You may choose a combination of any of the following:  · Living will: This is a written record of the treatment you want  You can also choose which treatments you do not want, which to limit, and which to stop at a certain time  This includes surgery, medicine, IV fluid, and tube feedings  · Durable power of  for healthcare [facility]): This is a written record that states who you want to make healthcare choices for you when you are unable to make them for yourself  This person, called a proxy, is usually a family member or a friend  You may choose more than 1 proxy  · Do not resuscitate (DNR) order:  A DNR order is used in case your heart stops beating or you stop breathing  It is a request not to have certain forms of treatment, such as CPR  A DNR order may be included in other types of advance directives  · Medical directive: This covers the care that you want if you are in a coma, near death, or unable to make decisions for yourself  You can list the treatments you want for each condition  Treatment may include pain medicine, surgery, blood transfusions, dialysis, IV or tube feedings, and a ventilator (breathing machine)  · Values history: This document has questions about your views, beliefs, and how you feel and think about life  This information can help others choose the care that you would choose  Why are advance directives important? An advance directive helps you control your care  Although spoken wishes may be used, it is better to have your wishes written down  Spoken wishes can be misunderstood, or not followed  Treatments may be given even if you do not want them  An advance directive may make it easier for your family to make difficult choices about your care  Weight Management   Why it is important to manage your weight:  Being overweight increases your risk of health conditions such as heart disease, high blood pressure, type 2 diabetes, and certain types of cancer   It can also increase your risk for osteoarthritis, sleep apnea, and other respiratory problems  Aim for a slow, steady weight loss  Even a small amount of weight loss can lower your risk of health problems  How to lose weight safely:  A safe and healthy way to lose weight is to eat fewer calories and get regular exercise  You can lose up about 1 pound a week by decreasing the number of calories you eat by 500 calories each day  Healthy meal plan for weight management:  A healthy meal plan includes a variety of foods, contains fewer calories, and helps you stay healthy  A healthy meal plan includes the following:  · Eat whole-grain foods more often  A healthy meal plan should contain fiber  Fiber is the part of grains, fruits, and vegetables that is not broken down by your body  Whole-grain foods are healthy and provide extra fiber in your diet  Some examples of whole-grain foods are whole-wheat breads and pastas, oatmeal, brown rice, and bulgur  · Eat a variety of vegetables every day  Include dark, leafy greens such as spinach, kale, sylvia greens, and mustard greens  Eat yellow and orange vegetables such as carrots, sweet potatoes, and winter squash  · Eat a variety of fruits every day  Choose fresh or canned fruit (canned in its own juice or light syrup) instead of juice  Fruit juice has very little or no fiber  · Eat low-fat dairy foods  Drink fat-free (skim) milk or 1% milk  Eat fat-free yogurt and low-fat cottage cheese  Try low-fat cheeses such as mozzarella and other reduced-fat cheeses  · Choose meat and other protein foods that are low in fat  Choose beans or other legumes such as split peas or lentils  Choose fish, skinless poultry (chicken or turkey), or lean cuts of red meat (beef or pork)  Before you cook meat or poultry, cut off any visible fat  · Use less fat and oil  Try baking foods instead of frying them  Add less fat, such as margarine, sour cream, regular salad dressing and mayonnaise to foods  Eat fewer high-fat foods   Some examples of high-fat foods include french fries, doughnuts, ice cream, and cakes  · Eat fewer sweets  Limit foods and drinks that are high in sugar  This includes candy, cookies, regular soda, and sweetened drinks  Exercise:  Exercise at least 30 minutes per day on most days of the week  Some examples of exercise include walking, biking, dancing, and swimming  You can also fit in more physical activity by taking the stairs instead of the elevator or parking farther away from stores  Ask your healthcare provider about the best exercise plan for you  © Copyright [employer] 2018 Information is for End User's use only and may not be sold, redistributed or otherwise used for commercial purposes   All illustrations and images included in CareNotes® are the copyrighted property of A D A M , Inc  or [address] [place], [place] [address]milo mana

## 2022-02-09 NOTE — PROGRESS NOTES
Assessment and Plan:     Problem List Items Addressed This Visit     None           Preventive health issues were discussed with patient, and age appropriate screening tests were ordered as noted in patient's After Visit Summary  Personalized health advice and appropriate referrals for health education or preventive services given if needed, as noted in patient's After Visit Summary  History of Present Illness:     Patient presents for Medicare Annual Wellness visit  She is up to date with preventative care  Patient Care Team:  Bettina Ibarra DO as PCP - General  MD Bettina Ponce DO     Problem List:     Patient Active Problem List   Diagnosis    Oral pain of unknown etiology    [facility]oxysmal atrial fibrillation ([facility] [address] )    Hypothyroidism    Anxiety    Cardiomyopathy ([facility] [address] [facility]    CKD (chronic kidney disease), stage IV ([facility] [address] )    Arthritis    Chronic diastolic congestive heart failure ([facility] [address] [facility]    Hyperlipidemia    Hypertension    Insomnia    Contact dermatitis    Sinus bradycardia    Mild aortic stenosis    Bronchitis    Fall    Hematoma of right hip    Constipation    Chronic low back pain without sciatica    Closed stable burst fracture of twelfth thoracic vertebra with routine healing    Hyponatremia      Past Medical and Surgical History:     Past Medical History:   Diagnosis Date    A-fib [facility])     Cardiac disease     Hyperlipidemia     Hypertension      Past Surgical History:   Procedure Laterality Date    APPENDECTOMY      CHOLECYSTECTOMY      HERNIA REPAIR      HYSTERECTOMY      NEPHRECTOMY Right       Family History:     Family History   Problem Relation Age of Onset    Heart disease Family     Hypertension Family     Cancer Family       Social History:     Social History     Socioeconomic History    Marital status:       Spouse name: None    Number of children: None    Years of education: Less than high school    Highest education level: None   Occupational History    Occupation: Retired   Tobacco Use    Smoking status: Never Smoker    Smokeless tobacco: Never Used   Substance and Sexual Activity    Alcohol use: No    Drug use: Never    Sexual activity: Not Currently     Partners: Male     Birth control/protection: Post-menopausal   Other Topics Concern    None   Social History Narrative    Always uses seatbelt    Denied:  History of daily caffeinated cola consumption    Denied:  History of daily coffee consumption    Daily tea consumption    Denied:  History of dental care, regularly    Exercise:  Walking    Living will in place    No Christian beliefs    Power of  in existence    Water intake, adequate (per day)     Social Determinants of Health     Financial Resource Strain: Not on file   Food Insecurity: Not on file   Transportation Needs: Not on file   Physical Activity: Not on file   Stress: Not on file   Social Connections: Not on file   Intimate Partner Violence: Not on file   Housing Stability: Not on file      Medications and Allergies:     Current Outpatient Medications   Medication Sig Dispense Refill    acetaminophen (TYLENOL) 325 mg tablet Take 3 tablets (975 mg total) by mouth every 8 (eight) hours  0    ALPRAZolam (XANAX) 0 25 mg tablet TAKE ONE TABLET BY MOUTH FOUR TIMES A DAY AS NEEDED FOR ANXIETY 120 tablet 0    amiodarone 100 mg tablet Take 1 tablet (100 mg total) by mouth daily 90 tablet 3    Artificial Saliva (Xerostomia Relief [place]) SOLN Apply 1 application to the mouth or throat 4 (four) times a day 10 mL 3    aspirin (ECOTRIN LOW STRENGTH) 81 mg EC tablet Take 81 mg by mouth every other day      clotrimazole (MYCELEX) 10 mg soha Take 1 tablet (10 mg total) by mouth 5 (five) times a day Dissolve 1 tab by mouth 5 times a day 50 Soha 0    Crestor 5 MG tablet TAKE ONE TABLET BY MOUTH EVERY DAY 90 tablet 1    docusate sodium (COLACE) 100 mg capsule Take 1 capsule (100 mg total) by mouth 2 (two) times a day 10 capsule 0    Emollient (EUCERIN) lotion Apply topically 2 (two) times a day      famotidine (PEPCID) 40 MG tablet Take 1 tablet (40 mg total) by mouth daily 30 tablet 2    furosemide (LASIX) 20 mg tablet TAKE TWO TABLETS BY MOUTH EVERY DAY 60 tablet 0    levothyroxine (Synthroid) 25 mcg tablet Take 1 5 tablets (37 5 mcg total) by mouth daily in the early morning 45 tablet 5    linaCLOtide (Linzess) 72 MCG CAPS Take 1 capsule by mouth daily 30 capsule 3    loperamide (IMODIUM) 2 mg capsule Take 1 capsule (2 mg total) by mouth 3 (three) times a day as needed for diarrhea 30 capsule 0    methocarbamol (ROBAXIN) 500 mg tablet Take 1 tablet (500 mg total) by mouth every 6 (six) hours as needed for muscle spasms 30 tablet 0    pilocarpine (SALAGEN) 5 mg tablet Take 1 tablet (5 mg total) by mouth 2 (two) times a day 60 tablet 2    polyethylene glycol (MIRALAX) 17 g packet Take 17 g by mouth daily as needed (constipation)  0    predniSONE 1 mg tablet Take 1 tablet (1 mg total) by mouth daily 90 tablet 0    Respiratory Therapy Supplies (NEBULIZER/ADULT MASK) KIT by Does not apply route every 4 (four) hours as needed (wheezing) 1 each 0    Synthroid 25 MCG tablet Take 1 5 tablets (37 5 mcg total) by mouth daily 45 tablet 3    triamcinolone (KENALOG) 0 1 % cream Apply topically Twice daily      valACYclovir (VALTREX) 500 mg tablet Take 1 tablet (500 mg total) by mouth 3 (three) times a day for 10 days 30 tablet 0    zolpidem (AMBIEN CR) 12 5 MG CR tablet TAKE ONE TABLET BY MOUTH AT BEDTIME 30 tablet 0     No current facility-administered medications for this visit       Allergies   Allergen Reactions    Penicillin G Anaphylaxis    Spironolactone Shortness Of Breath    Atorvastatin      Other reaction(s): Leg Cramps    Cephalexin Headache    Chocolate - Food Allergy     Citalopram Abdominal Pain and Headache    Diltiazem     Fosinopril Sodium-Hctz     Methylprednisolone Nausea Only Tablet generic only    Monopril [Fosinopril]      Reaction Date: [id number];     Penicillins     Pollen Extract     Pravastatin Myalgia     Other reaction(s): Leg Cramps    Sporanox [Itraconazole]      Reaction Date: [id number];     Strawberry C [Ascorbate - Food Allergy]     Caffeine - Food Allergy Palpitations     Tachycardia      Immunizations:     Immunization History   Administered Date(s) Administered    COVID-19 PFIZER VACCINE 0 3 ML IM 03/08/2021, 03/29/2021, 12/10/2021    Influenza Split High Dose Preservative Free IM 10/11/2011, 09/27/2012, 10/10/2013, 10/09/2014, 10/14/2015, 10/20/2016, 10/04/2017    Influenza, high dose seasonal 0 7 mL 09/24/2018, 09/20/2019, 10/06/2020, 10/05/2021    Pneumococcal Conjugate 13-Valent 12/14/2015    Pneumococcal Polysaccharide PPV23 10/11/2011      Health Maintenance: There are no preventive care reminders to display for this patient  Topic Date Due    DTaP,Tdap,and Td Vaccines (1 - Tdap) Never done      Medicare Health Risk Assessment:     Ht 5' 7" (1 702 m)   BMI 28 04 kg/m²      Celeste Kiran is here for her Subsequent Wellness visit  Last Medicare Wellness visit information reviewed, patient interviewed, no change since last AWV  Health Risk Assessment:   Patient rates overall health as fair  Patient feels that their physical health rating is same  Patient is satisfied with their life  Eyesight was rated as slightly worse  Hearing was rated as slightly worse  Patient feels that their emotional and mental health rating is same  Patients states they are never, rarely angry  Patient states they are never, rarely unusually tired/fatigued  Pain experienced in the last 7 days has been none  Patient states that she has experienced no weight loss or gain in last 6 months  Depression Screening:   PHQ-2 Score: 0  PHQ-9 Score: 2      Fall Risk Screening:    In the past year, patient has experienced: no history of falling in past year      Urinary Incontinence Screening:   Patient has not leaked urine accidently in the last six months  Home Safety:  Patient has trouble with stairs inside or outside of their home  Patient has working smoke alarms and has working carbon monoxide detector  Home safety hazards include: none  Nutrition:   Current diet is Regular  Medications:   Patient is currently taking over-the-counter supplements  OTC medications include: see medication list  Patient is able to manage medications  Activities of Daily Living (ADLs)/Instrumental Activities of Daily Living (IADLs):   Walk and transfer into and out of bed and chair?: Yes  Dress and groom yourself?: Yes    Bathe or shower yourself?: Yes    Feed yourself? Yes  Do your laundry/housekeeping?: Yes  Manage your money, pay your bills and track your expenses?: Yes  Make your own meals?: Yes    Do your own shopping?: Yes    Previous Hospitalizations:   Any hospitalizations or ED visits within the last 12 months?: Yes    How many hospitalizations have you had in the last year?: 1-2    Advance Care Planning:   Living will: Yes    Durable POA for healthcare:  Yes    Advanced directive: Yes    Advanced directive counseling given: Yes    Five wishes given: Yes    Patient declined ACP directive: No    End of Life Decisions reviewed with patient: Yes    Provider agrees with end of life decisions: Yes      Cognitive Screening:   Provider or family/friend/caregiver concerned regarding cognition?: No    PREVENTIVE SCREENINGS      Cardiovascular Screening:    General: Screening Not Indicated and History Lipid Disorder      Diabetes Screening:     General: Screening Current      Colorectal Cancer Screening:     General: Screening Not Indicated      Cervical Cancer Screening:    General: Screening Not Indicated      Osteoporosis Screening:    General: Screening Current      Abdominal Aortic Aneurysm (AAA) Screening:        General: Screening Current and Screening Not Indicated      Lung Cancer Screening:     General: Screening Not Indicated      Hepatitis C Screening:    General: Screening Current    Screening, Brief Intervention, and Referral to Treatment (SBIRT)    Screening    Typical number of drinks in a week: 0    Single Item Drug Screening:  How often have you used an illegal drug (including marijuana) or a prescription medication for non-medical reasons in the past year? never    Single Item Drug Screen Score: 0  Interpretation: Negative screen for possible drug use disorder      Dionne Ahn, DO

## 2022-02-10 DIAGNOSIS — G47.00 INSOMNIA, UNSPECIFIED TYPE: ICD-10-CM

## 2022-02-11 RX ORDER — ZOLPIDEM TARTRATE 12.5 MG/1
TABLET, FILM COATED, EXTENDED RELEASE ORAL
Qty: 30 TABLET | Refills: 0 | Status: SHIPPED | OUTPATIENT
Start: 2022-02-11 | End: 2022-03-09

## 2022-02-15 ENCOUNTER — OFFICE VISIT (OUTPATIENT)
Dept: FAMILY MEDICINE CLINIC | Facility: CLINIC | Age: 87
End: 2022-02-15
Payer: COMMERCIAL

## 2022-02-15 VITALS
SYSTOLIC BLOOD PRESSURE: 120 MMHG | WEIGHT: 182 LBS | HEIGHT: 67 IN | DIASTOLIC BLOOD PRESSURE: 76 MMHG | TEMPERATURE: 97.8 F | HEART RATE: 62 BPM | BODY MASS INDEX: 28.56 KG/M2

## 2022-02-15 DIAGNOSIS — H61.21 RIGHT EAR IMPACTED CERUMEN: Primary | ICD-10-CM

## 2022-02-15 PROCEDURE — 1160F RVW MEDS BY RX/DR IN RCRD: CPT | Performed by: FAMILY MEDICINE

## 2022-02-15 PROCEDURE — 1036F TOBACCO NON-USER: CPT | Performed by: FAMILY MEDICINE

## 2022-02-15 PROCEDURE — 4040F PNEUMOC VAC/ADMIN/RCVD: CPT | Performed by: FAMILY MEDICINE

## 2022-02-15 PROCEDURE — 99213 OFFICE O/P EST LOW 20 MIN: CPT | Performed by: FAMILY MEDICINE

## 2022-02-16 NOTE — PROGRESS NOTES
Patient ID: Marlys Bush is a 80 y o  female  HPI: 80 y  o female presents for follow up hypertension ,hypothyroidism, afib, and hypercholesterolemia  Pt denies any dizziness,  chest pain, palpitations, or dypsnea with exertion  SUBJECTIVE    Family History   Problem Relation Age of Onset    Heart disease Family     Hypertension Family     Cancer Family      Social History     Socioeconomic History    Marital status:       Spouse name: Not on file    Number of children: Not on file    Years of education: Less than high school    Highest education level: Not on file   Occupational History    Occupation: Retired   Tobacco Use    Smoking status: Never Smoker    Smokeless tobacco: Never Used   Substance and Sexual Activity    Alcohol use: No    Drug use: Never    Sexual activity: Not Currently     Partners: Male     Birth control/protection: Post-menopausal   Other Topics Concern    Not on file   Social History Narrative    Always uses seatbelt    Denied:  History of daily caffeinated cola consumption    Denied:  History of daily coffee consumption    Daily tea consumption    Denied:  History of dental care, regularly    Exercise:  Walking    Living will in place    No Yazidism beliefs    Power of  in existence    Water intake, adequate (per day)     Social Determinants of Health     Financial Resource Strain: Not on file   Food Insecurity: Not on file   Transportation Needs: Not on file   Physical Activity: Not on file   Stress: Not on file   Social Connections: Not on file   Intimate Partner Violence: Not on file   Housing Stability: Not on file     Past Medical History:   Diagnosis Date    A-fib [facility])     Cardiac disease     Hyperlipidemia     Hypertension      Past Surgical History:   Procedure Laterality Date    APPENDECTOMY      CHOLECYSTECTOMY      HERNIA REPAIR      HYSTERECTOMY      NEPHRECTOMY Right      Allergies   Allergen Reactions    Penicillin G Anaphylaxis    Spironolactone Shortness Of Breath    Atorvastatin      Other reaction(s): Leg Cramps    Cephalexin Headache    Chocolate - Food Allergy     Citalopram Abdominal Pain and Headache    Diltiazem     Fosinopril Sodium-Hctz     Methylprednisolone Nausea Only     Tablet generic only    Monopril [Fosinopril]      Reaction Date: 28Apr2011;     Penicillins     Pollen Extract     Pravastatin Myalgia     Other reaction(s): Leg Cramps    Sporanox [Itraconazole]      Reaction Date: 28Apr2011;     Strawberry C [Ascorbate - Food Allergy]     Caffeine - Food Allergy Palpitations     Tachycardia       Current Outpatient Medications:     acetaminophen (TYLENOL) 325 mg tablet, Take 3 tablets (975 mg total) by mouth every 8 (eight) hours, Disp: , Rfl: 0    ALPRAZolam (XANAX) 0 25 mg tablet, TAKE ONE TABLET BY MOUTH FOUR TIMES A DAY AS NEEDED FOR ANXIETY, Disp: 120 tablet, Rfl: 0    amiodarone 100 mg tablet, Take 1 tablet (100 mg total) by mouth daily, Disp: 90 tablet, Rfl: 3    Artificial Saliva (Xerostomia Relief [place]) SOLN, Apply 1 application to the mouth or throat 4 (four) times a day, Disp: 10 mL, Rfl: 3    aspirin (ECOTRIN LOW STRENGTH) 81 mg EC tablet, Take 81 mg by mouth every other day, Disp: , Rfl:     Emollient (EUCERIN) lotion, Apply topically 2 (two) times a day, Disp: , Rfl:     famotidine (PEPCID) 40 MG tablet, Take 1 tablet (40 mg total) by mouth daily, Disp: 30 tablet, Rfl: 2    furosemide (LASIX) 20 mg tablet, TAKE TWO TABLETS BY MOUTH EVERY DAY, Disp: 60 tablet, Rfl: 0    methocarbamol (ROBAXIN) 500 mg tablet, Take 1 tablet (500 mg total) by mouth every 6 (six) hours as needed for muscle spasms, Disp: 30 tablet, Rfl: 0    polyethylene glycol (MIRALAX) 17 g packet, Take 17 g by mouth daily as needed (constipation), Disp: , Rfl: 0    predniSONE 1 mg tablet, Take 1 tablet (1 mg total) by mouth daily, Disp: 90 tablet, Rfl: 0    Respiratory Therapy Supplies (NEBULIZER/ADULT MASK) KIT, by Does not apply route every 4 (four) hours as needed (wheezing), Disp: 1 each, Rfl: 0    Synthroid 25 MCG tablet, Take 1 5 tablets (37 5 mcg total) by mouth daily, Disp: 45 tablet, Rfl: 3    triamcinolone (KENALOG) 0 1 % cream, Apply topically Twice daily, Disp: , Rfl:     clotrimazole (MYCELEX) 10 mg soha, Take 1 tablet (10 mg total) by mouth 5 (five) times a day Dissolve 1 tab by mouth 5 times a day, Disp: 50 Soha, Rfl: 0    Crestor 5 MG tablet, TAKE ONE TABLET BY MOUTH EVERY DAY, Disp: 90 tablet, Rfl: 1    docusate sodium (COLACE) 100 mg capsule, Take 1 capsule (100 mg total) by mouth 2 (two) times a day, Disp: 10 capsule, Rfl: 0    levothyroxine (Levoxyl) 25 mcg tablet, Take 1 5 tablets (37 5 mcg total) by mouth daily, Disp: 45 tablet, Rfl: 3    linaCLOtide (Linzess) 72 MCG CAPS, Take 1 capsule by mouth daily, Disp: 30 capsule, Rfl: 3    loperamide (IMODIUM) 2 mg capsule, Take 1 capsule (2 mg total) by mouth 3 (three) times a day as needed for diarrhea, Disp: 30 capsule, Rfl: 0    pilocarpine (SALAGEN) 5 mg tablet, Take 1 tablet (5 mg total) by mouth 2 (two) times a day, Disp: 60 tablet, Rfl: 2    valACYclovir (VALTREX) 500 mg tablet, Take 1 tablet (500 mg total) by mouth 3 (three) times a day for 10 days (Patient not taking: Reported on 2/9/2022 ), Disp: 30 tablet, Rfl: 0    zolpidem (AMBIEN CR) 12 5 MG CR tablet, TAKE ONE TABLET BY MOUTH AT BEDTIME, Disp: 30 tablet, Rfl: 0    Review of Systems  Constitutional:     Denies fever, chills ,fatigue ,weakness ,weight loss, weight gain     ENT: Denies earache ,loss of hearing ,nosebleed, nasal discharge,nasal congestion ,sore throat ,hoarseness  Pulmonary: Denies shortness of breath ,cough  ,dyspnea on exertion, orthopnea  ,PND   Cardiovascular:  Denies bradycardia , tachycardia  ,palpations, lower extremity edema leg, claudication  Breast:  Denies new or changing breast lumps ,nipple discharge ,nipple changes  Abdomen:  Denies abdominal pain , anorexia , indigestion, nausea, vomiting, constipation, diarrhea  Musculoskeletal: Denies myalgias, arthralgias, joint swelling, joint stiffness , limb pain, limb swelling  Gu: denies dysuria, polyuria  Skin: Denies skin rash, skin lesion, skin wound, itching, dry skin  Neuro: Denies headache, numbness, tingling, confusion, loss of consciousness, dizziness, vertigo  Psychiatric: Denies feelings of depression, suicidal ideation, anxiety, sleep disturbances    OBJECTIVE  /82   Pulse 57   Temp 98 9 °F (37 2 °C)   Ht 5' 7" (1 702 m)   Wt 82 6 kg (182 lb)   SpO2 96%   BMI 28 51 kg/m²   Constitutional:   NAD, well appearing and well nourished      ENT:   Conjunctiva and lids: no injection, edema, or discharge     Pupils and iris: ANKIT bilaterally    External inspection of ears and nose: normal without deformities or discharge  Otoscopic exam: right tm occluded with cerumen  Nasal mucosa, septum and turbinates: Normal or edema or discharge         Oropharynx:  Moist mucosa, normal tongue and tonsils without lesions  No erythema        Pulmonary:Respiratory effort normal rate and rhythm, no increased work of breathing   Auscultation of lungs:  Clear bilaterally with no adventitious breath sounds       Cardiovascular: regular rate and rhythm, S1 and S2, no murmur, no edema and/or varicosities of LE      Abdomen: Soft and non-distended     Positive bowel sounds      No heptomegaly or splenomegaly      Gu: no suprapubic tenderness or CVA tenderness, no urethral discharge  Lymphatic:  No anterior or posterior cervical lymphadenopathy         Musculoskeletal:  Gait and station: Normal gait      Digits and nails normal without clubbing or cyanosis       Inspection/palpation of joints, bones, and muscles:  No joint tenderness, swelling, full active and passive range of motion       Skin: Normal skin turgor and no rashes      Neuro:    Normal reflexes      Psych:   alert and oriented to person, place and time normal mood and affect       Assessment/Plan:Diagnoses and all orders for this visit:    Medicare annual wellness visit, subsequent    Primary hypertension  Comments:  Continue current meds  Mixed hyperlipidemia  Comments:  coninue statin tx  Hypothyroidism, unspecified type  Comments:  Continue with levothyroxine therapy   Orders:  -     levothyroxine (Levoxyl) 25 mcg tablet; Take 1 5 tablets (37 5 mcg total) by mouth daily    Azotemia  -     BUN; Future  -     Creatinine, serum; Future    Chronic kidney disease, stage 5 ([facility] [address] )  Comments: Will check bmp     Chronic diastolic (congestive) heart failure (HCC)  Comments:  Continue with daily weights, low salt diet  [facility], unspecified [facility] )  Comments:  Continue amiodarone tx  Need for vaccination  -     PNEUMOCOCCAL POLYSACCHARIDE VACCINE 23-VALENT =>1YO SQ IM      Will see patient back after using debrox drops for 3 nights into right ear

## 2022-02-22 ENCOUNTER — APPOINTMENT (EMERGENCY)
Dept: CT IMAGING | Facility: HOSPITAL | Age: 87
End: 2022-02-22
Payer: COMMERCIAL

## 2022-02-22 ENCOUNTER — HOSPITAL ENCOUNTER (OUTPATIENT)
Facility: HOSPITAL | Age: 87
Setting detail: OBSERVATION
Discharge: HOME HEALTH | End: 2022-02-24
Attending: EMERGENCY MEDICINE | Admitting: INTERNAL MEDICINE
Payer: COMMERCIAL

## 2022-02-22 DIAGNOSIS — R10.31 ACUTE BILATERAL LOWER ABDOMINAL PAIN: ICD-10-CM

## 2022-02-22 DIAGNOSIS — N17.9 AKI (ACUTE KIDNEY INJURY) (HCC): Primary | ICD-10-CM

## 2022-02-22 DIAGNOSIS — R10.32 ACUTE BILATERAL LOWER ABDOMINAL PAIN: ICD-10-CM

## 2022-02-22 DIAGNOSIS — S22.081D CLOSED STABLE BURST FRACTURE OF TWELFTH THORACIC VERTEBRA WITH ROUTINE HEALING: ICD-10-CM

## 2022-02-22 DIAGNOSIS — G89.29 CHRONIC MIDLINE LOW BACK PAIN WITHOUT SCIATICA: ICD-10-CM

## 2022-02-22 DIAGNOSIS — K11.7 XEROSTOMIA: ICD-10-CM

## 2022-02-22 DIAGNOSIS — E87.1 HYPONATREMIA: ICD-10-CM

## 2022-02-22 DIAGNOSIS — I50.32 CHRONIC DIASTOLIC CONGESTIVE HEART FAILURE (HCC): ICD-10-CM

## 2022-02-22 DIAGNOSIS — M54.50 CHRONIC MIDLINE LOW BACK PAIN WITHOUT SCIATICA: ICD-10-CM

## 2022-02-22 DIAGNOSIS — I42.0 DILATED CARDIOMYOPATHY (HCC): ICD-10-CM

## 2022-02-22 DIAGNOSIS — R94.31 ABNORMAL ECG: ICD-10-CM

## 2022-02-22 DIAGNOSIS — F41.9 ANXIETY: ICD-10-CM

## 2022-02-22 DIAGNOSIS — Z90.5 HISTORY OF RIGHT NEPHRECTOMY: ICD-10-CM

## 2022-02-22 LAB
2HR DELTA HS TROPONIN: 1 NG/L
ALBUMIN SERPL BCP-MCNC: 4 G/DL (ref 3.5–5)
ALP SERPL-CCNC: 88 U/L (ref 46–116)
ALT SERPL W P-5'-P-CCNC: 25 U/L (ref 12–78)
ANION GAP SERPL CALCULATED.3IONS-SCNC: 7 MMOL/L (ref 4–13)
APTT PPP: 30 SECONDS (ref 23–37)
AST SERPL W P-5'-P-CCNC: 23 U/L (ref 5–45)
BASOPHILS # BLD AUTO: 0.05 THOUSANDS/ΜL (ref 0–0.1)
BASOPHILS NFR BLD AUTO: 0 % (ref 0–1)
BILIRUB SERPL-MCNC: 1.14 MG/DL (ref 0.2–1)
BILIRUB UR QL STRIP: NEGATIVE
BUN SERPL-MCNC: 39 MG/DL (ref 5–25)
CALCIUM SERPL-MCNC: 9.2 MG/DL (ref 8.3–10.1)
CARDIAC TROPONIN I PNL SERPL HS: 19 NG/L
CARDIAC TROPONIN I PNL SERPL HS: 20 NG/L
CHLORIDE SERPL-SCNC: 96 MMOL/L (ref 100–108)
CLARITY UR: CLEAR
CO2 SERPL-SCNC: 27 MMOL/L (ref 21–32)
COLOR UR: YELLOW
CREAT SERPL-MCNC: 2.25 MG/DL (ref 0.6–1.3)
DIGOXIN SERPL-MCNC: <0.2 NG/ML (ref 0.8–2)
EOSINOPHIL # BLD AUTO: 0.14 THOUSAND/ΜL (ref 0–0.61)
EOSINOPHIL NFR BLD AUTO: 1 % (ref 0–6)
ERYTHROCYTE [DISTWIDTH] IN BLOOD BY AUTOMATED COUNT: 12.8 % (ref 11.6–15.1)
GFR SERPL CREATININE-BSD FRML MDRD: 18 ML/MIN/1.73SQ M
GLUCOSE SERPL-MCNC: 118 MG/DL (ref 65–140)
GLUCOSE UR STRIP-MCNC: NEGATIVE MG/DL
HCT VFR BLD AUTO: 44.2 % (ref 34.8–46.1)
HGB BLD-MCNC: 15.8 G/DL (ref 11.5–15.4)
HGB UR QL STRIP.AUTO: NEGATIVE
HOLD SPECIMEN: NORMAL
IMM GRANULOCYTES # BLD AUTO: 0.04 THOUSAND/UL (ref 0–0.2)
IMM GRANULOCYTES NFR BLD AUTO: 0 % (ref 0–2)
INR PPP: 1.04 (ref 0.84–1.19)
KETONES UR STRIP-MCNC: NEGATIVE MG/DL
LACTATE SERPL-SCNC: 1.7 MMOL/L (ref 0.5–2)
LEUKOCYTE ESTERASE UR QL STRIP: ABNORMAL
LIPASE SERPL-CCNC: 95 U/L (ref 73–393)
LYMPHOCYTES # BLD AUTO: 1.37 THOUSANDS/ΜL (ref 0.6–4.47)
LYMPHOCYTES NFR BLD AUTO: 11 % (ref 14–44)
MAGNESIUM SERPL-MCNC: 2.3 MG/DL (ref 1.6–2.6)
MCH RBC QN AUTO: 34 PG (ref 26.8–34.3)
MCHC RBC AUTO-ENTMCNC: 35.7 G/DL (ref 31.4–37.4)
MCV RBC AUTO: 95 FL (ref 82–98)
MONOCYTES # BLD AUTO: 0.94 THOUSAND/ΜL (ref 0.17–1.22)
MONOCYTES NFR BLD AUTO: 7 % (ref 4–12)
NEUTROPHILS # BLD AUTO: 10.21 THOUSANDS/ΜL (ref 1.85–7.62)
NEUTS SEG NFR BLD AUTO: 81 % (ref 43–75)
NITRITE UR QL STRIP: NEGATIVE
NRBC BLD AUTO-RTO: 0 /100 WBCS
PH UR STRIP.AUTO: 5.5 [PH] (ref 4.5–8)
PLATELET # BLD AUTO: 228 THOUSANDS/UL (ref 149–390)
PMV BLD AUTO: 10.3 FL (ref 8.9–12.7)
POTASSIUM SERPL-SCNC: 4 MMOL/L (ref 3.5–5.3)
PROT SERPL-MCNC: 7.2 G/DL (ref 6.4–8.2)
PROT UR STRIP-MCNC: NEGATIVE MG/DL
PROTHROMBIN TIME: 13.6 SECONDS (ref 11.6–14.5)
RBC # BLD AUTO: 4.65 MILLION/UL (ref 3.81–5.12)
SODIUM SERPL-SCNC: 130 MMOL/L (ref 136–145)
SP GR UR STRIP.AUTO: <=1.005 (ref 1–1.03)
TSH SERPL DL<=0.05 MIU/L-ACNC: 4.15 UIU/ML (ref 0.36–3.74)
UROBILINOGEN UR QL STRIP.AUTO: 0.2 E.U./DL
WBC # BLD AUTO: 12.75 THOUSAND/UL (ref 4.31–10.16)

## 2022-02-22 PROCEDURE — G1004 CDSM NDSC: HCPCS

## 2022-02-22 PROCEDURE — 80162 ASSAY OF DIGOXIN TOTAL: CPT | Performed by: PHYSICIAN ASSISTANT

## 2022-02-22 PROCEDURE — 81001 URINALYSIS AUTO W/SCOPE: CPT

## 2022-02-22 PROCEDURE — 85610 PROTHROMBIN TIME: CPT | Performed by: PHYSICIAN ASSISTANT

## 2022-02-22 PROCEDURE — 83735 ASSAY OF MAGNESIUM: CPT | Performed by: PHYSICIAN ASSISTANT

## 2022-02-22 PROCEDURE — 74176 CT ABD & PELVIS W/O CONTRAST: CPT

## 2022-02-22 PROCEDURE — 99285 EMERGENCY DEPT VISIT HI MDM: CPT | Performed by: PHYSICIAN ASSISTANT

## 2022-02-22 PROCEDURE — 36415 COLL VENOUS BLD VENIPUNCTURE: CPT | Performed by: EMERGENCY MEDICINE

## 2022-02-22 PROCEDURE — 83690 ASSAY OF LIPASE: CPT | Performed by: EMERGENCY MEDICINE

## 2022-02-22 PROCEDURE — 83605 ASSAY OF LACTIC ACID: CPT | Performed by: EMERGENCY MEDICINE

## 2022-02-22 PROCEDURE — 80053 COMPREHEN METABOLIC PANEL: CPT | Performed by: EMERGENCY MEDICINE

## 2022-02-22 PROCEDURE — 84439 ASSAY OF FREE THYROXINE: CPT | Performed by: PHYSICIAN ASSISTANT

## 2022-02-22 PROCEDURE — 99285 EMERGENCY DEPT VISIT HI MDM: CPT

## 2022-02-22 PROCEDURE — 96361 HYDRATE IV INFUSION ADD-ON: CPT

## 2022-02-22 PROCEDURE — 96374 THER/PROPH/DIAG INJ IV PUSH: CPT

## 2022-02-22 PROCEDURE — 84443 ASSAY THYROID STIM HORMONE: CPT | Performed by: PHYSICIAN ASSISTANT

## 2022-02-22 PROCEDURE — 93005 ELECTROCARDIOGRAM TRACING: CPT

## 2022-02-22 PROCEDURE — 85025 COMPLETE CBC W/AUTO DIFF WBC: CPT | Performed by: EMERGENCY MEDICINE

## 2022-02-22 PROCEDURE — 85730 THROMBOPLASTIN TIME PARTIAL: CPT | Performed by: PHYSICIAN ASSISTANT

## 2022-02-22 PROCEDURE — 84484 ASSAY OF TROPONIN QUANT: CPT | Performed by: EMERGENCY MEDICINE

## 2022-02-22 RX ORDER — SUCRALFATE 1 G/1
1 TABLET ORAL ONCE
Status: COMPLETED | OUTPATIENT
Start: 2022-02-23 | End: 2022-02-23

## 2022-02-22 RX ORDER — ALPRAZOLAM 0.25 MG/1
TABLET ORAL
Qty: 120 TABLET | Refills: 0 | Status: SHIPPED | OUTPATIENT
Start: 2022-02-22 | End: 2022-03-21

## 2022-02-22 RX ORDER — DICYCLOMINE HCL 20 MG
20 TABLET ORAL ONCE
Status: COMPLETED | OUTPATIENT
Start: 2022-02-23 | End: 2022-02-23

## 2022-02-22 RX ORDER — HYDROMORPHONE HCL/PF 1 MG/ML
0.5 SYRINGE (ML) INJECTION ONCE
Status: COMPLETED | OUTPATIENT
Start: 2022-02-23 | End: 2022-02-23

## 2022-02-22 RX ORDER — ALPRAZOLAM 0.25 MG/1
0.25 TABLET ORAL ONCE
Status: COMPLETED | OUTPATIENT
Start: 2022-02-22 | End: 2022-02-23

## 2022-02-22 RX ADMIN — SODIUM CHLORIDE 1000 ML: 9 INJECTION, SOLUTION INTRAVENOUS at 21:17

## 2022-02-22 RX ADMIN — FAMOTIDINE 20 MG: 10 INJECTION INTRAVENOUS at 21:15

## 2022-02-23 PROBLEM — K11.7 XEROSTOMIA: Status: ACTIVE | Noted: 2022-02-23

## 2022-02-23 PROBLEM — R10.32 ACUTE BILATERAL LOWER ABDOMINAL PAIN: Status: ACTIVE | Noted: 2022-02-23

## 2022-02-23 PROBLEM — R10.31 ACUTE BILATERAL LOWER ABDOMINAL PAIN: Status: ACTIVE | Noted: 2022-02-23

## 2022-02-23 LAB
ANION GAP SERPL CALCULATED.3IONS-SCNC: 7 MMOL/L (ref 4–13)
BACTERIA UR QL AUTO: NORMAL /HPF
BUN SERPL-MCNC: 32 MG/DL (ref 5–25)
CALCIUM SERPL-MCNC: 8.4 MG/DL (ref 8.3–10.1)
CHLORIDE SERPL-SCNC: 101 MMOL/L (ref 100–108)
CO2 SERPL-SCNC: 26 MMOL/L (ref 21–32)
CREAT SERPL-MCNC: 1.93 MG/DL (ref 0.6–1.3)
ERYTHROCYTE [DISTWIDTH] IN BLOOD BY AUTOMATED COUNT: 12.8 % (ref 11.6–15.1)
GFR SERPL CREATININE-BSD FRML MDRD: 21 ML/MIN/1.73SQ M
GLUCOSE P FAST SERPL-MCNC: 106 MG/DL (ref 65–99)
GLUCOSE SERPL-MCNC: 106 MG/DL (ref 65–140)
HCT VFR BLD AUTO: 35.8 % (ref 34.8–46.1)
HGB BLD-MCNC: 12.4 G/DL (ref 11.5–15.4)
MCH RBC QN AUTO: 33.9 PG (ref 26.8–34.3)
MCHC RBC AUTO-ENTMCNC: 34.1 G/DL (ref 31.4–37.4)
MCV RBC AUTO: 99 FL (ref 82–98)
NON-SQ EPI CELLS URNS QL MICRO: NORMAL /HPF
PLATELET # BLD AUTO: 173 THOUSANDS/UL (ref 149–390)
PMV BLD AUTO: 10.7 FL (ref 8.9–12.7)
POTASSIUM SERPL-SCNC: 3.8 MMOL/L (ref 3.5–5.3)
RBC # BLD AUTO: 3.6 MILLION/UL (ref 3.81–5.12)
RBC #/AREA URNS AUTO: NORMAL /HPF
SODIUM SERPL-SCNC: 134 MMOL/L (ref 136–145)
T4 FREE SERPL-MCNC: 1.3 NG/DL (ref 0.76–1.46)
WBC # BLD AUTO: 6.59 THOUSAND/UL (ref 4.31–10.16)
WBC #/AREA URNS AUTO: NORMAL /HPF

## 2022-02-23 PROCEDURE — 80048 BASIC METABOLIC PNL TOTAL CA: CPT | Performed by: PHYSICIAN ASSISTANT

## 2022-02-23 PROCEDURE — 97163 PT EVAL HIGH COMPLEX 45 MIN: CPT

## 2022-02-23 PROCEDURE — 36415 COLL VENOUS BLD VENIPUNCTURE: CPT | Performed by: PHYSICIAN ASSISTANT

## 2022-02-23 PROCEDURE — 99219 PR INITIAL OBSERVATION CARE/DAY 50 MINUTES: CPT | Performed by: INTERNAL MEDICINE

## 2022-02-23 PROCEDURE — 97167 OT EVAL HIGH COMPLEX 60 MIN: CPT

## 2022-02-23 PROCEDURE — 69210 REMOVE IMPACTED EAR WAX UNI: CPT | Performed by: FAMILY MEDICINE

## 2022-02-23 PROCEDURE — 85027 COMPLETE CBC AUTOMATED: CPT | Performed by: PHYSICIAN ASSISTANT

## 2022-02-23 RX ORDER — ASPIRIN 81 MG/1
81 TABLET ORAL EVERY OTHER DAY
Status: DISCONTINUED | OUTPATIENT
Start: 2022-02-23 | End: 2022-02-24 | Stop reason: HOSPADM

## 2022-02-23 RX ORDER — ALPRAZOLAM 0.25 MG/1
0.25 TABLET ORAL 4 TIMES DAILY PRN
Status: DISCONTINUED | OUTPATIENT
Start: 2022-02-23 | End: 2022-02-23

## 2022-02-23 RX ORDER — LEVOTHYROXINE SODIUM 0.07 MG/1
37.5 TABLET ORAL EVERY OTHER DAY
Status: DISCONTINUED | OUTPATIENT
Start: 2022-02-24 | End: 2022-02-24 | Stop reason: HOSPADM

## 2022-02-23 RX ORDER — ZOLPIDEM TARTRATE 5 MG/1
5 TABLET ORAL
Status: DISCONTINUED | OUTPATIENT
Start: 2022-02-23 | End: 2022-02-24 | Stop reason: HOSPADM

## 2022-02-23 RX ORDER — POLYVINYL ALCOHOL 14 MG/ML
1 SOLUTION/ DROPS OPHTHALMIC 2 TIMES DAILY
Status: DISCONTINUED | OUTPATIENT
Start: 2022-02-23 | End: 2022-02-23

## 2022-02-23 RX ORDER — ACETAMINOPHEN 325 MG/1
650 TABLET ORAL EVERY 6 HOURS PRN
Status: DISCONTINUED | OUTPATIENT
Start: 2022-02-23 | End: 2022-02-24 | Stop reason: HOSPADM

## 2022-02-23 RX ORDER — CALCIUM CARBONATE 200(500)MG
1000 TABLET,CHEWABLE ORAL DAILY PRN
Status: DISCONTINUED | OUTPATIENT
Start: 2022-02-23 | End: 2022-02-24 | Stop reason: HOSPADM

## 2022-02-23 RX ORDER — PREDNISONE 1 MG/1
1 TABLET ORAL DAILY
Status: DISCONTINUED | OUTPATIENT
Start: 2022-02-23 | End: 2022-02-24 | Stop reason: HOSPADM

## 2022-02-23 RX ORDER — LEVOTHYROXINE SODIUM 0.03 MG/1
25 TABLET ORAL EVERY OTHER DAY
Status: DISCONTINUED | OUTPATIENT
Start: 2022-02-23 | End: 2022-02-24 | Stop reason: HOSPADM

## 2022-02-23 RX ORDER — SENNOSIDES 8.6 MG
2 TABLET ORAL 2 TIMES DAILY
Status: DISCONTINUED | OUTPATIENT
Start: 2022-02-23 | End: 2022-02-24 | Stop reason: HOSPADM

## 2022-02-23 RX ORDER — PILOCARPINE HYDROCHLORIDE 5 MG/1
5 TABLET, FILM COATED ORAL 2 TIMES DAILY
Status: DISCONTINUED | OUTPATIENT
Start: 2022-02-23 | End: 2022-02-24 | Stop reason: HOSPADM

## 2022-02-23 RX ORDER — AMIODARONE HYDROCHLORIDE 200 MG/1
100 TABLET ORAL DAILY
Status: DISCONTINUED | OUTPATIENT
Start: 2022-02-23 | End: 2022-02-24 | Stop reason: HOSPADM

## 2022-02-23 RX ORDER — DICYCLOMINE HYDROCHLORIDE 10 MG/1
10 CAPSULE ORAL
Status: DISCONTINUED | OUTPATIENT
Start: 2022-02-23 | End: 2022-02-24 | Stop reason: HOSPADM

## 2022-02-23 RX ORDER — POLYETHYLENE GLYCOL 3350 17 G/17G
17 POWDER, FOR SOLUTION ORAL DAILY
Status: DISCONTINUED | OUTPATIENT
Start: 2022-02-23 | End: 2022-02-24 | Stop reason: HOSPADM

## 2022-02-23 RX ORDER — ROSUVASTATIN CALCIUM 10 MG/1
5 TABLET, COATED ORAL
Status: DISCONTINUED | OUTPATIENT
Start: 2022-02-23 | End: 2022-02-24 | Stop reason: HOSPADM

## 2022-02-23 RX ORDER — PRAVASTATIN SODIUM 40 MG
40 TABLET ORAL
Status: DISCONTINUED | OUTPATIENT
Start: 2022-02-23 | End: 2022-02-23

## 2022-02-23 RX ORDER — GABAPENTIN 100 MG/1
100 CAPSULE ORAL
Status: DISCONTINUED | OUTPATIENT
Start: 2022-02-23 | End: 2022-02-24 | Stop reason: HOSPADM

## 2022-02-23 RX ORDER — OXYCODONE HYDROCHLORIDE 5 MG/1
2.5 TABLET ORAL EVERY 4 HOURS PRN
Status: DISCONTINUED | OUTPATIENT
Start: 2022-02-23 | End: 2022-02-24 | Stop reason: HOSPADM

## 2022-02-23 RX ORDER — XYLITOL/YERBA SANTA
5 AEROSOL, SPRAY WITH PUMP (ML) MUCOUS MEMBRANE 3 TIMES DAILY PRN
Status: DISCONTINUED | OUTPATIENT
Start: 2022-02-23 | End: 2022-02-24 | Stop reason: HOSPADM

## 2022-02-23 RX ORDER — ONDANSETRON 2 MG/ML
4 INJECTION INTRAMUSCULAR; INTRAVENOUS EVERY 6 HOURS PRN
Status: DISCONTINUED | OUTPATIENT
Start: 2022-02-23 | End: 2022-02-24 | Stop reason: HOSPADM

## 2022-02-23 RX ORDER — ALPRAZOLAM 0.25 MG/1
0.25 TABLET ORAL 4 TIMES DAILY
Status: DISCONTINUED | OUTPATIENT
Start: 2022-02-23 | End: 2022-02-24 | Stop reason: HOSPADM

## 2022-02-23 RX ORDER — POLYETHYLENE GLYCOL 3350 17 G/17G
17 POWDER, FOR SOLUTION ORAL DAILY PRN
Status: DISCONTINUED | OUTPATIENT
Start: 2022-02-23 | End: 2022-02-23

## 2022-02-23 RX ORDER — SENNOSIDES 8.6 MG
1 TABLET ORAL DAILY
Status: DISCONTINUED | OUTPATIENT
Start: 2022-02-23 | End: 2022-02-23

## 2022-02-23 RX ORDER — HEPARIN SODIUM 5000 [USP'U]/ML
5000 INJECTION, SOLUTION INTRAVENOUS; SUBCUTANEOUS EVERY 8 HOURS SCHEDULED
Status: DISCONTINUED | OUTPATIENT
Start: 2022-02-23 | End: 2022-02-24 | Stop reason: HOSPADM

## 2022-02-23 RX ADMIN — ROSUVASTATIN CALCIUM 5 MG: 10 TABLET, FILM COATED ORAL at 16:06

## 2022-02-23 RX ADMIN — PREDNISONE 1 MG: 1 TABLET ORAL at 09:53

## 2022-02-23 RX ADMIN — ASPIRIN 81 MG: 81 TABLET, COATED ORAL at 09:53

## 2022-02-23 RX ADMIN — ALPRAZOLAM 0.25 MG: 0.25 TABLET ORAL at 21:33

## 2022-02-23 RX ADMIN — LUBIPROSTONE 24 MCG: 24 CAPSULE, GELATIN COATED ORAL at 10:41

## 2022-02-23 RX ADMIN — HEPARIN SODIUM 5000 UNITS: 5000 INJECTION INTRAVENOUS; SUBCUTANEOUS at 13:53

## 2022-02-23 RX ADMIN — PILOCARPINE HYDROCHLORIDE 5 MG: 5 TABLET, FILM COATED ORAL at 18:26

## 2022-02-23 RX ADMIN — GABAPENTIN 100 MG: 100 CAPSULE ORAL at 04:27

## 2022-02-23 RX ADMIN — SUCRALFATE 1 G: 1 TABLET ORAL at 00:02

## 2022-02-23 RX ADMIN — OXYCODONE HYDROCHLORIDE 2.5 MG: 5 TABLET ORAL at 04:27

## 2022-02-23 RX ADMIN — DICYCLOMINE HYDROCHLORIDE 10 MG: 10 CAPSULE ORAL at 16:05

## 2022-02-23 RX ADMIN — HYDROMORPHONE HYDROCHLORIDE 0.5 MG: 1 INJECTION, SOLUTION INTRAMUSCULAR; INTRAVENOUS; SUBCUTANEOUS at 00:01

## 2022-02-23 RX ADMIN — LUBIPROSTONE 24 MCG: 24 CAPSULE, GELATIN COATED ORAL at 16:05

## 2022-02-23 RX ADMIN — DICYCLOMINE HYDROCHLORIDE 10 MG: 10 CAPSULE ORAL at 11:53

## 2022-02-23 RX ADMIN — DICYCLOMINE HYDROCHLORIDE 10 MG: 10 CAPSULE ORAL at 21:34

## 2022-02-23 RX ADMIN — ALPRAZOLAM 0.25 MG: 0.25 TABLET ORAL at 00:02

## 2022-02-23 RX ADMIN — ONDANSETRON 4 MG: 2 INJECTION INTRAMUSCULAR; INTRAVENOUS at 12:13

## 2022-02-23 RX ADMIN — GLYCERIN 1 DROP: .002; .002; .01 SOLUTION/ DROPS OPHTHALMIC at 11:53

## 2022-02-23 RX ADMIN — DICYCLOMINE HYDROCHLORIDE 20 MG: 20 TABLET ORAL at 00:02

## 2022-02-23 RX ADMIN — ALPRAZOLAM 0.25 MG: 0.25 TABLET ORAL at 18:25

## 2022-02-23 RX ADMIN — ALPRAZOLAM 0.25 MG: 0.25 TABLET ORAL at 11:53

## 2022-02-23 RX ADMIN — AMIODARONE HYDROCHLORIDE 100 MG: 200 TABLET ORAL at 09:53

## 2022-02-23 RX ADMIN — GABAPENTIN 100 MG: 100 CAPSULE ORAL at 21:38

## 2022-02-23 RX ADMIN — STANDARDIZED SENNA CONCENTRATE 17.2 MG: 8.6 TABLET ORAL at 09:53

## 2022-02-23 RX ADMIN — GLYCERIN 1 DROP: .002; .002; .01 SOLUTION/ DROPS OPHTHALMIC at 18:25

## 2022-02-23 RX ADMIN — PILOCARPINE HYDROCHLORIDE 5 MG: 5 TABLET, FILM COATED ORAL at 10:41

## 2022-02-23 RX ADMIN — HEPARIN SODIUM 5000 UNITS: 5000 INJECTION INTRAVENOUS; SUBCUTANEOUS at 05:50

## 2022-02-23 RX ADMIN — LEVOTHYROXINE SODIUM 25 MCG: 25 TABLET ORAL at 07:26

## 2022-02-23 NOTE — UTILIZATION REVIEW
Initial Clinical Review    Admission: Date/Time/Statement:   Admission Orders (From admission, onward)     Ordered        02/23/22 0000  Place in Observation  Once                      Orders Placed This Encounter   Procedures    Place in Observation     Standing Status:   Standing     Number of Occurrences:   1     Order Specific Question:   Level of Care     Answer:   Med Surg [16]     ED Arrival Information     Expected Arrival Acuity    - 2/22/2022 19:36 Urgent         Means of arrival Escorted by Service Admission type    Wheelchair Family Member Hospitalist Urgent         Arrival complaint    abdominal pain        Chief Complaint   Patient presents with    Abdominal Pain     patient reports lower abdominal pain for 3 weeks  denies N/V/D       Initial Presentation:  81 yo female PMH of atrial fibrillation, CHF, CKD, hypertension, HLD, appendectomy, cholecystectomy, hernia repair to ED from home presenting w abd pain  Reports dull aching non rading ABD pain for 2 weeks w symptom onset beginning w constipation  Unintentional 10 LB wt loss  IN ED: CT surgical HX of right nephrectomy, abd w colonic diverticulosis w no acute findings, labs w leukocytosis, UA macr abnormal, ISABELLE, hyponatreamia  Received meds in ED w relief  Observation admission due to acute bilateral lower ABD pain, closed stable burst FX of 12th thoracic vertebra , elevated creatinine in setting CKD 4-, Hyponatremia, HLD, paroxysmal afib  Plan Encourage regular BM w daily bowl regimen, pain control , oral hydration, hold am dose Lasix, baseline 2 09, s/p IVF in ED, AM BMP  Cont home meds    Date: 2/23/2022   Day 2:   Attending  ABD pain different than prior ; dull & lasts for 1 hr after eating; continue with bowel regimen  Will schedule Bentyl  PT/OT consulted    Encourage to increase p o  intake      ED Triage Vitals   Temperature Pulse Respirations Blood Pressure SpO2   02/22/22 2010 02/22/22 2010 02/22/22 2010 02/22/22 2010 02/22/22 2010 98 1 °F (36 7 °C) 58 20 134/65 94 %      Temp Source Heart Rate Source Patient Position - Orthostatic VS BP Location FiO2 (%)   02/22/22 2010 02/22/22 2010 02/22/22 2010 02/22/22 2010 --   Oral Monitor Sitting Right arm       Pain Score       02/23/22 0001       9          Wt Readings from Last 1 Encounters:   02/15/22 82 6 kg (182 lb)     Additional Vital Signs:   Date/Time Temp Pulse Resp BP MAP (mmHg) SpO2 O2 Device Patient Position - Orthostatic VS   02/23/22 09:02:35 97 6 °F (36 4 °C) 54 Abnormal  18 132/77 95 95 % -- --   02/23/22 0822 -- 51 Abnormal  -- 158/79 -- 95 % None (Room air) Sitting   02/23/22 0730 -- 52 Abnormal  18 145/71 102 95 % None (Room air) Lying   02/23/22 0300 -- 65 20 188/88 Abnormal  -- 95 % None (Room air) Lying   02/22/22 2010 98 1 °F (36 7 °C) 58 20 134/65 -- 94 % None (Room air) Sitting       Pertinent Labs/Diagnostic Test Results:   CT abdomen pelvis wo contrast   ED Interpretation by Jen Butterfield PA-C (02/22 2330)   Spike Hernández MD  [phone] 2/22/2022     Narrative & Impression  CT ABDOMEN AND PELVIS WITHOUT IV CONTRAST     INDICATION:   Abdominal pain, acute, nonlocalized  abd pain      COMPARISON:  CT chest, abdomen and pelvis on 4/3/2021 and thoracolumbar spine radiographs on 4/7/2021      TECHNIQUE:  CT examination of the abdomen and pelvis was performed without intravenous contrast   Axial, sagittal, and coronal 2D reformatted images were created from the source data and submitted for interpretation       Radiation dose length product (DLP) for this visit:  594 mGy-cm   This examination, like all CT scans performed in the [facility], was performed utilizing techniques to minimize radiation dose exposure, including the use of iterative   reconstruction and automated exposure control       Enteric contrast was not administered       FINDINGS:     ABDOMEN     LOWER CHEST:  Small hiatal hernia noted    Mild patchy subsegmental atelectasis and linear scarring in the max   g bases      LIVER/BILIARY TREE:  Unremarkable      GALLBLADDER:  Gallbladder is surgically absent      SPLEEN:  Unremarkable      PANCREAS:  Unremarkable      ADRENAL GLANDS:  Unremarkable      KIDNEYS/URETERS:  Status post right nephrectomy  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  Fullness of the left renal pelvis  No hydronephrosis     STOMACH AND BOWEL:  There is colonic diverticulosis without evidence of acute diverticulitis      APPENDIX:  Prior appendectomy      ABDOMINOPELVIC CAVITY:  No ascites  No pneumoperitoneum  No lymphadenopathy      VESSELS:  Atherosclerotic changes are present  No evidence of aneurysm      PELVIS     REPRODUCTIVE ORGANS:  Surgical changes of prior hysterectomy      URINARY BLADDER:  Unremarkable      ABDOMINAL WALL/INGUINAL REGIONS:  Unremarkable      OSSEOUS STRUCTURES:  No acute fracture or destructive osseous lesion  Interval progression of chronic compression deformity of T12 vertebral body with approximately 80% loss of height  Spinal de   generative changes are noted      IMPRESSION:     1  No acute findings in the abdomen or pelvis  2   Colonic diverticulosis without acute diverticulitis  3   Progression of chronic compression deformity of T12 vertebral body with approximately 80% loss of height            Workstation performed: [id number]        Final Result by Trenton Langston MD (02/22 2321)      1  No acute findings in the abdomen or pelvis  2   Colonic diverticulosis without acute diverticulitis  3   Progression of chronic compression deformity of T12 vertebral body with approximately 80% loss of height              Workstation performed: [id number]               Results from last 7 days   Lab Units 02/23/22  0531 02/22/22 2015   WBC Thousand/uL 6 59 12 75*   HEMOGLOBIN g/dL 12 4 15 8*   HEMATOCRIT % 35 8 44 2   PLATELETS Thousands/uL 173 228   NEUTROS ABS Thousands/µL  --  10 21*         Results from last 7 days   Lab Units 02/23/22  0531 02/22/22 2015   SODIUM mmol/L 134* 130*   POTASSIUM mmol/L 3 8 4 0   CHLORIDE mmol/L 101 96*   CO2 mmol/L 26 27   ANION GAP mmol/L 7 7   BUN mg/dL 32* 39*   CREATININE mg/dL 1 93* 2 25*   EGFR ml/min/1 73sq m 21 18   CALCIUM mg/dL 8 4 9 2   MAGNESIUM mg/dL  --  2 3     Results from last 7 days   Lab Units 02/22/22 2015   AST U/L 23   ALT U/L 25   ALK PHOS U/L 88   TOTAL PROTEIN g/dL 7 2   ALBUMIN g/dL 4 0   TOTAL BILIRUBIN mg/dL 1 14*         Results from last 7 days   Lab Units 02/23/22  0531 02/22/22 2015   GLUCOSE RANDOM mg/dL 106 118             No results found for: BETA-HYDROXYBUTYRATE                   Results from last 7 days   Lab Units 02/22/22  2301 02/22/22  2107   HS TNI 0HR ng/L  --  19   HS TNI 2HR ng/L 20  --    HSTNI D2 ng/L 1  --          Results from last 7 days   Lab Units 02/22/22  2017   PROTIME seconds 13 6   INR  1 04   PTT seconds 30     Results from last 7 days   Lab Units 02/22/22  2015   TSH 3RD GENERATON uIU/mL 4 149*         Results from last 7 days   Lab Units 02/22/22  2108   LACTIC ACID mmol/L 1 7         Results from last 7 days   Lab Units 02/22/22  2107   DIGOXIN LVL ng/mL <0 2*                 Results from last 7 days   Lab Units 02/22/22 2015   LIPASE u/L 95                 Results from last 7 days   Lab Units 02/22/22  2233   CLARITY UA  Clear   COLOR UA  Yellow   SPEC GRAV UA  <=1 005   PH UA  5 5   GLUCOSE UA mg/dl Negative   KETONES UA mg/dl Negative   BLOOD UA  Negative   PROTEIN UA mg/dl Negative   NITRITE UA  Negative   BILIRUBIN UA  Negative   UROBILINOGEN UA E U /dl 0 2   LEUKOCYTES UA  Small*   WBC UA /hpf 0-1   RBC UA /hpf None Seen   BACTERIA UA /hpf None Seen   EPITHELIAL CELLS WET PREP /hpf Occasional       2/22 ekg sinus bradycardia ST segments:     ST segments:  Non-specific   T waves:     T waves: normal        ED Treatment:   Medication Administration from 02/22/2022 1936 to 02/23/2022 9524       Date/Time Order Dose Route Action 02/22/2022 2115 famotidine (PEPCID) injection 20 mg 20 mg Intravenous Given     02/22/2022 2117 sodium chloride 0 9 % bolus 1,000 mL 1,000 mL Intravenous New Bag     02/23/2022 0002 ALPRAZolam (XANAX) tablet 0 25 mg 0 25 mg Oral Given     02/23/2022 0001 HYDROmorphone (DILAUDID) injection 0 5 mg 0 5 mg Intravenous Given     02/23/2022 0002 sucralfate (CARAFATE) tablet 1 g 1 g Oral Given     02/23/2022 0002 dicyclomine (BENTYL) tablet 20 mg 20 mg Oral Given     02/23/2022 0726 levothyroxine tablet 25 mcg 25 mcg Oral Given     02/23/2022 0427 gabapentin (NEURONTIN) capsule 100 mg 100 mg Oral Given     02/23/2022 0427 oxyCODONE (ROXICODONE) IR tablet 2 5 mg 2 5 mg Oral Given     02/23/2022 0550 heparin (porcine) subcutaneous injection 5,000 Units 5,000 Units Subcutaneous Given        Past Medical History:   Diagnosis Date    A-fib ([place] [address] )     Cardiac disease     Hyperlipidemia     Hypertension      Present on Admission:   Anxiety   Arthritis   Chronic diastolic congestive heart failure (HCC)   CKD (chronic kidney disease), stage IV ([place] [address] )   Hyperlipidemia   Hyponatremia   Hypothyroidism   Hypertension   Mild aortic stenosis   Paroxysmal atrial fibrillation (HCC)      Admitting Diagnosis: Hyponatremia [E87 1]  Abdominal pain [R10 9]  Abnormal ECG [R94 31]  ISABELLE (acute kidney injury) ([place] [address] ) [N17 9]  Acute bilateral lower abdominal pain [R10 31, R10 32]  History of right nephrectomy [Z90 5]  Age/Sex: 80 y o  female  Admission Orders:  Scheduled Medications:  amiodarone, 100 mg, Oral, Daily  aspirin, 81 mg, Oral, Every Other Day  gabapentin, 100 mg, Oral, HS  heparin (porcine), 5,000 Units, Subcutaneous, Q8H LATHA  levothyroxine, 25 mcg, Oral, Every Other Day  [START ON 2/24/2022] levothyroxine, 37 5 mcg, Oral, Every Other Day  lubiprostone, 24 mcg, Oral, BID With Meals  pilocarpine, 5 mg, Oral, BID  polyethylene glycol, 17 g, Oral, Daily  pravastatin, 40 mg, Oral, Daily With Dinner  predniSONE, 1 mg, Oral, Daily  senna, 2 tablet, Oral, BID      Continuous IV Infusions:     PRN Meds:  acetaminophen, 650 mg, Oral, Q6H PRN  ALPRAZolam, 0 25 mg, Oral, 4x Daily PRN  calcium carbonate, 1,000 mg, Oral, Daily PRN  ondansetron, 4 mg, Intravenous, Q6H PRN  oxyCODONE, 2 5 mg, Oral, Q4H PRN  saliva substitute, 5 spray, Mouth/Throat, TID PRN  zolpidem, 5 mg, Oral, HS PRN        IP CONSULT TO NUTRITION SERVICES    Network Utilization Review Department  ATTENTION: Please call with any questions or concerns to [phone] and carefully listen to the prompts so that you are directed to the right person  All voicemails are confidential   Magdy Leo all requests for admission clinical reviews, approved or denied determinations and any other requests to dedicated fax number below belonging to the campus where the patient is receiving treatment   List of dedicated fax numbers for the Facilities:  [address] DENIALS (Administrative/Medical Necessity) [phone]   [address] (Maternity/NICU/Pediatrics) [phone]   [address] [place] [address] [address] [place] [address]  [address] [facility] [place] [address] [address] [place] [address] Merissa Cronin [address] [facility] [address] [phone]

## 2022-02-23 NOTE — ASSESSMENT & PLAN NOTE
Lab Results   Component Value Date    EGFR 18 02/22/2022    EGFR 19 02/09/2022    EGFR 29 04/09/2021    CREATININE 2 25 (H) 02/22/2022    CREATININE 2 09 (H) 02/09/2022    CREATININE 1 53 (H) 04/09/2021     Creatinine is slightly above patient's baseline  Received 1 L normal saline in the ER  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

## 2022-02-23 NOTE — PHYSICAL THERAPY NOTE
PHYSICAL THERAPY EVALUATION  NAME: Denisse Vergara  AGE:   80 y o  MRN:  [id number]  ADMIT DX: Hyponatremia [E87 1]  Abdominal pain [R10 9]  Abnormal ECG [R94 31]  ISABELLE (acute kidney injury) ([facility] [address] ) [N17 9]  Acute bilateral lower abdominal pain [R10 31, R10 32]  History of right nephrectomy [Z90 5]    PMH:   Past Medical History:   Diagnosis Date    A-fib [facility])     Cardiac disease     Hyperlipidemia     Hypertension      LENGTH OF STAY: 0     22 1516   PT Last Visit   PT Visit Date 22   Note Type   Note type Evaluation   Pain Assessment   Pain Assessment Tool 0-10   Pain Score No Pain   Restrictions/Precautions   Weight Bearing Precautions Per Order No   Other Precautions Fall Risk  (Masimo)   Home Living   Type of [address] [place] [address] Two level; Able to live on main level with bedroom/bathroom;Stairs to enter with rails  (3 JOSE)   Bathroom Shower/Tub   (pt spongebathes)   Bathroom Equipment Commode  (by bedside at night)   Home Equipment Walker;Cane   Additional Comments Ambulates independently without AD inside the home at baseline  (Uses a RW or a SPC outside the home at baseline  )   Prior Function   Level of [place] Independent with ADLs and functional mobility   Lives With Daughter   Receives Help From Family   ADL Assistance Independent   IADLs Needs assistance   Falls in the last 6 months 0   General   Family/Caregiver Present No   Cognition   Overall Cognitive Status Impaired  (mildly)   Arousal/Participation Cooperative   Orientation Level Oriented X4   Following Commands Follows one step commands with increased time or repetition   Comments Pt identified by name and   Subjective   Subjective "I play solitare and puzzles "  Pt agrees to PT evaluation  Pleasant and cooperative throughout session      RLE Assessment   RLE Assessment X   Strength RLE   RLE Overall Strength 4-/5  (functionally)   LLE Assessment   LLE Assessment X   Strength LLE   LLE Overall Strength 4-/5  (functionally)   Bed Mobility   Supine to Sit Unable to assess  (OOB in chair prior to session, with OT at end of session)   Transfers   Sit to Stand 4  Minimal assistance   Additional items Assist x 1; Increased time required;Verbal cues;Armrests   Stand to Sit 4  Minimal assistance   Additional items Assist x 1; Increased time required;Verbal cues   Stand pivot 4  Minimal assistance   Additional items Increased time required;Verbal cues  (without AD)   Ambulation/Elevation   Gait pattern Improper Weight shift; Forward Flexion;Decreased foot clearance; Short stride; Excessively slow  (poor RW proximity)   Gait Assistance 4  Minimal assist  (progressed to supervision with RW)   Additional items Assist x 1;Verbal cues  (without AD)   Assistive Device None  (trials with and without RW)   Distance 10` x1 without AD and ~45` x1 with RW   Balance   Static Sitting Good   Dynamic Sitting Fair +   Static Standing Fair -   Dynamic Standing Fair -   Ambulatory Poor +   Endurance Deficit   Endurance Deficit Yes   Endurance Deficit Description limited ambulation distance, fatigue   Activity Tolerance   Activity Tolerance Patient limited by fatigue   Nurse Made Aware Per RN, pt appropriate to evaluate   Assessment   Prognosis Good   Problem List Decreased strength;Decreased endurance; Impaired balance;Decreased mobility; Decreased safety awareness   Goals   Patient Goals to walk again   STG Expiration Date 03/05/22   Short Term Goal #1 Pt will be able to: (1) perform bed mobility with mod I to decrease caregiver burden (2) perform sit to stand with mod I to increase level of independence and promote safe toiletting and transfers (3) ambulate at least 200` with mod I and least restrictive AD to increase activity tolerance and allow for safe community mobilization (4) increase standing balance by 1 grade to decrease risk of falls (5) negotiate at least 3 stairs with mod I and use of handrail to allow safe access into home   PT Treatment Day 0   Plan   Treatment/Interventions Functional transfer training;LE strengthening/ROM; Therapeutic exercise; Endurance training;Patient/family training;Equipment eval/education;Gait training;Bed mobility   PT Frequency 3-5x/wk   Recommendation   PT Discharge Recommendation Home with home health rehabilitation   Equipment Recommended [place] walker   AM-PAC Basic Mobility Inpatient   Turning in Bed Without Bedrails 4   Lying on Back to Sitting on Edge of Flat Bed 3   Moving Bed to Chair 3   Standing Up From Chair 3   Walk in Room 3   Climb 3-5 Stairs 3   Basic Mobility Inpatient Raw Score 19   Basic Mobility Standardized Score 42 48   Highest Level Of Mobility   JH-HLM Goal 6: Walk 10 steps or more   JH-HLM Highest Level of Mobility 7: Walk 25 feet or more   JH-HLM Goal Achieved Yes   End of Consult   Patient Position at End of Consult Seated edge of bed; All needs within reach  (with OT)   The patient's AM-PAC Basic Mobility Inpatient Short Form Raw Score is 19  A Raw score of greater than 16 suggests the patient may benefit from discharge to home  Please also refer to the recommendation of the Physical Therapist for safe discharge planning  Assessment: Pt is a 80 y o  female seen for PT evaluation s/p admit to [address] [place] [address] on 2/22/2022 w/ Acute bilateral lower abdominal pain  Order placed for PT  Comorbidities affecting pt's physical performance at time of assessment include: HTN and A-fib  Personal factors affecting pt at time of IE include: steps to enter environment and advanced age  Prior to admission, pt was was independent w/ all functional mobility w/ use of AD outside of home only, lived in one floor environment, had 3 JOSE (+) railing and lived with daughter  Upon evaluation: Pt requires min A for sit to stand, min A for ambulation without AD, and supervision for ambulation with RW     (Please find full objective findings from PT assessment regarding body systems outlined above)  Impairments and limitations also listed above, especially due to  weakness, impaired balance, decreased endurance, gait deviations, decreased activity tolerance, decreased safety awareness and fall risk  Pt's clinical presentation is currently unstable/unpredictable seen in pt's presentation of decreased safety awareness, fall risk, and limited insight into deficits  Pt to benefit from continued skilled PT tx while in hospital and upon DC to address deficits as defined above and maximize level of functional mobility  From PT/mobility standpoint, recommendation at time of d/c would be Home PT, home with family support and with rolling walker pending progress  Recommend progression of ambulation and initiation of HEP as appropriate        Eveline Gaona, PT,DPT

## 2022-02-23 NOTE — ASSESSMENT & PLAN NOTE
Wt Readings from Last 3 Encounters:   02/15/22 82 6 kg (182 lb)   02/09/22 82 6 kg (182 lb)   10/05/21 81 2 kg (179 lb)     Patient appears euvolemic, will hold morning dose of Lasix pending review of lab work

## 2022-02-23 NOTE — ASSESSMENT & PLAN NOTE
CT abdomen pelvis shows progression of chronic compression deformity of T12 vertebral body but no acute fracture or lesion  Discussed with patient and daughter, will start gabapentin at bedtime  PT/OT  No recent falls

## 2022-02-23 NOTE — ASSESSMENT & PLAN NOTE
Patient reports acute going on chronic bilateral lower abdominal pain, burning in nature, postprandial  Minimal improvement in the ER with IV narcotic, Bentyl, Pepcid and sucralfate  Unknown etiology, may be multifactorial due to radiculopathy from T12 compression fracture, hypothyroidism, hiatal hernia, functional vs other  Hx of IBD on linzess     CT A/P - surgically absent appendex, gallbladder, uterus; colonic diverticulosis without diverticulitis; unremarkable urinary bladder; status post right nephrectomy; small hiatal hernia  UA benign   Patient reports 10 lb weight loss for last 2 weeks due to abdominal pain, c/s RDN   Encourage reg BMs with daily bowel regimen   Pain control, oral hydration, diet as tolerated

## 2022-02-23 NOTE — ASSESSMENT & PLAN NOTE
Supposed to be taking Synthroid alternating doses 25 mcg/37 5 mcg but noncompliant because she thought this was causing her pain  Encouraged to continue alternating doses since TSH is elevated

## 2022-02-23 NOTE — PLAN OF CARE
Problem: MOBILITY - ADULT  Goal: Maintain or return to baseline ADL function  Description: INTERVENTIONS:  -  Assess patient's ability to carry out ADLs; assess patient's baseline for ADL function and identify physical deficits which impact ability to perform ADLs (bathing, care of mouth/teeth, toileting, grooming, dressing, etc )  - Assess/evaluate cause of self-care deficits   - Assess range of motion  - Assess patient's mobility; develop plan if impaired  - Assess patient's need for assistive devices and provide as appropriate  - Encourage maximum independence but intervene and supervise when necessary  - Involve family in performance of ADLs  - Assess for home care needs following discharge   - Consider OT consult to assist with ADL evaluation and planning for discharge  - Provide patient education as appropriate  Outcome: Progressing  Goal: Maintains/Returns to pre admission functional level  Description: INTERVENTIONS:  - Perform BMAT or MOVE assessment daily    - Set and communicate daily mobility goal to care team and patient/family/caregiver  - Collaborate with rehabilitation services on mobility goals if consulted  - Reposition patient every 2 hours    - Out of bed for toileting  - Record patient progress and toleration of activity level   Outcome: Progressing     Problem: Prexisting or High Potential for Compromised Skin Integrity  Goal: Skin integrity is maintained or improved  Description: INTERVENTIONS:  - Identify patients at risk for skin breakdown  - Assess and monitor skin integrity  - Assess and monitor nutrition and hydration status  - Monitor labs   - Assess for incontinence   - Turn and reposition patient  - Assist with mobility/ambulation  - Relieve pressure over bony prominences  - Avoid friction and shearing  - Provide appropriate hygiene as needed including keeping skin clean and dry  - Evaluate need for skin moisturizer/barrier cream  - Collaborate with interdisciplinary team   - Patient/family teaching  - Consider wound care consult   Outcome: Progressing

## 2022-02-23 NOTE — ASSESSMENT & PLAN NOTE
This appears to be chronic, 130 on admission and given 1 L normal saline bolus in the ER  Will recheck in the morning on BMP

## 2022-02-23 NOTE — OCCUPATIONAL THERAPY NOTE
Occupational Therapy Evaluation      Marlys Bush    2/23/2022    Patient Active Problem List   Diagnosis    Oral pain of unknown etiology    Paroxysmal atrial fibrillation (HCC)    Hypothyroidism    Anxiety    Cardiomyopathy ([address] )    CKD (chronic kidney disease), stage IV (HCC)    Arthritis    Chronic diastolic congestive heart failure (HCC)    Hyperlipidemia    Hypertension    Insomnia    Contact dermatitis    Sinus bradycardia    Mild aortic stenosis    Bronchitis    Fall    Hematoma of right hip    Constipation    Chronic low back pain without sciatica    Closed stable burst fracture of twelfth thoracic vertebra with routine healing    Hyponatremia    Chronic kidney disease, stage 5 (HCC)    Acute bilateral lower abdominal pain    Xerostomia       Past Medical History:   Diagnosis Date    A-fib [facility])     Cardiac disease     Hyperlipidemia     Hypertension        Past Surgical History:   Procedure Laterality Date    APPENDECTOMY      CHOLECYSTECTOMY      HERNIA REPAIR      HYSTERECTOMY      NEPHRECTOMY Right         02/23/22 1507   OT Last Visit   OT Visit Date 02/23/22   Note Type   Note type Evaluation   Restrictions/Precautions   Weight Bearing Precautions Per Order No   Other Precautions Fall Risk  (Masimo )   Pain Assessment   Pain Assessment Tool 0-10   Pain Score No Pain   Home Living   Type of Home House   Home Layout Two level; Able to live on main level with bedroom/bathroom;Stairs to enter with rails; Performs ADLs on one level  (3 JOSE , FFSU, does not access 2nd floor )   Bathroom Shower/Tub None  (spongebathes )   Bathroom Toilet Standard   Bathroom Equipment Commode  (BSC at bedside, PM use )   [address] Walker;Cane  (used at baseline for community mob  )   Additional Comments Pt reports furniture/wall surfing within her home, uses SPC vs RW  for community mobility      Prior Function   Level of [place] Independent with ADLs and functional mobility   Lives With Daughter   Receives Help From Family   ADL Assistance Independent   IADLs Needs assistance  ((I) med mgmt and simple meal prep, A for cooking/cleaning )   Falls in the last 6 months 0  ((+) fall Hx, no falls since last admission )   Vocational Retired   Comments pt reports managing medications with pill box (I)  Completes simple meal prep, but daughter assists with cleaning and cooking  Pt has electric recliner she enjoys sitting in  Lifestyle   Autonomy Pt reports being (I) with ADLs and needs assistance with IADLs PTA  Pt lives with daughter in 2 story home with home with FFSU and 3 JOSE  SPC vs RW used for community mobility  Reciprocal Relationships daughter    Service to Others retired    Intrinsic Gratification enjoys playing [employer] on her tablet, doing puzzles with her sister, and cross word puzzles  Pt enjoys walking outside when it is nice out and sitting in the sun    ADL   Eating Assistance 5  Supervision/Setup   Grooming Assistance 5  Supervision/Setup   UB Bathing Assistance 5  Supervision/Setup   LB [address] 5  Supervision/Setup   Additional Comments Pt limited by decreased balance, decreased activity tolerance, and fear of falling/ generalized anxiety  Bed Mobility   Supine to Sit   (DNT: Pt seated on BSC upon arrival)   Sit to Supine 5  Supervision   Additional items Assist x 1;Verbal cues   Additional Comments Assistance to boost to [facility]  Transfers   Sit to Stand 4  Minimal assistance   Additional items Assist x 1; Increased time required;Verbal cues;Armrests  (RW)   Stand to Sit 4  Minimal assistance   Additional items Assist x 1; Increased time required;Verbal cues  (RW)   Toilet transfer 4  Minimal assistance   Additional items Assist x 1; Increased time required;Verbal cues; Commode  (RW)   Additional Comments VC for proper hand placement during transfers  toilet transfers to [facility] completed x2 trials     Functional Mobility   Functional Mobility 4  Minimal assistance   Additional Comments Min assistance without RW, supervision with RW  Functional mobility  BSC <>door   grossly unsteady without RW    Additional items Rolling walker   Balance   Static Sitting Good   Dynamic Sitting Fair +   Static Standing Fair -   Dynamic Standing Fair -   Activity Tolerance   Activity Tolerance Patient limited by fatigue   Medical Staff Made Aware Communication with PT   Nurse Made Aware RN [facility] verbalized pt appropriate to see, made aware of outcomes    RUE Assessment   RUE Assessment WFL  (Full AROM, MMT 4/5 )   LUE Assessment   LUE Assessment WFL  (Full AROM, MMT 4/5 )   Hand Function   Gross Motor Coordination Functional   Fine Motor Coordination Functional   Sensation   Light Touch No apparent deficits  (per pt )   Vision-Basic Assessment   Current Vision Wears glasses only for reading   Cognition   Overall Cognitive Status Impaired  (age related changes )   Arousal/Participation Alert; Cooperative   Attention Difficulty attending to directions   Orientation Level Oriented to person;Oriented to place   Memory Decreased short term memory  (able to recall 2/3 words per Mini Cog )   Following Commands Follows one step commands with increased time or repetition   Comments Mini-Cog assessment performed today  Version 1 was given  Patient able to recall 2/3 words  Patient able to draw a normal clock with the incorrect time  Total score of test was 3/5 indicating  further screening of cognition is recommended  (Pt agreeable to OT session, pleasant )   Cognition Assessment Tools Other (Comment)  (Mini Cog )   Score 3   Assessment   Limitation Decreased ADL status; Decreased Safe judgement during ADL;Decreased UE strength;Decreased endurance;Decreased fine motor control;Decreased self-care trans;Decreased high-level ADLs   Prognosis Good;Fair   Assessment Patient is a 80 y o  female seen for OT evaluation at [facility] following admission on 2/22/2022  s/p Acute bilateral lower abdominal pain  Comorbidities impacting functional performance include: anxiety, CKD, arthritis, HTN, mild aortic stenosis, healing fx of T12 vertebra, anxiety, (+) fall hx, chronic LBP, CKD, cardiomyopathy, CHF  Patient presents with active orders for OT eval and treat and Up with assistance   Performed at least 2 patient identifiers during session including name and wristband  Pt reports being (I) with ADLs and needs assistance with IADLs PTA  Pt lives with daughter in 2 story home with home with FFSU and 3 JOSE  SPC vs RW used for community mobility  Upon initial evaluation, patient requires supervision for UB ADLs, min assistance for LB ADLs, and supervision for transfers and functional mobility within room and bathroom with the use of with RW  Based on functional eval, patient presents A&Ox2 with impaired  attention, impaired  safety awareness, and impaired  short term and long term memory  Please see evaluation for Mini Cog scores  Occupational performance is affected by the following deficits: endurance ,  decreased muscular strength , decreased standing tolerance for self care tasks , decreased dynamic balance impacting functional reach, memory , attention span and impaired safety awareness  Based on these findings, functional performance deficits, and assessment findings, pt has been identified as a high complexity evaluation  Personal factors impacting performance at the time of evaluation include: decreased (+) Hx of falls , steps to enter home, decreased IADL independence and High fall risk   Patient would benefit from OT services to maximize level of functional independence and safety in self-care and transfers    Occupational areas to address include:bathing/showering, toileting, personal hygiene/grooming , functional mobility, transfer to all surfaces, medication management , meal preparation and household management  From OT standpoint, recommendation at time of D/C would be return to previous environment with home health rehabilitation  Goals   Patient Goals to walk around more, to be able to complete all prior tasks    Plan   Treatment Interventions ADL retraining;Functional transfer training;Cognitive reorientation;UE strengthening/ROM; Compensatory technique education; Endurance training;Energy conservation;Patient/family training  (compensatory memory strategies )   Goal Expiration Date 03/05/22   OT Treatment Day 0   OT Frequency 2-3x/wk   Recommendation   OT Discharge Recommendation Home with home health rehabilitation   OT - OK to Discharge Yes  (once medically clear)   Additional Comments  Pt resting in bed at end of session c call button in  reach and bed alarm activated    Additional Comments 2 The patient's raw score on the AM-PAC Daily Activity inpatient short form is 21, standardized score is 44 27, greater than 39 4  Patients at this level are likely to benefit from discharge to home  Please refer to the recommendation of the Occupational Therapist for safe discharge planning  AM-PAC Daily Activity Inpatient   Lower Body Dressing 3   Bathing 3   Toileting 3   Upper Body Dressing 4   Grooming 4   Eating 4   Daily Activity Raw Score 21   Daily Activity Standardized Score (Calc for Raw Score >=11) 44 27   AM-PAC Applied Cognition Inpatient   Following a Speech/Presentation 2   Understanding Ordinary Conversation 3   Taking Medications 3   Remembering Where Things Are Placed or Put Away 3   Remembering List of 4-5 Errands 3   Taking Care of Complicated Tasks 3   Applied Cognition Raw Score 17   Applied Cognition Standardized Score 36 52   Pt will complete UB ADLs Independent  with use of AE as needed for increased ADL independence within 10 days       Pt will complete LB ADLs Independent   with use of AE as needed for increased ADL independence within 10 days  Pt will complete toileting Mod independent   with use of DME for increased ADL independence within 10 days  Pt will demonstrate proper body mechanics to complete transfers and functional mobility with Independent  and use of AD for increased safety and functional mobility within 10 days  Pt will demonstrate standing tolerance of 5 min with Supervision and use of AD for increased endurance and activity tolerance within 10 days  Pt will demonstrate OOB sitting tolerance of 2-4 hr/day for increased activity tolerance and engagement within 10 days  Pt will participate in 10 min of BUE therapeutic exercise to increase strength, ROM, and endurance during ADL/IADLs within 10 days  Pt will receive education on use of compensatory memory strategies for increased safety and independent with IADL tasks  Pt will attend to treatment task or activity for 5 minutes without need for redirection to improve activity engagement within 10 days  Pt will participate in ongoing cognitive assessments to assist with safe D/C planning and recommendations       Pt will demonstrate proper body mechanics and fall prevention strategies during 100% of tx sessions for increased safety awareness during ADL/IADLs    Ngoc Robins, OT

## 2022-02-23 NOTE — ED PROVIDER NOTES
History  Chief Complaint   Patient presents with    Abdominal Pain     patient reports lower abdominal pain for 3 weeks  denies N/V/D     Patient is a 70-year-old female history of atrial fibrillation, hyperlipidemia, hypertension, appendectomy, cholecystectomy, hernia repair, the presents emergency department with generalized dull aching nonradiating abdominal pain for 1 day  Patient has associated symptomatology of constipation symptoms beginning the current presentation of generalized abdominal pain  Patient has been recently placed on Ambien 0 5 mg on 02/11/2022  Patient states that she she has baseline dietary item appetite at this time  Affirms palliative factors of bowel movement with provocative factors of pressure to generalized abdomen  Patient denies not effective treatment  Patient denies fevers, chills, nausea, vomiting, diarrhea and urinary symptoms  Patient denies recent fall or recent trauma  Patient denies sick contacts or recent travel  Patient denies chest pain and shortness of breath  History provided by:  Patient   used: No        Prior to Admission Medications   Prescriptions Last Dose Informant Patient Reported? Taking?    ALPRAZolam (XANAX) 0 25 mg tablet   No No   Sig: TAKE ONE TABLET BY MOUTH FOUR TIMES A DAY AS NEEDED FOR ANXIETY   Artificial Saliva (Xerostomia Relief Spray) SOLN  Child No No   Sig: Apply 1 application to the mouth or throat 4 (four) times a day   Crestor 5 MG tablet  Child No No   Sig: TAKE ONE TABLET BY MOUTH EVERY DAY   Emollient (EUCERIN) lotion  Child Yes No   Sig: Apply topically 2 (two) times a day   Respiratory Therapy Supplies (NEBULIZER/ADULT MASK) KIT  Child No No   Sig: by Does not apply route every 4 (four) hours as needed (wheezing)   Synthroid 25 MCG tablet  Child No No   Sig: Take 1 5 tablets (37 5 mcg total) by mouth daily   acetaminophen (TYLENOL) 325 mg tablet  Child No No   Sig: Take 3 tablets (975 mg total) by mouth every 8 (eight) hours   amiodarone 100 mg tablet  Child No No   Sig: Take 1 tablet (100 mg total) by mouth daily   aspirin (ECOTRIN LOW STRENGTH) 81 mg EC tablet  Child Yes No   Sig: Take 81 mg by mouth every other day   clotrimazole (MYCELEX) 10 mg florecita  Child No No   Sig: Take 1 tablet (10 mg total) by mouth 5 (five) times a day Dissolve 1 tab by mouth 5 times a day   docusate sodium (COLACE) 100 mg capsule  Child No No   Sig: Take 1 capsule (100 mg total) by mouth 2 (two) times a day   famotidine (PEPCID) 40 MG tablet  Child No No   Sig: Take 1 tablet (40 mg total) by mouth daily   furosemide (LASIX) 20 mg tablet  Child No No   Sig: TAKE TWO TABLETS BY MOUTH EVERY DAY   levothyroxine (Levoxyl) 25 mcg tablet  Child No No   Sig: Take 1 5 tablets (37 5 mcg total) by mouth daily   linaCLOtide (Linzess) 72 MCG CAPS  Child No No   Sig: Take 1 capsule by mouth daily   loperamide (IMODIUM) 2 mg capsule  Child No No   Sig: Take 1 capsule (2 mg total) by mouth 3 (three) times a day as needed for diarrhea   methocarbamol (ROBAXIN) 500 mg tablet  Child No No   Sig: Take 1 tablet (500 mg total) by mouth every 6 (six) hours as needed for muscle spasms   pilocarpine (SALAGEN) 5 mg tablet  Child No No   Sig: Take 1 tablet (5 mg total) by mouth 2 (two) times a day   polyethylene glycol (MIRALAX) 17 g packet  Child No No   Sig: Take 17 g by mouth daily as needed (constipation)   predniSONE 1 mg tablet  Child No No   Sig: Take 1 tablet (1 mg total) by mouth daily   triamcinolone (KENALOG) 0 1 % cream  Child Yes No   Sig: Apply topically Twice daily   valACYclovir (VALTREX) 500 mg tablet   No No   Sig: Take 1 tablet (500 mg total) by mouth 3 (three) times a day for 10 days   Patient not taking: Reported on 2/9/2022    zolpidem (AMBIEN CR) 12 5 MG CR tablet  Child No No   Sig: TAKE ONE TABLET BY MOUTH AT BEDTIME      Facility-Administered Medications: None       Past Medical History:   Diagnosis Date    [facility]-fib [facility])     Cardiac disease     Hyperlipidemia     Hypertension        Past Surgical History:   Procedure Laterality Date    APPENDECTOMY      CHOLECYSTECTOMY      HERNIA REPAIR      HYSTERECTOMY      NEPHRECTOMY Right        Family History   Problem Relation Age of Onset    Heart disease Family     Hypertension Family     Cancer Family      I have reviewed and agree with the history as documented  E-Cigarette/Vaping     E-Cigarette/Vaping Substances     Social History     Tobacco Use    Smoking status: Never Smoker    Smokeless tobacco: Never Used   Substance Use Topics    Alcohol use: No    Drug use: Never       Review of Systems   Constitutional: Negative for activity change, appetite change, chills and fever  HENT: Negative for congestion, postnasal drip, rhinorrhea, sinus pressure, sinus pain, sore throat and tinnitus  Eyes: Negative for photophobia and visual disturbance  Respiratory: Negative for cough, chest tightness and shortness of breath  Cardiovascular: Negative for chest pain and palpitations  Gastrointestinal: Positive for abdominal distention, abdominal pain and constipation  Negative for diarrhea, nausea and vomiting  Genitourinary: Negative for difficulty urinating, dysuria, flank pain, frequency and urgency  Musculoskeletal: Negative for back pain, gait problem, neck pain and neck stiffness  Skin: Negative for pallor and rash  Allergic/Immunologic: Negative for environmental allergies and food allergies  Neurological: Negative for dizziness, weakness, numbness and headaches  Psychiatric/Behavioral: Negative for confusion  All other systems reviewed and are negative  Physical Exam  Physical Exam  Vitals and nursing note reviewed  Constitutional:       General: She is awake  Appearance: Normal appearance  She is well-developed  She is not ill-appearing, toxic-appearing or diaphoretic        Comments: /65 (BP Location: Right arm)   Pulse 58   Temp 98 1 °F (36 7 °C) (Oral)   Resp 20   SpO2 94%      HENT:      Head: Normocephalic and atraumatic  Right Ear: Hearing and external ear normal  No decreased hearing noted  No drainage, swelling or tenderness  No mastoid tenderness  Left Ear: Hearing and external ear normal  No decreased hearing noted  No drainage, swelling or tenderness  No mastoid tenderness  Nose: Nose normal       Mouth/Throat:      Lips: Pink  Mouth: Mucous membranes are moist       Pharynx: Oropharynx is clear  Uvula midline  Eyes:      General: Lids are normal  Vision grossly intact  Right eye: No discharge  Left eye: No discharge  Extraocular Movements: Extraocular movements intact  Conjunctiva/sclera: Conjunctivae normal       Pupils: Pupils are equal, round, and reactive to light  Neck:      Vascular: No JVD  Trachea: Trachea and phonation normal  No tracheal tenderness or tracheal deviation  Cardiovascular:      Rate and Rhythm: Normal rate and regular rhythm  Pulses: Normal pulses  Radial pulses are 2+ on the right side and 2+ on the left side  Posterior tibial pulses are 2+ on the right side and 2+ on the left side  Heart sounds: Normal heart sounds  Pulmonary:      Effort: Pulmonary effort is normal       Breath sounds: Normal breath sounds  No stridor  No decreased breath sounds, wheezing, rhonchi or rales  Chest:      Chest wall: No tenderness  Abdominal:      General: Abdomen is flat  Bowel sounds are normal  There is no distension  Palpations: Abdomen is soft  Abdomen is not rigid  Tenderness: There is generalized abdominal tenderness  There is no right CVA tenderness, left CVA tenderness, guarding or rebound  Musculoskeletal:         General: Normal range of motion  Cervical back: Full passive range of motion without pain, normal range of motion and neck supple  No rigidity  No spinous process tenderness or muscular tenderness   Normal range of motion  Lymphadenopathy:      Head:      Right side of head: No submental, submandibular, tonsillar, preauricular, posterior auricular or occipital adenopathy  Left side of head: No submental, submandibular, tonsillar, preauricular, posterior auricular or occipital adenopathy  Cervical: No cervical adenopathy  Right cervical: No superficial, deep or posterior cervical adenopathy  Left cervical: No superficial, deep or posterior cervical adenopathy  Skin:     General: Skin is warm  Capillary Refill: Capillary refill takes less than 2 seconds  Neurological:      General: No focal deficit present  Mental Status: She is alert and oriented to person, place, and time  GCS: GCS eye subscore is 4  GCS verbal subscore is 5  GCS motor subscore is 6  Sensory: No sensory deficit  Psychiatric:         Mood and Affect: Mood normal          Speech: Speech normal          Behavior: Behavior normal  Behavior is cooperative  Thought Content:  Thought content normal          Judgment: Judgment normal          Vital Signs  ED Triage Vitals   Temperature Pulse Respirations Blood Pressure SpO2   02/22/22 2010 02/22/22 2010 02/22/22 2010 02/22/22 2010 02/22/22 2010   98 1 °F (36 7 °C) 58 20 134/65 94 %      Temp Source Heart Rate Source Patient Position - Orthostatic VS BP Location FiO2 (%)   02/22/22 2010 02/22/22 2010 02/22/22 2010 02/22/22 2010 --   Oral Monitor Sitting Right arm       Pain Score       02/23/22 0001       9           Vitals:    02/22/22 2010   BP: 134/65   Pulse: 58   Patient Position - Orthostatic VS: Sitting         Visual Acuity      ED Medications  Medications   famotidine (PEPCID) injection 20 mg (20 mg Intravenous Given 2/22/22 2115)   sodium chloride 0 9 % bolus 1,000 mL (0 mL Intravenous Stopped 2/23/22 0006)   ALPRAZolam (XANAX) tablet 0 25 mg (0 25 mg Oral Given 2/23/22 0002)   HYDROmorphone (DILAUDID) injection 0 5 mg (0 5 mg Intravenous Given 2/23/22 0001)   sucralfate (CARAFATE) tablet 1 g (1 g Oral Given 2/23/22 0002)   dicyclomine (BENTYL) tablet 20 mg (20 mg Oral Given 2/23/22 0002)       Diagnostic Studies  Results Reviewed     Procedure Component Value Units Date/Time    Urine Microscopic [[id number]]  (Normal) Collected: 02/22/22 2233    Lab Status: Final result Specimen: Urine, Clean Catch Updated: 02/23/22 0001     RBC, UA None Seen /hpf      WBC, UA 0-1 /hpf      Epithelial Cells Occasional /hpf      Bacteria, UA None Seen /hpf     HS Troponin I 2hr [[id number]]  (Normal) Collected: 02/22/22 2301    Lab Status: Final result Specimen: Blood from Arm, Left Updated: 02/22/22 2350     hs TnI 2hr 20 ng/L      Delta 2hr hsTnI 1 ng/L     HS Troponin I 4hr [[id number]]     Lab Status: No result Specimen: Blood     Urine Macroscopic, POC [[id number]]  (Abnormal) Collected: 02/22/22 2233    Lab Status: Final result Specimen: Urine Updated: 02/22/22 2234     Color, UA Yellow     Clarity, UA Clear     pH, UA 5 5     Leukocytes, UA Small     Nitrite, UA Negative     Protein, UA Negative mg/dl      Glucose, UA Negative mg/dl      Ketones, UA Negative mg/dl      Urobilinogen, UA 0 2 E U /dl      Bilirubin, UA Negative     Blood, UA Negative     Specific Gravity, UA <=1 005    Narrative:      CLINITEK RESULT    Digoxin level [[id number]]  (Abnormal) Collected: 02/22/22 2107    Lab Status: Final result Specimen: Blood Updated: 02/22/22 2225     Digoxin Lvl <0 2 ng/mL     Magnesium [[id number]]  (Normal) Collected: 02/22/22 2015    Lab Status: Final result Specimen: Blood from Arm, Left Updated: 02/22/22 2216     Magnesium 2 3 mg/dL     [place] draw [[id number]] Collected: 02/22/22 2017    Lab Status: Final result Specimen: Blood from Arm, Left Updated: 02/22/22 2201    Narrative: The following orders were created for panel order [place] draw    Procedure                               Abnormality         Status                     --------- -----------         ------                     Jonathan Millet Top on AIFW[[id number]]                           Final result               Green / [place] Ledger tube on WPMQ[[id number]]                       Final result                 Please view results for these tests on the individual orders  TSH, 3rd generation with Free T4 reflex [[id number]]  (Abnormal) Collected: 02/22/22 2015    Lab Status: Final result Specimen: Blood from Arm, Left Updated: 02/22/22 2136     TSH 3RD GENERATON 4 149 uIU/mL     Narrative:      Patients undergoing fluorescein dye angiography may retain small amounts of fluorescein in the body for 48-72 hours post procedure  Samples containing fluorescein can produce falsely depressed TSH values  If the patient had this procedure,a specimen should be resubmitted post fluorescein clearance  T4, free [[id number]] Collected: 02/22/22 2015    Lab Status: In process Specimen: Blood from Arm, Left Updated: 02/22/22 2136    Lactic acid [[id number]]  (Normal) Collected: 02/22/22 2108    Lab Status: Final result Specimen: Blood Updated: 02/22/22 2134     LACTIC ACID 1 7 mmol/L     Narrative:      Result may be elevated if tourniquet was used during collection      HS Troponin 0hr (reflex protocol) [[id number]]  (Normal) Collected: 02/22/22 2107    Lab Status: Final result Specimen: Blood Updated: 02/22/22 2129     hs TnI 0hr 19 ng/L     Protime-INR [[id number]]  (Normal) Collected: 02/22/22 2017    Lab Status: Final result Specimen: Blood from Arm, Left Updated: 02/22/22 2118     Protime 13 6 seconds      INR 1 04    APTT [[id number]]  (Normal) Collected: 02/22/22 2017    Lab Status: Final result Specimen: Blood from Arm, Left Updated: 02/22/22 2118     PTT 30 seconds     Comprehensive metabolic panel [[id number]]  (Abnormal) Collected: 02/22/22 2015    Lab Status: Final result Specimen: Blood from Arm, Left Updated: 02/22/22 2039     Sodium 130 mmol/L      Potassium 4 0 mmol/L      Chloride 96 mmol/L CO2 27 mmol/L      ANION GAP 7 mmol/L      BUN 39 mg/dL      Creatinine 2 25 mg/dL      Glucose 118 mg/dL      Calcium 9 2 mg/dL      AST 23 U/L      ALT 25 U/L      Alkaline Phosphatase 88 U/L      Total Protein 7 2 g/dL      Albumin 4 0 g/dL      Total Bilirubin 1 14 mg/dL      eGFR 18 ml/min/1 73sq m     Narrative:      National Kidney Disease Foundation guidelines for Chronic Kidney Disease (CKD):     Stage 1 with normal or high GFR (GFR > 90 mL/min/1 73 square meters)    Stage 2 Mild CKD (GFR = 60-89 mL/min/1 73 square meters)    Stage 3A Moderate CKD (GFR = 45-59 mL/min/1 73 square meters)    Stage 3B Moderate CKD (GFR = 30-44 mL/min/1 73 square meters)    Stage 4 Severe CKD (GFR = 15-29 mL/min/1 73 square meters)    Stage 5 End Stage CKD (GFR <15 mL/min/1 73 square meters)  Note: GFR calculation is accurate only with a steady state creatinine    Lipase [[id number]]  (Normal) Collected: 02/22/22 2015    Lab Status: Final result Specimen: Blood from Arm, Left Updated: 02/22/22 2039     Lipase 95 u/L     CBC and differential [[id number]]  (Abnormal) Collected: 02/22/22 2015    Lab Status: Final result Specimen: Blood from Arm, Left Updated: 02/22/22 2026     WBC 12 75 Thousand/uL      RBC 4 65 Million/uL      Hemoglobin 15 8 g/dL      Hematocrit 44 2 %      MCV 95 fL      MCH 34 0 pg      MCHC 35 7 g/dL      RDW 12 8 %      MPV 10 3 fL      Platelets 386 Thousands/uL      nRBC 0 /100 WBCs      Neutrophils Relative 81 %      Immat GRANS % 0 %      Lymphocytes Relative 11 %      Monocytes Relative 7 %      Eosinophils Relative 1 %      Basophils Relative 0 %      Neutrophils Absolute 10 21 Thousands/µL      Immature Grans Absolute 0 04 Thousand/uL      Lymphocytes Absolute 1 37 Thousands/µL      Monocytes Absolute 0 94 Thousand/µL      Eosinophils Absolute 0 14 Thousand/µL      Basophils Absolute 0 05 Thousands/µL                  CT abdomen pelvis wo contrast   ED Interpretation by Shar Hernández PA-C (02/22 [address])   Jacqueline Mandujano MD  [phone] 2/22/2022     Narrative & Impression  CT ABDOMEN AND PELVIS WITHOUT IV CONTRAST     INDICATION:   Abdominal pain, acute, nonlocalized  abd pain      COMPARISON:  CT chest, abdomen and pelvis on 4/3/2021 and thoracolumbar spine radiographs on 4/7/2021      TECHNIQUE:  CT examination of the abdomen and pelvis was performed without intravenous contrast   Axial, sagittal, and coronal 2D reformatted images were created from the source data and submitted for interpretation       Radiation dose length product (DLP) for this visit:  594 mGy-cm   This examination, like all CT scans performed in the [facility], was performed utilizing techniques to minimize radiation dose exposure, including the use of iterative   reconstruction and automated exposure control       Enteric contrast was not administered       FINDINGS:     ABDOMEN     LOWER CHEST:  Small hiatal hernia noted  Mild patchy subsegmental atelectasis and linear scarring in the max   g bases      LIVER/BILIARY TREE:  Unremarkable      GALLBLADDER:  Gallbladder is surgically absent      SPLEEN:  Unremarkable      PANCREAS:  Unremarkable      ADRENAL GLANDS:  Unremarkable      KIDNEYS/URETERS:  Status post right nephrectomy  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  Fullness of the left renal pelvis  No hydronephrosis     STOMACH AND BOWEL:  There is colonic diverticulosis without evidence of acute diverticulitis      APPENDIX:  Prior appendectomy      ABDOMINOPELVIC CAVITY:  No ascites  No pneumoperitoneum  No lymphadenopathy      VESSELS:  Atherosclerotic changes are present  No evidence of aneurysm      PELVIS     REPRODUCTIVE ORGANS:  Surgical changes of prior hysterectomy      URINARY BLADDER:  Unremarkable      ABDOMINAL WALL/INGUINAL REGIONS:  Unremarkable      OSSEOUS STRUCTURES:  No acute fracture or destructive osseous lesion    Interval progression of chronic compression deformity of T12 vertebral body with approximately 80% loss of height  Spinal de   generative changes are noted      IMPRESSION:     1  No acute findings in the abdomen or pelvis  2   Colonic diverticulosis without acute diverticulitis  3   Progression of chronic compression deformity of T12 vertebral body with approximately 80% loss of height            Workstation performed: [id number]        Final Result by Jeremie Lassiter MD (02/22 2321)      1  No acute findings in the abdomen or pelvis  2   Colonic diverticulosis without acute diverticulitis  3   Progression of chronic compression deformity of T12 vertebral body with approximately 80% loss of height              Workstation performed: [id number]                    Procedures  ECG 12 Lead Documentation Only    Date/Time: 2/22/2022 9:37 PM  Performed by: Maria Del Rosario Barrera PA-C  Authorized by: Maria Del Rosario Barrera PA-C     Indications / Diagnosis:  Generalized abdominal pain  ECG reviewed by me, the ED Provider: yes    Patient location:  ED  Previous ECG:     Previous ECG:  Compared to current    Comparison ECG info:  ST changes in AVF, and V3 when compared with ECG of April 5, 2017    Similarity:  Changes noted  Interpretation:     Interpretation: abnormal    Rate:     ECG rate:  56    ECG rate assessment: bradycardic    Rhythm:     Rhythm: sinus bradycardia    Ectopy:     Ectopy: none    QRS:     QRS axis:  Normal    QRS intervals:  Normal  Conduction:     Conduction: normal    ST segments:     ST segments:  Non-specific  T waves:     T waves: normal               ED Course  ED Course as of 02/23/22 0126   Tue Feb 22, 2022 2134 Patient denies taking digoxin   2136 hs TnI 0hr: 19   2255 DIGOXIN LEVEL(!): <0 2  Doubt digoxin toxicity   2306 Creatinine(!): 2 25 Wed Feb 23, 2022   0002 Bacteria, UA: None Seen                                             MDM  Number of Diagnoses or Management Options  ISABELLE (acute kidney injury) ([facility] [address] ): new and does not require workup  Hyponatremia: new and does not require workup     Amount and/or Complexity of Data Reviewed  Clinical lab tests: ordered and reviewed  Review and summarize past medical records: yes    Risk of Complications, Morbidity, and/or Mortality  Presenting problems: moderate  Diagnostic procedures: moderate  Management options: moderate    Patient Progress  Patient progress: stable     Patient is a 28-year-old female history of atrial fibrillation, hyperlipidemia, hypertension, appendectomy, cholecystectomy, hernia repair, the presents emergency department with generalized dull aching nonradiating abdominal pain for 1 day  Patient hemodynamically stable afebrile  Hyponatremia 130, hyperchloremia 96, BUN elevated at 39, serum creatinine 2 25 worsening from 02/09/2022 which was 2 09-1 L bolus of normal saline solution delivered in the ED  Slight leukocytosis, no banding, likely secondary to pain  Normal urinalysis, doubt UTI  ECG with some ST changes when compared with ECG April 5, 2017, patient is not on digoxin; negative serial troponins  Patient denies chest pain and shortness or breath symptoms  CT abdomen pelvis without contrast-impression -"   1  No acute findings in the abdomen or pelvis  2   Colonic diverticulosis without acute diverticulitis    3   Progression of chronic compression deformity of T12 vertebral body with approximately 80% loss of height "  Delivered Bentyl, Pepcid, Dilaudid, Carafate with patient verbalizing decrease in abdominal pain symptoms status post medication delivery  Discussed patient case with slim and both agreed to place patient in patient observational status under care of Dr Azalia Hernandez, Internal Medicine  Patient and patient's daughter with verbal understanding all patient clinical laboratory imaging findings, admission instructions and verbalized agreement patient current treatment plan          Disposition  Final diagnoses:   ISABELLE (acute kidney injury) ([facility] [address] )   Hyponatremia   Abnormal ECG   History of right nephrectomy     Time reflects when diagnosis was documented in both MDM as applicable and the Disposition within this note     Time User Action Codes Description Comment    2/22/2022 11:07 PM Lella Blood Add [N17 9] ISABELLE (acute kidney injury) ([facility] [address] )     2/22/2022 11:07 PM Lella Blood Add [E87 1] Hyponatremia     2/22/2022 11:07 PM Lella Blood Add [R94 31] Abnormal ECG     2/22/2022 11:30 PM Lella Blood Add [Z90 5] History of right nephrectomy       ED Disposition     ED Disposition Condition Date/Time Comment    Admit Stable Tue Feb 22, 2022 11:59 PM Case was discussed with Olya Coppola and the patient's admission status was agreed to be Admission Status: observation status to the service of Dr Aquiles nSeed, Internal Medicine   Follow-up Information    None         Patient's Medications   Discharge Prescriptions    No medications on file       No discharge procedures on file      PDMP Review       Value Time User    PDMP Reviewed  Yes 12/29/2021  7:44 AM Itz Lozano, 10 [facility]           ED Provider  Electronically Signed by           Renée Camacho PA-C  02/23/22 0126       Renée Camacho PA-C  02/23/22 0126

## 2022-02-23 NOTE — PLAN OF CARE
Problem: OCCUPATIONAL THERAPY ADULT  Goal: Performs self-care activities at highest level of function for planned discharge setting  See evaluation for individualized goals  Description: Treatment Interventions: ADL retraining,Functional transfer training,Cognitive reorientation,UE strengthening/ROM,Compensatory technique education,Endurance training,Energy conservation,Patient/family training (compensatory memory strategies )          See flowsheet documentation for full assessment, interventions and recommendations  2/23/2022 1653 by Chayo Stock OT  Note: Limitation: Decreased ADL status,Decreased Safe judgement during ADL,Decreased UE strength,Decreased endurance,Decreased fine motor control,Decreased self-care trans,Decreased high-level ADLs  Prognosis: Good,Fair  Assessment: Patient is a 80 y o  female seen for OT evaluation at [facility] following admission on 2/22/2022  s/p Acute bilateral lower abdominal pain  Comorbidities impacting functional performance include: anxiety, CKD, arthritis, HTN, mild aortic stenosis, healing fx of T12 vertebra, anxiety, (+) fall hx, chronic LBP, CKD, cardiomyopathy, CHF  Patient presents with active orders for OT eval and treat and Up with assistance   Performed at least 2 patient identifiers during session including name and wristband  Pt reports being (I) with ADLs and needs assistance with IADLs PTA  Pt lives with daughter in 2 story home with home with FFSU and 3 JOSE  SPC vs RW used for community mobility  Upon initial evaluation, patient requires supervision for UB ADLs, min assistance for LB ADLs, and supervision for transfers and functional mobility within room and bathroom with the use of with RW  Based on functional eval, patient presents A&Ox2 with impaired  attention, impaired  safety awareness, and impaired  short term and long term memory  Please see evaluation for Mini Cog scores   Occupational performance is affected by the following deficits: endurance ,  decreased muscular strength , decreased standing tolerance for self care tasks , decreased dynamic balance impacting functional reach, memory , attention span and impaired safety awareness  Based on these findings, functional performance deficits, and assessment findings, pt has been identified as a high complexity evaluation  Personal factors impacting performance at the time of evaluation include: decreased (+) Hx of falls , steps to enter home, decreased IADL independence and High fall risk   Patient would benefit from OT services to maximize level of functional independence and safety in self-care and transfers  Occupational areas to address include:bathing/showering, toileting, personal hygiene/grooming , functional mobility, transfer to all surfaces, medication management , meal preparation and household management  From OT standpoint, recommendation at time of D/C would be return to previous environment with home health rehabilitation  OT Discharge Recommendation: Home with home health rehabilitation  OT - OK to Discharge: Yes (once medically clear)    2/23/2022 1652 by Ez Bran OT  Note: Limitation: Decreased ADL status,Decreased Safe judgement during ADL,Decreased UE strength,Decreased endurance,Decreased fine motor control,Decreased self-care trans,Decreased high-level ADLs  Prognosis: Good,Fair  Assessment: Patient is a 80 y o  female seen for OT evaluation at [facility] following admission on 2/22/2022  s/p Acute bilateral lower abdominal pain  Comorbidities impacting functional performance include: anxiety, CKD, arthritis, HTN, mild aortic stenosis, healing fx of T12 vertebra, anxiety, (+) fall hx, chronic LBP, CKD, cardiomyopathy, CHF  Patient presents with active orders for OT eval and treat and Up with assistance   Performed at least 2 patient identifiers during session including name and wristband  Pt reports being (I) with ADLs and needs assistance with IADLs PTA  Pt lives with daughter in 2 story home with home with FFSU and 3 JOSE  SPC vs RW used for community mobility  Upon initial evaluation, patient requires supervision for UB ADLs, min assistance for LB ADLs, and supervision for transfers and functional mobility within room and bathroom with the use of with RW  Based on functional eval, patient presents A&Ox2 with impaired  attention, impaired  safety awareness, and impaired  short term and long term memory  Please see evaluation for Mini Cog scores  Occupational performance is affected by the following deficits: endurance ,  decreased muscular strength , decreased standing tolerance for self care tasks , decreased dynamic balance impacting functional reach, memory , attention span and impaired safety awareness  Based on these findings, functional performance deficits, and assessment findings, pt has been identified as a high complexity evaluation  Personal factors impacting performance at the time of evaluation include: decreased (+) Hx of falls , steps to enter home, decreased IADL independence and High fall risk   Patient would benefit from OT services to maximize level of functional independence and safety in self-care and transfers  Occupational areas to address include:bathing/showering, toileting, personal hygiene/grooming , functional mobility, transfer to all surfaces, medication management , meal preparation and household management  From OT standpoint, recommendation at time of D/C would be return to previous environment with home health rehabilitation        OT Discharge Recommendation: Home with home health rehabilitation  OT - OK to Discharge: Yes (once medically clear)     Jyoti Simpson OT

## 2022-02-23 NOTE — H&P
[facility]  H&P- Barbara Tobin 8/26/1926, 80 y o  female MRN: [id number]  Unit/Bed#: ED 16 Encounter: [id number]  Primary Care Provider: Eamon Lazo DO   Date and time admitted to hospital: 2/22/2022  8:18 PM    * Acute bilateral lower abdominal pain  Assessment & Plan  Patient reports acute going on chronic bilateral lower abdominal pain, burning in nature, postprandial  Minimal improvement in the ER with IV narcotic, Bentyl, Pepcid and sucralfate  Unknown etiology, may be multifactorial due to radiculopathy from T12 compression fracture, hypothyroidism, hiatal hernia, functional vs other  Hx of IBD on linzess     CT A/P - surgically absent appendex, gallbladder, uterus; colonic diverticulosis without diverticulitis; unremarkable urinary bladder; status post right nephrectomy; small hiatal hernia  UA benign   Patient reports 10 lb weight loss for last 2 weeks due to abdominal pain, c/s RDN   Encourage reg BMs with daily bowel regimen   Pain control, oral hydration, diet as tolerated     Closed stable burst fracture of twelfth thoracic vertebra with routine healing  Assessment & Plan  CT abdomen pelvis shows progression of chronic compression deformity of T12 vertebral body but no acute fracture or lesion  Discussed with patient and daughter, will start gabapentin at bedtime  PT/OT  No recent falls    Chronic diastolic congestive heart failure (HCC)  Assessment & Plan  Wt Readings from Last 3 Encounters:   02/15/22 82 6 kg (182 lb)   02/09/22 82 6 kg (182 lb)   10/05/21 81 2 kg (179 lb)     Patient appears euvolemic, will hold morning dose of Lasix pending review of lab work    CKD (chronic kidney disease), stage IV [facility])  Assessment & Plan  Lab Results   Component Value Date    EGFR 18 02/22/2022    EGFR 19 02/09/2022    EGFR 29 04/09/2021    CREATININE 2 25 (H) 02/22/2022    CREATININE 2 09 (H) 02/09/2022    CREATININE 1 53 (H) 04/09/2021     Creatinine is slightly above patient's baseline  Received 1 L normal saline in the ER  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    Xerostomia  Assessment & Plan  Continue home meds     Hyponatremia  Assessment & Plan  This appears to be chronic, 130 on admission and given 1 L normal saline bolus in the ER  Will recheck in the morning on BMP    Hyperlipidemia  Assessment & Plan  Continue statin    Anxiety  Assessment & Plan  Continue Xanax    Hypothyroidism  Assessment & Plan  Supposed to be taking Synthroid alternating doses 25 mcg/37 5 mcg but noncompliant because she thought this was causing her pain  Encouraged to continue alternating doses since TSH is elevated    Paroxysmal atrial fibrillation (HCC)  Assessment & Plan  Taken off of Pradaxa per history  Continue baby aspirin, amiodarone     VTE Pharmacologic Prophylaxis: VTE Score: 5 High Risk (Score >/= 5) - Pharmacological DVT Prophylaxis Ordered: heparin  Sequential Compression Devices Ordered  Code Status: DNR DNI   Discussion with family: Updated  (daughter) at bedside  Anticipated Length of Stay: Patient will be admitted on an observation basis with an anticipated length of stay of less than 2 midnights secondary to abdominal pain   Total Time for Visit, including Counseling / Coordination of Care: 45 minutes Greater than 50% of this total time spent on direct patient counseling and coordination of care  Chief Complaint: abdominal pain x 2-3 wks     History of Present Illness:  Edmund Garcia is a 80 y o  female with a PMH of atrial fibrillation, CHF, CKD, hypertension, hyperlipidemia, who presents with lower abdominal pain  Patient states pain started about 2 weeks ago, and she was taking Tylenol with relief  Patient states pain has worsened and is now after she eats  It is associated with bloating and a burning type of pain for about 3 hours and resolves    Patient lives with her daughter who does all the cooking and states that they 8 home cooked meals every day and make everything from scratch  No correlation to specific foods or ingredients  Daughter states she has been cooking her soups and bland foods lately  Patient states pain is now more constant and still burning in nature  Had colonoscopy in the past that was normal   No change in bowel habits and typically has anywhere from 1-3 bowel movements a day  Only change in medication was increasing Synthroid every other day the patient states she started this and the pain got worse in her abdomen so she stopped  No associated nausea or vomiting  No fevers or chills  No chest pain or shortness of breath  No recent falls or back pain  No urinary complaints  Patient does have chronic dry mouth and chews gum or ice cubes all day long  She also is hard of hearing with earwax complications  Review of Systems:  Review of Systems   Constitutional: Negative for chills and fever  HENT: Negative for congestion  Hard of hearing  Respiratory: Negative for cough and shortness of breath  Cardiovascular: Negative for chest pain  Gastrointestinal: Positive for abdominal pain  Negative for blood in stool, constipation, diarrhea, nausea and vomiting  Genitourinary: Negative for dysuria, enuresis, frequency and urgency  Musculoskeletal: Negative for back pain and gait problem  Neurological: Negative for dizziness and light-headedness  Psychiatric/Behavioral: Negative for confusion  Past Medical and Surgical History:   Past Medical History:   Diagnosis Date    A-fib [facility])     Cardiac disease     Hyperlipidemia     Hypertension        Past Surgical History:   Procedure Laterality Date    APPENDECTOMY      CHOLECYSTECTOMY      HERNIA REPAIR      HYSTERECTOMY      NEPHRECTOMY Right        Meds/Allergies:  Prior to Admission medications    Medication Sig Start Date End Date Taking?  Authorizing Provider   acetaminophen (TYLENOL) 325 mg tablet Take 3 tablets (975 mg total) by mouth every 8 (eight) hours 3/25/21   Hazel Brown PA-C   ALPRAZolam Audery Monteagle) 0 25 mg tablet TAKE ONE TABLET BY MOUTH FOUR TIMES A DAY AS NEEDED FOR ANXIETY 2/22/22   [place] Marc Broadt, DO   amiodarone 100 mg tablet Take 1 tablet (100 mg total) by mouth daily 1/27/22   Bettina Ibarra,    Artificial Saliva (Xerostomia Relief [place]) SOLN Apply 1 application to the mouth or throat 4 (four) times a day 10/5/21   [place] Marc Emmiet, DO   aspirin (ECOTRIN LOW STRENGTH) 81 mg EC tablet Take 81 mg by mouth every other day    Historical Provider, MD   clotrimazole (MYCELEX) 10 mg florecita Take 1 tablet (10 mg total) by mouth 5 (five) times a day Dissolve 1 tab by mouth 5 times a day 1/27/22   [place] Marc Emmiet, DO   Crestor 5 MG tablet TAKE ONE TABLET BY MOUTH EVERY DAY 6/1/21   [place] Marc Emmiet, DO   docusate sodium (COLACE) 100 mg capsule Take 1 capsule (100 mg total) by mouth 2 (two) times a day 3/25/21   Hazel Brown PA-C   Emollient (EUCERIN) lotion Apply topically 2 (two) times a day 4/23/15   Historical Provider, MD   famotidine (PEPCID) 40 MG tablet Take 1 tablet (40 mg total) by mouth daily 10/13/21   [place] Marc Broadt, DO   furosemide (LASIX) 20 mg tablet TAKE TWO TABLETS BY MOUTH EVERY DAY 2/7/22   [place] Marc Broadt, DO   levothyroxine (Levoxyl) 25 mcg tablet Take 1 5 tablets (37 5 mcg total) by mouth daily 2/9/22 3/11/22  [place] Marc Broadt, DO   linaCLOtide (Linzess) 72 MCG CAPS Take 1 capsule by mouth daily 4/21/21   [place] Marc Broadt, DO   loperamide (IMODIUM) 2 mg capsule Take 1 capsule (2 mg total) by mouth 3 (three) times a day as needed for diarrhea 4/9/21   Joaquin Lane PA-C   methocarbamol (ROBAXIN) 500 mg tablet Take 1 tablet (500 mg total) by mouth every 6 (six) hours as needed for muscle spasms 4/9/21   Joaquin Bailey PA-C   pilocarpine (SALAGEN) 5 mg tablet Take 1 tablet (5 mg total) by mouth 2 (two) times a day 2/9/22   Tom Shah DO   polyethylene glycol (MIRALAX) 17 g packet Take 17 g by mouth daily as needed (constipation) 3/25/21   Lulu Che PA-C   predniSONE 1 mg tablet Take 1 tablet (1 mg total) by mouth daily 6/8/21   Radha Grijalva MD   Respiratory Therapy Supplies (NEBULIZER/ADULT MASK) KIT by Does not apply route every 4 (four) hours as needed (wheezing) 9/26/19   Radha Grijalva MD   Synthroid 25 MCG tablet Take 1 5 tablets (37 5 mcg total) by mouth daily 8/16/21   Tom Shah DO   triamcinolone (KENALOG) 0 1 % cream Apply topically Twice daily 1/3/18   Historical Provider, MD   valACYclovir (VALTREX) 500 mg tablet Take 1 tablet (500 mg total) by mouth 3 (three) times a day for 10 days  Patient not taking: Reported on 2/9/2022  10/20/21 10/30/21  Tom Shah DO   zolpidem (AMBIEN CR) 12 5 MG CR tablet TAKE ONE TABLET BY MOUTH AT BEDTIME 2/11/22   Hossein Mak DO     I have reviewed home medications with patient personally  Allergies: Allergies   Allergen Reactions    Penicillin G Anaphylaxis    Spironolactone Shortness Of Breath    Atorvastatin      Other reaction(s): Leg Cramps    Cephalexin Headache    Chocolate - Food Allergy     Citalopram Abdominal Pain and Headache    Diltiazem     Fosinopril Sodium-Hctz     Methylprednisolone Nausea Only     Tablet generic only    Monopril [Fosinopril]      Reaction Date: 28Apr2011;     Penicillins     Pollen Extract     Pravastatin Myalgia     Other reaction(s): Leg Cramps    Sporanox [Itraconazole]      Reaction Date: [id number];     Strawberry C [Ascorbate - Food Allergy]     Caffeine - Food Allergy Palpitations     Tachycardia       Social History:  Marital Status:     Occupation:   Patient Pre-hospital Living Situation: With other family member: dtr  Patient Pre-hospital Level of Mobility: walks  Patient Pre-hospital Diet Restrictions:   Substance Use History:   Social History Substance and Sexual Activity   Alcohol Use No     Social History     Tobacco Use   Smoking Status Never Smoker   Smokeless Tobacco Never Used     Social History     Substance and Sexual Activity   Drug Use Never       Family History:  Family History   Problem Relation Age of Onset    Heart disease Family     Hypertension Family     Cancer Family        Physical Exam:     Vitals:   Blood Pressure: 134/65 (02/22/22 2010)  Pulse: 58 (02/22/22 2010)  Temperature: 98 1 °F (36 7 °C) (02/22/22 2010)  Temp Source: Oral (02/22/22 2010)  Respirations: 20 (02/22/22 2010)  SpO2: 94 % (02/22/22 2010)    Physical Exam  Constitutional:       Appearance: Normal appearance  HENT:      Head: Normocephalic and atraumatic  Ears:      Comments: Hard of hearing     Mouth/Throat:      Mouth: Mucous membranes are dry  Eyes:      Extraocular Movements: Extraocular movements intact  Cardiovascular:      Rate and Rhythm: Normal rate and regular rhythm  Pulmonary:      Effort: Pulmonary effort is normal       Breath sounds: Normal breath sounds  Abdominal:      General: Abdomen is flat  Palpations: Abdomen is soft  Tenderness: There is no abdominal tenderness  There is no guarding or rebound  Comments: Soft, grossly nontender, normal bowel sounds   Musculoskeletal:         General: No swelling  Normal range of motion  Cervical back: Normal range of motion and neck supple  Skin:     General: Skin is warm and dry  Neurological:      General: No focal deficit present  Mental Status: She is alert and oriented to person, place, and time     Psychiatric:      Comments: Anxious       Additional Data:     Lab Results:  Results from last 7 days   Lab Units 02/22/22 2015   WBC Thousand/uL 12 75*   HEMOGLOBIN g/dL 15 8*   HEMATOCRIT % 44 2   PLATELETS Thousands/uL 228   NEUTROS PCT % 81*   LYMPHS PCT % 11*   MONOS PCT % 7   EOS PCT % 1     Results from last 7 days   Lab Units 02/22/22 2015   SODIUM mmol/L 130*   POTASSIUM mmol/L 4 0   CHLORIDE mmol/L 96*   CO2 mmol/L 27   BUN mg/dL 39*   CREATININE mg/dL 2 25*   ANION GAP mmol/L 7   CALCIUM mg/dL 9 2   ALBUMIN g/dL 4 0   TOTAL BILIRUBIN mg/dL 1 14*   ALK PHOS U/L 88   ALT U/L 25   AST U/L 23   GLUCOSE RANDOM mg/dL 118     Results from last 7 days   Lab Units 02/22/22  2017   INR  1 04             Results from last 7 days   Lab Units 02/22/22  2108   LACTIC ACID mmol/L 1 7       Imaging: Reviewed radiology reports from this admission including: abdominal/pelvic CT  CT abdomen pelvis wo contrast   ED Interpretation by Sedrick Tolbert PA-C (02/22 2330)   Jonny Vang MD  [phone] 2/22/2022     Narrative & Impression  CT ABDOMEN AND PELVIS WITHOUT IV CONTRAST     INDICATION:   Abdominal pain, acute, nonlocalized  abd pain      COMPARISON:  CT chest, abdomen and pelvis on 4/3/2021 and thoracolumbar spine radiographs on 4/7/2021      TECHNIQUE:  CT examination of the abdomen and pelvis was performed without intravenous contrast   Axial, sagittal, and coronal 2D reformatted images were created from the source data and submitted for interpretation       Radiation dose length product (DLP) for this visit:  594 mGy-cm   This examination, like all CT scans performed in the [facility], was performed utilizing techniques to minimize radiation dose exposure, including the use of iterative   reconstruction and automated exposure control       Enteric contrast was not administered       FINDINGS:     ABDOMEN     LOWER CHEST:  Small hiatal hernia noted  Mild patchy subsegmental atelectasis and linear scarring in the max   g bases      LIVER/BILIARY TREE:  Unremarkable      GALLBLADDER:  Gallbladder is surgically absent      SPLEEN:  Unremarkable      PANCREAS:  Unremarkable      ADRENAL GLANDS:  Unremarkable      KIDNEYS/URETERS:  Status post right nephrectomy  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  Fullness of the left renal pelvis    No hydronephrosis     STOMACH AND BOWEL:  There is colonic diverticulosis without evidence of acute diverticulitis      APPENDIX:  Prior appendectomy      ABDOMINOPELVIC CAVITY:  No ascites  No pneumoperitoneum  No lymphadenopathy      VESSELS:  Atherosclerotic changes are present  No evidence of aneurysm      PELVIS     REPRODUCTIVE ORGANS:  Surgical changes of prior hysterectomy      URINARY BLADDER:  Unremarkable      ABDOMINAL WALL/INGUINAL REGIONS:  Unremarkable      OSSEOUS STRUCTURES:  No acute fracture or destructive osseous lesion  Interval progression of chronic compression deformity of T12 vertebral body with approximately 80% loss of height  Spinal de   generative changes are noted      IMPRESSION:     1  No acute findings in the abdomen or pelvis  2   Colonic diverticulosis without acute diverticulitis  3   Progression of chronic compression deformity of T12 vertebral body with approximately 80% loss of height            Workstation performed: [id number]        Final Result by Nissa Reyes MD (02/22 2321)      1  No acute findings in the abdomen or pelvis  2   Colonic diverticulosis without acute diverticulitis  3   Progression of chronic compression deformity of T12 vertebral body with approximately 80% loss of height  Workstation performed: [id number]             EKG and Other Studies Reviewed on Admission:   · EKG: Sinus Bradycardia  HR 56     ** Please Note: This note has been constructed using a voice recognition system   **

## 2022-02-23 NOTE — PROGRESS NOTES
Patient ID: Gisela Benson is a 80 y o  female  HPI: 80 y  o female presents for recheck of her right ear   She has been using debrox and sounds are muffled  SUBJECTIVE    Family History   Problem Relation Age of Onset    Heart disease Family     Hypertension Family     Cancer Family      Social History     Socioeconomic History    Marital status:       Spouse name: Not on file    Number of children: Not on file    Years of education: Less than high school    Highest education level: Not on file   Occupational History    Occupation: Retired   Tobacco Use    Smoking status: Never Smoker    Smokeless tobacco: Never Used   Substance and Sexual Activity    Alcohol use: No    Drug use: Never    Sexual activity: Not Currently     Partners: Male     Birth control/protection: Post-menopausal   Other Topics Concern    Not on file   Social History Narrative    Always uses seatbelt    Denied:  History of daily caffeinated cola consumption    Denied:  History of daily coffee consumption    Daily tea consumption    Denied:  History of dental care, regularly    Exercise:  Walking    Living will in place    No Synagogue beliefs    Power of  in existence    Water intake, adequate (per day)     Social Determinants of Health     Financial Resource Strain: Not on file   Food Insecurity: Not on file   Transportation Needs: Not on file   Physical Activity: Not on file   Stress: Not on file   Social Connections: Not on file   Intimate Partner Violence: Not on file   Housing Stability: Not on file     Past Medical History:   Diagnosis Date    A-fib [facility])     Cardiac disease     Hyperlipidemia     Hypertension      Past Surgical History:   Procedure Laterality Date    APPENDECTOMY      CHOLECYSTECTOMY      HERNIA REPAIR      HYSTERECTOMY      NEPHRECTOMY Right      Allergies   Allergen Reactions    Penicillin G Anaphylaxis    Spironolactone Shortness Of Breath    Atorvastatin      Other reaction(s): Leg Cramps    Cephalexin Headache    Chocolate - Food Allergy     Citalopram Abdominal Pain and Headache    Diltiazem     Fosinopril Sodium-Hctz     Methylprednisolone Nausea Only     Tablet generic only    Monopril [Fosinopril]      Reaction Date: [id number];     Penicillins     Pollen Extract     Pravastatin Myalgia     Other reaction(s): Leg Cramps    Sporanox [Itraconazole]      Reaction Date: [id number];     Strawberry C [Ascorbate - Food Allergy]     Caffeine - Food Allergy Palpitations     Tachycardia     No current facility-administered medications for this visit      Current Outpatient Medications:     acetaminophen (TYLENOL) 325 mg tablet, Take 3 tablets (975 mg total) by mouth every 8 (eight) hours, Disp: , Rfl: 0    amiodarone 100 mg tablet, Take 1 tablet (100 mg total) by mouth daily, Disp: 90 tablet, Rfl: 3    Artificial Saliva (Xerostomia Relief [place]) SOLN, Apply 1 application to the mouth or throat 4 (four) times a day, Disp: 10 mL, Rfl: 3    aspirin (ECOTRIN LOW STRENGTH) 81 mg EC tablet, Take 81 mg by mouth every other day, Disp: , Rfl:     clotrimazole (MYCELEX) 10 mg soha, Take 1 tablet (10 mg total) by mouth 5 (five) times a day Dissolve 1 tab by mouth 5 times a day, Disp: 50 Soha, Rfl: 0    Crestor 5 MG tablet, TAKE ONE TABLET BY MOUTH EVERY DAY, Disp: 90 tablet, Rfl: 1    docusate sodium (COLACE) 100 mg capsule, Take 1 capsule (100 mg total) by mouth 2 (two) times a day, Disp: 10 capsule, Rfl: 0    Emollient (EUCERIN) lotion, Apply topically 2 (two) times a day, Disp: , Rfl:     famotidine (PEPCID) 40 MG tablet, Take 1 tablet (40 mg total) by mouth daily, Disp: 30 tablet, Rfl: 2    furosemide (LASIX) 20 mg tablet, TAKE TWO TABLETS BY MOUTH EVERY DAY, Disp: 60 tablet, Rfl: 0    levothyroxine (Levoxyl) 25 mcg tablet, Take 1 5 tablets (37 5 mcg total) by mouth daily, Disp: 45 tablet, Rfl: 3    linaCLOtide (Linzess) 72 MCG CAPS, Take 1 capsule by mouth daily, Disp: 30 capsule, Rfl: 3    loperamide (IMODIUM) 2 mg capsule, Take 1 capsule (2 mg total) by mouth 3 (three) times a day as needed for diarrhea, Disp: 30 capsule, Rfl: 0    methocarbamol (ROBAXIN) 500 mg tablet, Take 1 tablet (500 mg total) by mouth every 6 (six) hours as needed for muscle spasms (Patient not taking: Reported on 2/23/2022 ), Disp: 30 tablet, Rfl: 0    pilocarpine (SALAGEN) 5 mg tablet, Take 1 tablet (5 mg total) by mouth 2 (two) times a day, Disp: 60 tablet, Rfl: 2    polyethylene glycol (MIRALAX) 17 g packet, Take 17 g by mouth daily as needed (constipation), Disp: , Rfl: 0    predniSONE 1 mg tablet, Take 1 tablet (1 mg total) by mouth daily, Disp: 90 tablet, Rfl: 0    Respiratory Therapy Supplies (NEBULIZER/ADULT MASK) KIT, by Does not apply route every 4 (four) hours as needed (wheezing), Disp: 1 each, Rfl: 0    Synthroid 25 MCG tablet, Take 1 5 tablets (37 5 mcg total) by mouth daily, Disp: 45 tablet, Rfl: 3    triamcinolone (KENALOG) 0 1 % cream, Apply topically Twice daily, Disp: , Rfl:     zolpidem (AMBIEN CR) 12 5 MG CR tablet, TAKE ONE TABLET BY MOUTH AT BEDTIME, Disp: 30 tablet, Rfl: 0    ALPRAZolam (XANAX) 0 25 mg tablet, TAKE ONE TABLET BY MOUTH FOUR TIMES A DAY AS NEEDED FOR ANXIETY, Disp: 120 tablet, Rfl: 0    valACYclovir (VALTREX) 500 mg tablet, Take 1 tablet (500 mg total) by mouth 3 (three) times a day for 10 days (Patient not taking: Reported on 2/9/2022 ), Disp: 30 tablet, Rfl: 0    Facility-Administered Medications Ordered in Other Visits:     acetaminophen (TYLENOL) tablet 650 mg, 650 mg, Oral, Q6H PRN, Matt Montemayor PA-C    ALPRAZolam (XANAX) tablet 0 25 mg, 0 25 mg, Oral, 4x Daily PRN, Matt Montemayor PA-C    amiodarone tablet 100 mg, 100 mg, Oral, Daily, Matt Montemayor PA-C    aspirin (ECOTRIN LOW STRENGTH) EC tablet 81 mg, 81 mg, Oral, Every Other Day, Matt Montemayor PA-C    calcium carbonate (TUMS) chewable tablet 1,000 mg, 1,000 mg, Oral, Daily PRN, Matt Montemayor PA-C    gabapentin (NEURONTIN) capsule 100 mg, 100 mg, Oral, HS, Matt Montemayor PA-C, 100 mg at 02/23/22 2940    heparin (porcine) subcutaneous injection 5,000 Units, 5,000 Units, Subcutaneous, Q8H [address], 5,000 Units at 02/23/22 0550 **AND** [CANCELED] Platelet count, , , Once, Matt Montemayor PA-C    levothyroxine tablet 25 mcg, 25 mcg, Oral, Every Other Day, Matt Montemayor PA-C    [START ON 2/24/2022] levothyroxine tablet 37 5 mcg, 37 5 mcg, Oral, Every Other Day, Matt Montemayor PA-C    lubiprostone (AMITIZA) capsule 24 mcg, 24 mcg, Oral, BID With Meals, Bea Garcia MD    ondansetron [facility]) injection 4 mg, 4 mg, Intravenous, Q6H PRN, Matt Montemayor PA-C    oxyCODONE (ROXICODONE) IR tablet 2 5 mg, 2 5 mg, Oral, Q4H PRN, Matt Montemayor PA-C, 2 5 mg at 02/23/22 0427    pilocarpine (SALAGEN) tablet 5 mg, 5 mg, Oral, BID, Matt Montemayor PA-C    polyethylene glycol (MIRALAX) packet 17 g, 17 g, Oral, Daily PRN, Matt Montemayor PA-C    pravastatin (PRAVACHOL) tablet 40 mg, 40 mg, Oral, Daily With Dinner, Matt Montemayor PA-C    predniSONE tablet 1 mg, 1 mg, Oral, Daily, Matt Montemayor PA-C    saliva substitute (MOUTH KOTE) mucosal solution 5 spray, 5 spray, Mouth/Throat, TID PRN, Matt Montemayor PA-C    senna (SENOKOT) tablet 8 6 mg, 1 tablet, Oral, Daily, Matt Montemayor PA-C    zolpidem (AMBIEN) tablet 5 mg, 5 mg, Oral, HS PRN, Matt Montemayor PA-C    Review of Systems  Constitutional:     Denies fever, chills ,fatigue ,weakness ,weight loss, weight gain     ENT: Denies earache ,loss of hearing ,nosebleed, nasal discharge,nasal congestion ,sore throat ,hoarseness+ muffled sound from right ear  Pulmonary: Denies shortness of breath ,cough  ,dyspnea on exertion, orthopnea  ,PND   Cardiovascular:  Denies bradycardia , tachycardia  ,palpations, lower extremity edema leg, claudication  Breast:  Denies new or changing breast lumps ,nipple discharge ,nipple changes  Abdomen:  Denies abdominal pain , anorexia , indigestion, nausea, vomiting, constipation, diarrhea  Musculoskeletal: Denies myalgias, arthralgias, joint swelling, joint stiffness , limb pain, limb swelling  Gu: denies dysuria, polyuria  Skin: Denies skin rash, skin lesion, skin wound, itching, dry skin  Neuro: Denies headache, numbness, tingling, confusion, loss of consciousness, dizziness, vertigo  Psychiatric: Denies feelings of depression, suicidal ideation, anxiety, sleep disturbances    OBJECTIVE  /76   Pulse 62   Temp 97 8 °F (36 6 °C)   Ht 5' 7" (1 702 m)   Wt 82 6 kg (182 lb)   BMI 28 51 kg/m²   Constitutional:   NAD, well appearing and well nourished      ENT:   Conjunctiva and lids: no injection, edema, or discharge     Pupils and iris: ANKIT bilaterally    External inspection of ears and nose: normal without deformities or discharge  Otoscopic exam:    + right cerumen impacted and removed with a currette until tm fully visualized and intact  Nasal mucosa, septum and turbinates: Normal or edema or discharge         Oropharynx:  Moist mucosa, normal tongue and tonsils without lesions  No erythema        Pulmonary:Respiratory effort normal rate and rhythm, no increased work of breathing   Auscultation of lungs:  Clear bilaterally with no adventitious breath sounds       Cardiovascular: regular rate and rhythm, S1 and S2, no murmur, no edema and/or varicosities of LE      Abdomen: Soft and non-distended     Positive bowel sounds      No heptomegaly or splenomegaly      Gu: no suprapubic tenderness or CVA tenderness, no urethral discharge  Lymphatic:  No anterior or posterior cervical lymphadenopathy         Musculoskeletal:  Gait and station: Normal gait      Digits and nails normal without clubbing or cyanosis       Inspection/palpation of joints, bones, and muscles:  No joint tenderness, swelling, full active and passive range of motion       Skin: Normal skin turgor and no rashes      Neuro:      Normal reflexes     Psych:   alert and oriented to person, place and time     normal mood and affect   Ear cerumen removal    Date/Time: 2/23/2022 7:03 AM  Performed by: Brittney Bee DO  Authorized by: Brittney Bee DO   Universal Protocol:  Consent: Verbal consent obtained  Patient location:  ED  Procedure details:     Local anesthetic:  Auralgan    Location:  R ear    Procedure type: curette      Approach:  External  Post-procedure details:     Complication:  None    Hearing quality:  Improved    Patient tolerance of procedure: Tolerated well, no immediate complications        Assessment/Plan:Diagnoses and all orders for this visit:    Right ear impacted cerumen    Other orders  -     Ear cerumen removal        Reviewed with patient plan to treat with above plan      Patient instructed to call in 72 hours if not feeling better or if symptoms worsen

## 2022-02-23 NOTE — DISCHARGE INSTRUCTIONS
Yao Samuel MD  [phone] 2/22/2022      Narrative & Impression  CT ABDOMEN AND PELVIS WITHOUT IV CONTRAST     INDICATION:   Abdominal pain, acute, nonlocalized  abd pain  COMPARISON:  CT chest, abdomen and pelvis on 4/3/2021 and thoracolumbar spine radiographs on 4/7/2021  TECHNIQUE:  CT examination of the abdomen and pelvis was performed without intravenous contrast   Axial, sagittal, and coronal 2D reformatted images were created from the source data and submitted for interpretation  Radiation dose length product (DLP) for this visit:  594 mGy-cm   This examination, like all CT scans performed in the [facility], was performed utilizing techniques to minimize radiation dose exposure, including the use of iterative   reconstruction and automated exposure control  Enteric contrast was not administered  FINDINGS:     ABDOMEN     LOWER CHEST:  Small hiatal hernia noted  Mild patchy subsegmental atelectasis and linear scarring in the lung bases  LIVER/BILIARY TREE:  Unremarkable  GALLBLADDER:  Gallbladder is surgically absent  SPLEEN:  Unremarkable  PANCREAS:  Unremarkable  ADRENAL GLANDS:  Unremarkable  KIDNEYS/URETERS:  Status post right nephrectomy  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  Fullness of the left renal pelvis  No hydronephrosis     STOMACH AND BOWEL:  There is colonic diverticulosis without evidence of acute diverticulitis  APPENDIX:  Prior appendectomy  ABDOMINOPELVIC CAVITY:  No ascites  No pneumoperitoneum  No lymphadenopathy  VESSELS:  Atherosclerotic changes are present  No evidence of aneurysm  PELVIS     REPRODUCTIVE ORGANS:  Surgical changes of prior hysterectomy  URINARY BLADDER:  Unremarkable  ABDOMINAL WALL/INGUINAL REGIONS:  Unremarkable  OSSEOUS STRUCTURES:  No acute fracture or destructive osseous lesion    Interval progression of chronic compression deformity of T12 vertebral body with approximately 80% loss of height  Spinal degenerative changes are noted  IMPRESSION:     1  No acute findings in the abdomen or pelvis  2   Colonic diverticulosis without acute diverticulitis  3   Progression of chronic compression deformity of T12 vertebral body with approximately 80% loss of height             Workstation performed: [id number]

## 2022-02-23 NOTE — PLAN OF CARE
Problem: PHYSICAL THERAPY ADULT  Goal: Performs mobility at highest level of function for planned discharge setting  See evaluation for individualized goals  Description: Treatment/Interventions: Functional transfer training,LE strengthening/ROM,Therapeutic exercise,Endurance training,Patient/family training,Equipment eval/education,Gait training,Bed mobility  Equipment Recommended: Kori Gallegos       See flowsheet documentation for full assessment, interventions and recommendations  Note: Prognosis: Good  Problem List: Decreased strength,Decreased endurance,Impaired balance,Decreased mobility,Decreased safety awareness  Assessment: Pt is a 80 y o  female seen for PT evaluation s/p admit to [facility]/22[facility] w/ Acute bilateral lower abdominal pain  Order placed for PT  Comorbidities affecting pt's physical performance at time of assessment include: HTN and A-fib  Personal factors affecting pt at time of IE include: steps to enter environment and advanced age  Prior to admission, pt was was independent w/ all functional mobility w/ use of AD outside of home only, lived in one floor environment, had 3 JOSE (+) railing and lived with daughter  Upon evaluation: Pt requires min A for sit to stand, min A for ambulation without AD, and supervision for ambulation with RW  (Please find full objective findings from PT assessment regarding body systems outlined above)  Impairments and limitations also listed above, especially due to  weakness, impaired balance, decreased endurance, gait deviations, decreased activity tolerance, decreased safety awareness and fall risk  Pt's clinical presentation is currently unstable/unpredictable seen in pt's presentation of decreased safety awareness, fall risk, and limited insight into deficits  Pt to benefit from continued skilled PT tx while in hospital and upon DC to address deficits as defined above and maximize level of functional mobility   From PT/mobility standpoint, recommendation at time of d/c would be Home PT, home with family support and with rolling walker pending progress  Recommend progression of ambulation and initiation of HEP as appropriate  PT Discharge Recommendation: Home with home health rehabilitation          See flowsheet documentation for full assessment

## 2022-02-24 VITALS
BODY MASS INDEX: 28.51 KG/M2 | HEIGHT: 67 IN | DIASTOLIC BLOOD PRESSURE: 60 MMHG | RESPIRATION RATE: 18 BRPM | OXYGEN SATURATION: 91 % | SYSTOLIC BLOOD PRESSURE: 132 MMHG | TEMPERATURE: 97.6 F | HEART RATE: 47 BPM

## 2022-02-24 PROBLEM — R10.32 ACUTE BILATERAL LOWER ABDOMINAL PAIN: Status: RESOLVED | Noted: 2022-02-23 | Resolved: 2022-02-24

## 2022-02-24 PROBLEM — R10.31 ACUTE BILATERAL LOWER ABDOMINAL PAIN: Status: RESOLVED | Noted: 2022-02-23 | Resolved: 2022-02-24

## 2022-02-24 PROBLEM — E87.1 HYPONATREMIA: Status: RESOLVED | Noted: 2021-04-08 | Resolved: 2022-02-24

## 2022-02-24 LAB
ATRIAL RATE: 56 BPM
P AXIS: 69 DEGREES
PR INTERVAL: 216 MS
QRS AXIS: 50 DEGREES
QRSD INTERVAL: 104 MS
QT INTERVAL: 520 MS
QTC INTERVAL: 501 MS
T WAVE AXIS: 131 DEGREES
VENTRICULAR RATE: 56 BPM

## 2022-02-24 PROCEDURE — 99217 PR OBSERVATION CARE DISCHARGE MANAGEMENT: CPT | Performed by: INTERNAL MEDICINE

## 2022-02-24 PROCEDURE — 93010 ELECTROCARDIOGRAM REPORT: CPT | Performed by: INTERNAL MEDICINE

## 2022-02-24 RX ORDER — XYLITOL/YERBA SANTA
5 AEROSOL, SPRAY WITH PUMP (ML) MUCOUS MEMBRANE 3 TIMES DAILY PRN
Qty: 236 ML | Refills: 0 | Status: SHIPPED | OUTPATIENT
Start: 2022-02-24 | End: 2022-03-08 | Stop reason: HOSPADM

## 2022-02-24 RX ORDER — SENNOSIDES 8.6 MG
17.2 TABLET ORAL 2 TIMES DAILY
Qty: 120 TABLET | Refills: 0 | Status: SHIPPED | OUTPATIENT
Start: 2022-02-24 | End: 2022-03-08 | Stop reason: HOSPADM

## 2022-02-24 RX ORDER — DICYCLOMINE HYDROCHLORIDE 10 MG/1
10 CAPSULE ORAL
Qty: 120 CAPSULE | Refills: 0 | Status: SHIPPED | OUTPATIENT
Start: 2022-02-24 | End: 2022-03-08 | Stop reason: HOSPADM

## 2022-02-24 RX ADMIN — POLYETHYLENE GLYCOL 3350 17 G: 17 POWDER, FOR SOLUTION ORAL at 11:39

## 2022-02-24 RX ADMIN — AMIODARONE HYDROCHLORIDE 100 MG: 200 TABLET ORAL at 08:00

## 2022-02-24 RX ADMIN — LUBIPROSTONE 24 MCG: 24 CAPSULE, GELATIN COATED ORAL at 07:58

## 2022-02-24 RX ADMIN — PILOCARPINE HYDROCHLORIDE 5 MG: 5 TABLET, FILM COATED ORAL at 10:17

## 2022-02-24 RX ADMIN — HEPARIN SODIUM 5000 UNITS: 5000 INJECTION INTRAVENOUS; SUBCUTANEOUS at 05:29

## 2022-02-24 RX ADMIN — DICYCLOMINE HYDROCHLORIDE 10 MG: 10 CAPSULE ORAL at 07:58

## 2022-02-24 RX ADMIN — LEVOTHYROXINE SODIUM 37.5 MCG: 75 TABLET ORAL at 05:28

## 2022-02-24 RX ADMIN — PREDNISONE 1 MG: 1 TABLET ORAL at 08:00

## 2022-02-24 RX ADMIN — ALPRAZOLAM 0.25 MG: 0.25 TABLET ORAL at 11:34

## 2022-02-24 RX ADMIN — GLYCERIN 1 DROP: .002; .002; .01 SOLUTION/ DROPS OPHTHALMIC at 10:17

## 2022-02-24 RX ADMIN — ALPRAZOLAM 0.25 MG: 0.25 TABLET ORAL at 08:00

## 2022-02-24 RX ADMIN — DICYCLOMINE HYDROCHLORIDE 10 MG: 10 CAPSULE ORAL at 11:34

## 2022-02-24 NOTE — CASE MANAGEMENT
Case Management Assessment & Discharge Planning Note    Patient name Muriel Golden  Location W /W -57 MRN [id number]  : 1926 Date 2022       Current Admission Date: 2022  Current Admission Diagnosis: Anxiety   Patient Active Problem List    Diagnosis Date Noted    Xerostomia 2022    Chronic kidney disease, stage 5 ([address] ) 2022    Closed stable burst fracture of twelfth thoracic vertebra with routine healing 2021    Chronic low back pain without sciatica 2021    Constipation 2021    Fall 2021    Hematoma of right hip 2021    Bronchitis 2019    Mild aortic stenosis 10/08/2018    Sinus bradycardia 2018    Chronic diastolic congestive heart failure ([address] ) 2017    Cardiomyopathy ([address] ) 2017    CKD (chronic kidney disease), stage IV (HCC) 2017    Oral pain of unknown etiology 2017    Paroxysmal atrial fibrillation ([address] ) 2017    Hypothyroidism 2017    Anxiety 2017    Insomnia 10/18/2012    Arthritis 2012    Contact dermatitis 2012    Hyperlipidemia 2012    Hypertension 2012      LOS (days): 0  Geometric Mean LOS (GMLOS) (days):   Days to GMLOS:     OBJECTIVE:              Current admission status: Observation       Preferred Pharmacy:   [place] [address]  Phone: [phone] Fax: [phone]    Primary Care Provider: Hossein Mak DO    Primary Insurance: [facility]  Secondary Insurance:     ASSESSMENT:  Active Health Care Agents    There are no active Health Care Agents on file  DISCHARGE DETAILS:    Discharge planning discussed with[de-identified] Joaquina Son - daughter  Freedom of Choice: Yes  Comments - Freedom of Choice:  Norberto Rojas wishes to use [facility] [address] for her mom, with a [facility] date next week, as they want time to adjust at home first   CM contacted family/caregiver?: Yes  Were Treatment Team discharge recommendations reviewed with patient/caregiver?: Yes  Did patient/caregiver verbalize understanding of patient care needs?: Yes  Were patient/caregiver advised of the risks associated with not following Treatment Team discharge recommendations?: Yes    Contacts  Patient Contacts: Moraima Woods  Relationship to Patient[de-identified] Family  Contact Method: Phone  Phone Number: [phone]  Reason/Outcome: Discharge [address]         Is the patient interested in [facility] at discharge?: Yes  [address] requested[de-identified] [address] Name[de-identified] [address] Provider[de-identified] PCP  Home Health Services Needed[de-identified] Evaluate Functional Status and Safety,Gait/ADL Training,Strengthening/Theraputic Exercises to Improve Function  Homebound Criteria Met[de-identified] Uses an Assist Device (i e  cane, walker, etc),Requires the Assistance of Another Person for Safe Ambulation or to Leave the Home  Supporting Clincal Findings[de-identified] Fatigues Easliy in Short Distances,Limited Endurance         Other Referral/Resources/Interventions Provided:  Interventions: HHC  Referral Comments: Daughter wishes to use [facility], with a SOC date for next week, per family's wish           Treatment Team Recommendation: Home with [facility]  Discharge Destination Plan[de-identified] Home with Ashley at Discharge : Family           ETA of Transport (Date): 02/24/22  ETA of Transport (Time): 1200

## 2022-02-24 NOTE — CASE MANAGEMENT
Case Management Assessment & Discharge Planning Note    Patient name Little Diana  Location W /W -52 MRN [id number]  : 1926 Date 2022       Current Admission Date: 2022  Current Admission Diagnosis: Anxiety   Patient Active Problem List    Diagnosis Date Noted    Xerostomia 2022    Chronic kidney disease, stage 5 ([facility] ) 2022    Closed stable burst fracture of twelfth thoracic vertebra with routine healing 2021    Chronic low back pain without sciatica 2021    Constipation 2021    Fall 2021    Hematoma of right hip 2021    Bronchitis 2019    Mild aortic stenosis 10/08/2018    Sinus bradycardia 2018    Chronic diastolic congestive heart failure ([facility] ) 2017    Cardiomyopathy [facility] ) 2017    CKD (chronic kidney disease), stage IV (HCC) 2017    Oral pain of unknown etiology 2017    Paroxysmal atrial fibrillation ([facility] ) 2017    Hypothyroidism 2017    Anxiety 2017    Insomnia 10/18/2012    Arthritis 2012    Contact dermatitis 2012    Hyperlipidemia 2012    Hypertension 2012      LOS (days): 0  Geometric Mean LOS (GMLOS) (days):   Days to GMLOS:     OBJECTIVE:              Current admission status: Observation       Preferred Pharmacy:   [place] [address]  Phone: [phone] Fax: [phone]    Primary Care Provider: Raz Fortune DO    Primary Insurance: [facility] RE[facility]  Secondary Insurance:     ASSESSMENT:  Active Health Care Agents    There are no active Health Care Agents on file  DISCHARGE DETAILS:    Discharge planning discussed with[de-identified] Daughter - Dannielle Gray of Choice: Yes  Comments - Freedom of Choice: Wen Pierce wishes for a blanket referral to be placed for home PT with a follow up of choices    CM contacted family/caregiver?: Yes  Were Treatment Team discharge recommendations reviewed with patient/caregiver?: Yes  Did patient/caregiver verbalize understanding of patient care needs?: Yes  Were patient/caregiver advised of the risks associated with not following Treatment Team discharge recommendations?: Yes    Contacts  Patient Contacts: Daughter Delonte Calderón  Relationship to Patient[de-identified] Family  Contact Method: Phone  Phone Number: [phone]  Reason/Outcome: Continuity of [address]         Is the patient interested in [facility] at discharge?: Yes  [address] requested[de-identified] Physical [facility] Name[de-identified] Other  [facility] Provider[de-identified] PCP  Home Health Services Needed[de-identified] Evaluate Functional Status and Safety,Gait/ADL Training,Heart Failure Management,Strengthening/Theraputic Exercises to Improve Function  Homebound Criteria Met[de-identified] Requires the Assistance of Another Person for Safe Ambulation or to Leave the Home,Uses an Assist Device (i e  cane, walker, etc)  Supporting Clincal Findings[de-identified] Fatigues Easliy in Short Distances,Limited Endurance         Other Referral/Resources/Interventions Provided:  Interventions: [facility]  Referral Comments: Daughter wishes for a blanket referral with follow-up of choices later  [facility]  Discharge Destination Plan[de-identified] Home with Ashley at Discharge : Family           ETA of Transport (Date): 02/24/22  ETA of Transport (Time): 1200              IMM Given (Date):: 02/24/22  IMM Given to[de-identified] Family  Family notified[de-identified] Discussed the IMM with daughter, Norberto Rojas agrees with discharge plan and has no questions at this time

## 2022-02-24 NOTE — PLAN OF CARE
Problem: MOBILITY - ADULT  Goal: Maintain or return to baseline ADL function  Description: INTERVENTIONS:  -  Assess patient's ability to carry out ADLs; assess patient's baseline for ADL function and identify physical deficits which impact ability to perform ADLs (bathing, care of mouth/teeth, toileting, grooming, dressing, etc )  - Assess/evaluate cause of self-care deficits   - Assess range of motion  - Assess patient's mobility; develop plan if impaired  - Assess patient's need for assistive devices and provide as appropriate  - Encourage maximum independence but intervene and supervise when necessary  - Involve family in performance of ADLs  - Assess for home care needs following discharge   - Consider OT consult to assist with ADL evaluation and planning for discharge  - Provide patient education as appropriate  2/24/2022 1028 by Nj Schilling  Outcome: Adequate for Discharge  2/24/2022 1027 by Pramod Dia  Outcome: Progressing  Goal: Maintains/Returns to pre admission functional level  Description: INTERVENTIONS:  - Perform BMAT or MOVE assessment daily    - Set and communicate daily mobility goal to care team and patient/family/caregiver  - Collaborate with rehabilitation services on mobility goals if consulted  - Perform Range of Motion 4 times a day  - Reposition patient every 2 hours    - Dangle patient 2 times a day  - Stand patient 2 times a day  - Ambulate patient 2 times a day  - Out of bed to chair 2 times a day   - Out of bed for meals 2 times a day  - Out of bed for toileting  - Record patient progress and toleration of activity level   2/24/2022 1028 by Nj Schilling  Outcome: Adequate for Discharge  2/24/2022 1027 by Nj Schilling  Outcome: Progressing     Problem: Prexisting or High Potential for Compromised Skin Integrity  Goal: Skin integrity is maintained or improved  Description: INTERVENTIONS:  - Identify patients at risk for skin breakdown  - Assess and monitor skin integrity  - Assess and monitor nutrition and hydration status  - Monitor labs   - Assess for incontinence   - Turn and reposition patient  - Assist with mobility/ambulation  - Relieve pressure over bony prominences  - Avoid friction and shearing  - Provide appropriate hygiene as needed including keeping skin clean and dry  - Evaluate need for skin moisturizer/barrier cream  - Collaborate with interdisciplinary team   - Patient/family teaching  - Consider wound care consult   2/24/2022 1028 by Dorys   Outcome: Adequate for Discharge  2/24/2022 1027 by Dorys   Outcome: Progressing     Problem: Potential for Falls  Goal: Patient will remain free of falls  Description: INTERVENTIONS:  - Educate patient/family on patient safety including physical limitations  - Instruct patient to call for assistance with activity   - Consult OT/PT to assist with strengthening/mobility   - Keep Call bell within reach  - Keep bed low and locked with side rails adjusted as appropriate  - Keep care items and personal belongings within reach  - Initiate and maintain comfort rounds  - Make Fall Risk Sign visible to staff  - Offer Toileting every 2 Hours, in advance of need  - Initiate/Maintain bed alarm  - Obtain necessary fall risk management equipment: alarms  - Apply yellow socks and bracelet for high fall risk patients  - Consider moving patient to room near nurses station  2/24/2022 1028 by Dorys Youngr  Outcome: Adequate for Discharge  2/24/2022 1027 by Bronson Dia  Outcome: Progressing

## 2022-02-24 NOTE — PLAN OF CARE
Problem: MOBILITY - ADULT  Goal: Maintain or return to baseline ADL function  Description: INTERVENTIONS:  -  Assess patient's ability to carry out ADLs; assess patient's baseline for ADL function and identify physical deficits which impact ability to perform ADLs (bathing, care of mouth/teeth, toileting, grooming, dressing, etc )  - Assess/evaluate cause of self-care deficits   - Assess range of motion  - Assess patient's mobility; develop plan if impaired  - Assess patient's need for assistive devices and provide as appropriate  - Encourage maximum independence but intervene and supervise when necessary  - Involve family in performance of ADLs  - Assess for home care needs following discharge   - Consider OT consult to assist with ADL evaluation and planning for discharge  - Provide patient education as appropriate  Outcome: Progressing  Goal: Maintains/Returns to pre admission functional level  Description: INTERVENTIONS:  - Perform BMAT or MOVE assessment daily    - Set and communicate daily mobility goal to care team and patient/family/caregiver  - Collaborate with rehabilitation services on mobility goals if consulted  - Perform Range of Motion 4 times a day  - Reposition patient every 2 hours    - Dangle patient 2 times a day  - Stand patient 2 times a day  - Ambulate patient 2 times a day  - Out of bed to chair 2 times a day   - Out of bed for meals 2 times a day  - Out of bed for toileting  - Record patient progress and toleration of activity level   Outcome: Progressing     Problem: Prexisting or High Potential for Compromised Skin Integrity  Goal: Skin integrity is maintained or improved  Description: INTERVENTIONS:  - Identify patients at risk for skin breakdown  - Assess and monitor skin integrity  - Assess and monitor nutrition and hydration status  - Monitor labs   - Assess for incontinence   - Turn and reposition patient  - Assist with mobility/ambulation  - Relieve pressure over bony prominences  - Avoid friction and shearing  - Provide appropriate hygiene as needed including keeping skin clean and dry  - Evaluate need for skin moisturizer/barrier cream  - Collaborate with interdisciplinary team   - Patient/family teaching  - Consider wound care consult   Outcome: Progressing     Problem: Potential for Falls  Goal: Patient will remain free of falls  Description: INTERVENTIONS:  - Educate patient/family on patient safety including physical limitations  - Instruct patient to call for assistance with activity   - Consult OT/PT to assist with strengthening/mobility   - Keep Call bell within reach  - Keep bed low and locked with side rails adjusted as appropriate  - Keep care items and personal belongings within reach  - Initiate and maintain comfort rounds  - Make Fall Risk Sign visible to staff  - Offer Toileting every 2 Hours, in advance of need  - Initiate/Maintain bed alarm  - Obtain necessary fall risk management equipment: alarms  - Apply yellow socks and bracelet for high fall risk patients  - Consider moving patient to room near nurses station  Outcome: Progressing

## 2022-02-24 NOTE — DISCHARGE SUMMARY
Discharge Summary - [address] [facility]    Patient Information: Juan Pablo Powell 80 y o  female MRN: [id number]  Unit/Bed#: W -01 Encounter: [id number]    Discharging Physician / Practitioner: Alba Mckeon MD  PCP: Brittney Bee DO  Admission Date: 2/22/2022  Discharge Date: 02/24/22    Reason for Admission:  Abdominal pain    Discharge Diagnoses:     Principal Problem (Resolved):    Acute bilateral lower abdominal pain  Active Problems:    Paroxysmal atrial fibrillation (HCC)    Hypothyroidism    Anxiety    CKD (chronic kidney disease), stage IV (HCC)    Arthritis    Chronic diastolic congestive heart failure ([facility] [address] )    Hyperlipidemia    Hypertension    Mild aortic stenosis    Closed stable burst fracture of twelfth thoracic vertebra with routine healing    Xerostomia  Resolved Problems:    Hyponatremia      Consultations During Hospital Stay:  · None    Procedures Performed:   · None    Significant Findings:   · Acute generalized abdominal pain  · Hyponatremia    Incidental Findings:   · None     Test Results Pending at Discharge (will require follow up): · None     Outpatient Tests Requested:  · None    Complications:  None    Hospital Course:     Juan Pablo Powell is a 80 y o  female patient who originally presented to the hospital on 2/22/2022 due to abdominal pain  Imaging was benign  And workup was unremarkable  She was started on Bentyl for cramping abdominal pain after eating which improved her pain  She was continue this on discharge  She was advised to follow-up with PCP in 1-2 weeks  Condition at Discharge: fair     Discharge Day Visit / Exam:     Subjective:  Patient states she feels much better this morning she did not have any abdominal pain after eating since starting Bentyl    Vitals: Blood Pressure: 132/60 (02/24/22 0811)  Pulse: (!) 47 (02/24/22 0811)  Temperature: 97 6 °F (36 4 °C) (02/24/22 0811)  Temp Source: Oral (02/22/22 2010)  Respirations: 18 (02/24/22 0811)  SpO2: 91 % (02/24/22 5062)  Exam:   Physical Exam  Vitals and nursing note reviewed  Constitutional:       Appearance: Normal appearance  She is normal weight  HENT:      Head: Normocephalic and atraumatic  Right Ear: External ear normal       Left Ear: External ear normal       Nose: Nose normal       Mouth/Throat:      Mouth: Mucous membranes are moist       Pharynx: Oropharynx is clear  Eyes:      Conjunctiva/sclera: Conjunctivae normal       Pupils: Pupils are equal, round, and reactive to light  Cardiovascular:      Rate and Rhythm: Normal rate and regular rhythm  Pulses: Normal pulses  Heart sounds: Normal heart sounds  Pulmonary:      Effort: Pulmonary effort is normal       Breath sounds: Normal breath sounds  Abdominal:      General: Abdomen is flat  Bowel sounds are normal  There is no distension  Palpations: Abdomen is soft  Tenderness: There is no abdominal tenderness  Musculoskeletal:         General: No swelling or tenderness  Cervical back: Neck supple  No muscular tenderness  Skin:     General: Skin is warm and dry  Capillary Refill: Capillary refill takes less than 2 seconds  Findings: No erythema or rash  Neurological:      General: No focal deficit present  Mental Status: She is alert and oriented to person, place, and time  Mental status is at baseline  Psychiatric:         Mood and Affect: Mood normal          Behavior: Behavior normal          Thought Content: Thought content normal          Judgment: Judgment normal          Discharge instructions/Information to patient and family:   See after visit summary for information provided to patient and family  Provisions for Follow-Up Care:  See after visit summary for information related to follow-up care and any pertinent home health orders        Disposition:     Home with [facility] (Reminder: Complete face to face encounter)    For Discharges to [facility]:   · Not Applicable to this Patient - Not Applicable to this Patient    Planned Readmission: no      Discharge Statement:  I spent 38 minutes discharging the patient  This time was spent on the day of discharge  I had direct contact with the patient on the day of discharge  Greater than 50% of the total time was spent examining patient, answering all patient questions, arranging and discussing plan of care with patient as well as directly providing post-discharge instructions  Additional time then spent on discharge activities  Discharge Medications:  See after visit summary for reconciled discharge medications provided to patient and family  ** Please Note: Dragon 360 Dictation voice to text software may have been used in the creation of this document   **

## 2022-02-24 NOTE — NUTRITION
02/24/22 1210   Assessment   Timepoint Initial  (Consult (Malnutrition))   Recommendations/Interventions   Malnutrition/BMI Present No  (Does not meet 2 criteria at this time;  No significant changes in wt; Good Intake/Appetite; Mild muscle wasting consistent w/ advanced age)

## 2022-02-25 ENCOUNTER — TRANSITIONAL CARE MANAGEMENT (OUTPATIENT)
Dept: FAMILY MEDICINE CLINIC | Facility: CLINIC | Age: 87
End: 2022-02-25

## 2022-02-25 NOTE — UTILIZATION REVIEW
Notification of Discharge   This is a Notification of Discharge from our facility [address]  Please be advised that this patient has been discharge from our facility  Below you will find the admission and discharge date and time including the patients disposition  UTILIZATION REVIEW CONTACT:  Harleen Carson MA  Utilization   Network Utilization Review Department  Phone: [phone] x carefully listen to the prompts  All voicemails are confidential   Email: [email or web address]     PHYSICIAN ADVISORY SERVICES:  FOR KVHD-YN-MCNF REVIEW - MEDICAL NECESSITY DENIAL  Phone: [phone]  Fax: [phone]  Email: [email or web address]     PRESENTATION DATE: 2/22/2022  8:18 PM  OBERVATION ADMISSION DATE:   INPATIENT ADMISSION DATE: N/A N/A   DISCHARGE DATE: 2/24/2022  1:10 PM  DISPOSITION: Home with [facility] with [address] [place] [address] Road INFORMATION:  Send all requests for admission clinical reviews, approved or denied determinations and any other requests to dedicated fax number below belonging to the campus where the patient is receiving treatment   List of dedicated fax numbers:  [address] DENIALS (Administrative/Medical Necessity) [phone]   [address] (Maternity/NICU/Pediatrics) [phone]   [facility] [phone]   [address] [facility] [phone]   [address] [place] [address] [phone]   [address] [place] [address],[address] [phone]   [facility]  [phone]   [address] [place] [address], [facility]  [address] [facility] [facility] [place] [address] [facility] [phone]

## 2022-03-01 ENCOUNTER — TELEPHONE (OUTPATIENT)
Dept: FAMILY MEDICINE CLINIC | Facility: CLINIC | Age: 87
End: 2022-03-01

## 2022-03-01 ENCOUNTER — NURSE TRIAGE (OUTPATIENT)
Dept: OTHER | Facility: OTHER | Age: 87
End: 2022-03-01

## 2022-03-01 ENCOUNTER — APPOINTMENT (EMERGENCY)
Dept: CT IMAGING | Facility: HOSPITAL | Age: 87
DRG: 389 | End: 2022-03-01
Payer: COMMERCIAL

## 2022-03-01 ENCOUNTER — HOSPITAL ENCOUNTER (INPATIENT)
Facility: HOSPITAL | Age: 87
LOS: 6 days | Discharge: HOME HEALTH | DRG: 389 | End: 2022-03-08
Attending: EMERGENCY MEDICINE | Admitting: INTERNAL MEDICINE
Payer: COMMERCIAL

## 2022-03-01 DIAGNOSIS — K56.41 IMPACTED STOOL IN RECTUM (HCC): ICD-10-CM

## 2022-03-01 DIAGNOSIS — H61.20 WAX IN EAR: ICD-10-CM

## 2022-03-01 DIAGNOSIS — K62.89 PROCTITIS: Primary | ICD-10-CM

## 2022-03-01 DIAGNOSIS — H04.123 DRY EYES: ICD-10-CM

## 2022-03-01 DIAGNOSIS — S22.081D CLOSED STABLE BURST FRACTURE OF TWELFTH THORACIC VERTEBRA WITH ROUTINE HEALING: ICD-10-CM

## 2022-03-01 DIAGNOSIS — M54.50 CHRONIC MIDLINE LOW BACK PAIN WITHOUT SCIATICA: ICD-10-CM

## 2022-03-01 DIAGNOSIS — K59.00 CONSTIPATION, UNSPECIFIED CONSTIPATION TYPE: ICD-10-CM

## 2022-03-01 DIAGNOSIS — G89.29 CHRONIC MIDLINE LOW BACK PAIN WITHOUT SCIATICA: ICD-10-CM

## 2022-03-01 DIAGNOSIS — R26.2 AMBULATORY DYSFUNCTION: ICD-10-CM

## 2022-03-01 LAB
ALBUMIN SERPL BCP-MCNC: 3.6 G/DL (ref 3.5–5)
ALP SERPL-CCNC: 75 U/L (ref 46–116)
ALT SERPL W P-5'-P-CCNC: 24 U/L (ref 12–78)
ANION GAP SERPL CALCULATED.3IONS-SCNC: 9 MMOL/L (ref 4–13)
AST SERPL W P-5'-P-CCNC: 21 U/L (ref 5–45)
BASOPHILS # BLD AUTO: 0.03 THOUSANDS/ΜL (ref 0–0.1)
BASOPHILS NFR BLD AUTO: 0 % (ref 0–1)
BILIRUB SERPL-MCNC: 0.92 MG/DL (ref 0.2–1)
BUN SERPL-MCNC: 20 MG/DL (ref 5–25)
CALCIUM SERPL-MCNC: 8.9 MG/DL (ref 8.3–10.1)
CHLORIDE SERPL-SCNC: 99 MMOL/L (ref 100–108)
CO2 SERPL-SCNC: 26 MMOL/L (ref 21–32)
CREAT SERPL-MCNC: 1.76 MG/DL (ref 0.6–1.3)
EOSINOPHIL # BLD AUTO: 0.24 THOUSAND/ΜL (ref 0–0.61)
EOSINOPHIL NFR BLD AUTO: 3 % (ref 0–6)
ERYTHROCYTE [DISTWIDTH] IN BLOOD BY AUTOMATED COUNT: 12.9 % (ref 11.6–15.1)
GFR SERPL CREATININE-BSD FRML MDRD: 24 ML/MIN/1.73SQ M
GLUCOSE SERPL-MCNC: 116 MG/DL (ref 65–140)
HCT VFR BLD AUTO: 41.4 % (ref 34.8–46.1)
HGB BLD-MCNC: 14.6 G/DL (ref 11.5–15.4)
IMM GRANULOCYTES # BLD AUTO: 0.05 THOUSAND/UL (ref 0–0.2)
IMM GRANULOCYTES NFR BLD AUTO: 1 % (ref 0–2)
LIPASE SERPL-CCNC: 75 U/L (ref 73–393)
LYMPHOCYTES # BLD AUTO: 1.1 THOUSANDS/ΜL (ref 0.6–4.47)
LYMPHOCYTES NFR BLD AUTO: 12 % (ref 14–44)
MCH RBC QN AUTO: 34.9 PG (ref 26.8–34.3)
MCHC RBC AUTO-ENTMCNC: 35.3 G/DL (ref 31.4–37.4)
MCV RBC AUTO: 99 FL (ref 82–98)
MONOCYTES # BLD AUTO: 0.8 THOUSAND/ΜL (ref 0.17–1.22)
MONOCYTES NFR BLD AUTO: 8 % (ref 4–12)
NEUTROPHILS # BLD AUTO: 7.28 THOUSANDS/ΜL (ref 1.85–7.62)
NEUTS SEG NFR BLD AUTO: 76 % (ref 43–75)
NRBC BLD AUTO-RTO: 0 /100 WBCS
PLATELET # BLD AUTO: 229 THOUSANDS/UL (ref 149–390)
PMV BLD AUTO: 10.3 FL (ref 8.9–12.7)
POTASSIUM SERPL-SCNC: 4.4 MMOL/L (ref 3.5–5.3)
PROT SERPL-MCNC: 6.9 G/DL (ref 6.4–8.2)
RBC # BLD AUTO: 4.18 MILLION/UL (ref 3.81–5.12)
SODIUM SERPL-SCNC: 134 MMOL/L (ref 136–145)
WBC # BLD AUTO: 9.5 THOUSAND/UL (ref 4.31–10.16)

## 2022-03-01 PROCEDURE — 36415 COLL VENOUS BLD VENIPUNCTURE: CPT | Performed by: PHYSICIAN ASSISTANT

## 2022-03-01 PROCEDURE — 99285 EMERGENCY DEPT VISIT HI MDM: CPT | Performed by: PHYSICIAN ASSISTANT

## 2022-03-01 PROCEDURE — 85025 COMPLETE CBC W/AUTO DIFF WBC: CPT | Performed by: PHYSICIAN ASSISTANT

## 2022-03-01 PROCEDURE — 83690 ASSAY OF LIPASE: CPT | Performed by: PHYSICIAN ASSISTANT

## 2022-03-01 PROCEDURE — 99284 EMERGENCY DEPT VISIT MOD MDM: CPT

## 2022-03-01 PROCEDURE — 74176 CT ABD & PELVIS W/O CONTRAST: CPT

## 2022-03-01 PROCEDURE — 0DCP7ZZ EXTIRPATION OF MATTER FROM RECTUM, VIA NATURAL OR ARTIFICIAL OPENING: ICD-10-PCS | Performed by: HOSPITALIST

## 2022-03-01 PROCEDURE — 80053 COMPREHEN METABOLIC PANEL: CPT | Performed by: PHYSICIAN ASSISTANT

## 2022-03-01 PROCEDURE — G1004 CDSM NDSC: HCPCS

## 2022-03-01 PROCEDURE — 84443 ASSAY THYROID STIM HORMONE: CPT

## 2022-03-01 NOTE — TELEPHONE ENCOUNTER
Correct not a nursing need  Spoke with daughter Wilfrid Leslie she has tried three glycerin fleet enemas, prune juice Li is taking senna miralax and linzess   Does not want to eat because she is feeling full  Informed her to stop dicyclomine  Any other suggestions on what they could try?

## 2022-03-01 NOTE — TELEPHONE ENCOUNTER
Pt's daughter called stating the pt has been constipated now for 5 days  States she was put on dicyclomine in the hospital and daughter believes this is causing the constipation  Has given her fleets and senekot and nothing is working    Asking if a nurse could come out and see the pt?

## 2022-03-01 NOTE — TELEPHONE ENCOUNTER
They could buy a bottle of magnesium citrate and have her drink half and if no bm after 2 hrs, they can repeat it

## 2022-03-01 NOTE — TELEPHONE ENCOUNTER
Shivam Lu don't think this is  a case for a home health nurse  Im not sure what you think? I'd advise her to stop the dicyclomine and help her with daily miralax

## 2022-03-01 NOTE — TELEPHONE ENCOUNTER
Regarding: severe constipation   ----- Message from Mae Plasencia sent at 3/1/2022  6:53 PM EST -----  "My mother is severely constipated she hasn't had a bowel movement since last Thursday"

## 2022-03-02 ENCOUNTER — RA CDI HCC (OUTPATIENT)
Dept: OTHER | Facility: HOSPITAL | Age: 87
End: 2022-03-02

## 2022-03-02 LAB
BACTERIA UR QL AUTO: NORMAL /HPF
BILIRUB UR QL STRIP: NEGATIVE
CLARITY UR: CLEAR
COLOR UR: YELLOW
GLUCOSE UR STRIP-MCNC: NEGATIVE MG/DL
HGB UR QL STRIP.AUTO: NEGATIVE
KETONES UR STRIP-MCNC: NEGATIVE MG/DL
LEUKOCYTE ESTERASE UR QL STRIP: ABNORMAL
NITRITE UR QL STRIP: NEGATIVE
NON-SQ EPI CELLS URNS QL MICRO: NORMAL /HPF
PH UR STRIP.AUTO: 8 [PH]
PROT UR STRIP-MCNC: NEGATIVE MG/DL
RBC #/AREA URNS AUTO: NORMAL /HPF
SP GR UR STRIP.AUTO: 1.01 (ref 1–1.03)
TSH SERPL DL<=0.05 MIU/L-ACNC: 3.86 UIU/ML (ref 0.36–3.74)
UROBILINOGEN UR QL STRIP.AUTO: 0.2 E.U./DL
WBC #/AREA URNS AUTO: NORMAL /HPF

## 2022-03-02 PROCEDURE — 99223 1ST HOSP IP/OBS HIGH 75: CPT | Performed by: INTERNAL MEDICINE

## 2022-03-02 PROCEDURE — 81001 URINALYSIS AUTO W/SCOPE: CPT | Performed by: PHYSICIAN ASSISTANT

## 2022-03-02 RX ORDER — SENNOSIDES 8.6 MG
17.2 TABLET ORAL 2 TIMES DAILY
Status: DISCONTINUED | OUTPATIENT
Start: 2022-03-02 | End: 2022-03-02

## 2022-03-02 RX ORDER — HEPARIN SODIUM 5000 [USP'U]/ML
5000 INJECTION, SOLUTION INTRAVENOUS; SUBCUTANEOUS EVERY 8 HOURS SCHEDULED
Status: DISCONTINUED | OUTPATIENT
Start: 2022-03-02 | End: 2022-03-08 | Stop reason: HOSPADM

## 2022-03-02 RX ORDER — BISACODYL 10 MG
10 SUPPOSITORY, RECTAL RECTAL DAILY PRN
Status: DISCONTINUED | OUTPATIENT
Start: 2022-03-02 | End: 2022-03-08 | Stop reason: HOSPADM

## 2022-03-02 RX ORDER — POLYETHYLENE GLYCOL 3350 17 G/17G
17 POWDER, FOR SOLUTION ORAL 2 TIMES DAILY
Status: DISCONTINUED | OUTPATIENT
Start: 2022-03-02 | End: 2022-03-07

## 2022-03-02 RX ORDER — LEVOTHYROXINE SODIUM 0.07 MG/1
37.5 TABLET ORAL
Status: DISCONTINUED | OUTPATIENT
Start: 2022-03-02 | End: 2022-03-08 | Stop reason: HOSPADM

## 2022-03-02 RX ORDER — PILOCARPINE HYDROCHLORIDE 5 MG/1
5 TABLET, FILM COATED ORAL 2 TIMES DAILY
Status: DISCONTINUED | OUTPATIENT
Start: 2022-03-02 | End: 2022-03-08 | Stop reason: HOSPADM

## 2022-03-02 RX ORDER — ACETAMINOPHEN 325 MG/1
650 TABLET ORAL EVERY 6 HOURS PRN
Status: DISCONTINUED | OUTPATIENT
Start: 2022-03-02 | End: 2022-03-08 | Stop reason: HOSPADM

## 2022-03-02 RX ORDER — ZOLPIDEM TARTRATE 5 MG/1
5 TABLET ORAL
Status: DISCONTINUED | OUTPATIENT
Start: 2022-03-02 | End: 2022-03-08 | Stop reason: HOSPADM

## 2022-03-02 RX ORDER — ASPIRIN 81 MG/1
81 TABLET ORAL EVERY OTHER DAY
Status: DISCONTINUED | OUTPATIENT
Start: 2022-03-02 | End: 2022-03-08 | Stop reason: HOSPADM

## 2022-03-02 RX ORDER — BISACODYL 10 MG
10 SUPPOSITORY, RECTAL RECTAL ONCE
Status: COMPLETED | OUTPATIENT
Start: 2022-03-02 | End: 2022-03-02

## 2022-03-02 RX ORDER — XYLITOL/YERBA SANTA
5 AEROSOL, SPRAY WITH PUMP (ML) MUCOUS MEMBRANE 4 TIMES DAILY
Status: DISCONTINUED | OUTPATIENT
Start: 2022-03-02 | End: 2022-03-08 | Stop reason: HOSPADM

## 2022-03-02 RX ORDER — ALPRAZOLAM 0.25 MG/1
0.25 TABLET ORAL 4 TIMES DAILY PRN
Status: DISCONTINUED | OUTPATIENT
Start: 2022-03-02 | End: 2022-03-08 | Stop reason: HOSPADM

## 2022-03-02 RX ORDER — POLYETHYLENE GLYCOL 3350 17 G/17G
17 POWDER, FOR SOLUTION ORAL DAILY
Status: DISCONTINUED | OUTPATIENT
Start: 2022-03-02 | End: 2022-03-02

## 2022-03-02 RX ORDER — FAMOTIDINE 20 MG/1
40 TABLET, FILM COATED ORAL DAILY
Status: DISCONTINUED | OUTPATIENT
Start: 2022-03-02 | End: 2022-03-08 | Stop reason: HOSPADM

## 2022-03-02 RX ORDER — LIDOCAINE HYDROCHLORIDE 20 MG/ML
JELLY TOPICAL DAILY PRN
Status: DISCONTINUED | OUTPATIENT
Start: 2022-03-02 | End: 2022-03-02

## 2022-03-02 RX ORDER — AMIODARONE HYDROCHLORIDE 200 MG/1
100 TABLET ORAL DAILY
Status: DISCONTINUED | OUTPATIENT
Start: 2022-03-02 | End: 2022-03-08 | Stop reason: HOSPADM

## 2022-03-02 RX ORDER — DOCUSATE SODIUM 100 MG/1
100 CAPSULE, LIQUID FILLED ORAL 2 TIMES DAILY
Status: DISCONTINUED | OUTPATIENT
Start: 2022-03-02 | End: 2022-03-02

## 2022-03-02 RX ORDER — PREDNISONE 1 MG/1
1 TABLET ORAL DAILY
Status: DISCONTINUED | OUTPATIENT
Start: 2022-03-02 | End: 2022-03-08 | Stop reason: HOSPADM

## 2022-03-02 RX ORDER — ONDANSETRON 2 MG/ML
4 INJECTION INTRAMUSCULAR; INTRAVENOUS EVERY 6 HOURS PRN
Status: DISCONTINUED | OUTPATIENT
Start: 2022-03-02 | End: 2022-03-08 | Stop reason: HOSPADM

## 2022-03-02 RX ORDER — MINERAL OIL 100 G/100G
1 OIL RECTAL ONCE
Status: DISCONTINUED | OUTPATIENT
Start: 2022-03-02 | End: 2022-03-08 | Stop reason: HOSPADM

## 2022-03-02 RX ORDER — FUROSEMIDE 40 MG/1
40 TABLET ORAL DAILY
Status: DISCONTINUED | OUTPATIENT
Start: 2022-03-02 | End: 2022-03-08 | Stop reason: HOSPADM

## 2022-03-02 RX ORDER — HYDRALAZINE HYDROCHLORIDE 20 MG/ML
10 INJECTION INTRAMUSCULAR; INTRAVENOUS EVERY 6 HOURS PRN
Status: DISCONTINUED | OUTPATIENT
Start: 2022-03-02 | End: 2022-03-08 | Stop reason: HOSPADM

## 2022-03-02 RX ORDER — AMOXICILLIN 250 MG
2 CAPSULE ORAL 2 TIMES DAILY
Status: DISCONTINUED | OUTPATIENT
Start: 2022-03-02 | End: 2022-03-08 | Stop reason: HOSPADM

## 2022-03-02 RX ADMIN — ASPIRIN 81 MG: 81 TABLET, COATED ORAL at 08:41

## 2022-03-02 RX ADMIN — AMIODARONE HYDROCHLORIDE 100 MG: 200 TABLET ORAL at 08:41

## 2022-03-02 RX ADMIN — DOCUSATE SODIUM 100 MG: 100 CAPSULE ORAL at 08:41

## 2022-03-02 RX ADMIN — FUROSEMIDE 40 MG: 40 TABLET ORAL at 08:41

## 2022-03-02 RX ADMIN — SENNOSIDES AND DOCUSATE SODIUM 2 TABLET: 50; 8.6 TABLET ORAL at 08:41

## 2022-03-02 RX ADMIN — Medication 5 SPRAY: at 21:51

## 2022-03-02 RX ADMIN — HEPARIN SODIUM 5000 UNITS: 5000 INJECTION INTRAVENOUS; SUBCUTANEOUS at 13:57

## 2022-03-02 RX ADMIN — HEPARIN SODIUM 5000 UNITS: 5000 INJECTION INTRAVENOUS; SUBCUTANEOUS at 05:25

## 2022-03-02 RX ADMIN — BISACODYL 10 MG: 10 SUPPOSITORY RECTAL at 02:52

## 2022-03-02 RX ADMIN — LIDOCAINE HYDROCHLORIDE 1 APPLICATION: 20 JELLY TOPICAL at 02:52

## 2022-03-02 RX ADMIN — PREDNISONE 1 MG: 1 TABLET ORAL at 08:41

## 2022-03-02 RX ADMIN — PILOCARPINE HYDROCHLORIDE 5 MG: 5 TABLET, FILM COATED ORAL at 08:41

## 2022-03-02 RX ADMIN — SENNOSIDES AND DOCUSATE SODIUM 2 TABLET: 50; 8.6 TABLET ORAL at 19:13

## 2022-03-02 RX ADMIN — WITCH HAZEL 1 PAD: 500 SOLUTION RECTAL; TOPICAL at 19:14

## 2022-03-02 RX ADMIN — ACETAMINOPHEN 650 MG: 325 TABLET, FILM COATED ORAL at 06:00

## 2022-03-02 RX ADMIN — Medication 5 SPRAY: at 12:26

## 2022-03-02 RX ADMIN — Medication 5 SPRAY: at 19:14

## 2022-03-02 RX ADMIN — PILOCARPINE HYDROCHLORIDE 5 MG: 5 TABLET, FILM COATED ORAL at 19:13

## 2022-03-02 RX ADMIN — STANDARDIZED SENNA CONCENTRATE 17.2 MG: 8.6 TABLET ORAL at 08:41

## 2022-03-02 RX ADMIN — FAMOTIDINE 40 MG: 20 TABLET ORAL at 08:41

## 2022-03-02 RX ADMIN — Medication 5 SPRAY: at 08:35

## 2022-03-02 RX ADMIN — LEVOTHYROXINE SODIUM 37.5 MCG: 75 TABLET ORAL at 05:23

## 2022-03-02 RX ADMIN — ALPRAZOLAM 0.25 MG: 0.25 TABLET ORAL at 06:00

## 2022-03-02 RX ADMIN — POLYETHYLENE GLYCOL 3350 17 G: 17 POWDER, FOR SOLUTION ORAL at 21:02

## 2022-03-02 RX ADMIN — LIDOCAINE HYDROCHLORIDE: 20 JELLY TOPICAL at 08:47

## 2022-03-02 RX ADMIN — POLYETHYLENE GLYCOL 3350 17 G: 17 POWDER, FOR SOLUTION ORAL at 08:41

## 2022-03-02 NOTE — ASSESSMENT & PLAN NOTE
Patient and daughter insistent that she receives her xanax 0 25mg 4x daily PRN     Plan:  - continue home xanax 0 25mg 4x daily PRN

## 2022-03-02 NOTE — ASSESSMENT & PLAN NOTE
Lab Results   Component Value Date    EGFR 24 03/01/2022    EGFR 21 02/23/2022    EGFR 18 02/22/2022    CREATININE 1 76 (H) 03/01/2022    CREATININE 1 93 (H) 02/23/2022    CREATININE 2 25 (H) 02/22/2022     Baseline creatinine 1 5-1 9

## 2022-03-02 NOTE — ED PROVIDER NOTES
History  Chief Complaint   Patient presents with    Constipation     pt left hospital thursday and has not had BM since, tried fleets/mag/miralax/senna without success, c/o pain/pressure     Patient is a 42-year-old female presenting to the emergency room for evaluation  Patient states she was recently admitted to the hospital from February 22nd to February 24th  She states she has not been able to have a bowel movement since returning home from the hospital   Patient has tried multiple over-the-counter medications including suppositories, MiraLax, Colace, senna, without any relief  Patient states she is having a lot of lower abdominal pressure and rectal pressure  She denies any other symptoms at this time  Constipation  Associated symptoms: no abdominal pain, no back pain, no dysuria, no fever and no vomiting        Prior to Admission Medications   Prescriptions Last Dose Informant Patient Reported? Taking?    ALPRAZolam (XANAX) 0 25 mg tablet   No No   Sig: TAKE ONE TABLET BY MOUTH FOUR TIMES A DAY AS NEEDED FOR ANXIETY   Artificial Saliva (Xerostomia Relief Spray) SOLN  Child No No   Sig: Apply 1 application to the mouth or throat 4 (four) times a day   Crestor 5 MG tablet  Child No No   Sig: TAKE ONE TABLET BY MOUTH EVERY DAY   Emollient (EUCERIN) lotion  Child Yes No   Sig: Apply topically 2 (two) times a day   Respiratory Therapy Supplies (NEBULIZER/ADULT MASK) KIT  Child No No   Sig: by Does not apply route every 4 (four) hours as needed (wheezing)   amiodarone 100 mg tablet  Child No No   Sig: Take 1 tablet (100 mg total) by mouth daily   aspirin (ECOTRIN LOW STRENGTH) 81 mg EC tablet  Child Yes No   Sig: Take 81 mg by mouth every other day   dicyclomine (BENTYL) 10 mg capsule   No No   Sig: Take 1 capsule (10 mg total) by mouth 4 (four) times a day (before meals and at bedtime)   docusate sodium (COLACE) 100 mg capsule  Child No No   Sig: Take 1 capsule (100 mg total) by mouth 2 (two) times a day famotidine (PEPCID) 40 MG tablet  Child No No   Sig: Take 1 tablet (40 mg total) by mouth daily   furosemide (LASIX) 20 mg tablet  Child No No   Sig: TAKE TWO TABLETS BY MOUTH EVERY DAY   levothyroxine (Levoxyl) 25 mcg tablet  Child No No   Sig: Take 1 5 tablets (37 5 mcg total) by mouth daily   linaCLOtide (Linzess) 72 MCG CAPS  Child No No   Sig: Take 1 capsule by mouth daily   pilocarpine (SALAGEN) 5 mg tablet  Child No No   Sig: Take 1 tablet (5 mg total) by mouth 2 (two) times a day   polyethylene glycol (MIRALAX) 17 g packet  Child No No   Sig: Take 17 g by mouth daily as needed (constipation)   predniSONE 1 mg tablet  Child No No   Sig: Take 1 tablet (1 mg total) by mouth daily   saliva substitute (MOUTH KOTE)   No No   Sig: Apply 5 sprays to the mouth or throat 3 (three) times a day as needed (dry mouth)   senna (SENOKOT) 8 6 mg   No No   Sig: Take 2 tablets (17 2 mg total) by mouth 2 (two) times a day   zolpidem (AMBIEN CR) 12 5 MG CR tablet  Child No No   Sig: TAKE ONE TABLET BY MOUTH AT BEDTIME      Facility-Administered Medications: None       Past Medical History:   Diagnosis Date    A[facility]fib ([facility] )     Cardiac disease     Hyperlipidemia     Hypertension        Past Surgical History:   Procedure Laterality Date    APPENDECTOMY      CHOLECYSTECTOMY      HERNIA REPAIR      HYSTERECTOMY      NEPHRECTOMY Right        Family History   Problem Relation Age of Onset    Heart disease Family     Hypertension Family     Cancer Family      I have reviewed and agree with the history as documented  E-Cigarette/Vaping     E-Cigarette/Vaping Substances     Social History     Tobacco Use    Smoking status: Never Smoker    Smokeless tobacco: Never Used   Substance Use Topics    Alcohol use: Not Currently    Drug use: Never       Review of Systems   Constitutional: Negative for chills and fever  HENT: Negative for ear pain and sore throat  Eyes: Negative for pain and visual disturbance  Respiratory: Negative for cough and shortness of breath  Cardiovascular: Negative for chest pain and palpitations  Gastrointestinal: Positive for constipation  Negative for abdominal pain and vomiting  Genitourinary: Negative for dysuria and hematuria  Musculoskeletal: Negative for arthralgias and back pain  Skin: Negative for color change and rash  Neurological: Negative for seizures and syncope  All other systems reviewed and are negative  Physical Exam  Physical Exam  Vitals and nursing note reviewed  Constitutional:       General: She is not in acute distress  Appearance: She is well-developed  HENT:      Head: Normocephalic and atraumatic  Eyes:      Conjunctiva/sclera: Conjunctivae normal    Cardiovascular:      Rate and Rhythm: Normal rate and regular rhythm  Heart sounds: No murmur heard  Pulmonary:      Effort: Pulmonary effort is normal  No respiratory distress  Breath sounds: Normal breath sounds  Abdominal:      Palpations: Abdomen is soft  Tenderness: There is no abdominal tenderness  Comments: Large amount of stool felt in the rectum  Was able to remove some of the stool, it is normal in color  Patient's rectum is very tender during exam   Musculoskeletal:      Cervical back: Neck supple  Skin:     General: Skin is warm and dry  Neurological:      Mental Status: She is alert           Vital Signs  ED Triage Vitals   Temperature Pulse Respirations Blood Pressure SpO2   03/01/22 2018 03/01/22 2018 03/01/22 2018 03/01/22 2018 03/01/22 2018   98 4 °F (36 9 °C) 63 18 141/63 93 %      Temp Source Heart Rate Source Patient Position - Orthostatic VS BP Location FiO2 (%)   03/01/22 2018 03/01/22 2018 03/01/22 2018 03/01/22 2018 --   Oral Monitor Sitting Right arm       Pain Score       03/01/22 2019       5           Vitals:    03/01/22 2018   BP: 141/63   Pulse: 63   Patient Position - Orthostatic VS: Sitting         ED Medications  Medications ALPRAZolam (XANAX) tablet 0 25 mg (has no administration in time range)   zolpidem (AMBIEN) tablet 5 mg (has no administration in time range)   amiodarone tablet 100 mg (has no administration in time range)   saliva substitute (MOUTH KOTE) mucosal solution 5 spray (has no administration in time range)   aspirin (ECOTRIN LOW STRENGTH) EC tablet 81 mg (has no administration in time range)   docusate sodium (COLACE) capsule 100 mg (has no administration in time range)   famotidine (PEPCID) tablet 40 mg (has no administration in time range)   furosemide (LASIX) tablet 40 mg (has no administration in time range)   levothyroxine tablet 37 5 mcg (has no administration in time range)   pilocarpine (SALAGEN) tablet 5 mg (has no administration in time range)   polyethylene glycol (MIRALAX) packet 17 g (has no administration in time range)   predniSONE tablet 1 mg (has no administration in time range)   senna (SENOKOT) tablet 17 2 mg (has no administration in time range)   lidocaine (XYLOCAINE) 2 % topical gel (has no administration in time range)   bisacodyl (DULCOLAX) rectal suppository 10 mg (has no administration in time range)   bisacodyl (DULCOLAX) rectal suppository 10 mg (has no administration in time range)   mineral oil enema 1 enema (has no administration in time range)   acetaminophen (TYLENOL) tablet 650 mg (has no administration in time range)   ondansetron (ZOFRAN) injection 4 mg (has no administration in time range)   heparin (porcine) subcutaneous injection 5,000 Units (has no administration in time range)       Diagnostic Studies  Results Reviewed     Procedure Component Value Units Date/Time    TSH, 3rd generation [[id number]] Collected: 03/01/22 2140    Lab Status:  In process Specimen: Blood from Arm, Right Updated: 03/02/22 0249    Platelet count [[id number]]     Lab Status: No result Specimen: Blood     UA (URINE) with reflex to Scope [[id number]]     Lab Status: No result Specimen: Urine     Comprehensive metabolic panel [[id number]]  (Abnormal) Collected: 03/01/22 2140    Lab Status: Final result Specimen: Blood from Arm, Right Updated: 03/01/22 2209     Sodium 134 mmol/L      Potassium 4 4 mmol/L      Chloride 99 mmol/L      CO2 26 mmol/L      ANION GAP 9 mmol/L      BUN 20 mg/dL      Creatinine 1 76 mg/dL      Glucose 116 mg/dL      Calcium 8 9 mg/dL      AST 21 U/L      ALT 24 U/L      Alkaline Phosphatase 75 U/L      Total Protein 6 9 g/dL      Albumin 3 6 g/dL      Total Bilirubin 0 92 mg/dL      eGFR 24 ml/min/1 73sq m     Narrative:      National Kidney Disease Foundation guidelines for Chronic Kidney Disease (CKD):     Stage 1 with normal or high GFR (GFR > 90 mL/min/1 73 square meters)    Stage 2 Mild CKD (GFR = 60-89 mL/min/1 73 square meters)    Stage 3A Moderate CKD (GFR = 45-59 mL/min/1 73 square meters)    Stage 3B Moderate CKD (GFR = 30-44 mL/min/1 73 square meters)    Stage 4 Severe CKD (GFR = 15-29 mL/min/1 73 square meters)    Stage 5 End Stage CKD (GFR <15 mL/min/1 73 square meters)  Note: GFR calculation is accurate only with a steady state creatinine    Lipase [[id number]]  (Normal) Collected: 03/01/22 2140    Lab Status: Final result Specimen: Blood from Arm, Right Updated: 03/01/22 2209     Lipase 75 u/L     CBC and differential [[id number]]  (Abnormal) Collected: 03/01/22 2140    Lab Status: Final result Specimen: Blood from Arm, Right Updated: 03/01/22 2148     WBC 9 50 Thousand/uL      RBC 4 18 Million/uL      Hemoglobin 14 6 g/dL      Hematocrit 41 4 %      MCV 99 fL      MCH 34 9 pg      MCHC 35 3 g/dL      RDW 12 9 %      MPV 10 3 fL      Platelets 036 Thousands/uL      nRBC 0 /100 WBCs      Neutrophils Relative 76 %      Immat GRANS % 1 %      Lymphocytes Relative 12 %      Monocytes Relative 8 %      Eosinophils Relative 3 %      Basophils Relative 0 %      Neutrophils Absolute 7 28 Thousands/µL      Immature Grans Absolute 0 05 Thousand/uL      Lymphocytes Absolute 1 10 Thousands/µL      Monocytes Absolute 0 80 Thousand/µL      Eosinophils Absolute 0 24 Thousand/µL      Basophils Absolute 0 03 Thousands/µL                  CT abdomen pelvis wo contrast   Final Result by Emery Vicente MD (03/02 0037)      Rectal impaction  Liquid stool throughout the proximal colon without evidence of colitis or bowel obstruction  Workstation performed: [id number]                    [facility]  Number of Diagnoses or Management Options  Impacted stool in rectum [facility]): new and requires workup  Proctitis: new and requires workup  Diagnosis management comments: Patient is a 80-year-old female presenting to the emergency room for evaluation of constipation  She was discharged from the hospital on February 24th and has not had a bowel movement since  She has tried multiple over-the-counter medications  Patient states she can no longer stay on her bottom due to the severity of the pain  On exam patient is sitting on the bedside commode in no acute distress  Did attempt to disimpact the patient, some stool was able to be removed, however patient only minimally tolerated the procedure secondary to pain  Patient's labs were okay  Patient's CT is concerning for distended rectum secondary to fecal impaction and surrounding fat stranding  Will bring patient in for observation for impacted stools and proctitis         Amount and/or Complexity of Data Reviewed  Clinical lab tests: ordered and reviewed  Tests in the radiology section of CPT®: reviewed and ordered    Patient Progress  Patient progress: stable      Disposition  Final diagnoses:   Proctitis   Impacted stool in rectum [facility])     Time reflects when diagnosis was documented in both MDM as applicable and the Disposition within this note     Time User Action Codes Description Comment    3/2/2022 12:49 AM Jestine Day Add [K62 89] Proctitis     3/2/2022 12:50 AM Jestine Day Add [K56 41] Impacted stool in rectum ([address] )     3/2/2022 12:56 AM Erick Spear Add [K59 00] Constipation, unspecified constipation type     3/2/2022 12:56 AM Erick Spear Remove [K59 00] Constipation, unspecified constipation type       ED Disposition     ED Disposition Condition Date/Time Comment    Admit Stable Wed Mar 2, 2022  1:04 AM Case was discussed with ZARIA and the patient's admission status was agreed to be Admission Status: inpatient status to the service of Dr Louise Andrew   Follow-up Information    None         Patient's Medications   Discharge Prescriptions    No medications on file       No discharge procedures on file      PDMP Review       Value Time User    PDMP Reviewed  Yes 12/29/2021  7:44 AM Mahi Cortez, [facility]          ED Provider  Electronically Signed by           Wanda Mcduffie PA-C  03/02/22 0378

## 2022-03-02 NOTE — ED NOTES
Patient repositioned in chair as per Patient request  Comfort and safety measures provided        Delilah Hirsch RN  03/02/22 5143

## 2022-03-02 NOTE — ASSESSMENT & PLAN NOTE
Wt Readings from Last 3 Encounters:   02/15/22 82 6 kg (182 lb)   02/09/22 82 6 kg (182 lb)   10/05/21 81 2 kg (179 lb)     Last echo 8/2017: LVEF 58%, grade 1 diastolic dysfunction   Clinically euvolemic     Plan:  - continue lasix 40mg daily

## 2022-03-02 NOTE — TELEPHONE ENCOUNTER
Reason for Disposition   Last bowel movement (BM) > 4 days ago    Answer Assessment - Initial Assessment Questions  1  STOOL PATTERN OR FREQUENCY: "How often do you pass bowel movements (BMs)?"  (Normal range: tid to q 3 days)  "When was the last BM passed?"        Every day    2  STRAINING: "Do you have to strain to have a BM?"       Denies    3  RECTAL PAIN: "Does your rectum hurt when the stool comes out?" If Yes, ask: "Do you have hemorrhoids? How bad is the pain?"  (Scale 1-10; or mild, moderate, severe)      Denies    4  STOOL COMPOSITION: "Are the stools hard?"       Denies    5  BLOOD ON STOOLS: "Has there been any blood on the toilet tissue or on the surface of the BM?" If Yes, ask: "When was the last time?"       Denies    6  CHRONIC CONSTIPATION: "Is this a new problem for you?"  If no, ask: "How long have you had this problem?" (days, weeks, months)       Yes    7  CHANGES IN DIET OR HYDRATION: "Have there been any recent changes in your diet?" "How much fluids are you drinking consuming on a daily basis?"  "How much have you had to drink today?"      Denies    8  MEDICATIONS: "Have you been taking any new medications?" "Are you taking any narcotic pain medications?" (e g , Vicoden, Percocet, morphine, dilaudid)      Bentyl 10 mg, stopped this morning    9  LAXATIVES: "Have you been using any stool softeners, laxatives, or enemas?"  If yes, ask "What, how often, and when was the last time?"  Senokot, linzess, miralax, 3 glycerin fleets yesterday, mag citrate took today    10  ACTIVITY:  "How much walking do you do every day? on a daily basis?"  "Has your activity level decreased in the past week?"         Decreased    11  CAUSE: "What do you think is causing the constipation?"         Medication that prescribed in the hospital    12  OTHER SYMPTOMS: "Do you have any other symptoms?" (e g , abdominal pain, bloating, fever, vomiting)        Not eating as much    13   MEDICAL HISTORY: "Do you have a history of hemorrhoids, rectal fissures, or rectal surgery or rectal abscess?"       Denies    14   PREGNANCY: "Is there any chance you are pregnant?" "When was your last menstrual period?"        N/A    Protocols used: CONSTIPATION-ADULT-AH

## 2022-03-02 NOTE — ASSESSMENT & PLAN NOTE
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

## 2022-03-02 NOTE — ASSESSMENT & PLAN NOTE
Continue home Synthroid 37 5 mcg  On last admission was reported to be non-compliant with synthroid as she thought it was causing her abdominal pain

## 2022-03-02 NOTE — ASSESSMENT & PLAN NOTE
Taken off of Pradaxa per patient and her daughter 2/2 falls/bleeding     Plan:  - Continue home ASA 81mg, amiodarone

## 2022-03-02 NOTE — H&P
[facility] 8/26/1926, 80 y o  female MRN: [id number]  Unit/Bed#: ED 15 Encounter: [id number]  Primary Care Provider: Leela Lujan DO   Date and time admitted to hospital: 3/1/2022  8:23 PM    Constipation  Assessment & Plan  · Patient was constipated on previous admission 2/22-2/24 when she was admitted for abdominal pain and ISABELLE  Was discharged home with aggressive bowel regimen including: senna 2 tabs BID, colace 100mg BID, linzess 72 mcg daily, and miralax PRN   · Per the patient and her daughter, she was only taking the senna 2 tablets once daily as she was fearful of developing diarrhea  · Did try multiple enemas at home per daughter, also took one dose of magnesium sulfate and a dose of miralax on 3/1  · Last BM 2/24  · Soapsuds enema in the ED and manual disimpaction with minimal relief   · CT abdomen/pelvis w/o contrast (3/1): Rectal impaction  Liquid stool throughout the proximal colon without evidence of colitis or bowel obstruction    · Patient with lower abdominal pain and significant sharp rectal pain especially with wiping, likely 2/2 local irritation     Plan:  - continue home regimen of senna 2 tabs BID, colace 100mg BID  - added scheduled miralax daily  - one time scheduled ducolax suppository, then continue daily PRN thereafter   - ordered mineral oil enema for the morning   - topical lidocaine 2% for rectal discomfort   - tylenol PRN for pain     CKD (chronic kidney disease), stage IV [facility])  Assessment & Plan  Lab Results   Component Value Date    EGFR 24 03/01/2022    EGFR 21 02/23/2022    EGFR 18 02/22/2022    CREATININE 1 76 (H) 03/01/2022    CREATININE 1 93 (H) 02/23/2022    CREATININE 2 25 (H) 02/22/2022     Baseline creatinine 1 5-1 9     Chronic diastolic congestive heart failure (HCC)  Assessment & Plan  Wt Readings from Last 3 Encounters:   02/15/22 82 6 kg (182 lb)   02/09/22 82 6 kg (182 lb)   10/05/21 81 2 kg (179 lb)     Last echo 8/2017: LVEF 17%, grade 1 diastolic dysfunction   Clinically euvolemic     Plan:  - continue lasix 40mg daily     Paroxysmal atrial fibrillation (HCC)  Assessment & Plan  Taken off of Pradaxa per patient and her daughter 2/2 falls/bleeding     Plan:  - Continue home ASA 81mg, amiodarone     Closed stable burst fracture of twelfth thoracic vertebra with routine healing  Assessment & Plan  · CT abdomen pelvis last admission 2/23 shows progression of chronic compression deformity of T12 vertebral body but no acute fracture or lesion  · Of note, patient has been on chronic low dose steroids with prednisone 1mg daily for "muscle pain" likely increasing her risk of fracture    Plan:  - continue home tylenol PRN for pain     Anxiety  Assessment & Plan  Patient and daughter insistent that she receives her xanax 0 25mg 4x daily PRN     Plan:  - continue home xanax 0 25mg 4x daily PRN     Hypothyroidism  Assessment & Plan  Continue home Synthroid 37 5 mcg  On last admission was reported to be non-compliant with synthroid as she thought it was causing her abdominal pain     VTE Pharmacologic Prophylaxis: VTE Score: 5 High Risk (Score >/= 5) - Pharmacological DVT Prophylaxis Ordered: heparin  Sequential Compression Devices Ordered  Code Status: Level 3 - DNAR and DNI   Discussion with family: Updated  (daughter) at bedside  Anticipated Length of Stay: Patient will be admitted on an inpatient basis with an anticipated length of stay of greater than 2 midnights secondary to fecal impaction  Chief Complaint: constipation and abdominal pain     History of Present Illness:  Denisse Vergara is a 80 y o  female with a PMH of afib (no longer on anticoagulation), IBS-C, hypothyroidism who presents with constipation and abdominal pain  Patient was constipated on previous admission 2/22-2/24 when she was admitted for abdominal pain and ISABELLE   Was discharged home with aggressive bowel regimen including: senna 2 tabs BID, colace 100mg BID, linzess 72 mcg daily, and miralax PRN  Per the patient and her daughter, she was only taking the senna 2 tablets once daily as she was fearful of developing diarrhea  Her last bowel movement was on 2/24  She started to develop lower abdominal pain and rectal pain on 3/1  Abdominal pain is described as cramping and the rectal pain is described as sharp and severe, especially with wiping  She denies any changes to her appetite and has had good PO intake  Denies any chest pain or SOB, nausea or vomiting  On 3/1 she did try multiple enemas at home per daughter, and also took one dose of magnesium sulfate and a dose of miralax with no results  On presentation to the ED, vital signs were stable  Labs were unremarkable for any pertubations from the patient's baseline  CT abdomen/pelvis showed rectal impaction with liquid stool in the proximal colon and no evidence of colitis or obstruction  She received a soapsuds enema and attempted manual disimpaction in the ED without significant relief  Review of Systems:  Review of Systems   Constitutional: Negative for chills, diaphoresis, fatigue, fever and unexpected weight change  HENT: Negative for sore throat  Respiratory: Negative for cough, chest tightness and shortness of breath  Cardiovascular: Negative for chest pain, palpitations and leg swelling  Gastrointestinal: Positive for abdominal pain and constipation  Negative for blood in stool, diarrhea, nausea and vomiting  Genitourinary: Negative for difficulty urinating and dysuria  Musculoskeletal: Negative for myalgias  Skin: Negative for rash and wound  Neurological: Negative for dizziness, syncope, weakness, numbness and headaches  Psychiatric/Behavioral: Negative for confusion  All other systems reviewed and are negative        Past Medical and Surgical History:   Past Medical History:   Diagnosis Date    A-fib [facility])     Cardiac disease     Hyperlipidemia     Hypertension        Past Surgical History:   Procedure Laterality Date    APPENDECTOMY      CHOLECYSTECTOMY      HERNIA REPAIR      HYSTERECTOMY      NEPHRECTOMY Right        Meds/Allergies:  Prior to Admission medications    Medication Sig Start Date End Date Taking?  Authorizing Provider   ALPAKASHZoana Lopez) 0 25 mg tablet TAKE ONE TABLET BY MOUTH FOUR TIMES A DAY AS NEEDED FOR ANXIETY 2/22/22   Breanna Shah DO   amiodarone 100 mg tablet Take 1 tablet (100 mg total) by mouth daily 1/27/22   Bettina Ibarra,    Artificial Saliva (Xerostomia Relief [place]) SOLN Apply 1 application to the mouth or throat 4 (four) times a day 10/5/21   Breanna Shah DO   aspirin (ECOTRIN LOW STRENGTH) 81 mg EC tablet Take 81 mg by mouth every other day    Historical Provider, MD   Crestor 5 MG tablet TAKE ONE TABLET BY MOUTH EVERY DAY 6/1/21   Breanna Shah DO   dicyclomine (BENTYL) 10 mg capsule Take 1 capsule (10 mg total) by mouth 4 (four) times a day (before meals and at bedtime) 2/24/22   Laney Martino MD   docusate sodium (COLACE) 100 mg capsule Take 1 capsule (100 mg total) by mouth 2 (two) times a day 3/25/21   Hazel Brown PA-C   Emollient (EUCERIN) lotion Apply topically 2 (two) times a day 4/23/15   Historical Provider, MD   famotidine (PEPCID) 40 MG tablet Take 1 tablet (40 mg total) by mouth daily 10/13/21   Breanna Shah DO   furosemide (LASIX) 20 mg tablet TAKE TWO TABLETS BY MOUTH EVERY DAY 2/7/22   Breanna Shah DO   levothyroxine (Levoxyl) 25 mcg tablet Take 1 5 tablets (37 5 mcg total) by mouth daily 2/9/22 3/11/22  Breanna Shah DO   linaCLOtide (Linzess) 72 MCG CAPS Take 1 capsule by mouth daily 4/21/21   Breanna Shah DO   pilocarpine (SALAGEN) 5 mg tablet Take 1 tablet (5 mg total) by mouth 2 (two) times a day 2/9/22   Breanna Shah DO   polyethylene glycol (MIRALAX) 17 g packet Take 17 g by mouth daily as needed (constipation) 3/25/21   Mak Oates PA-C   predniSONE 1 mg tablet Take 1 tablet (1 mg total) by mouth daily 6/8/21   Genna Bran MD   Respiratory Therapy Supplies (NEBULIZER/ADULT MASK) KIT by Does not apply route every 4 (four) hours as needed (wheezing) 9/26/19   Genna Bran MD   saliva substitute (MOUTH KOTE) Apply 5 sprays to the mouth or throat 3 (three) times a day as needed (dry mouth) 2/24/22   Kaden Weathers MD   senna (SENOKOT) 8 6 mg Take 2 tablets (17 2 mg total) by mouth 2 (two) times a day 2/24/22   Kaden Weathers MD   zolpidem (AMBIEN CR) 12 5 MG CR tablet TAKE ONE TABLET BY MOUTH AT BEDTIME 2/11/22   Cori Jiang DO     I have reviewed home medications with patient personally  Allergies: Allergies   Allergen Reactions    Penicillin G Anaphylaxis    Spironolactone Shortness Of Breath    Atorvastatin      Other reaction(s): Leg Cramps    Cephalexin Headache    Chocolate - Food Allergy     Citalopram Abdominal Pain and Headache    Diltiazem     Fosinopril Sodium-Hctz     Methylprednisolone Nausea Only     Tablet generic only    Monopril [Fosinopril]      Reaction Date: 28Apr2011;     Penicillins     Pollen Extract     Pravastatin Myalgia     Other reaction(s): Leg Cramps    Sporanox [Itraconazole]      Reaction Date: [id number];     Strawberry C [Ascorbate - Food Allergy]     Caffeine - Food Allergy Palpitations     Tachycardia       Social History:  Marital Status:     Occupation: retired   Patient Pre-hospital Living Situation: Home, With other family member: daughter  Patient Pre-hospital Level of Mobility: walks with walker  Patient Pre-hospital Diet Restrictions: none   Substance Use History:   Social History     Substance and Sexual Activity   Alcohol Use Not Currently     Social History     Tobacco Use   Smoking Status Never Smoker   Smokeless Tobacco Never Used     Social History     Substance and Sexual Activity   Drug Use Never Family History:  Family History   Problem Relation Age of Onset    Heart disease Family     Hypertension Family     Cancer Family        Physical Exam:     Vitals:   Blood Pressure: 141/63 (03/01/22 2018)  Pulse: 63 (03/01/22 2018)  Temperature: 98 4 °F (36 9 °C) (03/01/22 2018)  Temp Source: Oral (03/01/22 2018)  Respirations: 18 (03/01/22 2018)  SpO2: 93 % (03/01/22 2018)    Physical Exam  Vitals and nursing note reviewed  Constitutional:       General: She is in acute distress (moderate distress from rectal pain as she had just attempted to have a BM )  Appearance: Normal appearance  She is not ill-appearing or diaphoretic  HENT:      Head: Normocephalic and atraumatic  Mouth/Throat:      Mouth: Mucous membranes are moist    Cardiovascular:      Rate and Rhythm: Normal rate and regular rhythm  Pulses: Normal pulses  Heart sounds: Normal heart sounds  Pulmonary:      Effort: Pulmonary effort is normal       Breath sounds: Normal breath sounds  No stridor  No wheezing, rhonchi or rales  Abdominal:      General: Bowel sounds are normal       Palpations: Abdomen is soft  There is no mass  Tenderness: There is no abdominal tenderness  There is no guarding  Musculoskeletal:      Right lower leg: No edema  Left lower leg: No edema  Skin:     General: Skin is warm and dry  Neurological:      General: No focal deficit present  Mental Status: She is alert and oriented to person, place, and time     Psychiatric:         Mood and Affect: Mood normal          Behavior: Behavior normal         Additional Data:     Lab Results:  Results from last 7 days   Lab Units 03/01/22  2140   WBC Thousand/uL 9 50   HEMOGLOBIN g/dL 14 6   HEMATOCRIT % 41 4   PLATELETS Thousands/uL 229   NEUTROS PCT % 76*   LYMPHS PCT % 12*   MONOS PCT % 8   EOS PCT % 3     Results from last 7 days   Lab Units 03/01/22  2140   SODIUM mmol/L 134*   POTASSIUM mmol/L 4 4   CHLORIDE mmol/L 99*   CO2 mmol/L 26   BUN mg/dL 20   CREATININE mg/dL 1 76*   ANION GAP mmol/L 9   CALCIUM mg/dL 8 9   ALBUMIN g/dL 3 6   TOTAL BILIRUBIN mg/dL 0 92   ALK PHOS U/L 75   ALT U/L 24   AST U/L 21   GLUCOSE RANDOM mg/dL 116       Imaging:   CT abdomen pelvis wo contrast   Final Result by Eric Corona MD (03/02 0037)      Rectal impaction  Liquid stool throughout the proximal colon without evidence of colitis or bowel obstruction  Workstation performed: [id number]             EKG and Other Studies Reviewed on Admission:   · EKG: No EKG obtained  ** Please Note: This note has been constructed using a voice recognition system   **

## 2022-03-02 NOTE — ASSESSMENT & PLAN NOTE
· Patient was constipated on previous admission 2/22-2/24 when she was admitted for abdominal pain and ISABELLE  Was discharged home with aggressive bowel regimen including: senna 2 tabs BID, colace 100mg BID, linzess 72 mcg daily, and miralax PRN   · Per the patient and her daughter, she was only taking the senna 2 tablets once daily as she was fearful of developing diarrhea  · Did try multiple enemas at home per daughter, also took one dose of magnesium sulfate and a dose of miralax on 3/1  · Last BM 2/24  · Soapsuds enema in the ED and manual disimpaction with minimal relief   · CT abdomen/pelvis w/o contrast (3/1): Rectal impaction  Liquid stool throughout the proximal colon without evidence of colitis or bowel obstruction    · Patient with lower abdominal pain and significant sharp rectal pain especially with wiping, likely 2/2 local irritation     Plan:  - continue home regimen of senna 2 tabs BID, colace 100mg BID  - added scheduled miralax daily  - one time scheduled ducolax suppository, then continue daily PRN thereafter   - ordered mineral oil enema for the morning   - topical lidocaine 2% for rectal discomfort   - tylenol PRN for pain

## 2022-03-03 LAB
ANION GAP SERPL CALCULATED.3IONS-SCNC: 10 MMOL/L (ref 4–13)
BUN SERPL-MCNC: 22 MG/DL (ref 5–25)
CALCIUM SERPL-MCNC: 9 MG/DL (ref 8.3–10.1)
CHLORIDE SERPL-SCNC: 98 MMOL/L (ref 100–108)
CO2 SERPL-SCNC: 26 MMOL/L (ref 21–32)
CREAT SERPL-MCNC: 1.83 MG/DL (ref 0.6–1.3)
GFR SERPL CREATININE-BSD FRML MDRD: 23 ML/MIN/1.73SQ M
GLUCOSE SERPL-MCNC: 108 MG/DL (ref 65–140)
MAGNESIUM SERPL-MCNC: 2.7 MG/DL (ref 1.6–2.6)
POTASSIUM SERPL-SCNC: 4.4 MMOL/L (ref 3.5–5.3)
SODIUM SERPL-SCNC: 134 MMOL/L (ref 136–145)

## 2022-03-03 PROCEDURE — 83735 ASSAY OF MAGNESIUM: CPT | Performed by: INTERNAL MEDICINE

## 2022-03-03 PROCEDURE — 99232 SBSQ HOSP IP/OBS MODERATE 35: CPT | Performed by: INTERNAL MEDICINE

## 2022-03-03 PROCEDURE — 80048 BASIC METABOLIC PNL TOTAL CA: CPT | Performed by: INTERNAL MEDICINE

## 2022-03-03 RX ORDER — POLYVINYL ALCOHOL 14 MG/ML
1 SOLUTION/ DROPS OPHTHALMIC
Status: DISCONTINUED | OUTPATIENT
Start: 2022-03-03 | End: 2022-03-03

## 2022-03-03 RX ADMIN — WITCH HAZEL 1 PAD: 500 SOLUTION RECTAL; TOPICAL at 23:55

## 2022-03-03 RX ADMIN — PILOCARPINE HYDROCHLORIDE 5 MG: 5 TABLET, FILM COATED ORAL at 10:14

## 2022-03-03 RX ADMIN — ZOLPIDEM TARTRATE 5 MG: 5 TABLET ORAL at 00:35

## 2022-03-03 RX ADMIN — PILOCARPINE HYDROCHLORIDE 5 MG: 5 TABLET, FILM COATED ORAL at 18:59

## 2022-03-03 RX ADMIN — PREDNISONE 1 MG: 1 TABLET ORAL at 10:14

## 2022-03-03 RX ADMIN — FUROSEMIDE 40 MG: 40 TABLET ORAL at 10:14

## 2022-03-03 RX ADMIN — Medication 5 SPRAY: at 13:42

## 2022-03-03 RX ADMIN — ALPRAZOLAM 0.25 MG: 0.25 TABLET ORAL at 13:42

## 2022-03-03 RX ADMIN — POLYETHYLENE GLYCOL 3350 17 G: 17 POWDER, FOR SOLUTION ORAL at 10:13

## 2022-03-03 RX ADMIN — Medication 5 SPRAY: at 21:47

## 2022-03-03 RX ADMIN — Medication 5 SPRAY: at 10:14

## 2022-03-03 RX ADMIN — HEPARIN SODIUM 5000 UNITS: 5000 INJECTION INTRAVENOUS; SUBCUTANEOUS at 21:46

## 2022-03-03 RX ADMIN — ALPRAZOLAM 0.25 MG: 0.25 TABLET ORAL at 10:14

## 2022-03-03 RX ADMIN — GLYCERIN 1 DROP: .002; .002; .01 SOLUTION/ DROPS OPHTHALMIC at 14:25

## 2022-03-03 RX ADMIN — Medication 5 SPRAY: at 18:59

## 2022-03-03 RX ADMIN — SENNOSIDES AND DOCUSATE SODIUM 2 TABLET: 50; 8.6 TABLET ORAL at 18:59

## 2022-03-03 RX ADMIN — SENNOSIDES AND DOCUSATE SODIUM 2 TABLET: 50; 8.6 TABLET ORAL at 10:14

## 2022-03-03 RX ADMIN — FAMOTIDINE 40 MG: 20 TABLET ORAL at 10:13

## 2022-03-03 RX ADMIN — HYDRALAZINE HYDROCHLORIDE 10 MG: 20 INJECTION INTRAMUSCULAR; INTRAVENOUS at 22:10

## 2022-03-03 RX ADMIN — HEPARIN SODIUM 5000 UNITS: 5000 INJECTION INTRAVENOUS; SUBCUTANEOUS at 05:56

## 2022-03-03 RX ADMIN — LEVOTHYROXINE SODIUM 37.5 MCG: 75 TABLET ORAL at 05:55

## 2022-03-03 RX ADMIN — ZOLPIDEM TARTRATE 5 MG: 5 TABLET ORAL at 21:47

## 2022-03-03 RX ADMIN — WITCH HAZEL 1 PAD: 500 SOLUTION RECTAL; TOPICAL at 21:47

## 2022-03-03 RX ADMIN — POLYETHYLENE GLYCOL 3350 17 G: 17 POWDER, FOR SOLUTION ORAL at 21:46

## 2022-03-03 RX ADMIN — AMIODARONE HYDROCHLORIDE 100 MG: 200 TABLET ORAL at 10:15

## 2022-03-03 RX ADMIN — HEPARIN SODIUM 5000 UNITS: 5000 INJECTION INTRAVENOUS; SUBCUTANEOUS at 13:43

## 2022-03-03 NOTE — PROGRESS NOTES
[facility]  coding opportunities     DX: I13 2        Chart Reviewed * (Number of) Inbasket suggestions sent to Provider: 1                  Patients insurance company: [employer]

## 2022-03-03 NOTE — ASSESSMENT & PLAN NOTE
· CT abdomen pelvis last admission 2/23 shows progression of chronic compression deformity of T12 vertebral body but no acute fracture or lesion     · Of note, patient has been on chronic low dose steroids with prednisone 1mg daily for "muscle pain" likely increasing her risk of fracture  · Continue home tylenol PRN for pain

## 2022-03-03 NOTE — ASSESSMENT & PLAN NOTE
Wt Readings from Last 3 Encounters:   02/15/22 82 6 kg (182 lb)   02/09/22 82 6 kg (182 lb)   10/05/21 81 2 kg (179 lb)     · Last echo 8/2017: LVEF 76%, grade 1 diastolic dysfunction   · Clinically euvolemic   · Plan:  · Continue lasix 40mg daily

## 2022-03-03 NOTE — ASSESSMENT & PLAN NOTE
Recent Labs     03/01/22  2140 03/03/22  0620   CREATININE 1 76* 1 83*   EGFR 24 23     Estimated Creatinine Clearance: 20 3 mL/min (A) (by C-G formula based on SCr of 1 83 mg/dL (H))  · Baseline creatinine 1 5-1 9   · With evidence of urinary retention since admission, likely secondary to constipation   However, creatinine remains at baseline  · Monitor BMP

## 2022-03-03 NOTE — PROGRESS NOTES
[facility]  Progress Note - Pako Vizcarran 8/26/1926, 80 y o  female MRN: [id number]  Unit/Bed#: S -01 Encounter: [id number]  Primary Care Provider: Daiana Kim DO   Date and time admitted to hospital: 3/1/2022  8:23 PM    * Constipation  Assessment & Plan  · Patient was constipated on previous admission 2/22-2/24 when she was admitted for abdominal pain and ISABELLE  Was discharged home with aggressive bowel regimen including: senna 2 tabs BID, colace 100mg BID, linzess 72 mcg daily, and miralax PRN   · Per the patient and her daughter, she was only taking the senna 2 tablets once daily as she was fearful of developing diarrhea  · Did try multiple enemas at home per daughter, also took one dose of magnesium sulfate and a dose of miralax on 3/1  · Soapsuds enema in the ED and manual disimpaction with minimal relief   · CT abdomen/pelvis w/o contrast (3/1): Rectal impaction  Liquid stool throughout the proximal colon without evidence of colitis or bowel obstruction  · Patient with lower abdominal pain and significant sharp rectal pain especially with wiping, likely 2/2 local irritation     Plan:  · Bowel regimen with senna 2 tabs BID, colace 100mg BID, and Miralax  · Ducolax suppository prn  · Tylenol PRN for pain     Closed stable burst fracture of twelfth thoracic vertebra with routine healing  Assessment & Plan  · CT abdomen pelvis last admission 2/23 shows progression of chronic compression deformity of T12 vertebral body but no acute fracture or lesion     · Of note, patient has been on chronic low dose steroids with prednisone 1mg daily for "muscle pain" likely increasing her risk of fracture  · Continue home tylenol PRN for pain     Chronic diastolic congestive heart failure [facility])  Assessment & Plan  Wt Readings from Last 3 Encounters:   02/15/22 82 6 kg (182 lb)   02/09/22 82 6 kg (182 lb)   10/05/21 81 2 kg (179 lb)     · Last echo 8/2017: LVEF 62%, grade 1 diastolic dysfunction   · Clinically euvolemic   · Plan:  · Continue lasix 40mg daily     CKD (chronic kidney disease), stage IV [facility])  Assessment & Plan  Recent Labs     22  2140 22  0620   CREATININE 1 76* 1 83*   EGFR 24 23     Estimated Creatinine Clearance: 20 3 mL/min (A) (by C-G formula based on SCr of 1 83 mg/dL (H))  · Baseline creatinine 1 5-1 9   · With evidence of urinary retention since admission, likely secondary to constipation  However, creatinine remains at baseline  · Monitor BMP    Anxiety  Assessment & Plan  · Continue home xanax 0 25mg 4x daily PRN     Hypothyroidism  Assessment & Plan  · Continue home Synthroid 37 5 mcg      Paroxysmal atrial fibrillation (HCC)  Assessment & Plan  · Taken off of Pradaxa per patient and her daughter 2/2 falls/bleeding   · Continue home ASA 81mg, amiodarone           VTE Pharmacologic Prophylaxis: VTE Score: 5 High Risk (Score >/= 5) - Pharmacological DVT Prophylaxis Ordered: heparin  Sequential Compression Devices Ordered  Patient Centered Rounds: I performed bedside rounds with nursing staff today  Discussions with Specialists or Other Care Team Provider: SLIM attending physician, nursing staff and case management  Education and Discussions with Family / Patient: Attempted to update  (daughter) via phone  Unable to contact  Current Length of Stay: 1 day(s)  Current Patient Status: Inpatient   Discharge Plan: Anticipate discharge tomorrow to discharge location to be determined pending rehab evaluations  Code Status: Level 3 - DNAR and DNI    Subjective:   Patient seen and examined  She states she feels better and the pain is improving, however she does not feel completely normal yet  She reports some episodes of watery bowel movements  She denies any chest pain or shortness of breath  She has no other complaints       Objective:     Vitals:   Temp (24hrs), Av 1 °F (36 7 °C), Min:97 5 °F (36 4 °C), Max:98 6 °F (37 °C)    Temp: [97 5 °F (36 4 °C)-98 6 °F (37 °C)] 97 5 °F (36 4 °C)  HR:  [54-71] 54  Resp:  [17-18] 18  BP: (129-178)/(60-80) 129/60  SpO2:  [93 %-98 %] 93 %  Body mass index is 28 51 kg/m²  Input and Output Summary (last 24 hours): Intake/Output Summary (Last 24 hours) at 3/3/2022 1610  Last data filed at 3/3/2022 1601  Gross per 24 hour   Intake 240 ml   Output 1325 ml   Net -1085 ml       Physical Exam:   Physical Exam  Vitals and nursing note reviewed  Constitutional:       General: She is not in acute distress  Appearance: She is not ill-appearing or toxic-appearing  HENT:      Head: Normocephalic  Eyes:      General: No scleral icterus  Pupils: Pupils are equal, round, and reactive to light  Cardiovascular:      Rate and Rhythm: Normal rate and regular rhythm  Heart sounds: Murmur heard  Pulmonary:      Effort: No respiratory distress  Breath sounds: Normal breath sounds  No wheezing, rhonchi or rales  Abdominal:      General: Bowel sounds are normal  There is no distension  Palpations: Abdomen is soft  Tenderness: There is abdominal tenderness in the right lower quadrant, suprapubic area and left lower quadrant  There is no guarding  Musculoskeletal:      Right lower leg: No edema  Left lower leg: No edema  Skin:     General: Skin is warm  Capillary Refill: Capillary refill takes less than 2 seconds  Neurological:      Mental Status: She is alert and oriented to person, place, and time  Mental status is at baseline     Psychiatric:         Mood and Affect: Mood normal             Additional Data:     Labs:  Results from last 7 days   Lab Units 03/01/22  2140   WBC Thousand/uL 9 50   HEMOGLOBIN g/dL 14 6   HEMATOCRIT % 41 4   PLATELETS Thousands/uL 229   NEUTROS PCT % 76*   LYMPHS PCT % 12*   MONOS PCT % 8   EOS PCT % 3     Results from last 7 days   Lab Units 03/03/22  0620 03/01/22  2140 03/01/22  2140   SODIUM mmol/L 134*   < > 134*   POTASSIUM mmol/L 4 4   < > 4 4   CHLORIDE mmol/L 98*   < > 99*   CO2 mmol/L 26   < > 26   BUN mg/dL 22   < > 20   CREATININE mg/dL 1 83*   < > 1 76*   ANION GAP mmol/L 10   < > 9   CALCIUM mg/dL 9 0   < > 8 9   ALBUMIN g/dL  --   --  3 6   TOTAL BILIRUBIN mg/dL  --   --  0 92   ALK PHOS U/L  --   --  75   ALT U/L  --   --  24   AST U/L  --   --  21   GLUCOSE RANDOM mg/dL 108   < > 116    < > = values in this interval not displayed                         Lines/Drains:  Invasive Devices  Report    Peripheral Intravenous Line            Peripheral IV 03/01/22 Right Antecubital 1 day                      Imaging: Reviewed radiology reports from this admission including: abdominal/pelvic CT    Recent Cultures (last 7 days):         Last 24 Hours Medication List:   Current Facility-Administered Medications   Medication Dose Route Frequency Provider Last Rate    acetaminophen  650 mg Oral Q6H PRN Yen Mei MD      ALPRAZolam  0 25 mg Oral 4x Daily PRN Yen Mei MD      amiodarone  100 mg Oral Daily Yen Mei MD      aspirin  81 mg Oral Every Other Day Yen Mei MD      bisacodyl  10 mg Rectal Daily PRN Yen Mei MD      famotidine  40 mg Oral Daily Yen Mei MD      furosemide  40 mg Oral Daily Yen Mei MD      glycerin-hypromellose-  1 drop Both Eyes TID PRN Roland Gonzalez DO      heparin (porcine)  5,000 Units Subcutaneous Q8H [address], [place]      hydrALAZINE  10 mg Intravenous Q6H PRN Roland Gonzalez DO      levothyroxine  37 5 mcg Oral Early Morning Yen Mei MD      mineral oil  1 enema Rectal Once Yen Mei MD      ondansetron  4 mg Intravenous Q6H PRN Yen Mei MD      pilocarpine  5 mg Oral BID Yen Mei MD      polyethylene glycol  17 g Oral BID Roland Gonzalez DO      predniSONE  1 mg Oral Daily Yen Mei MD      saliva substitute  5 spray Mouth/Throat 4x Daily Yen Mei MD      senna-docusate sodium  2 tablet Oral BID Roland Gonzalez DO     [facility] witch hazel-glycerin  1 pad Topical Q4H Tanya Carroll MD      zolpidem  5 mg Oral HS Navneet Espinoza MD          Today, Patient Was Seen By: Tanya Carroll MD    **Please Note: This note may have been constructed using a voice recognition system  **

## 2022-03-03 NOTE — ASSESSMENT & PLAN NOTE
· Taken off of Pradaxa per patient and her daughter 2/2 falls/bleeding   · Continue home ASA 81mg, amiodarone

## 2022-03-03 NOTE — ASSESSMENT & PLAN NOTE
· Patient was constipated on previous admission 2/22-2/24 when she was admitted for abdominal pain and ISABELLE  Was discharged home with aggressive bowel regimen including: senna 2 tabs BID, colace 100mg BID, linzess 72 mcg daily, and miralax PRN   · Per the patient and her daughter, she was only taking the senna 2 tablets once daily as she was fearful of developing diarrhea  · Did try multiple enemas at home per daughter, also took one dose of magnesium sulfate and a dose of miralax on 3/1  · Soapsuds enema in the ED and manual disimpaction with minimal relief   · CT abdomen/pelvis w/o contrast (3/1): Rectal impaction  Liquid stool throughout the proximal colon without evidence of colitis or bowel obstruction    · Patient with lower abdominal pain and significant sharp rectal pain especially with wiping, likely 2/2 local irritation     Plan:  · Bowel regimen with senna 2 tabs BID, colace 100mg BID, and Miralax  · Ducolax suppository prn  · Tylenol PRN for pain

## 2022-03-03 NOTE — UTILIZATION REVIEW
Initial Clinical Review    Admission: Date/Time/Statement:   Admission Orders (From admission, onward)     Ordered        03/02/22 0105  INPATIENT ADMISSION  Once                      Orders Placed This Encounter   Procedures    INPATIENT ADMISSION     Standing Status:   Standing     Number of Occurrences:   1     Order Specific Question:   Level of Care     Answer:   Med Surg [16]     Order Specific Question:   Estimated length of stay     Answer:   More than 2 Midnights     Order Specific Question:   Certification     Answer:   I certify that inpatient services are medically necessary for this patient for a duration of greater than two midnights  See H&P and MD Progress Notes for additional information about the patient's course of treatment  ED Arrival Information     Expected Arrival Acuity    3/1/2022 20:00 3/1/2022 20:05 Urgent         Means of arrival Escorted by Service Admission type    Walk-In Family Member Hospitalist Urgent         Arrival complaint    Constipation, abdominal pain        Chief Complaint   Patient presents with    Constipation     pt left hospital thursday and has not had BM since, tried fleets/mag/miralax/senna without success, c/o pain/pressure       Initial Presentation: 80 y o  female with a PMH of afib (no longer on anticoagulation), IBS-C, hypothyroidism, CKD 4, dCHF, anxiety, recent admission 2/2022 for abdominal pain and ISABELLE who presents with constipation and abdominal pain/severe rectal pain that started 3/1,  with last bm 2/24  Pt not compliant with home bowel regime  On 3/1 ,received multiple enemas, one dose of magnesium sulfate and a dose of miralax with no results  On exam,  In distress 2/2 rectalpain after trying to have bm, abdomen soft, normal bowel sounds   Labs- creat 1 83 with baseline 1 5-1 9     CT A/P shows rectal impaction with liquid stool in the proximal colon and no evidence of colitis or obstruction   received a soapsuds enema and attempted manual disimpaction in the ED without significant relief  Pt admitted as Inpatient with constipation  Plan - continue home regimen of senna 2 tabs BID, colace 100mg BID, add Miralax daily, dulcolax suppos x1, mineral oil enema 3/3, lido topical, rectal discomfort, prn tylenol  Date: 3/3  Day 2:   Pt with  ml @0700 with void 50 ml @0519, voided 225 ml @0714 with   ml  Pt st cath'ed @6123 for 550 ml   Pt voided 100 ml @1430 with  ml   Creat remains stable  Monitor BMP and ffor continued urinary retention   Karrie Nissen Pt reports some episodes of watery bm's  Pt states pain improving but she does not feel normal yet  Bowel regimen with senna 2 tabs BID, colace 100mg BID, and Miralax, Dulcolax suppos prn, Tylenol for pain and with pedrito tucks  Abdomen tender RLQ, suprapubic and LLQ         ED Triage Vitals   Temperature Pulse Respirations Blood Pressure SpO2   03/01/22 2018 03/01/22 2018 03/01/22 2018 03/01/22 2018 03/01/22 2018   98 4 °F (36 9 °C) 63 18 141/63 93 %      Temp Source Heart Rate Source Patient Position - Orthostatic VS BP Location FiO2 (%)   03/01/22 2018 03/01/22 2018 03/01/22 2018 03/01/22 2018 --   Oral Monitor Sitting Right arm       Pain Score       03/01/22 2019       5          Wt Readings from Last 1 Encounters:   02/15/22 82 6 kg (182 lb)     Additional Vital Signs:   Date/Time Temp Pulse Resp BP MAP (mmHg) SpO2   03/03/22 0832 97 5 °F (36 4 °C) 54 Abnormal  18 129/60 87 93 %   03/03/22 0412 -- -- -- 131/69 -- --   03/03/22 0100 98 6 °F (37 °C) -- -- -- -- --   03/02/22 2209 -- 71 17 178/79 Abnormal  -- 97 %   03/02/22 1916 -- 63 18 150/80 -- 98 %   03/02/22 1400 -- 62 -- 140/66 95 97 %   03/02/22 0932 -- -- -- -- -- --   03/02/22 0700 97 7 °F (36 5 °C) 48 Abnormal  16 167/70 100 93 %   03/02/22 0600 -- 54 Abnormal  15 154/72 106 94 %   03/02/22 0500 -- 56 -- 167/78 112 94 %   03/02/22 0400 -- 56 -- 163/74 107 94 %   03/02/22 0300 -- 54 Abnormal  -- 154/59 98 94 %   03/02/22 0253 -- 54 Abnormal  -- -- -- 96 %   03/02/22 0251 -- -- -- 186/75 Abnormal  108 --       Pertinent Labs/Diagnostic Test Results:   CT abdomen pelvis wo contrast   Final Result by Carmela Ellis MD (03/02 0037)      Rectal impaction  Liquid stool throughout the proximal colon without evidence of colitis or bowel obstruction              Workstation performed: [id number]               Results from last 7 days   Lab Units 03/01/22  2140   WBC Thousand/uL 9 50   HEMOGLOBIN g/dL 14 6   HEMATOCRIT % 41 4   PLATELETS Thousands/uL 229   NEUTROS ABS Thousands/µL 7 28         Results from last 7 days   Lab Units 03/03/22  0620 03/01/22  2140   SODIUM mmol/L 134* 134*   POTASSIUM mmol/L 4 4 4 4   CHLORIDE mmol/L 98* 99*   CO2 mmol/L 26 26   ANION GAP mmol/L 10 9   BUN mg/dL 22 20   CREATININE mg/dL 1 83* 1 76*   EGFR ml/min/1 73sq m 23 24   CALCIUM mg/dL 9 0 8 9   MAGNESIUM mg/dL 2 7*  --      Results from last 7 days   Lab Units 03/01/22  2140   AST U/L 21   ALT U/L 24   ALK PHOS U/L 75   TOTAL PROTEIN g/dL 6 9   ALBUMIN g/dL 3 6   TOTAL BILIRUBIN mg/dL 0 92         Results from last 7 days   Lab Units 03/03/22  0620 03/01/22  2140   GLUCOSE RANDOM mg/dL 108 116               Results from last 7 days   Lab Units 03/01/22  2140   TSH 3RD GENERATON uIU/mL 3 859*               Results from last 7 days   Lab Units 03/01/22  2140   LIPASE u/L 75                 Results from last 7 days   Lab Units 03/02/22  1926   CLARITY UA  Clear   COLOR UA  Yellow   SPEC GRAV UA  1 010   PH UA  8 0   GLUCOSE UA mg/dl Negative   KETONES UA mg/dl Negative   BLOOD UA  Negative   PROTEIN UA mg/dl Negative   NITRITE UA  Negative   BILIRUBIN UA  Negative   UROBILINOGEN UA E U /dl 0 2   LEUKOCYTES UA  Small*   WBC UA /hpf 0-1   RBC UA /hpf None Seen   BACTERIA UA /hpf None Seen   EPITHELIAL CELLS WET PREP /hpf Occasional             ED Treatment:   Medication Administration from 03/01/2022 1920 to 03/02/2022 2209       Date/Time Order Dose Route Action Comments     03/02/2022 0600 ALPRAZolam (XANAX) tablet 0 25 mg 0 25 mg Oral Given      03/02/2022 0841 amiodarone tablet 100 mg 100 mg Oral Given      03/02/2022 2151 saliva substitute (MOUTH KOTE) mucosal solution 5 spray 5 spray Mouth/Throat Given      03/02/2022 1914 saliva substitute (MOUTH KOTE) mucosal solution 5 spray 5 spray Mouth/Throat Given      03/02/2022 1226 saliva substitute (MOUTH KOTE) mucosal solution 5 spray 5 spray Mouth/Throat Given      03/02/2022 0835 saliva substitute (MOUTH KOTE) mucosal solution 5 spray 5 spray Mouth/Throat Given      03/02/2022 0841 aspirin (ECOTRIN LOW STRENGTH) EC tablet 81 mg 81 mg Oral Given      03/02/2022 0841 docusate sodium (COLACE) capsule 100 mg 100 mg Oral Given      03/02/2022 0841 famotidine (PEPCID) tablet 40 mg 40 mg Oral Given      03/02/2022 0841 furosemide (LASIX) tablet 40 mg 40 mg Oral Given      03/02/2022 0523 levothyroxine tablet 37 5 mcg 37 5 mcg Oral Given      03/02/2022 1913 pilocarpine (SALAGEN) tablet 5 mg 5 mg Oral Given      03/02/2022 0841 pilocarpine (SALAGEN) tablet 5 mg 5 mg Oral Given      03/02/2022 0841 polyethylene glycol (MIRALAX) packet 17 g 17 g Oral Given      03/02/2022 0841 predniSONE tablet 1 mg 1 mg Oral Given      03/02/2022 0841 senna (SENOKOT) tablet 17 2 mg 17 2 mg Oral Given      03/02/2022 0847 lidocaine (XYLOCAINE) 2 % topical gel   Topical Given      03/02/2022 0252 lidocaine (XYLOCAINE) 2 % topical gel 1 application Topical Given      03/02/2022 0252 bisacodyl (DULCOLAX) rectal suppository 10 mg 10 mg Rectal Given      03/02/2022 0838 mineral oil enema 1 enema 0 enema Rectal Hold Patient requesting to perform at later time  MD made aware  Will monitor       03/02/2022 0600 acetaminophen (TYLENOL) tablet 650 mg 650 mg Oral Given      03/02/2022 1357 heparin (porcine) subcutaneous injection 5,000 Units 5,000 Units Subcutaneous Given      03/02/2022 0525 heparin (porcine) subcutaneous injection 5,000 Units 5,000 Units Subcutaneous Given      03/02/2022 1913 senna-docusate sodium (SENOKOT S) 8 6-50 mg per tablet 2 tablet 2 tablet Oral Given      03/02/2022 0841 senna-docusate sodium (SENOKOT S) 8 6-50 mg per tablet 2 tablet 2 tablet Oral Given      03/02/2022 2102 polyethylene glycol (MIRALAX) packet 17 g 17 g Oral Given      03/02/2022 1914 witch hazel-glycerin (TUCKS) topical pad 1 pad 1 pad Topical Given      03/02/2022 1559 witch hazel-glycerin (TUCKS) topical pad 1 pad 0 pad Topical Hold         Past Medical History:   Diagnosis Date    A-fib ([facility] [address] )     Cardiac disease     Hyperlipidemia     Hypertension      Present on Admission:   Chronic diastolic congestive heart failure (HCC)   Constipation   Anxiety   Paroxysmal atrial fibrillation (HCC)   Hypothyroidism   CKD (chronic kidney disease), stage IV ([facility])      Admitting Diagnosis: Proctitis [K62 89]  Abdominal pain [R10 9]  Impacted stool in rectum (HCC) [K56 41]  Constipation [K59 00]  Age/Sex: 80 y o  female  Admission Orders:  Scheduled Medications:  amiodarone, 100 mg, Oral, Daily  aspirin, 81 mg, Oral, Every Other Day  famotidine, 40 mg, Oral, Daily  furosemide, 40 mg, Oral, Daily  heparin (porcine), 5,000 Units, Subcutaneous, Q8H [facility] & intermediate  levothyroxine, 37 5 mcg, Oral, Early Morning  mineral oil, 1 enema, Rectal, Once  pilocarpine, 5 mg, Oral, BID  polyethylene glycol, 17 g, Oral, BID  predniSONE, 1 mg, Oral, Daily  saliva substitute, 5 spray, Mouth/Throat, 4x Daily  senna-docusate sodium, 2 tablet, Oral, BID  witch hazel-glycerin, 1 pad, Topical, Q4H  zolpidem, 5 mg, Oral, HS      Continuous IV Infusions:     PRN Meds:  acetaminophen, 650 mg, Oral, Q6H PRN  ALPRAZolam, 0 25 mg, Oral, 4x Daily PRN x2 3/3  bisacodyl, 10 mg, Rectal, Daily PRN  glycerin-hypromellose-, 1 drop, Both Eyes, TID PRN x1 3/3  hydrALAZINE, 10 mg, Intravenous, Q6H PRN  ondansetron, 4 mg, Intravenous, Q6H PRN    SCD's   SSE x1  OOB as crystal   monitor for urinary retention    Network Utilization Review Department  ATTENTION: Please call with any questions or concerns to [phone] and carefully listen to the prompts so that you are directed to the right person  All voicemails are confidential   Valentina Navarrete all requests for admission clinical reviews, approved or denied determinations and any other requests to dedicated fax number below belonging to the campus where the patient is receiving treatment   List of dedicated fax numbers for the Facilities:  [address] DENIALS (Administrative/Medical Necessity) [phone]   [address] (Maternity/NICU/Pediatrics) [phone]   [address] [place] [address]  [address] [address] [place] [address] Merissa Cronin 1481 [address] [place] [address] [phone]

## 2022-03-04 LAB
ANION GAP SERPL CALCULATED.3IONS-SCNC: 7 MMOL/L (ref 4–13)
BUN SERPL-MCNC: 21 MG/DL (ref 5–25)
CALCIUM SERPL-MCNC: 8.8 MG/DL (ref 8.3–10.1)
CHLORIDE SERPL-SCNC: 97 MMOL/L (ref 100–108)
CO2 SERPL-SCNC: 27 MMOL/L (ref 21–32)
CREAT SERPL-MCNC: 2.03 MG/DL (ref 0.6–1.3)
GFR SERPL CREATININE-BSD FRML MDRD: 20 ML/MIN/1.73SQ M
GLUCOSE SERPL-MCNC: 101 MG/DL (ref 65–140)
POTASSIUM SERPL-SCNC: 4.5 MMOL/L (ref 3.5–5.3)
SODIUM SERPL-SCNC: 131 MMOL/L (ref 136–145)

## 2022-03-04 PROCEDURE — 99232 SBSQ HOSP IP/OBS MODERATE 35: CPT | Performed by: INTERNAL MEDICINE

## 2022-03-04 PROCEDURE — 80048 BASIC METABOLIC PNL TOTAL CA: CPT | Performed by: INTERNAL MEDICINE

## 2022-03-04 RX ORDER — SODIUM PHOSPHATE, DIBASIC AND SODIUM PHOSPHATE, MONOBASIC 7; 19 G/133ML; G/133ML
1 ENEMA RECTAL ONCE
Status: COMPLETED | OUTPATIENT
Start: 2022-03-04 | End: 2022-03-04

## 2022-03-04 RX ORDER — IODINE/SODIUM IODIDE 2 %
TINCTURE TOPICAL 4 TIMES DAILY
Status: DISCONTINUED | OUTPATIENT
Start: 2022-03-04 | End: 2022-03-08 | Stop reason: HOSPADM

## 2022-03-04 RX ORDER — BISACODYL 10 MG
10 SUPPOSITORY, RECTAL RECTAL ONCE
Status: COMPLETED | OUTPATIENT
Start: 2022-03-04 | End: 2022-03-04

## 2022-03-04 RX ORDER — KETOTIFEN FUMARATE 0.35 MG/ML
1 SOLUTION/ DROPS OPHTHALMIC 2 TIMES DAILY
Status: DISCONTINUED | OUTPATIENT
Start: 2022-03-04 | End: 2022-03-04

## 2022-03-04 RX ORDER — SODIUM CHLORIDE 9 MG/ML
75 INJECTION, SOLUTION INTRAVENOUS CONTINUOUS
Status: DISPENSED | OUTPATIENT
Start: 2022-03-04 | End: 2022-03-04

## 2022-03-04 RX ADMIN — PILOCARPINE HYDROCHLORIDE 5 MG: 5 TABLET, FILM COATED ORAL at 09:31

## 2022-03-04 RX ADMIN — FERRIC OXIDE RED: 8; 8 LOTION TOPICAL at 18:04

## 2022-03-04 RX ADMIN — FERRIC OXIDE RED 1 APPLICATION: 8; 8 LOTION TOPICAL at 12:32

## 2022-03-04 RX ADMIN — FAMOTIDINE 40 MG: 20 TABLET ORAL at 09:31

## 2022-03-04 RX ADMIN — Medication 5 SPRAY: at 12:32

## 2022-03-04 RX ADMIN — POLYETHYLENE GLYCOL 3350 17 G: 17 POWDER, FOR SOLUTION ORAL at 22:20

## 2022-03-04 RX ADMIN — SODIUM CHLORIDE 75 ML/HR: 0.9 INJECTION, SOLUTION INTRAVENOUS at 10:45

## 2022-03-04 RX ADMIN — SENNOSIDES AND DOCUSATE SODIUM 2 TABLET: 50; 8.6 TABLET ORAL at 09:31

## 2022-03-04 RX ADMIN — HEPARIN SODIUM 5000 UNITS: 5000 INJECTION INTRAVENOUS; SUBCUTANEOUS at 05:21

## 2022-03-04 RX ADMIN — ALPRAZOLAM 0.25 MG: 0.25 TABLET ORAL at 13:41

## 2022-03-04 RX ADMIN — FUROSEMIDE 40 MG: 40 TABLET ORAL at 09:30

## 2022-03-04 RX ADMIN — BISACODYL 10 MG: 10 SUPPOSITORY RECTAL at 10:45

## 2022-03-04 RX ADMIN — ASPIRIN 81 MG: 81 TABLET, COATED ORAL at 09:31

## 2022-03-04 RX ADMIN — ALPRAZOLAM 0.25 MG: 0.25 TABLET ORAL at 09:31

## 2022-03-04 RX ADMIN — SODIUM PHOSPHATE, DIBASIC AND SODIUM PHOSPHATE, MONOBASIC 1 ENEMA: 7; 19 ENEMA RECTAL at 20:24

## 2022-03-04 RX ADMIN — Medication 5 SPRAY: at 09:32

## 2022-03-04 RX ADMIN — POLYETHYLENE GLYCOL 3350 17 G: 17 POWDER, FOR SOLUTION ORAL at 09:31

## 2022-03-04 RX ADMIN — SENNOSIDES AND DOCUSATE SODIUM 2 TABLET: 50; 8.6 TABLET ORAL at 18:04

## 2022-03-04 RX ADMIN — HEPARIN SODIUM 5000 UNITS: 5000 INJECTION INTRAVENOUS; SUBCUTANEOUS at 22:20

## 2022-03-04 RX ADMIN — AMIODARONE HYDROCHLORIDE 100 MG: 200 TABLET ORAL at 09:30

## 2022-03-04 RX ADMIN — GLYCERIN 1 DROP: .002; .002; .01 SOLUTION/ DROPS OPHTHALMIC at 09:31

## 2022-03-04 RX ADMIN — FERRIC OXIDE RED: 8; 8 LOTION TOPICAL at 22:21

## 2022-03-04 RX ADMIN — ZOLPIDEM TARTRATE 5 MG: 5 TABLET ORAL at 22:21

## 2022-03-04 RX ADMIN — Medication 5 SPRAY: at 18:04

## 2022-03-04 RX ADMIN — Medication 5 SPRAY: at 22:20

## 2022-03-04 RX ADMIN — LEVOTHYROXINE SODIUM 37.5 MCG: 75 TABLET ORAL at 05:20

## 2022-03-04 RX ADMIN — PILOCARPINE HYDROCHLORIDE 5 MG: 5 TABLET, FILM COATED ORAL at 18:04

## 2022-03-04 RX ADMIN — HEPARIN SODIUM 5000 UNITS: 5000 INJECTION INTRAVENOUS; SUBCUTANEOUS at 13:41

## 2022-03-04 RX ADMIN — PREDNISONE 1 MG: 1 TABLET ORAL at 09:31

## 2022-03-04 NOTE — ASSESSMENT & PLAN NOTE
Wt Readings from Last 3 Encounters:   02/15/22 82 6 kg (182 lb)   02/09/22 82 6 kg (182 lb)   10/05/21 81 2 kg (179 lb)     · Last echo 8/2017: LVEF 91%, grade 1 diastolic dysfunction   · Clinically euvolemic   · Plan:  · Continue lasix 40mg daily

## 2022-03-04 NOTE — ASSESSMENT & PLAN NOTE
· Patient was constipated on previous admission 2/22-2/24 when she was admitted for abdominal pain and ISABELLE  Was discharged home with aggressive bowel regimen including: senna 2 tabs BID, colace 100mg BID, linzess 72 mcg daily, and miralax PRN   · Patient states she was compliant with home medication regimen, however became constipated again and needed to return to the hospital  · Did try multiple enemas at home per daughter, also took one dose of magnesium sulfate and a dose of miralax on 3/1  · Soapsuds enema in the ED and manual disimpaction with minimal relief   · CT abdomen/pelvis w/o contrast (3/1): Rectal impaction  Liquid stool throughout the proximal colon without evidence of colitis or bowel obstruction    · Patient with lower abdominal pain and significant sharp rectal pain especially with wiping, likely 2/2 local irritation     Plan:  · Bowel regimen with senna 2 tabs BID, colace 100mg BID, and Miralax  · Plan to administer Ducolax suppository 10 mg and subsequently fleet enema if suppository is not successful   · Apply Calcium zinc oxide lotion to the rectum for pain relief   · Tylenol PRN for pain

## 2022-03-04 NOTE — UTILIZATION REVIEW
Inpatient Admission Authorization Request   NOTIFICATION OF INPATIENT ADMISSION/INPATIENT AUTHORIZATION REQUEST   [facility]ICING FACILITY:   [address] [facility] [address] [place] [address]  Tax ID: [id number]  NPI: [id number]  Place of [facility]: [facility]patient [address] [facility]  [address]  Place of Service Code: 24     ATTENDING PROVIDER:  Attending Name and NPI#: Ramo Cesar [[id number]]  Address: [address], [address] [place] [address]  Phone: [phone]     UTILIZATION REVIEW CONTACT:  Dalia Loya Utilization   Network Utilization Review Department  Phone: [phone]  Fax: [phone]  Email: [email or web address]     PHYSICIAN ADVISORY SERVICES:  FOR XYEP-IV-MDLS REVIEW - MEDICAL NECESSITY DENIAL  Phone: [phone]  Fax: [phone]  Email: [email or web address]     TYPE OF REQUEST:  Inpatient Status     ADMISSION INFORMATION:  ADMISSION DATE/TIME: 3/2/22  1:05 AM  PATIENT DIAGNOSIS CODE/DESCRIPTION:  Proctitis [K62 89]  Abdominal pain [R10 9]  Impacted stool in rectum ([address] ) [K56 41]  Constipation [K59 00]  DISCHARGE DATE/TIME: No discharge date for patient encounter  IMPORTANT INFORMATION:  Please contact the Dalia Loya directly with any questions or concerns regarding this request  Department voicemails are confidential     Send requests for admission clinical reviews, concurrent reviews, approvals, and administrative denials due to lack of clinical to fax [phone]

## 2022-03-04 NOTE — CASE MANAGEMENT
Case Management Assessment & Discharge Planning Note    Patient name Harry DE LA CRUZ [address] 434/S -16 MRN [id number]  : 1926 Date 3/4/2022       Current Admission Date: 3/1/2022  Current Admission Diagnosis:Constipation   Patient Active Problem List    Diagnosis Date Noted    Xerostomia 2022    Chronic kidney disease, stage 5 ([facility] [address] ) 2022    Closed stable burst fracture of twelfth thoracic vertebra with routine healing 2021    Chronic low back pain without sciatica 2021    Constipation 2021    Fall 2021    Hematoma of right hip 2021    Bronchitis 2019    Mild aortic stenosis 10/08/2018    Sinus bradycardia 2018    Chronic diastolic congestive heart failure ([facility] [address] ) 2017    [facility]    CKD (chronic kidney disease), stage IV (HCC) 2017    Oral pain of unknown etiology 2017    Paroxysmal atrial fibrillation ([facility] [address] ) 2017    Hypothyroidism 2017    Anxiety 2017    Insomnia 10/18/2012    Arthritis 2012    Contact dermatitis 2012    Hyperlipidemia 2012    Hypertension 2012      LOS (days): 2  Geometric Mean LOS (GMLOS) (days): 3 20  Days to GMLOS:0 8     OBJECTIVE:    Risk of Unplanned Readmission Score: 17         Current admission status: Inpatient  Referral Reason: VNA    Preferred Pharmacy:   [facility], [address] [address]  Phone: [phone] Fax: [phone]    Primary Care Provider: Neyda Dixon DO    [facility]  Secondary Insurance:     ASSESSMENT:  [id number] [facility] Representative - Daughter   Primary Phone: [phone] (Home)               Advance Directives  Does patient have a [address]?: Yes  Does patient have Advance Directives?: Yes  Advance Directives: Living will,Power of  for health care,Power of  for finance  Primary Contact: daughter         Readmission Root Cause  30 Day Readmission: No    Patient Information  Admitted from[de-identified] Home  Mental Status: Alert  During Assessment patient was accompanied by: Not accompanied during assessment  Assessment information provided by[de-identified] Patient  Primary Caregiver: Self  Support Systems: Nelson Friedmangail Gabriel Edda of Residence: [address] [place] [address] do you live in?: [place]  Type of Current Residence: 2 story home  Upon entering residence, is there a bathroom on the main floor (no further steps)?: Yes  In the last 12 months, was there a time when you were not able to pay the mortgage or rent on time?: No  In the last 12 months, was there a time when you did not have a steady place to sleep or slept in a shelter (including now)?: No  Homeless/housing insecurity resource given?: N/A  Living Arrangements: Lives w/ Daughter  Is patient a ?: No    Activities of Daily Living Prior to Admission  Functional Status: Independent  Completes ADLs independently?: Yes  Ambulates independently?: Yes  Does patient use assisted devices?: Yes  Assisted Devices (DME) used: Jed Weiss  Does patient currently own DME?: Yes  What DME does the patient currently own?: Jed Weiss  Does patient have a history of Outpatient Therapy (PT/OT)?: No  Does the patient have a history of Short-Term Rehab?: No  Does patient have a history of HHC?: Yes  Does patient currently have [address]?: Yes    Current Home Health Care  Type of Current Home Care Services: Home PT  [address][de-identified] [address] Provider[de-identified] PCP    Patient Information Continued  Income Source: SSI/SSD  Does patient have prescription coverage?: Yes  Within the past 12 months, you worried that your food would run out before you got the money to buy more : Never true  Within the past 12 months, the food you bought just didnt last and you didnt have money to get more  Dargita Ponce Never true  Food insecurity resource given?: N/A  Does patient receive dialysis treatments?: No  Does patient have a history of substance abuse?: No  Does patient have a history of Mental Health Diagnosis?: No         Means of Transportation  Means of Transport to Appts[de-identified] Family transport  In the past 12 months, has lack of transportation kept you from medical appointments or from getting medications?: No  In the past 12 months, has lack of transportation kept you from meetings, work, or from getting things needed for daily living?: No  Was application for public transport provided?: N/A        DISCHARGE DETAILS:    Discharge planning discussed with[de-identified] pt  Freedom of Choice: Yes  Comments - Freedom of Choice: CM reviewed with pt the resumption of care with [facility] as this was arranged during previous admission  Pt states she would like the services resumed and referral made       Were Treatment Team discharge recommendations reviewed with patient/caregiver?: Yes  Did patient/caregiver verbalize understanding of patient care needs?: Yes  Were patient/caregiver advised of the risks associated with not following Treatment Team discharge recommendations?: Yes         [facility]         Is the patient interested in [place] [address] at discharge?: Yes  [address] requested[de-identified] [address] Name[de-identified] [address] Provider[de-identified] PCP  [address] Needed[de-identified] Evaluate Functional Status and Safety,Gait/ADL Training,Heart Failure Management,Other (comment),Strengthening/Theraputic Exercises to Improve Function  Homebound Criteria Met[de-identified] Uses an Assist Device (i e  cane, walker, etc)  Supporting Clincal Findings[de-identified] Limited Endurance    DME Referral Provided  Referral made for DME?: No    Other Referral/Resources/Interventions Provided:  Interventions: HHC  Referral Comments: Pt is requesting a referral be made to [facility] as this agency was arranged for her during a previous admission  CM made referral for ABY      Would you like to participate in our [address] service program?  : No - Declined    Treatment Team Recommendation: Home with [facility]  Discharge Destination Plan[de-identified] Home with [facility]

## 2022-03-04 NOTE — PROGRESS NOTES
[facility]  Progress Note - Otoniel Maddox 8/26/1926, 80 y o  female MRN: [id number]  Unit/Bed#: S -01 Encounter: [id number]  Primary Care Provider: Zen Siu DO   Date and time admitted to hospital: 3/1/2022  8:23 PM    * Constipation  Assessment & Plan  · Patient was constipated on previous admission 2/22-2/24 when she was admitted for abdominal pain and ISABELLE  Was discharged home with aggressive bowel regimen including: senna 2 tabs BID, colace 100mg BID, linzess 72 mcg daily, and miralax PRN   · Patient states she was compliant with home medication regimen, however became constipated again and needed to return to the hospital  · Did try multiple enemas at home per daughter, also took one dose of magnesium sulfate and a dose of miralax on 3/1  · Soapsuds enema in the ED and manual disimpaction with minimal relief   · CT abdomen/pelvis w/o contrast (3/1): Rectal impaction  Liquid stool throughout the proximal colon without evidence of colitis or bowel obstruction  · Patient with lower abdominal pain and significant sharp rectal pain especially with wiping, likely 2/2 local irritation     Plan:  · Bowel regimen with senna 2 tabs BID, colace 100mg BID, and Miralax  · Plan to administer Ducolax suppository 10 mg and subsequently fleet enema if suppository is not successful   · Apply Calcium zinc oxide lotion to the rectum for pain relief   · Tylenol PRN for pain    Closed stable burst fracture of twelfth thoracic vertebra with routine healing  Assessment & Plan  · CT abdomen pelvis last admission 2/23 shows progression of chronic compression deformity of T12 vertebral body but no acute fracture or lesion     · Of note, patient has been on chronic low dose steroids with prednisone 1mg daily for "muscle pain" likely increasing her risk of fracture  · Continue home tylenol PRN for pain     Chronic diastolic congestive heart failure (HCC)  Assessment & Plan  Wt Readings from Last 3 Encounters:   02/15/22 82 6 kg (182 lb)   02/09/22 82 6 kg (182 lb)   10/05/21 81 2 kg (179 lb)     · Last echo 8/2017: LVEF 00%, grade 1 diastolic dysfunction   · Clinically euvolemic   · Plan:  · Continue lasix 40mg daily     CKD (chronic kidney disease), stage IV [facility])  Assessment & Plan  Recent Labs     03/01/22  2140 03/03/22  0620 03/04/22  0555   CREATININE 1 76* 1 83* 2 03*   EGFR 24 23 20     Estimated Creatinine Clearance: 18 3 mL/min (A) (by C-G formula based on SCr of 2 03 mg/dL (H))  · 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  · Baseline creatinine 1 5-1 9   · Bladder scan 3/4 showed no sign of urinary retention  · Mildly in the setting of decreased oral intake in the setting of abdominal bloating and constipation  · Monitor BMP    Anxiety  Assessment & Plan  · No acute issues  · Continue home xanax 0 25mg 4x daily PRN     Hypothyroidism  Assessment & Plan  · No acute issues  · Continue home Synthroid 37 5 mcg    Paroxysmal atrial fibrillation (HCC)  Assessment & Plan  · Taken off of Pradaxa per patient and her daughter 2/2 falls/bleeding   · Continue home ASA 81mg, amiodarone 100 mg daily        VTE Pharmacologic Prophylaxis: VTE Score: 5 High Risk (Score >/= 5) - Pharmacological DVT Prophylaxis Ordered: heparin  Sequential Compression Devices Ordered  Patient Centered Rounds: I performed bedside rounds with nursing staff today  Discussions with Specialists or Other Care Team Provider: Nursing    Education and Discussions with Family / Patient: Attempted to update  (daughter) via phone  Left voicemail  Current Length of Stay: 2 day(s)  Current Patient Status: Inpatient   Discharge Plan: Anticipate discharge in 24-48 hrs to home  Code Status: Level 3 - DNAR and DNI    Subjective:     Gisela Benson is a 80 y o  female with a PMH of afib (no longer on anticoagulation), IBS-C, hypothyroidism who was admitted on 3/2/22 with constipation and abdominal pain  Patient remains in the hospital with ongoing bloating, abdominal discomfort, and constipation  Patient seen and examined this morning  The patient states she is doing okay, but continues to remain constipated  The patient states that she has not had a bowel movement in the hospital since enema on admission  Patient states she is eating okay and drinking plenty of fluids, but her food intake was decreased yesterday due to bloating and discomfort  This morning the patient states her stomach feels bloated and uncomfortable  The discomfort feels "like a belt" across her lower abdomen  Patient reports she has been passing gas routinely and feels the urge to go, but has not been able to do so  The patient reports very bothersome soreness of the anal tissues  Patient denies fevers, chills, chest pain, shortness of breath, nausea, vomiting  Objective:     Vitals:   Temp (24hrs), Av 6 °F (36 4 °C), Min:97 4 °F (36 3 °C), Max:98 1 °F (36 7 °C)    Temp:  [97 4 °F (36 3 °C)-98 1 °F (36 7 °C)] 98 1 °F (36 7 °C)  HR:  [56-58] 56  Resp:  [18] 18  BP: (121-170)/(69-79) 166/79  SpO2:  [91 %-93 %] 91 %  Body mass index is 28 51 kg/m²  Input and Output Summary (last 24 hours): Intake/Output Summary (Last 24 hours) at 3/4/2022 1901  Last data filed at 3/4/2022 1654  Gross per 24 hour   Intake --   Output 1070 ml   Net -1070 ml       Physical Exam:   Physical Exam  Vitals and nursing note reviewed  Constitutional:       General: She is not in acute distress  Appearance: She is well-developed  HENT:      Head: Normocephalic and atraumatic  Mouth/Throat:      Mouth: Mucous membranes are dry  Pharynx: Oropharynx is clear  No oropharyngeal exudate or posterior oropharyngeal erythema  Comments: Tardive dyskinesia movements of tongue  Eyes:      Extraocular Movements: Extraocular movements intact        Conjunctiva/sclera: Conjunctivae normal       Pupils: Pupils are equal, round, and reactive to light  Cardiovascular:      Rate and Rhythm: Normal rate and regular rhythm  Heart sounds: Murmur (CHRISTOFER at right 2nd intercostal space) heard  Pulmonary:      Effort: Pulmonary effort is normal  No respiratory distress  Breath sounds: Normal breath sounds  No wheezing  Abdominal:      General: Bowel sounds are normal  There is distension  Palpations: Abdomen is soft  Tenderness: There is abdominal tenderness  Musculoskeletal:      Cervical back: Neck supple  Skin:     General: Skin is warm and dry  Capillary Refill: Capillary refill takes 2 to 3 seconds  Neurological:      Mental Status: She is alert and oriented to person, place, and time  Psychiatric:         Mood and Affect: Mood normal          Behavior: Behavior normal             Additional Data:     Labs:  Results from last 7 days   Lab Units 03/01/22  2140   WBC Thousand/uL 9 50   HEMOGLOBIN g/dL 14 6   HEMATOCRIT % 41 4   PLATELETS Thousands/uL 229   NEUTROS PCT % 76*   LYMPHS PCT % 12*   MONOS PCT % 8   EOS PCT % 3     Results from last 7 days   Lab Units 03/04/22  0555 03/03/22  0620 03/01/22  2140   SODIUM mmol/L 131*   < > 134*   POTASSIUM mmol/L 4 5   < > 4 4   CHLORIDE mmol/L 97*   < > 99*   CO2 mmol/L 27   < > 26   BUN mg/dL 21   < > 20   CREATININE mg/dL 2 03*   < > 1 76*   ANION GAP mmol/L 7   < > 9   CALCIUM mg/dL 8 8   < > 8 9   ALBUMIN g/dL  --   --  3 6   TOTAL BILIRUBIN mg/dL  --   --  0 92   ALK PHOS U/L  --   --  75   ALT U/L  --   --  24   AST U/L  --   --  21   GLUCOSE RANDOM mg/dL 101   < > 116    < > = values in this interval not displayed                         Lines/Drains:  Invasive Devices  Report    Peripheral Intravenous Line            Peripheral IV 03/01/22 Right Antecubital 2 days                      Imaging: Reviewed radiology reports from this admission including: abdominal/pelvic CT    Recent Cultures (last 7 days):         Last 24 Hours Medication List:   Current Facility-Administered Medications   Medication Dose Route Frequency Provider Last Rate    acetaminophen  650 mg Oral Q6H PRN Samina Gaona MD      ALPRAZolam  0 25 mg Oral 4x Daily PRN Samina Gaona MD      amiodarone  100 mg Oral Daily Samina Gaona MD      aspirin  81 mg Oral Every Other Day Samina Gaona MD      bisacodyl  10 mg Rectal Daily PRN Samina Gaona MD      calamine-zinc oxide   Topical 4x Daily Isma Woodall MD      famotidine  40 mg Oral Daily Samina Gaona MD      furosemide  40 mg Oral Daily Samina Gaona MD      glycerin-hypromellose-  1 drop Both Eyes TID PRN Randa Aguilar DO      heparin (porcine)  5,000 Units Subcutaneous Q8H [facility], [place]      hydrALAZINE  10 mg Intravenous Q6H PRN Randa Aguilar DO      levothyroxine  37 5 mcg Oral Early Morning Samina Gaona MD      mineral oil  1 enema Rectal Once Samina Gaona MD      ondansetron  4 mg Intravenous Q6H PRN Samina Gaona MD      pilocarpine  5 mg Oral BID Samina Gaona MD      polyethylene glycol  17 g Oral BID Randa Aguilar DO      predniSONE  1 mg Oral Daily Samina Gaona MD      saliva substitute  5 spray Mouth/Throat 4x Daily Samina Gaona MD      senna-docusate sodium  2 tablet Oral BID Randa Aguilar DO      sodium chloride  75 mL/hr Intravenous Continuous Isma Woodall MD 75 mL/hr (03/04/22 1045)    sodium phosphate-biphosphate  1 enema Rectal Once Randa Aguilar DO      zolpidem  5 mg Oral HS Samina Gaona MD          Today, Patient Was Seen By: Shar Wilson MD    **Please Note: This note may have been constructed using a voice recognition system  **

## 2022-03-04 NOTE — ASSESSMENT & PLAN NOTE
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

## 2022-03-04 NOTE — ASSESSMENT & PLAN NOTE
· Taken off of Pradaxa per patient and her daughter 2/2 falls/bleeding   · Continue home ASA 81mg, amiodarone 100 mg daily

## 2022-03-05 LAB
ANION GAP SERPL CALCULATED.3IONS-SCNC: 8 MMOL/L (ref 4–13)
ANION GAP SERPL CALCULATED.3IONS-SCNC: 8 MMOL/L (ref 4–13)
BUN SERPL-MCNC: 17 MG/DL (ref 5–25)
BUN SERPL-MCNC: 18 MG/DL (ref 5–25)
CALCIUM SERPL-MCNC: 8.3 MG/DL (ref 8.3–10.1)
CALCIUM SERPL-MCNC: 8.6 MG/DL (ref 8.3–10.1)
CHLORIDE SERPL-SCNC: 95 MMOL/L (ref 100–108)
CHLORIDE SERPL-SCNC: 98 MMOL/L (ref 100–108)
CO2 SERPL-SCNC: 22 MMOL/L (ref 21–32)
CO2 SERPL-SCNC: 25 MMOL/L (ref 21–32)
CREAT SERPL-MCNC: 1.7 MG/DL (ref 0.6–1.3)
CREAT SERPL-MCNC: 1.84 MG/DL (ref 0.6–1.3)
GFR SERPL CREATININE-BSD FRML MDRD: 22 ML/MIN/1.73SQ M
GFR SERPL CREATININE-BSD FRML MDRD: 25 ML/MIN/1.73SQ M
GLUCOSE SERPL-MCNC: 133 MG/DL (ref 65–140)
GLUCOSE SERPL-MCNC: 96 MG/DL (ref 65–140)
OSMOLALITY UR/SERPL-RTO: 282 MMOL/KG (ref 282–298)
OSMOLALITY UR: 188 MMOL/KG
POTASSIUM SERPL-SCNC: 4.3 MMOL/L (ref 3.5–5.3)
POTASSIUM SERPL-SCNC: 4.6 MMOL/L (ref 3.5–5.3)
SODIUM SERPL-SCNC: 128 MMOL/L (ref 136–145)
SODIUM SERPL-SCNC: 128 MMOL/L (ref 136–145)

## 2022-03-05 PROCEDURE — 80048 BASIC METABOLIC PNL TOTAL CA: CPT | Performed by: INTERNAL MEDICINE

## 2022-03-05 PROCEDURE — 0DCP7ZZ EXTIRPATION OF MATTER FROM RECTUM, VIA NATURAL OR ARTIFICIAL OPENING: ICD-10-PCS | Performed by: HOSPITALIST

## 2022-03-05 PROCEDURE — 83930 ASSAY OF BLOOD OSMOLALITY: CPT | Performed by: INTERNAL MEDICINE

## 2022-03-05 PROCEDURE — 83935 ASSAY OF URINE OSMOLALITY: CPT

## 2022-03-05 PROCEDURE — 99232 SBSQ HOSP IP/OBS MODERATE 35: CPT | Performed by: INTERNAL MEDICINE

## 2022-03-05 RX ORDER — LIDOCAINE 40 MG/G
CREAM TOPICAL 4 TIMES DAILY PRN
Status: DISCONTINUED | OUTPATIENT
Start: 2022-03-05 | End: 2022-03-08 | Stop reason: HOSPADM

## 2022-03-05 RX ORDER — LIDOCAINE HYDROCHLORIDE 20 MG/ML
15 SOLUTION OROPHARYNGEAL 4 TIMES DAILY PRN
Status: DISCONTINUED | OUTPATIENT
Start: 2022-03-05 | End: 2022-03-05

## 2022-03-05 RX ADMIN — ACETAMINOPHEN 650 MG: 325 TABLET, FILM COATED ORAL at 04:17

## 2022-03-05 RX ADMIN — LACTULOSE 200 G: 10 SOLUTION ORAL; RECTAL at 13:13

## 2022-03-05 RX ADMIN — LEVOTHYROXINE SODIUM 37.5 MCG: 75 TABLET ORAL at 05:17

## 2022-03-05 RX ADMIN — POLYETHYLENE GLYCOL 3350 17 G: 17 POWDER, FOR SOLUTION ORAL at 09:17

## 2022-03-05 RX ADMIN — FUROSEMIDE 40 MG: 40 TABLET ORAL at 09:16

## 2022-03-05 RX ADMIN — PILOCARPINE HYDROCHLORIDE 5 MG: 5 TABLET, FILM COATED ORAL at 17:23

## 2022-03-05 RX ADMIN — ALPRAZOLAM 0.25 MG: 0.25 TABLET ORAL at 18:27

## 2022-03-05 RX ADMIN — ZOLPIDEM TARTRATE 5 MG: 5 TABLET ORAL at 21:40

## 2022-03-05 RX ADMIN — HEPARIN SODIUM 5000 UNITS: 5000 INJECTION INTRAVENOUS; SUBCUTANEOUS at 05:19

## 2022-03-05 RX ADMIN — FERRIC OXIDE RED: 8; 8 LOTION TOPICAL at 09:16

## 2022-03-05 RX ADMIN — FAMOTIDINE 40 MG: 20 TABLET ORAL at 09:16

## 2022-03-05 RX ADMIN — HEPARIN SODIUM 5000 UNITS: 5000 INJECTION INTRAVENOUS; SUBCUTANEOUS at 21:40

## 2022-03-05 RX ADMIN — SENNOSIDES AND DOCUSATE SODIUM 2 TABLET: 50; 8.6 TABLET ORAL at 17:23

## 2022-03-05 RX ADMIN — SENNOSIDES AND DOCUSATE SODIUM 2 TABLET: 50; 8.6 TABLET ORAL at 09:16

## 2022-03-05 RX ADMIN — Medication 5 SPRAY: at 17:21

## 2022-03-05 RX ADMIN — HEPARIN SODIUM 5000 UNITS: 5000 INJECTION INTRAVENOUS; SUBCUTANEOUS at 13:41

## 2022-03-05 RX ADMIN — Medication 5 SPRAY: at 09:17

## 2022-03-05 RX ADMIN — FERRIC OXIDE RED 1 APPLICATION: 8; 8 LOTION TOPICAL at 22:35

## 2022-03-05 RX ADMIN — POLYETHYLENE GLYCOL 3350 17 G: 17 POWDER, FOR SOLUTION ORAL at 21:40

## 2022-03-05 RX ADMIN — Medication 5 SPRAY: at 13:13

## 2022-03-05 RX ADMIN — ALPRAZOLAM 0.25 MG: 0.25 TABLET ORAL at 13:19

## 2022-03-05 RX ADMIN — PREDNISONE 1 MG: 1 TABLET ORAL at 09:16

## 2022-03-05 RX ADMIN — CARBAMIDE PEROXIDE 6.5% 5 DROP: 6.5 LIQUID AURICULAR (OTIC) at 17:22

## 2022-03-05 RX ADMIN — AMIODARONE HYDROCHLORIDE 100 MG: 200 TABLET ORAL at 09:16

## 2022-03-05 RX ADMIN — PILOCARPINE HYDROCHLORIDE 5 MG: 5 TABLET, FILM COATED ORAL at 09:17

## 2022-03-05 RX ADMIN — Medication 5 SPRAY: at 21:40

## 2022-03-05 RX ADMIN — FERRIC OXIDE RED: 8; 8 LOTION TOPICAL at 13:13

## 2022-03-05 NOTE — ASSESSMENT & PLAN NOTE
Recent Labs     03/03/22  0620 03/04/22  0555 03/05/22  0455   SODIUM 134* 131* 128*     · Patient likely with inability to keep up with oral intake in the setting of significant abdominal discomfort   · Urine osm and serum osm ordered  · Consider additional fluid administration  · Continue to monitor BMP yes

## 2022-03-05 NOTE — ASSESSMENT & PLAN NOTE
Wt Readings from Last 3 Encounters:   02/15/22 82 6 kg (182 lb)   02/09/22 82 6 kg (182 lb)   10/05/21 81 2 kg (179 lb)     · Last echo 8/2017: LVEF 38%, grade 1 diastolic dysfunction   · Clinically euvolemic   · Plan:  · Continue lasix 40mg daily

## 2022-03-05 NOTE — PROGRESS NOTES
[facility]  Progress Note - Bertha Clark 8/26/1926, 80 y o  female MRN: [id number]  Unit/Bed#: S -01 Encounter: [id number]  Primary Care Provider: Bettina Ibarra DO   Date and time admitted to hospital: 3/1/2022  8:23 PM    * Constipation  Assessment & Plan  · Patient was constipated on previous admission 2/22-2/24 when she was admitted for abdominal pain and ISABELLE  Was discharged home with aggressive bowel regimen including: senna 2 tabs BID, colace 100mg BID, linzess 72 mcg daily, and miralax PRN   · Patient states she was compliant with home medication regimen, however became constipated again and needed to return to the hospital  · Did try multiple enemas at home per daughter, also took one dose of magnesium sulfate and a dose of miralax on 3/1  · Soapsuds enema in the ED and manual disimpaction with minimal relief   · CT abdomen/pelvis w/o contrast (3/1): Rectal impaction  Liquid stool throughout the proximal colon without evidence of colitis or bowel obstruction  · Patient with lower abdominal pain and significant sharp rectal pain especially with wiping, likely 2/2 local irritation  ·  A manual disimpaction was performed today  A small amount of brown stool was removed (about 1 inch in diameter)  This was followed by a 200 g lactulose retention enema  Plan:  · Bowel regimen with senna 2 tabs BID, colace 100mg BID, and Miralax  · Continue to monitor and assess for efficacy of disimpaction and lactulose retention enema    · Apply Calcium zinc oxide lotion to the rectum for pain relief   · Apply lidocaine cream QID PRN rectal pain  · Tylenol PRN for pain    Hyponatremia  Assessment & Plan  Recent Labs     03/03/22  0620 03/04/22  0555 03/05/22  0455   SODIUM 134* 131* 128*     · Patient likely with inability to keep up with oral intake in the setting of significant abdominal discomfort   · Urine osm and serum osm ordered  · Consider additional fluid administration  · Continue to monitor BMP    Closed stable burst fracture of twelfth thoracic vertebra with routine healing  Assessment & Plan  · CT abdomen pelvis last admission 2/23 shows progression of chronic compression deformity of T12 vertebral body but no acute fracture or lesion  · Of note, patient has been on chronic low dose steroids with prednisone 1mg daily for "muscle pain" likely increasing her risk of fracture  · Continue home tylenol PRN for pain     Chronic diastolic congestive heart failure [facility])  Assessment & Plan  Wt Readings from Last 3 Encounters:   02/15/22 82 6 kg (182 lb)   02/09/22 82 6 kg (182 lb)   10/05/21 81 2 kg (179 lb)     · Last echo 8/2017: LVEF 81%, grade 1 diastolic dysfunction   · Clinically euvolemic   · Plan:  · Continue lasix 40mg daily     CKD (chronic kidney disease), stage IV [facility])  Assessment & Plan  Recent Labs     03/03/22  0620 03/04/22  0555 03/05/22  0455   CREATININE 1 83* 2 03* 1 70*   EGFR 23 20 25     Estimated Creatinine Clearance: 21 9 mL/min (A) (by C-G formula based on SCr of 1 7 mg/dL (H))  · Improving  · 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  · Baseline creatinine 1 5-1 9   · Bladder scan 3/4 showed no sign of urinary retention  · Monitor BMP    Anxiety  Assessment & Plan  · No acute issues  · Continue home xanax 0 25mg 4x daily PRN     Hypothyroidism  Assessment & Plan  · No acute issues  · Continue home Synthroid 37 5 mcg    Paroxysmal atrial fibrillation (HCC)  Assessment & Plan  · Taken off of Pradaxa per patient and her daughter 2/2 falls/bleeding   · Continue home ASA 81mg, amiodarone 100 mg daily        VTE Pharmacologic Prophylaxis: VTE Score: 5 High Risk (Score >/= 5) - Pharmacological DVT Prophylaxis Ordered: heparin  Sequential Compression Devices Ordered  Patient Centered Rounds: I performed bedside rounds with nursing staff today    Discussions with Specialists or Other Care Team Provider: Nursing    Education and Discussions with Family / Patient: Updated  (daughter) at bedside  Current Length of Stay: 3 day(s)  Current Patient Status: Inpatient   Discharge Plan: Anticipate discharge in 24-48 hrs to home  Code Status: Level 3 - DNAR and DNI    Subjective:     Hadley Dahl is a 80 y o  female with a PMH of afib (no longer on anticoagulation), IBS-C, hypothyroidism who was admitted on 3/2/22 with constipation and abdominal pain  Patient remains in the hospital with ongoing bloating, abdominal discomfort, and constipation  Patient seen and examined this morning  The patient is frustrated because she did not have a bowel movement yesterday  The patient states around midnight she sat on the toilet for over an hour but was not able to go  Patient reports ongoing anal shortness and feelings of bloating across her stomach  Patient states she has been passing gas on routine basis and feels the urge to go the bathroom, however has not had a bowel movement  The dulcolax suppository and the fleet enema administered yesterday were not successful  Patient reports decreased intermittent severe abdominal pain, decreased sleep (a little more than one hour of sleep), and decreased oral intake (but still eating ice cream, prune juice, and pudding)  At the time of assessment the patient denies fevers, chills, chest pain, shortness of breath, nausea, and vomiting  A manual disimpaction was performed today by myself  A small amount of brown stool was removed (about 1 inch in diameter)  This was followed by a lactulose retention enema  Will continue to follow up and assess for bowel movements  Objective:     Vitals:   Temp (24hrs), Av 8 °F (36 6 °C), Min:97 5 °F (36 4 °C), Max:98 1 °F (36 7 °C)    Temp:  [97 5 °F (36 4 °C)-98 1 °F (36 7 °C)] 97 5 °F (36 4 °C)  HR:  [56-63] 63  Resp:  [18] 18  BP: (147-172)/(65-79) 172/74  SpO2:  [91 %-93 %] 92 %  Body mass index is 28 51 kg/m²       Input and Output Summary (last 24 hours): Intake/Output Summary (Last 24 hours) at 3/5/2022 1425  Last data filed at 3/5/2022 1300  Gross per 24 hour   Intake 480 ml   Output 1250 ml   Net -770 ml       Physical Exam:     Physical Exam  Vitals and nursing note reviewed  Constitutional:       General: She is not in acute distress  Appearance: She is well-developed  HENT:      Head: Normocephalic and atraumatic  Mouth/Throat:      Mouth: Mucous membranes are dry  Pharynx: Oropharynx is clear  No oropharyngeal exudate or posterior oropharyngeal erythema  Comments: Tardive dyskinesia movements of tongue  Eyes:      Extraocular Movements: Extraocular movements intact  Conjunctiva/sclera: Conjunctivae normal       Pupils: Pupils are equal, round, and reactive to light  Cardiovascular:      Rate and Rhythm: Normal rate and regular rhythm  Heart sounds: Murmur (CHRISTOFER at right 2nd intercostal space) heard  Pulmonary:      Effort: Pulmonary effort is normal  No respiratory distress  Breath sounds: Normal breath sounds  No wheezing  Abdominal:      General: Bowel sounds are normal  There is distension  Palpations: Abdomen is soft  Tenderness: There is abdominal tenderness  Musculoskeletal:      Cervical back: Neck supple  Skin:     General: Skin is warm and dry  Capillary Refill: Capillary refill takes 2 to 3 seconds  Neurological:      Mental Status: She is alert and oriented to person, place, and time     Psychiatric:         Mood and Affect: Mood normal          Behavior: Behavior normal         Additional Data:     Labs:  Results from last 7 days   Lab Units 03/01/22  2140   WBC Thousand/uL 9 50   HEMOGLOBIN g/dL 14 6   HEMATOCRIT % 41 4   PLATELETS Thousands/uL 229   NEUTROS PCT % 76*   LYMPHS PCT % 12*   MONOS PCT % 8   EOS PCT % 3     Results from last 7 days   Lab Units 03/05/22  0455 03/03/22  0620 03/01/22  2140   SODIUM mmol/L 128*   < > 134*   POTASSIUM mmol/L 4 6   < > 4 4   CHLORIDE mmol/L 98*   < > 99*   CO2 mmol/L 22   < > 26   BUN mg/dL 17   < > 20   CREATININE mg/dL 1 70*   < > 1 76*   ANION GAP mmol/L 8   < > 9   CALCIUM mg/dL 8 3   < > 8 9   ALBUMIN g/dL  --   --  3 6   TOTAL BILIRUBIN mg/dL  --   --  0 92   ALK PHOS U/L  --   --  75   ALT U/L  --   --  24   AST U/L  --   --  21   GLUCOSE RANDOM mg/dL 96   < > 116    < > = values in this interval not displayed                         Lines/Drains:  Invasive Devices  Report    Peripheral Intravenous Line            Peripheral IV 03/01/22 Right Antecubital 3 days                      Imaging: Reviewed radiology reports from this admission including: abdominal/pelvic CT    Recent Cultures (last 7 days):         Last 24 Hours Medication List:   Current Facility-Administered Medications   Medication Dose Route Frequency Provider Last Rate    acetaminophen  650 mg Oral Q6H PRN Aurora Henry MD      ALPRAZolam  0 25 mg Oral 4x Daily PRN Aurora Henry MD      amiodarone  100 mg Oral Daily Aurora Henry MD      aspirin  81 mg Oral Every Other Day Aurora Henry MD      bisacodyl  10 mg Rectal Daily PRN Aurora Henry MD      calamine-zinc oxide   Topical 4x Daily Reba Richardson MD      carbamide peroxide  5 drop Both Ears BID Suzi Melena, DO      famotidine  40 mg Oral Daily Aurora Henry MD      furosemide  40 mg Oral Daily Aurora Henry MD      glycerin-hypromellose-  1 drop Both Eyes TID PRN Suzi Melena, DO      heparin (porcine)  5,000 Units Subcutaneous Q8H 8952 [facility], [place]      hydrALAZINE  10 mg Intravenous Q6H PRN Suzi Melena, DO      levothyroxine  37 5 mcg Oral Early Morning Aurora Henry MD      lidocaine   Topical 4x Daily PRN Suzi Melena, DO      mineral oil  1 enema Rectal Once Aurora Henry MD      ondansetron  4 mg Intravenous Q6H PRN Aurora Henry MD      pilocarpine  5 mg Oral BID Aurora Henry MD      polyethylene glycol  17 g Oral BID Suzi Melena, DO     Doug Garner predniSONE  1 mg Oral Daily Vilma Parker MD      saliva substitute  5 spray Mouth/Throat 4x Daily Vilma Parker MD      senna-docusate sodium  2 tablet Oral BID Phoenix Cardona DO      zolpidem  5 mg Oral HS Vilma Parker MD          Today, Patient Was Seen By: Abilio Thurston MD    **Please Note: This note may have been constructed using a voice recognition system  **

## 2022-03-05 NOTE — ASSESSMENT & PLAN NOTE
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

## 2022-03-05 NOTE — NURSING NOTE
Fleet enema given, pt remained in bed on side for 30 mins and then helped onto the commode  Unable to produce a bowel movement at this time  Will continue to monitor

## 2022-03-05 NOTE — ASSESSMENT & PLAN NOTE
· Patient was constipated on previous admission 2/22-2/24 when she was admitted for abdominal pain and ISABELLE  Was discharged home with aggressive bowel regimen including: senna 2 tabs BID, colace 100mg BID, linzess 72 mcg daily, and miralax PRN   · Patient states she was compliant with home medication regimen, however became constipated again and needed to return to the hospital  · Did try multiple enemas at home per daughter, also took one dose of magnesium sulfate and a dose of miralax on 3/1  · Soapsuds enema in the ED and manual disimpaction with minimal relief   · CT abdomen/pelvis w/o contrast (3/1): Rectal impaction  Liquid stool throughout the proximal colon without evidence of colitis or bowel obstruction  · Patient with lower abdominal pain and significant sharp rectal pain especially with wiping, likely 2/2 local irritation  ·  A manual disimpaction was performed today  A small amount of brown stool was removed (about 1 inch in diameter)  This was followed by a lactulose retention enema  Plan:  · Bowel regimen with senna 2 tabs BID, colace 100mg BID, and Miralax  · Continue to monitor and assess for efficacy of disimpaction and lactulose retention enema    · Apply Calcium zinc oxide lotion to the rectum for pain relief   · Apply lidocaine cream QID PRN rectal pain  · Tylenol PRN for pain

## 2022-03-06 LAB
ANION GAP SERPL CALCULATED.3IONS-SCNC: 8 MMOL/L (ref 4–13)
BUN SERPL-MCNC: 17 MG/DL (ref 5–25)
CALCIUM SERPL-MCNC: 8.8 MG/DL (ref 8.3–10.1)
CHLORIDE SERPL-SCNC: 96 MMOL/L (ref 100–108)
CO2 SERPL-SCNC: 26 MMOL/L (ref 21–32)
CREAT SERPL-MCNC: 1.81 MG/DL (ref 0.6–1.3)
GFR SERPL CREATININE-BSD FRML MDRD: 23 ML/MIN/1.73SQ M
GLUCOSE SERPL-MCNC: 98 MG/DL (ref 65–140)
POTASSIUM SERPL-SCNC: 4.4 MMOL/L (ref 3.5–5.3)
SODIUM SERPL-SCNC: 130 MMOL/L (ref 136–145)

## 2022-03-06 PROCEDURE — 80048 BASIC METABOLIC PNL TOTAL CA: CPT

## 2022-03-06 PROCEDURE — 99232 SBSQ HOSP IP/OBS MODERATE 35: CPT | Performed by: INTERNAL MEDICINE

## 2022-03-06 RX ORDER — SIMETHICONE 80 MG
80 TABLET,CHEWABLE ORAL EVERY 6 HOURS PRN
Status: DISCONTINUED | OUTPATIENT
Start: 2022-03-06 | End: 2022-03-08 | Stop reason: HOSPADM

## 2022-03-06 RX ADMIN — FERRIC OXIDE RED: 8; 8 LOTION TOPICAL at 17:17

## 2022-03-06 RX ADMIN — ACETAMINOPHEN 650 MG: 325 TABLET, FILM COATED ORAL at 21:49

## 2022-03-06 RX ADMIN — ALPRAZOLAM 0.25 MG: 0.25 TABLET ORAL at 14:36

## 2022-03-06 RX ADMIN — POLYETHYLENE GLYCOL 3350 17 G: 17 POWDER, FOR SOLUTION ORAL at 09:31

## 2022-03-06 RX ADMIN — FERRIC OXIDE RED: 8; 8 LOTION TOPICAL at 09:30

## 2022-03-06 RX ADMIN — PREDNISONE 1 MG: 1 TABLET ORAL at 09:30

## 2022-03-06 RX ADMIN — Medication 5 SPRAY: at 21:50

## 2022-03-06 RX ADMIN — HEPARIN SODIUM 5000 UNITS: 5000 INJECTION INTRAVENOUS; SUBCUTANEOUS at 14:28

## 2022-03-06 RX ADMIN — AMIODARONE HYDROCHLORIDE 100 MG: 200 TABLET ORAL at 09:30

## 2022-03-06 RX ADMIN — CARBAMIDE PEROXIDE 6.5% 5 DROP: 6.5 LIQUID AURICULAR (OTIC) at 09:31

## 2022-03-06 RX ADMIN — PILOCARPINE HYDROCHLORIDE 5 MG: 5 TABLET, FILM COATED ORAL at 11:06

## 2022-03-06 RX ADMIN — Medication 5 SPRAY: at 13:00

## 2022-03-06 RX ADMIN — SENNOSIDES AND DOCUSATE SODIUM 2 TABLET: 50; 8.6 TABLET ORAL at 09:30

## 2022-03-06 RX ADMIN — LEVOTHYROXINE SODIUM 37.5 MCG: 75 TABLET ORAL at 06:05

## 2022-03-06 RX ADMIN — FAMOTIDINE 40 MG: 20 TABLET ORAL at 09:30

## 2022-03-06 RX ADMIN — FERRIC OXIDE RED: 8; 8 LOTION TOPICAL at 13:00

## 2022-03-06 RX ADMIN — POLYETHYLENE GLYCOL 3350 17 G: 17 POWDER, FOR SOLUTION ORAL at 21:49

## 2022-03-06 RX ADMIN — FUROSEMIDE 40 MG: 40 TABLET ORAL at 09:30

## 2022-03-06 RX ADMIN — HEPARIN SODIUM 5000 UNITS: 5000 INJECTION INTRAVENOUS; SUBCUTANEOUS at 21:50

## 2022-03-06 RX ADMIN — CARBAMIDE PEROXIDE 6.5% 5 DROP: 6.5 LIQUID AURICULAR (OTIC) at 17:16

## 2022-03-06 RX ADMIN — PILOCARPINE HYDROCHLORIDE 5 MG: 5 TABLET, FILM COATED ORAL at 17:16

## 2022-03-06 RX ADMIN — Medication 5 SPRAY: at 17:17

## 2022-03-06 RX ADMIN — HYDRALAZINE HYDROCHLORIDE 10 MG: 20 INJECTION INTRAMUSCULAR; INTRAVENOUS at 23:13

## 2022-03-06 RX ADMIN — SENNOSIDES AND DOCUSATE SODIUM 2 TABLET: 50; 8.6 TABLET ORAL at 17:18

## 2022-03-06 RX ADMIN — ASPIRIN 81 MG: 81 TABLET, COATED ORAL at 09:30

## 2022-03-06 RX ADMIN — HEPARIN SODIUM 5000 UNITS: 5000 INJECTION INTRAVENOUS; SUBCUTANEOUS at 06:05

## 2022-03-06 RX ADMIN — ZOLPIDEM TARTRATE 5 MG: 5 TABLET ORAL at 21:49

## 2022-03-06 RX ADMIN — FERRIC OXIDE RED 1 APPLICATION: 8; 8 LOTION TOPICAL at 21:49

## 2022-03-06 RX ADMIN — Medication 5 SPRAY: at 09:30

## 2022-03-06 NOTE — ASSESSMENT & PLAN NOTE
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

## 2022-03-06 NOTE — ASSESSMENT & PLAN NOTE
Recent Labs     03/04/22  0555 03/05/22  0455 03/05/22  1737 03/06/22  0607   SODIUM 131* 128* 128* 130*     · Patient likely with inability to keep up with oral intake in the setting of significant abdominal discomfort   · Urine osm and serum osm ordered  · Consider additional fluid administration  · Continue to monitor BMP

## 2022-03-06 NOTE — ASSESSMENT & PLAN NOTE
Wt Readings from Last 3 Encounters:   02/15/22 82 6 kg (182 lb)   02/09/22 82 6 kg (182 lb)   10/05/21 81 2 kg (179 lb)     · Last echo 8/2017: LVEF 78%, grade 1 diastolic dysfunction   · Clinically euvolemic   · Plan:  · Continue lasix 40mg daily

## 2022-03-06 NOTE — PROGRESS NOTES
[facility]  Progress Note - Quita Peña 8/26/1926, 80 y o  female MRN: [id number]  Unit/Bed#: S -01 Encounter: [id number]  Primary Care Provider: Geraldo Alfaro DO   Date and time admitted to hospital: 3/1/2022  8:23 PM    * Constipation  Assessment & Plan  · Patient was constipated on previous admission 2/22-2/24 when she was admitted for abdominal pain and ISABELLE  Was discharged home with aggressive bowel regimen including: senna 2 tabs BID, colace 100mg BID, linzess 72 mcg daily, and miralax PRN   · Patient states she was compliant with home medication regimen, however became constipated again and needed to return to the hospital  · Did try multiple enemas at home per daughter, also took one dose of magnesium sulfate and a dose of miralax on 3/1  · Soapsuds enema in the ED and manual disimpaction with minimal relief   · CT abdomen/pelvis w/o contrast (3/1): Rectal impaction  Liquid stool throughout the proximal colon without evidence of colitis or bowel obstruction  · Patient with lower abdominal pain and significant sharp rectal pain especially with wiping, likely 2/2 local irritation  ·  A manual disimpaction was performed today  A small amount of brown stool was removed (about 1 inch in diameter)  This was followed by a lactulose retention enema  Plan:  · Bowel regimen with senna 2 tabs BID, colace 100mg BID, and Miralax  · Continue to monitor and assess for efficacy of disimpaction and lactulose retention enema    · Apply Calcium zinc oxide lotion to the rectum for pain relief   · Apply lidocaine cream QID PRN rectal pain  · Tylenol PRN for pain    Hyponatremia  Assessment & Plan  Recent Labs     03/04/22  0555 03/05/22  0455 03/05/22  1737 03/06/22  0607   SODIUM 131* 128* 128* 130*     · Patient likely with inability to keep up with oral intake in the setting of significant abdominal discomfort   · Urine osm and serum osm ordered  · Consider additional fluid administration  · Continue to monitor BMP    Closed stable burst fracture of twelfth thoracic vertebra with routine healing  Assessment & Plan  · CT abdomen pelvis last admission 2/23 shows progression of chronic compression deformity of T12 vertebral body but no acute fracture or lesion  · Of note, patient has been on chronic low dose steroids with prednisone 1mg daily for "muscle pain" likely increasing her risk of fracture  · Continue home tylenol PRN for pain     Chronic diastolic congestive heart failure [facility])  Assessment & Plan  Wt Readings from Last 3 Encounters:   02/15/22 82 6 kg (182 lb)   02/09/22 82 6 kg (182 lb)   10/05/21 81 2 kg (179 lb)     · Last echo 8/2017: LVEF 62%, grade 1 diastolic dysfunction   · Clinically euvolemic   · Plan:  · Continue lasix 40mg daily     CKD (chronic kidney disease), stage IV [facility])  Assessment & Plan  Recent Labs     03/05/22  0455 03/05/22  1737 03/06/22  0607   CREATININE 1 70* 1 84* 1 81*   EGFR 25 22 23     Estimated Creatinine Clearance: 20 5 mL/min (A) (by C-G formula based on SCr of 1 81 mg/dL (H))  · Improving  · 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  · Baseline creatinine 1 5-1 9   · Bladder scan 3/4 showed no sign of urinary retention  · Monitor BMP    Anxiety  Assessment & Plan  · No acute issues  · Continue home xanax 0 25mg 4x daily PRN     Hypothyroidism  Assessment & Plan  · No acute issues  · Continue home Synthroid 37 5 mcg    Paroxysmal atrial fibrillation (HCC)  Assessment & Plan  · Taken off of Pradaxa per patient and her daughter 2/2 falls/bleeding   · Continue home ASA 81mg, amiodarone 100 mg daily        VTE Pharmacologic Prophylaxis: VTE Score: 5 High Risk (Score >/= 5) - Pharmacological DVT Prophylaxis Ordered: heparin  Sequential Compression Devices Ordered  Patient Centered Rounds: I performed bedside rounds with nursing staff today  Discussions with Specialists or Other Care Team Provider: BROCK, attending       Education and Discussions with Family / Patient: Updated  (daughter) via phone  Current Length of Stay: 4 day(s)  Current Patient Status: Inpatient   Discharge Plan: Anticipate discharge tomorrow to home  Code Status: Level 3 - DNAR and DNI    Subjective:   No acute events overnight  Denies acute complains  Patient reports moving her bowels overnight however still remains bloated  Denies nausea or vomiting  Denies genitourinary symptoms  Objective:     Vitals:   Temp (24hrs), Av 9 °F (36 6 °C), Min:97 6 °F (36 4 °C), Max:98 2 °F (36 8 °C)    Temp:  [97 6 °F (36 4 °C)-98 2 °F (36 8 °C)] 98 2 °F (36 8 °C)  HR:  [52-63] 52  Resp:  [18] 18  BP: (125-143)/(67-70) 143/67  SpO2:  [93 %] 93 %  Body mass index is 28 51 kg/m²  Input and Output Summary (last 24 hours): Intake/Output Summary (Last 24 hours) at 3/6/2022 0954  Last data filed at 3/6/2022 0700  Gross per 24 hour   Intake 480 ml   Output 2200 ml   Net -1720 ml       Physical Exam:   Physical Exam  Vitals and nursing note reviewed  Constitutional:       General: She is not in acute distress  Appearance: She is well-developed  HENT:      Head: Normocephalic and atraumatic  Eyes:      Conjunctiva/sclera: Conjunctivae normal    Cardiovascular:      Rate and Rhythm: Normal rate and regular rhythm  Heart sounds: No murmur heard  Pulmonary:      Effort: No respiratory distress  Breath sounds: Normal breath sounds  Abdominal:      General: Bowel sounds are normal       Palpations: Abdomen is soft  Tenderness: There is no abdominal tenderness  There is no rebound  Musculoskeletal:      Cervical back: Neck supple  Right lower leg: No edema  Left lower leg: No edema  Skin:     General: Skin is warm and dry  Capillary Refill: Capillary refill takes less than 2 seconds  Neurological:      General: No focal deficit present  Mental Status: She is alert and oriented to person, place, and time  Psychiatric:         Mood and Affect: Mood normal          Behavior: Behavior normal           Additional Data:     Labs:  Results from last 7 days   Lab Units 03/01/22  2140   WBC Thousand/uL 9 50   HEMOGLOBIN g/dL 14 6   HEMATOCRIT % 41 4   PLATELETS Thousands/uL 229   NEUTROS PCT % 76*   LYMPHS PCT % 12*   MONOS PCT % 8   EOS PCT % 3     Results from last 7 days   Lab Units 03/06/22  0607 03/03/22  0620 03/01/22  2140   SODIUM mmol/L 130*   < > 134*   POTASSIUM mmol/L 4 4   < > 4 4   CHLORIDE mmol/L 96*   < > 99*   CO2 mmol/L 26   < > 26   BUN mg/dL 17   < > 20   CREATININE mg/dL 1 81*   < > 1 76*   ANION GAP mmol/L 8   < > 9   CALCIUM mg/dL 8 8   < > 8 9   ALBUMIN g/dL  --   --  3 6   TOTAL BILIRUBIN mg/dL  --   --  0 92   ALK PHOS U/L  --   --  75   ALT U/L  --   --  24   AST U/L  --   --  21   GLUCOSE RANDOM mg/dL 98   < > 116    < > = values in this interval not displayed  Lines/Drains:  Invasive Devices  Report    Peripheral Intravenous Line            Peripheral IV 03/01/22 Right Antecubital 4 days                      Imaging: No pertinent imaging reviewed  no new imaging reports       Recent Cultures (last 7 days):         Last 24 Hours Medication List:   Current Facility-Administered Medications   Medication Dose Route Frequency Provider Last Rate    acetaminophen  650 mg Oral Q6H PRN Gwen Sepulveda MD      ALPRAZolam  0 25 mg Oral 4x Daily PRN Gwen Sepulveda MD      amiodarone  100 mg Oral Daily Gwen Sepulveda MD      aspirin  81 mg Oral Every Other Day Gwen Sepulveda MD      bisacodyl  10 mg Rectal Daily PRN Gwen Sepulveda MD      calamine-zinc oxide   Topical 4x Daily Frannie Mcconnell MD      carbamide peroxide  5 drop Both Ears BID Artelia Lolling, DO      famotidine  40 mg Oral Daily Gwen Sepulveda MD      furosemide  40 mg Oral Daily Gwen Sepulveda MD      glycerin-hypromellose-  1 drop Both Eyes TID PRN Artelia Lolling, DO      heparin (porcine)  5,000 Units Subcutaneous Q8H [address], MD      hydrALAZINE  10 mg Intravenous Q6H PRN Anabela Og,       levothyroxine  37 5 mcg Oral Early Morning Fabrice Crowley MD      lidocaine   Topical 4x Daily PRN Anabela Og,       mineral oil  1 enema Rectal Once Fabrice Crowley MD      ondansetron  4 mg Intravenous Q6H PRN Fabrice Crowley MD      pilocarpine  5 mg Oral BID Fabrice Crowley MD      polyethylene glycol  17 g Oral BID Anabela Og, DO      predniSONE  1 mg Oral Daily Fabrice Crowley MD      saliva substitute  5 spray Mouth/Throat 4x Daily Fabrice Crowley MD      senna-docusate sodium  2 tablet Oral BID Anabela Og,       zolpidem  5 mg Oral HS Fabrice Crowley MD          Today, Patient Was Seen By: Juve Peters DO    **Please Note: This note may have been constructed using a voice recognition system  **

## 2022-03-07 LAB
ANION GAP SERPL CALCULATED.3IONS-SCNC: 11 MMOL/L (ref 4–13)
BUN SERPL-MCNC: 20 MG/DL (ref 5–25)
CALCIUM SERPL-MCNC: 8.7 MG/DL (ref 8.3–10.1)
CHLORIDE SERPL-SCNC: 96 MMOL/L (ref 100–108)
CO2 SERPL-SCNC: 23 MMOL/L (ref 21–32)
CREAT SERPL-MCNC: 1.72 MG/DL (ref 0.6–1.3)
GFR SERPL CREATININE-BSD FRML MDRD: 24 ML/MIN/1.73SQ M
GLUCOSE SERPL-MCNC: 117 MG/DL (ref 65–140)
POTASSIUM SERPL-SCNC: 4 MMOL/L (ref 3.5–5.3)
SODIUM SERPL-SCNC: 130 MMOL/L (ref 136–145)

## 2022-03-07 PROCEDURE — 80048 BASIC METABOLIC PNL TOTAL CA: CPT

## 2022-03-07 PROCEDURE — 99232 SBSQ HOSP IP/OBS MODERATE 35: CPT | Performed by: INTERNAL MEDICINE

## 2022-03-07 RX ORDER — POLYETHYLENE GLYCOL 3350 17 G/17G
17 POWDER, FOR SOLUTION ORAL DAILY
Status: DISCONTINUED | OUTPATIENT
Start: 2022-03-08 | End: 2022-03-08 | Stop reason: HOSPADM

## 2022-03-07 RX ORDER — AMOXICILLIN 250 MG
2 CAPSULE ORAL 2 TIMES DAILY
Qty: 120 TABLET | Refills: 0 | Status: SHIPPED | OUTPATIENT
Start: 2022-03-07 | End: 2022-03-15 | Stop reason: ALTCHOICE

## 2022-03-07 RX ORDER — SIMETHICONE 80 MG
80 TABLET,CHEWABLE ORAL EVERY 6 HOURS PRN
Qty: 30 TABLET | Refills: 0 | Status: SHIPPED | OUTPATIENT
Start: 2022-03-07

## 2022-03-07 RX ORDER — IODINE/SODIUM IODIDE 2 %
TINCTURE TOPICAL 4 TIMES DAILY
Qty: 177 ML | Refills: 0 | Status: SHIPPED | OUTPATIENT
Start: 2022-03-07

## 2022-03-07 RX ORDER — POLYETHYLENE GLYCOL 3350 17 G/17G
POWDER, FOR SOLUTION ORAL
Qty: 72 EACH | Refills: 0 | Status: SHIPPED | OUTPATIENT
Start: 2022-03-07

## 2022-03-07 RX ORDER — LIDOCAINE 40 MG/G
CREAM TOPICAL 4 TIMES DAILY PRN
Qty: 30 G | Refills: 0 | Status: SHIPPED | OUTPATIENT
Start: 2022-03-07

## 2022-03-07 RX ORDER — BISACODYL 10 MG
10 SUPPOSITORY, RECTAL RECTAL DAILY PRN
Qty: 12 SUPPOSITORY | Refills: 0 | Status: SHIPPED | OUTPATIENT
Start: 2022-03-07

## 2022-03-07 RX ADMIN — PILOCARPINE HYDROCHLORIDE 5 MG: 5 TABLET, FILM COATED ORAL at 09:47

## 2022-03-07 RX ADMIN — FUROSEMIDE 40 MG: 40 TABLET ORAL at 09:45

## 2022-03-07 RX ADMIN — Medication 5 SPRAY: at 09:46

## 2022-03-07 RX ADMIN — FERRIC OXIDE RED 1 APPLICATION: 8; 8 LOTION TOPICAL at 22:00

## 2022-03-07 RX ADMIN — Medication 5 SPRAY: at 17:41

## 2022-03-07 RX ADMIN — FERRIC OXIDE RED: 8; 8 LOTION TOPICAL at 17:40

## 2022-03-07 RX ADMIN — POLYETHYLENE GLYCOL 3350 17 G: 17 POWDER, FOR SOLUTION ORAL at 09:47

## 2022-03-07 RX ADMIN — HEPARIN SODIUM 5000 UNITS: 5000 INJECTION INTRAVENOUS; SUBCUTANEOUS at 05:36

## 2022-03-07 RX ADMIN — GLYCERIN 1 DROP: .002; .002; .01 SOLUTION/ DROPS OPHTHALMIC at 09:52

## 2022-03-07 RX ADMIN — FERRIC OXIDE RED: 8; 8 LOTION TOPICAL at 09:46

## 2022-03-07 RX ADMIN — PREDNISONE 1 MG: 1 TABLET ORAL at 09:45

## 2022-03-07 RX ADMIN — SIMETHICONE 80 MG: 80 TABLET, CHEWABLE ORAL at 00:30

## 2022-03-07 RX ADMIN — CARBAMIDE PEROXIDE 6.5% 5 DROP: 6.5 LIQUID AURICULAR (OTIC) at 09:45

## 2022-03-07 RX ADMIN — LEVOTHYROXINE SODIUM 37.5 MCG: 75 TABLET ORAL at 05:36

## 2022-03-07 RX ADMIN — AMIODARONE HYDROCHLORIDE 100 MG: 200 TABLET ORAL at 09:45

## 2022-03-07 RX ADMIN — PILOCARPINE HYDROCHLORIDE 5 MG: 5 TABLET, FILM COATED ORAL at 18:33

## 2022-03-07 RX ADMIN — CARBAMIDE PEROXIDE 6.5% 5 DROP: 6.5 LIQUID AURICULAR (OTIC) at 17:40

## 2022-03-07 RX ADMIN — SENNOSIDES AND DOCUSATE SODIUM 2 TABLET: 50; 8.6 TABLET ORAL at 09:45

## 2022-03-07 RX ADMIN — FAMOTIDINE 40 MG: 20 TABLET ORAL at 09:44

## 2022-03-07 RX ADMIN — ZOLPIDEM TARTRATE 5 MG: 5 TABLET ORAL at 21:59

## 2022-03-07 RX ADMIN — HEPARIN SODIUM 5000 UNITS: 5000 INJECTION INTRAVENOUS; SUBCUTANEOUS at 13:26

## 2022-03-07 RX ADMIN — SENNOSIDES AND DOCUSATE SODIUM 2 TABLET: 50; 8.6 TABLET ORAL at 17:42

## 2022-03-07 RX ADMIN — ALPRAZOLAM 0.25 MG: 0.25 TABLET ORAL at 13:26

## 2022-03-07 RX ADMIN — HEPARIN SODIUM 5000 UNITS: 5000 INJECTION INTRAVENOUS; SUBCUTANEOUS at 21:59

## 2022-03-07 RX ADMIN — ALPRAZOLAM 0.25 MG: 0.25 TABLET ORAL at 19:43

## 2022-03-07 NOTE — DISCHARGE INSTR - AVS FIRST PAGE
Dear Bruna Traylor,     It was our pleasure to care for you here at [facility]  It is our hope that we were always able to exceed the expected standards for your care during your stay  You were hospitalized due to constipation and abdominal pain  You were cared for on the 4th floor by Jacinto Castro MD under the service of Jan Butterfield DO with the [facility] who covers for your primary care physician (PCP), Phoenix Packer DO, while you were hospitalized  If you have any questions or concerns related to this hospitalization, you may contact us at [phone]  For follow up as well as any medication refills, we recommend that you follow up with your primary care physician  A registered nurse will reach out to you by phone within a few days after your discharge to answer any additional questions that you may have after going home  However, at this time we provide for you here, the most important instructions / recommendations at discharge:       Notable Medication Adjustments -   senna-docusate sodium 8 6-50 mg per tablet - Take 2 tablets by mouth 2 (two) times a day  polyethylene glycol 17 g packet - Take 17g by mouth daily  If constipation worsens, take 17 g by mouth two times daily  Resume Linzess 72 MCG daily after your diarrhea resolves  simethicone 80 mg chewable tablet - Chew 1 tablet (80 mg total) every 6 (six) hours as needed for flatulence  Take this medication cautiously as it can worsen constipation if used frequently  bisacodyl 10 mg suppository - Insert 1 suppository (10 mg total) into the rectum daily as needed for constipation  calamine-zinc oxide 8-8 % - Apply topically 4 (four) times a day to the rectum for anal soreness and pain   lidocaine 4 % cream - Apply topically 4 (four) times a day as needed for mild pain to the rectal area      Testing Required after Discharge -   None    Important follow up information -   Followup with your primary care doctor Alexia Rojas, DO as soon as possible for transition of care, management of chronic medical conditions, and additional medications for constipation as needed  Other Instructions -   Please read the attached instructions for constipation  Please take your medications as directed  Call your primary care doctor if your constipation worsens or you have concerns about your bowel regimen  Please review this entire after visit summary as additional general instructions including medication list, appointments, activity, diet, any pertinent wound care, and other additional recommendations from your care team that may be provided for you        Sincerely,     Margo Raza MD

## 2022-03-07 NOTE — DISCHARGE INSTRUCTIONS
Constipation   WHAT YOU NEED TO KNOW:   Constipation means you have hard, dry bowel movements, or you go longer than usual between bowel movements  DISCHARGE INSTRUCTIONS:   Call your doctor if:   · You have blood in your bowel movements  · You have a fever and abdominal pain with the constipation  · Your constipation gets worse  · You start to vomit  · You have questions or concerns about your condition or care  Medicines:   · Medicine  such as a laxative may help relax and loosen your intestines to help you have a bowel movement  Your provider may recommend you only use laxatives for a short time  Long-term use may make your bowels dependent on the medicine  · Take your medicine as directed  Contact your healthcare provider if you think your medicine is not helping or if you have side effects  Tell him of her if you are allergic to any medicine  Keep a list of the medicines, vitamins, and herbs you take  Include the amounts, and when and why you take them  Bring the list or the pill bottles to follow-up visits  Carry your medicine list with you in case of an emergency  Relieve constipation:   · A suppository  may be used to help soften your bowel movements  This may make them easier to pass  A suppository is guided into your rectum through your anus  · An enema  is liquid medicine used to clear bowel movement from your rectum  The medicine is put into your rectum through your anus  Prevent constipation:   · Drink liquids as directed  You may need to drink extra liquids to help soften and move your bowels  Ask how much liquid to drink each day and which liquids are best for you  · Eat high-fiber foods  This may help decrease constipation by adding bulk to your bowel movements  High-fiber foods include fruit, vegetables, whole-grain breads and cereals, and beans  Your healthcare provider or dietitian can help you create a high-fiber meal plan   Your provider may also recommend a fiber supplement if you cannot get enough fiber from food  · Exercise regularly  Regular physical activity can help stimulate your intestines  Walking is a good exercise to prevent or relieve constipation  Ask which exercises are best for you  · Schedule a time each day to have a bowel movement  This may help train your body to have regular bowel movements  Bend forward while you are on the toilet to help move the bowel movement out  Sit on the toilet for at least 10 minutes, even if you do not have a bowel movement  · Talk to your healthcare provider about your medicines  Certain medicines, such as opioids, can cause constipation  Your provider may be able to make medicine changes  For example, he or she may change the kind of medicine, or change when you take it  Follow up with your doctor as directed:  Write down your questions so you remember to ask them during your visits  © Copyright [employer] 2022 Information is for End User's use only and may not be sold, redistributed or otherwise used for commercial purposes  All illustrations and images included in CareNotes® are the copyrighted property of [employer]  or [facility] Elysia Rousseau   The above information is an  only  It is not intended as medical advice for individual conditions or treatments  Talk to your doctor, nurse or pharmacist before following any medical regimen to see if it is safe and effective for you

## 2022-03-08 VITALS
DIASTOLIC BLOOD PRESSURE: 71 MMHG | HEART RATE: 53 BPM | TEMPERATURE: 98.2 F | BODY MASS INDEX: 28.51 KG/M2 | HEIGHT: 67 IN | RESPIRATION RATE: 18 BRPM | SYSTOLIC BLOOD PRESSURE: 133 MMHG | OXYGEN SATURATION: 94 %

## 2022-03-08 PROCEDURE — 99239 HOSP IP/OBS DSCHRG MGMT >30: CPT | Performed by: HOSPITALIST

## 2022-03-08 RX ADMIN — ALPRAZOLAM 0.25 MG: 0.25 TABLET ORAL at 10:05

## 2022-03-08 RX ADMIN — FAMOTIDINE 40 MG: 20 TABLET ORAL at 09:59

## 2022-03-08 RX ADMIN — CARBAMIDE PEROXIDE 6.5% 5 DROP: 6.5 LIQUID AURICULAR (OTIC) at 10:00

## 2022-03-08 RX ADMIN — PREDNISONE 1 MG: 1 TABLET ORAL at 09:59

## 2022-03-08 RX ADMIN — PILOCARPINE HYDROCHLORIDE 5 MG: 5 TABLET, FILM COATED ORAL at 10:02

## 2022-03-08 RX ADMIN — HEPARIN SODIUM 5000 UNITS: 5000 INJECTION INTRAVENOUS; SUBCUTANEOUS at 05:26

## 2022-03-08 RX ADMIN — SENNOSIDES AND DOCUSATE SODIUM 2 TABLET: 50; 8.6 TABLET ORAL at 09:59

## 2022-03-08 RX ADMIN — POLYETHYLENE GLYCOL 3350 17 G: 17 POWDER, FOR SOLUTION ORAL at 09:59

## 2022-03-08 RX ADMIN — Medication 5 SPRAY: at 10:05

## 2022-03-08 RX ADMIN — FERRIC OXIDE RED: 8; 8 LOTION TOPICAL at 10:03

## 2022-03-08 RX ADMIN — AMIODARONE HYDROCHLORIDE 100 MG: 200 TABLET ORAL at 09:59

## 2022-03-08 RX ADMIN — LEVOTHYROXINE SODIUM 37.5 MCG: 75 TABLET ORAL at 05:25

## 2022-03-08 RX ADMIN — ASPIRIN 81 MG: 81 TABLET, COATED ORAL at 09:59

## 2022-03-08 RX ADMIN — FUROSEMIDE 40 MG: 40 TABLET ORAL at 09:59

## 2022-03-08 NOTE — PROGRESS NOTES
[facility]  Progress Note - Bruna Traylor 8/26/1926, 80 y o  female MRN: [id number]  Unit/Bed#: S -01 Encounter: [id number]  Primary Care Provider: Munira Whitley DO   Date and time admitted to hospital: 3/1/2022  8:23 PM    * Constipation  Assessment & Plan  · Patient was constipated on previous admission 2/22-2/24 when she was admitted for abdominal pain and ISABELLE  Was discharged home with aggressive bowel regimen including: senna 2 tabs BID, colace 100mg BID, linzess 72 mcg daily, and miralax PRN   · Patient states she was compliant with home medication regimen, however became constipated again and needed to return to the hospital  · Did try multiple enemas at home per daughter, also took one dose of magnesium sulfate and a dose of miralax on 3/1  · Soapsuds enema in the ED and manual disimpaction with minimal relief   · CT abdomen/pelvis w/o contrast (3/1): Rectal impaction  Liquid stool throughout the proximal colon without evidence of colitis or bowel obstruction  · Patient with lower abdominal pain and significant sharp rectal pain especially with wiping, likely 2/2 local irritation  ·  A manual disimpaction was performed 3/5 followed by a lactulose retention enema  · Patient has been having routine bowel movements as of 3/6/22 (she had three small bowel movements on 3/6)      Plan:  · Bowel regimen adjusted to senna s two tablets BID and Miralax daily  · Continue to monitor bowel movements  · Apply Calcium zinc oxide lotion to the rectum for pain relief   · Apply lidocaine cream QID PRN rectal pain  · Tylenol PRN for pain    Hyponatremia  Assessment & Plan  Recent Labs     03/05/22  0455 03/05/22  1737 03/06/22  0607 03/07/22  0442   SODIUM 128* 128* 130* 130*     · Urine osm 188  · Likely due to hypovolemia with inability to keep up with oral intake in the setting of significant abdominal discomfort   · Oral intake improving as of 3/6    Closed stable burst fracture of twelfth thoracic vertebra with routine healing  Assessment & Plan  · CT abdomen pelvis last admission 2/23 shows progression of chronic compression deformity of T12 vertebral body but no acute fracture or lesion  · Of note, patient has been on chronic low dose steroids with prednisone 1mg daily for "muscle pain" likely increasing her risk of fracture  · Continue home tylenol PRN for pain     Chronic diastolic congestive heart failure [facility])  Assessment & Plan  Wt Readings from Last 3 Encounters:   02/15/22 82 6 kg (182 lb)   02/09/22 82 6 kg (182 lb)   10/05/21 81 2 kg (179 lb)     · Last echo 8/2017: LVEF 69%, grade 1 diastolic dysfunction   · Clinically euvolemic   · Plan:  · Continue lasix 40mg daily     CKD (chronic kidney disease), stage IV [facility])  Assessment & Plan  Recent Labs     03/05/22  1737 03/06/22  0607 03/07/22  0442   CREATININE 1 84* 1 81* 1 72*   EGFR 22 23 24     Estimated Creatinine Clearance: 21 6 mL/min (A) (by C-G formula based on SCr of 1 72 mg/dL (H))  · Improving  · 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  · Baseline creatinine 1 5-1 9   · Bladder scan 3/4 showed no sign of urinary retention      Anxiety  Assessment & Plan  · No acute issues  · Continue home xanax 0 25mg 4x daily PRN     Hypothyroidism  Assessment & Plan  · No acute issues  · Continue home Synthroid 37 5 mcg    Paroxysmal atrial fibrillation (HCC)  Assessment & Plan  · Taken off of Pradaxa per patient and her daughter 2/2 falls/bleeding   · Continue home ASA 81mg, amiodarone 100 mg daily        VTE Pharmacologic Prophylaxis: VTE Score: 5 High Risk (Score >/= 5) - Pharmacological DVT Prophylaxis Ordered: heparin  Sequential Compression Devices Ordered  Patient Centered Rounds: I performed bedside rounds with nursing staff today    Discussions with Specialists or Other Care Team Provider: Nursing    Education and Discussions with Family / Patient: Updated  (daughter) at bedside  Current Length of Stay: 5 day(s)  Current Patient Status: Inpatient   Discharge Plan: Anticipate discharge in 24-48 hrs to home  Code Status: Level 3 - DNAR and DNI    Subjective:     Frannie Rojas is a 80 y o  female with a PMH of afib (no longer on anticoagulation), IBS-C, hypothyroidism who was admitted on 3/2/22 with constipation and abdominal pain  Patient remains in the hospital with ongoing bloating, abdominal discomfort  No acute events overnight  Patient has been having routine bowel movements as of 3/6/22 (she had three small bowel movements on 3/6)  Patient seen and examined this morning  The patient reports that yesterday afternoon she developed abdominal bloating and pain ("like a belt") across her stomach and it worsened into the evening  Yesterday evening she had an elevated blood pressure (186/80) requiring administration of 10 mg IV hydralazine  Thankfully, the patient's bloating improved early in the morning on 3/7 (around 4:00 AM)  The patient reports she is feeling much better now that her abdominal pain has subsided  She reports eating better as of 3/6 and improved sleep last night after her abdominal pain subsided  At the time of assessment the patient denied fevers, chills, chest pain, shortness of breath, nausea, vomiting  The patient reported her stools were becoming more frequent and she was having difficulty controlling them (getting to the commode in time)  The patient was planned for discharge today  Following lunch, the patient reported that she was feeling sick  Details were reviewed thoroughly with the patient  It was explained that the patient is likely having diarrhea in the setting of recent constipation and stool burden and this diarrhea will likely improve over time without significant adjustments to her bowel regimen   Patient was also informed that her bloating is chronic in nature, but by taking medications including bentyl and gas-x she can end up constipated again  In addition, it was discussed that Miralax could be causing bloating, but that it was necessary at this time due to her significant constipation and that our team would be reducing it to one time daily)  Patient and family at bedside felt concerned about discharge today and agreed to plan for monitoring overnight and discharge tomorrow  Objective:     Vitals:   Temp (24hrs), Av 8 °F (36 6 °C), Min:97 5 °F (36 4 °C), Max:98 1 °F (36 7 °C)    Temp:  [97 5 °F (36 4 °C)-98 1 °F (36 7 °C)] 98 1 °F (36 7 °C)  HR:  [61-66] 66  Resp:  [16-18] 18  BP: (134-186)/(60-83) 163/83  SpO2:  [92 %-95 %] 95 %  Body mass index is 28 51 kg/m²  Input and Output Summary (last 24 hours): Intake/Output Summary (Last 24 hours) at 3/7/2022 191  Last data filed at 3/7/2022 1329  Gross per 24 hour   Intake 480 ml   Output 900 ml   Net -420 ml       Physical Exam:     Physical Exam  Vitals and nursing note reviewed  Constitutional:       General: She is not in acute distress  Appearance: She is well-developed  HENT:      Head: Normocephalic and atraumatic  Mouth/Throat:      Mouth: Mucous membranes are dry  Pharynx: Oropharynx is clear  No oropharyngeal exudate or posterior oropharyngeal erythema  Comments: Tardive dyskinesia movements of tongue  Eyes:      Extraocular Movements: Extraocular movements intact  Conjunctiva/sclera: Conjunctivae normal       Pupils: Pupils are equal, round, and reactive to light  Cardiovascular:      Rate and Rhythm: Normal rate and regular rhythm  Heart sounds: Murmur (CHRISTOFER at right 2nd intercostal space) heard  Pulmonary:      Effort: Pulmonary effort is normal  No respiratory distress  Breath sounds: Normal breath sounds  No wheezing  Abdominal:      General: Bowel sounds are normal  There is no distension  Palpations: Abdomen is soft  Tenderness: There is no abdominal tenderness     Musculoskeletal: Cervical back: Neck supple  Skin:     General: Skin is warm and dry  Capillary Refill: Capillary refill takes 2 to 3 seconds  Neurological:      Mental Status: She is alert and oriented to person, place, and time  Psychiatric:         Mood and Affect: Mood is anxious  Behavior: Behavior normal         Additional Data:     Labs:  Results from last 7 days   Lab Units 03/01/22  2140   WBC Thousand/uL 9 50   HEMOGLOBIN g/dL 14 6   HEMATOCRIT % 41 4   PLATELETS Thousands/uL 229   NEUTROS PCT % 76*   LYMPHS PCT % 12*   MONOS PCT % 8   EOS PCT % 3     Results from last 7 days   Lab Units 03/07/22  0442 03/03/22  0620 03/01/22  2140   SODIUM mmol/L 130*   < > 134*   POTASSIUM mmol/L 4 0   < > 4 4   CHLORIDE mmol/L 96*   < > 99*   CO2 mmol/L 23   < > 26   BUN mg/dL 20   < > 20   CREATININE mg/dL 1 72*   < > 1 76*   ANION GAP mmol/L 11   < > 9   CALCIUM mg/dL 8 7   < > 8 9   ALBUMIN g/dL  --   --  3 6   TOTAL BILIRUBIN mg/dL  --   --  0 92   ALK PHOS U/L  --   --  75   ALT U/L  --   --  24   AST U/L  --   --  21   GLUCOSE RANDOM mg/dL 117   < > 116    < > = values in this interval not displayed                         Lines/Drains:  Invasive Devices  Report    Peripheral Intravenous Line            Peripheral IV 03/06/22 Left;Right Hand <1 day                      Imaging: Reviewed radiology reports from this admission including: abdominal/pelvic CT    Recent Cultures (last 7 days):         Last 24 Hours Medication List:   Current Facility-Administered Medications   Medication Dose Route Frequency Provider Last Rate    acetaminophen  650 mg Oral Q6H PRN Yen Mei MD      ALPRAZolam  0 25 mg Oral 4x Daily PRN Yen Mei MD      amiodarone  100 mg Oral Daily Yen Mei MD      aspirin  81 mg Oral Every Other Day Yen Mei MD      bisacodyl  10 mg Rectal Daily PRN Yen Mei MD      calamine-zinc oxide   Topical 4x Daily Shelby Roberto MD      carbamide peroxide  5 drop Both Ears BID Saskia Harrell, DO      famotidine  40 mg Oral Daily Ryan Morrison MD      furosemide  40 mg Oral Daily Ryan Morrison MD      glycerin-hypromellose-  1 drop Both Eyes TID PRN Saskia Harrell, DO      heparin (porcine)  5,000 Units Subcutaneous Q8H [address], MD      hydrALAZINE  10 mg Intravenous Q6H PRN Saskia Harrell, DO      levothyroxine  37 5 mcg Oral Early Morning Ryan Morrison MD      lidocaine   Topical 4x Daily PRN Saskia Harrell, DO      mineral oil  1 enema Rectal Once Ryan Morrison MD      ondansetron  4 mg Intravenous Q6H PRN Ryan Morrison MD      pilocarpine  5 mg Oral BID Ryan Morrison MD      [facility] ON 3/8/2022] polyethylene glycol  17 g Oral Daily Brandyn Morgan MD      predniSONE  1 mg Oral Daily Ryan Morrison MD      saliva substitute  5 spray Mouth/Throat 4x Daily Ryan Morrison MD      senna-docusate sodium  2 tablet Oral BID Saskia Harrell, DO      simethicone  80 mg Oral Q6H PRN Wendee Klinefelter, MD      zolpidem  5 mg Oral HS Ryan Morrison MD          Today, Patient Was Seen By: Brandyn Morgan MD    **Please Note: This note may have been constructed using a voice recognition system  **

## 2022-03-08 NOTE — ASSESSMENT & PLAN NOTE
Recent Labs     03/05/22  0455 03/05/22  1737 03/06/22  0607 03/07/22  0442   SODIUM 128* 128* 130* 130*     · Urine osm 188  · Likely due to hypovolemia with inability to keep up with oral intake in the setting of significant abdominal discomfort   · Oral intake improving as of 3/6

## 2022-03-08 NOTE — ASSESSMENT & PLAN NOTE
· Patient was constipated on previous admission 2/22-2/24 when she was admitted for abdominal pain and ISABELLE  Was discharged home with aggressive bowel regimen including: senna 2 tabs BID, colace 100mg BID, linzess 72 mcg daily, and miralax PRN   · Patient states she was compliant with home medication regimen, however became constipated again and needed to return to the hospital  · Did try multiple enemas at home per daughter, also took one dose of magnesium sulfate and a dose of miralax on 3/1  · Soapsuds enema in the ED and manual disimpaction with minimal relief   · CT abdomen/pelvis w/o contrast (3/1): Rectal impaction  Liquid stool throughout the proximal colon without evidence of colitis or bowel obstruction  · Patient with lower abdominal pain and significant sharp rectal pain especially with wiping, likely 2/2 local irritation  ·  A manual disimpaction was performed 3/5 followed by a lactulose retention enema  · Patient has been having routine bowel movements as of 3/6/22 (she had three small bowel movements on 3/6)      Plan:  · Bowel regimen adjusted to senna s two tablets BID and Miralax daily  · Continue to monitor bowel movements  · Apply Calcium zinc oxide lotion to the rectum for pain relief   · Apply lidocaine cream QID PRN rectal pain  · Tylenol PRN for pain

## 2022-03-08 NOTE — ASSESSMENT & PLAN NOTE
Recent Labs     03/05/22  1737 03/06/22  0607 03/07/22  0442   CREATININE 1 84* 1 81* 1 72*   EGFR 22 23 24     Estimated Creatinine Clearance: 21 6 mL/min (A) (by C-G formula based on SCr of 1 72 mg/dL (H))      · Improving  · 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  · Baseline creatinine 1 5-1 9   · Bladder scan 3/4 showed no sign of urinary retention

## 2022-03-08 NOTE — PLAN OF CARE
Problem: MOBILITY - ADULT  Goal: Maintain or return to baseline ADL function  Description: INTERVENTIONS:  -  Assess patient's ability to carry out ADLs; assess patient's baseline for ADL function and identify physical deficits which impact ability to perform ADLs (bathing, care of mouth/teeth, toileting, grooming, dressing, etc )  - Assess/evaluate cause of self-care deficits   - Assess range of motion  - Assess patient's mobility; develop plan if impaired  - Assess patient's need for assistive devices and provide as appropriate  - Encourage maximum independence but intervene and supervise when necessary  - Involve family in performance of ADLs  - Assess for home care needs following discharge   - Consider OT consult to assist with ADL evaluation and planning for discharge  - Provide patient education as appropriate  Outcome: Adequate for Discharge  Goal: Maintains/Returns to pre admission functional level  Description: INTERVENTIONS:  - Perform BMAT or MOVE assessment daily    - Set and communicate daily mobility goal to care team and patient/family/caregiver  - Collaborate with rehabilitation services on mobility goals if consulted  - Perform Range of Motion  times a day  - Reposition patient every  hours    - Dangle patient  times a day  - Stand patient  times a day  - Ambulate patient  times a day  - Out of bed to chair  times a day   - Out of bed for meals  times a day  - Out of bed for toileting  - Record patient progress and toleration of activity level   Outcome: Adequate for Discharge     Problem: Potential for Falls  Goal: Patient will remain free of falls  Description: INTERVENTIONS:  - Educate patient/family on patient safety including physical limitations  - Instruct patient to call for assistance with activity   - Consult OT/PT to assist with strengthening/mobility   - Keep Call bell within reach  - Keep bed low and locked with side rails adjusted as appropriate  - Keep care items and personal belongings within reach  - Initiate and maintain comfort rounds  - Make Fall Risk Sign visible to staff  - Offer Toileting every  Hours, in advance of need  - Initiate/Maintain alarm  - Obtain necessary fall risk management equipment  - Apply yellow socks and bracelet for high fall risk patients  - Consider moving patient to room near nurses station  Outcome: Adequate for Discharge     Problem: Prexisting or High Potential for Compromised Skin Integrity  Goal: Skin integrity is maintained or improved  Description: INTERVENTIONS:  - Identify patients at risk for skin breakdown  - Assess and monitor skin integrity  - Assess and monitor nutrition and hydration status  - Monitor labs   - Assess for incontinence   - Turn and reposition patient  - Assist with mobility/ambulation  - Relieve pressure over bony prominences  - Avoid friction and shearing  - Provide appropriate hygiene as needed including keeping skin clean and dry  - Evaluate need for skin moisturizer/barrier cream  - Collaborate with interdisciplinary team   - Patient/family teaching  - Consider wound care consult   Outcome: Adequate for Discharge

## 2022-03-08 NOTE — ASSESSMENT & PLAN NOTE
Wt Readings from Last 3 Encounters:   02/15/22 82 6 kg (182 lb)   02/09/22 82 6 kg (182 lb)   10/05/21 81 2 kg (179 lb)     · Last echo 8/2017: LVEF 90%, grade 1 diastolic dysfunction   · Clinically euvolemic   · Plan:  · Continue lasix 40mg daily

## 2022-03-09 DIAGNOSIS — I50.32 CHRONIC DIASTOLIC HEART FAILURE (HCC): ICD-10-CM

## 2022-03-09 DIAGNOSIS — G47.00 INSOMNIA, UNSPECIFIED TYPE: ICD-10-CM

## 2022-03-09 RX ORDER — FUROSEMIDE 20 MG/1
TABLET ORAL
Qty: 60 TABLET | Refills: 0 | Status: SHIPPED | OUTPATIENT
Start: 2022-03-09 | End: 2022-04-07

## 2022-03-09 RX ORDER — ZOLPIDEM TARTRATE 12.5 MG/1
TABLET, FILM COATED, EXTENDED RELEASE ORAL
Qty: 30 TABLET | Refills: 0 | Status: SHIPPED | OUTPATIENT
Start: 2022-03-09 | End: 2022-04-07

## 2022-03-09 NOTE — ASSESSMENT & PLAN NOTE
· Taken off of Pradaxa per patient and her daughter 2/2 falls/bleeding   · Continue home ASA 81mg, amiodarone 100 mg daily upon discharge

## 2022-03-09 NOTE — DISCHARGE SUMMARY
[facility]  Discharge- Taqueria Benites 8/26/1926, 80 y o  female MRN: [id number]  Unit/Bed#: S -01 Encounter: [id number]  Primary Care Provider: Kota Simmons DO   Date and time admitted to hospital: 3/1/2022  8:23 PM    * Constipation  Assessment & Plan  · Resolved  · Patient was constipated on previous admission 2/22-2/24 when she was admitted for abdominal pain and ISABELLE  Was discharged home with aggressive bowel regimen including: senna 2 tabs BID, colace 100mg BID, linzess 72 mcg daily, and miralax PRN   · The patient reported that she was only taking the Senokot 2 tablets daily (and was not taking the other bowel medications) as she was fearful of developing diarrhea  · Did try multiple enemas at home per daughter, also took one dose of magnesium sulfate and a dose of miralax on 3/1  · Soapsuds enema in the ED and manual disimpaction with minimal relief   · CT abdomen/pelvis w/o contrast (3/1): Rectal impaction  Liquid stool throughout the proximal colon without evidence of colitis or bowel obstruction  · In the hospital the patient was placed on senna-docusate 8 6-50 mg 2 tablets b i d  and MiraLax 17 g b i d  Unfortunately, the patient did not have any bowel movements in the hospital on this regimen  · On 03/04, the patient received a Dulcolax 10 mg suppository and subsequently a Fleet enema  · On 03/05, the patient underwent a 2nd manual disimpaction followed by a lactulose 200 mg retention enema  · Patient has been having routine bowel movements as of 3/6/22 (she had three small bowel movements on 3/6) and several bowel movements on 03/07  Plan:  · Bowel regimen adjusted to senna s two tablets BID and 17 g Miralax daily upon discharge  · Patient was instructed that, if her constipation worsens, she can take MiraLax 17 g b i d   · Patient was instructed to resume Linzess 72 mcg after her diarrhea resolved    · Apply Calcium zinc oxide lotion to the rectum for pain relief   · Apply lidocaine cream QID PRN rectal pain  · Tylenol PRN for pain  · Patient was instructed to use Gas-X as needed for flatulence, however to take this medication cautiously as it can worsen constipation  · Patient was instructed to discontinue Bentyl upon discharge    Hyponatremia  Assessment & Plan  Recent Labs     03/06/22  0607 03/07/22  0442   SODIUM 130* 130*     · Urine osm 188  · Likely due to hypovolemia with inability to keep up with oral intake in the setting of significant abdominal discomfort   · Oral intake improving as of 3/6  · Continue to follow with primary care doctor for ongoing monitoring of electrolytes    Closed stable burst fracture of twelfth thoracic vertebra with routine healing  Assessment & Plan  · CT abdomen pelvis last admission 2/23 shows progression of chronic compression deformity of T12 vertebral body but no acute fracture or lesion  · Of note, patient has been on chronic low dose steroids with prednisone 1mg daily for "muscle pain" likely increasing her risk of fracture  · Continue home tylenol PRN for pain   · Continued follow-up with primary care physician upon discharge    Chronic diastolic congestive heart failure [facility])  Assessment & Plan  Wt Readings from Last 3 Encounters:   02/15/22 82 6 kg (182 lb)   02/09/22 82 6 kg (182 lb)   10/05/21 81 2 kg (179 lb)     · Last echo 8/2017: LVEF 83%, grade 1 diastolic dysfunction   · Clinically euvolemic   · Plan:  · Continue lasix 40mg daily   · Continue to follow with primary care doctor upon discharge    CKD (chronic kidney disease), stage IV [facility])  Assessment & Plan  Recent Labs     03/06/22  0607 03/07/22  0442   CREATININE 1 81* 1 72*   EGFR 23 24     Estimated Creatinine Clearance: 21 6 mL/min (A) (by C-G formula based on SCr of 1 72 mg/dL (H))      · Improving  · 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  · Baseline creatinine 1 5-1 9   · Bladder scan 3/4 showed no sign of urinary retention  · Continue to follow-up with primary care doctor upon discharge for ongoing monitoring of kidney function      Anxiety  Assessment & Plan  · No acute issues  · Continue home xanax 0 25mg 4x daily PRN upon discharge    Hypothyroidism  Assessment & Plan  · No acute issues  · Continue home Synthroid 37 5 mcg upon discharge    Paroxysmal atrial fibrillation (HCC)  Assessment & Plan  · Taken off of Pradaxa per patient and her daughter 2/2 falls/bleeding   · Continue home ASA 81mg, amiodarone 100 mg daily upon discharge    Medical Problems             Resolved Problems  Date Reviewed: 3/6/2022    None              Discharging Resident: Celeste Nissen, MD  Discharging Attending: Kirby Mendoza MD  PCP: Leela Lujan DO  Admission Date:   Admission Orders (From admission, onward)     Ordered        03/02/22 0105  INPATIENT ADMISSION  Once                      Discharge Date: 03/08/22    Consultations During Hospital Stay:  · None    Procedures Performed:   · Manual Stool Disimpaction performed on 3/1 as well as on 3/5    Significant Findings / Test Results:     CT abdomen pelvis wo contrast  Result Date: 3/2/2022  Impression: Rectal impaction  Liquid stool throughout the proximal colon without evidence of colitis or bowel obstruction  Results from last 7 days   Lab Units 03/07/22  0442 03/03/22  0620 03/01/22  2140   SODIUM mmol/L 130*   < > 134*   POTASSIUM mmol/L 4 0   < > 4 4   CHLORIDE mmol/L 96*   < > 99*   CO2 mmol/L 23   < > 26   BUN mg/dL 20   < > 20   CREATININE mg/dL 1 72*   < > 1 76*   ANION GAP mmol/L 11   < > 9   CALCIUM mg/dL 8 7   < > 8 9   ALBUMIN g/dL  --   --  3 6   TOTAL BILIRUBIN mg/dL  --   --  0 92   ALK PHOS U/L  --   --  75   ALT U/L  --   --  24   AST U/L  --   --  21   GLUCOSE RANDOM mg/dL 117   < > 116    < > = values in this interval not displayed        Magnesium [[id number]]  (Abnormal) Collected: 03/03/22 0620    Lab Status: Final result Specimen: Blood from Arm, Left Updated: 03/03/22 0721     Magnesium 2 7 mg/dL     Urine Microscopic [[id number]]  (Normal) Collected: 03/02/22 1926    Lab Status: Final result Specimen: Urine, Clean Catch Updated: 03/02/22 2037     RBC, UA None Seen /hpf      WBC, UA 0-1 /hpf      Epithelial Cells Occasional /hpf      Bacteria, UA None Seen /hpf     UA (URINE) with reflex to Scope [[id number]]  (Abnormal) Collected: 03/02/22 1926    Lab Status: Final result Specimen: Urine, Clean Catch Updated: 03/02/22 2021     Color, UA Yellow     Clarity, UA Clear     Specific Gravity, UA 1 010     pH, UA 8 0     Leukocytes, UA Small     Nitrite, UA Negative     Protein, UA Negative mg/dl      Glucose, UA Negative mg/dl      Ketones, UA Negative mg/dl      Urobilinogen, UA 0 2 E U /dl      Bilirubin, UA Negative     Blood, UA Negative    TSH, 3rd generation [[id number]]  (Abnormal) Collected: 03/01/22 2140    Lab Status: Final result Specimen: Blood from Arm, Right Updated: 03/02/22 0309     TSH 3RD GENERATON 3 859 uIU/mL        Lipase 75 u/L    CBC and differential [[id number]]  (Abnormal)    Lab Status: Final result Specimen: Blood from Arm, Right     WBC 9 50 Thousand/uL     RBC 4 18 Million/uL     Hemoglobin 14 6 g/dL     Hematocrit 41 4 %     MCV 99 fL     MCH 34 9 pg     MCHC 35 3 g/dL     RDW 12 9 %     MPV 10 3 fL     Platelets 315 Thousands/uL     nRBC 0 /100 WBCs     Neutrophils Relative 76 %     Immat GRANS % 1 %     Lymphocytes Relative 12 %     Monocytes Relative 8 %     Eosinophils Relative 3 %     Basophils Relative 0 %     Neutrophils Absolute 7 28 Thousands/µL     Immature Grans Absolute 0 05 Thousand/uL     Lymphocytes Absolute 1 10 Thousands/µL     Monocytes Absolute 0 80 Thousand/µL     Eosinophils Absolute 0 24 Thousand/µL     Basophils Absolute 0 03 Thousands/µL     Incidental Findings:   · None     Test Results Pending at Discharge (will require follow up):   · None     Outpatient Tests Requested:  · None  · Followup with your primary care doctor Nuzhat De Oliveira, DO as soon as possible for transition of care, management of chronic medical conditions, and additional medications for constipation as needed  Notable Medication Adjustments:  § senna-docusate sodium 8 6-50 mg per tablet - Take 2 tablets by mouth 2 (two) times a day  § polyethylene glycol 17 g packet - Take 17g by mouth daily  If constipation worsens, take 17 g by mouth two times daily  § Resume Linzess 72 MCG daily after your diarrhea resolves  § simethicone 80 mg chewable tablet - Chew 1 tablet (80 mg total) every 6 (six) hours as needed for flatulence  Take this medication cautiously as it can worsen constipation if used frequently  § bisacodyl 10 mg suppository - Insert 1 suppository (10 mg total) into the rectum daily as needed for constipation  § calamine-zinc oxide 8-8 % - Apply topically 4 (four) times a day to the rectum for anal soreness and pain  § lidocaine 4 % cream - Apply topically 4 (four) times a day as needed for mild pain to the rectal area  Complications:  None    Reason for Admission: Constipation    Hospital Course:     Susanna Brothers is a 80 y o  female patient with a significant past medical history of atrial fibrillation (no longer on anticoagulation), IBS-C, CKD stage IV, hypothyroidism, 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 who originally presented to the [facility] Emergency Department on 3/1/2022 due to constipation, bloating, and abdominal pain  The patient was recently at [facility] from 2/22 to 2/24 due to abdominal pain and acute kidney injury  She was discharged home with a bowel regimen including Senokot 2 tablets b i d , Colace 100 mg b i d , Linzess 72 mcg daily, and MiraLax p r n     The patient reported that she was only taking the Senokot 2 tablets daily (and was not taking the other bowel medications) as she was fearful of developing diarrhea    At the time of presentation on 03/01, her last bowel movement was on 02/24  The patient was assessed in the emergency department on 03/01  Vital signs remained stable and the patient was in no acute distress  Labs were unremarkable, with a creatinine of 1 73 (within baseline for the patient)  In the emergency department a manual disimpaction was attempted and the emergency room pa was able to remove some stool  A CT abdomen and pelvis without contrast was obtained 3/1 showing rectal impaction and liquid stool throughout the proximal colon without evidence of colitis  The patient was admitted to the hospital due to impacted stools and proctitis  In the hospital the patient was placed on senna-docusate 8 6-50 mg 2 tablets b i d  and MiraLax 17 g b i d  Unfortunately, the patient did not have any bowel movements in the hospital on this regimen  On 03/04, the patient received a Dulcolax 10 mg suppository and subsequently a Fleet enema  On 03/05, the patient underwent a 2nd manual disimpaction followed by a lactulose 200 mg retention enema  Following lactulose retention enema on 03/05, the patient began developing routine bowel movements  The patient had 3 bowel movements on 3/6 with multiple regular bowel movements on 03/07  With the drastic change in her bowel movements, the patient became concerned as she was incontinent of stool  It was explained to the patient that she is likely having diarrhea in the setting of recent constipation and stool burden and that this diarrhea will improve over time without significant adjustments to her bowel regimen  The patient was monitored overnight on 03/07 and felt comfortable returning home on 03/08, as the frequency of her stools had decreased and her abdominal bloating had also improved  Please see above list of diagnoses and related plan for additional information  Condition at Discharge: good    Discharge Day Visit / Exam:   Subjective:     On the date of discharge the patient reports she is doing well  Patient reports she slept better last night (approximately 6 hours)  The patient reports that over the evening of 3/7 her stools became less frequent and her abdominal bloating improved as well  The patient reported she was eating and drinking well in the hospital   At the time of discharge the patient denied fevers, chills, chest pain, shortness of breath, nausea, vomiting  Patient plans to follow-up with her PCP Cori Jiang, DO tomorrow for ongoing monitoring of constipation and adjustment of bowel regimen as necessary  Patient understands and is in agreement with plan  Vitals: Blood Pressure: 133/71 (03/08/22 0734)  Pulse: (!) 53 (03/08/22 0734)  Temperature: 98 2 °F (36 8 °C) (03/08/22 0734)  Temp Source: Oral (03/07/22 2300)  Respirations: 18 (03/08/22 0734)  Height: 5' 7" (170 2 cm) (03/02/22 2300)  SpO2: 94 % (03/08/22 0734)  Exam:   Physical Exam  Vitals and nursing note reviewed  Constitutional:       General: She is not in acute distress  Appearance: She is well-developed  HENT:      Head: Normocephalic and atraumatic  Mouth/Throat:      Mouth: Mucous membranes are moist       Pharynx: Oropharynx is clear  No oropharyngeal exudate or posterior oropharyngeal erythema  Comments: Tardive dyskinesia movements of tongue  Eyes:      Extraocular Movements: Extraocular movements intact  Conjunctiva/sclera: Conjunctivae normal       Pupils: Pupils are equal, round, and reactive to light  Cardiovascular:      Rate and Rhythm: Normal rate and regular rhythm  Heart sounds: Murmur (CHRISTOFER at right 2nd intercostal space) heard  Pulmonary:      Effort: Pulmonary effort is normal  No respiratory distress  Breath sounds: Normal breath sounds  No wheezing  Abdominal:      General: Bowel sounds are normal  There is no distension  Palpations: Abdomen is soft  Tenderness: There is no abdominal tenderness     Musculoskeletal:      Cervical back: Neck supple  Skin:     General: Skin is warm and dry  Capillary Refill: Capillary refill takes 2 to 3 seconds  Neurological:      Mental Status: She is alert and oriented to person, place, and time  Psychiatric:         Mood and Affect: Mood is anxious  Behavior: Behavior normal           Discussion with Family: Updated  (daughter) at bedside  Discharge instructions/Information to patient and family:   See after visit summary for information provided to patient and family  Provisions for Follow-Up Care:  See after visit summary for information related to follow-up care and any pertinent home health orders  Disposition:   Home    Planned Readmission:   No    Discharge Medications:  See after visit summary for reconciled discharge medications provided to patient and/or family        **Please Note: This note may have been constructed using a voice recognition system**

## 2022-03-09 NOTE — ASSESSMENT & PLAN NOTE
Wt Readings from Last 3 Encounters:   02/15/22 82 6 kg (182 lb)   02/09/22 82 6 kg (182 lb)   10/05/21 81 2 kg (179 lb)     · Last echo 8/2017: LVEF 49%, grade 1 diastolic dysfunction   · Clinically euvolemic   · Plan:  · Continue lasix 40mg daily   · Continue to follow with primary care doctor upon discharge

## 2022-03-09 NOTE — ASSESSMENT & PLAN NOTE
Recent Labs     03/06/22  0607 03/07/22  0442   SODIUM 130* 130*     · Urine osm 188  · Likely due to hypovolemia with inability to keep up with oral intake in the setting of significant abdominal discomfort   · Oral intake improving as of 3/6  · Continue to follow with primary care doctor for ongoing monitoring of electrolytes

## 2022-03-09 NOTE — TELEPHONE ENCOUNTER
1 [id number] 02/22/2022 02/22/2022 ALPRAZolam 120 0 30 0 25 MG NA [id number] [place] 0 / 0 [place]    1 [id number] 02/11/2022 02/11/2022 Zolpidem Tartrate 30 0 30 12 5 MG NA [facility][id number] Medicare 0 / 0 [place]    1 [id number] 01/10/2022 01/05/2022 Zolpidem Tartrate 30 0 30 12 5 MG NA TONI ESPARZA [facility][id number] Medicare 0 / 0 [place]

## 2022-03-09 NOTE — ASSESSMENT & PLAN NOTE
· Resolved  · Patient was constipated on previous admission 2/22-2/24 when she was admitted for abdominal pain and ISABELLE  Was discharged home with aggressive bowel regimen including: senna 2 tabs BID, colace 100mg BID, linzess 72 mcg daily, and miralax PRN   · The patient reported that she was only taking the Senokot 2 tablets daily (and was not taking the other bowel medications) as she was fearful of developing diarrhea  · Did try multiple enemas at home per daughter, also took one dose of magnesium sulfate and a dose of miralax on 3/1  · Soapsuds enema in the ED and manual disimpaction with minimal relief   · CT abdomen/pelvis w/o contrast (3/1): Rectal impaction  Liquid stool throughout the proximal colon without evidence of colitis or bowel obstruction  · Initial rectal disimpaction was performed on 03/01  · In the hospital the patient was placed on senna-docusate 8 6-50 mg 2 tablets b i d  and MiraLax 17 g b i d  Unfortunately, the patient did not have any bowel movements in the hospital on this regimen  · On 03/04, the patient received a Dulcolax 10 mg suppository and subsequently a Fleet enema  · On 03/05, the patient underwent a 2nd manual disimpaction followed by a lactulose 200 mg retention enema  · Patient has been having routine bowel movements as of 3/6/22 (she had three small bowel movements on 3/6)  Plan:  · Bowel regimen adjusted to senna s two tablets BID and Miralax daily upon discharge  · Patient was instructed to resume Linzess 72 mcg after her diarrhea resolved    · Apply Calcium zinc oxide lotion to the rectum for pain relief   · Apply lidocaine cream QID PRN rectal pain  · Tylenol PRN for pain  · Patient was instructed to use Gas-X as needed for flatulence, however to take this medication cautiously as it can worsen constipation

## 2022-03-09 NOTE — ASSESSMENT & PLAN NOTE
Recent Labs     03/06/22  0607 03/07/22  0442   CREATININE 1 81* 1 72*   EGFR 23 24     Estimated Creatinine Clearance: 21 6 mL/min (A) (by C-G formula based on SCr of 1 72 mg/dL (H))      · Improving  · 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  · Baseline creatinine 1 5-1 9   · Bladder scan 3/4 showed no sign of urinary retention  · Continue to follow-up with primary care doctor upon discharge

## 2022-03-09 NOTE — UTILIZATION REVIEW
Notification of Discharge   This is a Notification of Discharge from our facility [address]  Please be advised that this patient has been discharge from our facility  Below you will find the admission and discharge date and time including the patients disposition  UTILIZATION REVIEW CONTACT:  Davin Antunez MA  Utilization   Network Utilization Review Department  Phone: [phone] x carefully listen to the prompts  All voicemails are confidential   Email: [email or web address]     PHYSICIAN ADVISORY SERVICES:  FOR IKIL-OX-XEUH REVIEW - MEDICAL NECESSITY DENIAL  Phone: [phone]  Fax: [phone]  Email: [email or web address]     PRESENTATION DATE: 3/1/2022  8:23 PM  OBERVATION ADMISSION DATE:   INPATIENT ADMISSION DATE: 3/2/22  1:05 AM   DISCHARGE DATE: 3/8/2022  1:01 PM  DISPOSITION: Home with [place] with [address] INFORMATION:  Send all requests for admission clinical reviews, approved or denied determinations and any other requests to dedicated fax number below belonging to the campus where the patient is receiving treatment   List of dedicated fax numbers:  [address] [place] [address] DENIALS (Administrative/Medical Necessity) [phone]   [address]  (Maternity/NICU/Pediatrics) [phone]   Mimi Perkins [phone]   [address] [facility] [phone]   [address] [place] [address] [phone]   [address] [place] [address] [address] [place] [address],[address] [phone]   [facility]  [phone]   [address] [place] [address]  [address] [facility] [address] [facility] [phone]

## 2022-03-10 ENCOUNTER — TRANSITIONAL CARE MANAGEMENT (OUTPATIENT)
Dept: FAMILY MEDICINE CLINIC | Facility: CLINIC | Age: 87
End: 2022-03-10

## 2022-03-15 ENCOUNTER — OFFICE VISIT (OUTPATIENT)
Dept: FAMILY MEDICINE CLINIC | Facility: CLINIC | Age: 87
End: 2022-03-15
Payer: COMMERCIAL

## 2022-03-15 VITALS
SYSTOLIC BLOOD PRESSURE: 126 MMHG | HEART RATE: 56 BPM | BODY MASS INDEX: 27.78 KG/M2 | HEIGHT: 67 IN | DIASTOLIC BLOOD PRESSURE: 72 MMHG | TEMPERATURE: 99 F | WEIGHT: 177 LBS

## 2022-03-15 DIAGNOSIS — E87.1 HYPONATREMIA: ICD-10-CM

## 2022-03-15 DIAGNOSIS — K59.00 CONSTIPATION, UNSPECIFIED CONSTIPATION TYPE: Primary | ICD-10-CM

## 2022-03-15 PROCEDURE — 1111F DSCHRG MED/CURRENT MED MERGE: CPT | Performed by: FAMILY MEDICINE

## 2022-03-15 PROCEDURE — 99495 TRANSJ CARE MGMT MOD F2F 14D: CPT | Performed by: FAMILY MEDICINE

## 2022-03-16 NOTE — PROGRESS NOTES
Patient ID: Neel Raza is a 80 y o  female  HPI: 80 y  o female presents for post hospital visit after a hospitalization for constipation and hyponatremia  She was sent home on several different meds for constipation  She developed diarrhea and is stable on just miralax (as per GI)  She  needs a follow up sodium level  SUBJECTIVE    Family History   Problem Relation Age of Onset    Heart disease Family     Hypertension Family     Cancer Family      Social History     Socioeconomic History    Marital status:      Spouse name: Not on file    Number of children: Not on file    Years of education: Less than high school    Highest education level: Not on file   Occupational History    Occupation: Retired   Tobacco Use    Smoking status: Never Smoker    Smokeless tobacco: Never Used   Substance and Sexual Activity    Alcohol use: Not Currently    Drug use: Never    Sexual activity: Not Currently     Partners: Male     Birth control/protection: Post-menopausal   Other Topics Concern    Not on file   Social History Narrative    Always uses seatbelt    Denied:  History of daily caffeinated cola consumption    Denied:  History of daily coffee consumption    Daily tea consumption    Denied:  History of dental care, regularly    Exercise:  Walking    Living will in place    No Buddhism beliefs    Power of  in existence    Water intake, adequate (per day)     Social Determinants of Health     Financial Resource Strain: Not on file   Food Insecurity: No Food Insecurity    Worried About [facility] [facility] in the Last Year: Never true    [address] in the Last Year: Never true   Transportation Needs: No Transportation Needs    Lack of Transportation (Medical): No    Lack of Transportation (Non-Medical):  No   Physical Activity: Not on file   Stress: Not on file   Social Connections: Not on file   Intimate Partner Violence: Not on file   Housing Stability: Unknown    Unable to Pay for Housing in the Last Year: No    Number of Places Lived in the Last Year: Not on file    Unstable Housing in the Last Year: No     Past Medical History:   Diagnosis Date    A-fib [facility])     Cardiac disease     Hyperlipidemia     Hypertension      Past Surgical History:   Procedure Laterality Date    APPENDECTOMY      CHOLECYSTECTOMY      HERNIA REPAIR      HYSTERECTOMY      NEPHRECTOMY Right      Allergies   Allergen Reactions    Penicillin G Anaphylaxis    Spironolactone Shortness Of Breath    Atorvastatin      Other reaction(s): Leg Cramps    Cephalexin Headache    Chocolate - Food Allergy     Citalopram Abdominal Pain and Headache    Diltiazem     Fosinopril Sodium-Hctz     Methylprednisolone Nausea Only     Tablet generic only    Monopril [Fosinopril]      Reaction Date: 28Apr2011;     Penicillins     Pollen Extract     Pravastatin Myalgia     Other reaction(s): Leg Cramps    Sporanox [Itraconazole]      Reaction Date: 28Apr2011;     Strawberry C [Ascorbate - Food Allergy]     Caffeine - Food Allergy Palpitations     Tachycardia       Current Outpatient Medications:     ALPRAZolam (XANAX) 0 25 mg tablet, TAKE ONE TABLET BY MOUTH FOUR TIMES A DAY AS NEEDED FOR ANXIETY, Disp: 120 tablet, Rfl: 0    amiodarone 100 mg tablet, Take 1 tablet (100 mg total) by mouth daily, Disp: 90 tablet, Rfl: 3    Artificial Saliva (Xerostomia Relief [place]) SOLN, Apply 1 application to the mouth or throat 4 (four) times a day, Disp: 10 mL, Rfl: 3    aspirin (ECOTRIN LOW STRENGTH) 81 mg EC tablet, Take 81 mg by mouth every other day, Disp: , Rfl:     bisacodyl (DULCOLAX) 10 mg suppository, Insert 1 suppository (10 mg total) into the rectum daily as needed for constipation, Disp: 12 suppository, Rfl: 0    calamine-zinc oxide 8-8 %, Apply topically 4 (four) times a day to the rectum for anal soreness and pain, Disp: 177 mL, Rfl: 0    carbamide peroxide (DEBROX) 6 5 % otic solution, Administer 5 drops into both ears 2 (two) times a day For earwax removal, Disp: 15 mL, Rfl: 0    Crestor 5 MG tablet, TAKE ONE TABLET BY MOUTH EVERY DAY, Disp: 90 tablet, Rfl: 1    Emollient (EUCERIN) lotion, Apply topically 2 (two) times a day, Disp: , Rfl:     famotidine (PEPCID) 40 MG tablet, Take 1 tablet (40 mg total) by mouth daily, Disp: 30 tablet, Rfl: 2    furosemide (LASIX) 20 mg tablet, TAKE TWO TABLETS BY MOUTH EVERY DAY, Disp: 60 tablet, Rfl: 0    glycerin-hypromellose- (ARTIFICIAL TEARS) 0 2-0 2-1 % SOLN, Administer 1 drop to both eyes 3 (three) times a day as needed (dry eyes), Disp: 15 mL, Rfl: 0    lidocaine (LMX) 4 % cream, Apply topically 4 (four) times a day as needed for mild pain To the rectal area, Disp: 30 g, Rfl: 0    linaCLOtide (Linzess) 72 MCG CAPS, Take 1 capsule by mouth daily, Disp: 30 capsule, Rfl: 3    pilocarpine (SALAGEN) 5 mg tablet, Take 1 tablet (5 mg total) by mouth 2 (two) times a day, Disp: 60 tablet, Rfl: 2    polyethylene glycol (MIRALAX) 17 g packet, Take 17g by mouth daily  If constipation worsens, take 17 g by mouth two times daily  , Disp: 72 each, Rfl: 0    predniSONE 1 mg tablet, Take 1 tablet (1 mg total) by mouth daily, Disp: 90 tablet, Rfl: 0    Respiratory Therapy Supplies (NEBULIZER/ADULT MASK) KIT, by Does not apply route every 4 (four) hours as needed (wheezing), Disp: 1 each, Rfl: 0    simethicone (MYLICON) 80 mg chewable tablet, Chew 1 tablet (80 mg total) every 6 (six) hours as needed for flatulence, Disp: 30 tablet, Rfl: 0    zolpidem (AMBIEN CR) 12 5 MG CR tablet, TAKE ONE TABLET BY MOUTH AT BEDTIME, Disp: 30 tablet, Rfl: 0    levothyroxine (Levoxyl) 25 mcg tablet, Take 1 5 tablets (37 5 mcg total) by mouth daily, Disp: 45 tablet, Rfl: 3    Review of Systems  Constitutional:     Denies fever, chills ,fatigue ,weakness ,weight loss, weight gain     ENT: Denies earache ,loss of hearing ,nosebleed, nasal discharge,nasal congestion ,sore throat ,hoarseness  Pulmonary: Denies shortness of breath ,cough  ,dyspnea on exertion, orthopnea  ,PND   Cardiovascular:  Denies bradycardia , tachycardia  ,palpations, lower extremity edema leg, claudication  Breast:  Denies new or changing breast lumps ,nipple discharge ,nipple changes  Abdomen:  Denies abdominal pain , anorexia , indigestion, nausea, vomiting, constipation, diarrhea  Musculoskeletal: Denies myalgias, arthralgias, joint swelling, joint stiffness , limb pain, limb swelling  Gu: denies dysuria, polyuria  Skin: Denies skin rash, skin lesion, skin wound, itching, dry skin  Neuro: Denies headache, numbness, tingling, confusion, loss of consciousness, dizziness, vertigo  Psychiatric: Denies feelings of depression, suicidal ideation, anxiety, sleep disturbances    OBJECTIVE  /72   Pulse 56   Temp 99 °F (37 2 °C)   Ht 5' 7" (1 702 m)   Wt 80 3 kg (177 lb)   BMI 27 72 kg/m²   Constitutional:   NAD, well appearing and well nourished      ENT:   Conjunctiva and lids: no injection, edema, or discharge     Pupils and iris: ANKIT bilaterally    External inspection of ears and nose: normal without deformities or discharge  Otoscopic exam: Canals patent without erythema  Nasal mucosa, septum and turbinates: Normal or edema or discharge         Oropharynx:  Moist mucosa, normal tongue and tonsils without lesions  No erythema        Pulmonary:Respiratory effort normal rate and rhythm, no increased work of breathing   Auscultation of lungs:  Clear bilaterally with no adventitious breath sounds       Cardiovascular: regular rate and rhythm, S1 and S2, no murmur, no edema and/or varicosities of LE      Abdomen: Soft and non-distended     Positive bowel sounds      No heptomegaly or splenomegaly      Gu: no suprapubic tenderness or CVA tenderness, no urethral discharge  Lymphatic:  No anterior or posterior cervical lymphadenopathy         Musculoskeletal:  Gait and station: Normal gait      Digits and nails normal without clubbing or cyanosis       Inspection/palpation of joints, bones, and muscles:  No joint tenderness, swelling, full active and passive range of motion       Skin: Normal skin turgor and no rashes      Neuro:     Normal reflexes     Psych:   alert and oriented to person, place and time     normal mood and affect       Assessment/Plan:Diagnoses and all orders for this visit:    Constipation, unspecified constipation type  Comments:  Cotninue with miralax therapy  Hyponatremia  Comments:  Recheck sodium level  Reviewed with patient plan to treat with above plan      Patient instructed to call in 72 hours if not feeling better or if symptoms worsen

## 2022-03-17 ENCOUNTER — TELEPHONE (OUTPATIENT)
Dept: FAMILY MEDICINE CLINIC | Facility: CLINIC | Age: 87
End: 2022-03-17

## 2022-03-17 DIAGNOSIS — K59.00 CONSTIPATION, UNSPECIFIED CONSTIPATION TYPE: Primary | ICD-10-CM

## 2022-03-17 DIAGNOSIS — Z78.9 NEED FOR FOLLOW-UP BY SOCIAL WORKER: ICD-10-CM

## 2022-03-17 NOTE — TELEPHONE ENCOUNTER
Patient is requesting a GI referral    For her abd pain    And she is also requesting a [facility] referral      Please advise

## 2022-03-17 NOTE — TELEPHONE ENCOUNTER
Elvira Posey from [facility] called back  The patient is actually requesting a home care nurse to come in weekly to check her vitals and her abdomen  She states that her abdomen is starting to swell again and she still has pain

## 2022-03-18 DIAGNOSIS — E87.1 HYPONATREMIA: ICD-10-CM

## 2022-03-18 DIAGNOSIS — K59.00 OBSTIPATION: Primary | ICD-10-CM

## 2022-03-18 NOTE — TELEPHONE ENCOUNTER
Spoke with [facility] they do not have her under there services   You could place referral into [facility] and see if [facility] take [facility] if not [facility] is open to new [facility] and there fax number is [phone]

## 2022-03-18 NOTE — TELEPHONE ENCOUNTER
Spoke with Neto Brewster patients daughter  She said they were supposed to have [facility] after her moms admission at end of february but on one called  Explained we will place referral today and see which agency could accept her  She would benefit from PT  I will place referral to  to speak with Neto Brewster because she is sole caregiver  Neto Brewster looking for help at home she wants to keep mom at home

## 2022-03-18 NOTE — TELEPHONE ENCOUNTER
Hospital note states patient is under care of [facility] Let me call them and see if they are aware she was discharged

## 2022-03-21 ENCOUNTER — PATIENT OUTREACH (OUTPATIENT)
Dept: FAMILY MEDICINE CLINIC | Facility: CLINIC | Age: 87
End: 2022-03-21

## 2022-03-21 DIAGNOSIS — F41.9 ANXIETY: ICD-10-CM

## 2022-03-21 DIAGNOSIS — K29.00 ACUTE GASTRITIS WITHOUT HEMORRHAGE, UNSPECIFIED GASTRITIS TYPE: ICD-10-CM

## 2022-03-21 RX ORDER — FAMOTIDINE 40 MG/1
TABLET, FILM COATED ORAL
Qty: 30 TABLET | Refills: 2 | Status: SHIPPED | OUTPATIENT
Start: 2022-03-21 | End: 2022-07-12

## 2022-03-21 RX ORDER — ALPRAZOLAM 0.25 MG/1
TABLET ORAL
Qty: 120 TABLET | Refills: 0 | Status: SHIPPED | OUTPATIENT
Start: 2022-03-21 | End: 2022-04-20

## 2022-03-21 NOTE — TELEPHONE ENCOUNTER
1  [id number] 03/09/2022 03/09/2022 Zolpidem Tartrate (Tablet, Extended Release)  30 0 30 12 5 MG NA [facility] #[id number]  Medicare 0 / 0 [place]    1  [id number] 02/22/2022 02/22/2022 ALPRAZolam (Tablet)  120 0 30 0 25 MG NA [facility] #[id number]  Medicare 0 / 0 [place]    1  [id number] 02/11/2022 02/11/2022 Zolpidem Tartrate (Tablet, Extended Release)  30 0 30 12 5 MG NA TONI [facility] #[id number]  Medicare 0 / 0 [place]

## 2022-03-21 NOTE — PROGRESS NOTES
Request received for OP SWCM to outreach patient's dtr/primary caregiver Nathaly Ellis (on consent form) to discuss additional in-home services  Spoke with patient's dtr Nathaly Ellis who stated patient needs assistance with all ADLs, uses rolling walker to ambulate when leaving the house but does not use DME in the house (uses furniture to get from one place to the next within the home), sponge-bathes, patient's dtr preps meals, patient preps own medications each day (does not use weekly pill box), and [facility]i transports to [facility] [place] [facility]  Patient declined Meals on Wheels  Expressed interest in aide services  Discussed insurance does not cover this service  Nathaly Ellis confirmed she would benefit from help in the home but patient does not qualify for Waiver due to assets after selling home and moving in with Nathaly Ellis  Asked if patient's spouse was a [place]  Nathaly Ellis confirmed that patient is surviving spouse of 3 husbands (all were Veterans)  Discussed [facility]; Nathaly Ellis agreed for OP SWCM to place this referral   Referral placed  Awaiting response/outreach to Nathaly Ellis did not receive call from [facility] since referral was placed on 3/18  Discussed with OP RNCM Lucía Correa who will check with KATHERINE on referral and potential SOC date  Informed Nathaly Ellis that [facility] may have aides that can be put in place during [facility] services (not long-term option)  No other needs at this time  Will follow

## 2022-03-21 NOTE — PROGRESS NOTES
Spoke with Julio Cruz patients daughter she is aware [facility] will be calling within in 24-48 hours to set up services

## 2022-03-21 NOTE — PROGRESS NOTES
Spoke with  [facility] intake patients referral was declined Friday due to capacity but that area is open today  She will process referral tomorrow  I will make patients daughter aware

## 2022-03-22 ENCOUNTER — TELEPHONE (OUTPATIENT)
Dept: FAMILY MEDICINE CLINIC | Facility: CLINIC | Age: 87
End: 2022-03-22

## 2022-03-22 NOTE — TELEPHONE ENCOUNTER
Eloisa Pritchett from [facility] called to give you some information about the patient    She is reporting having loose stools 6x a day  She is only taking Miralax 1x a day    She is showing signs of thrush with a sore mouth and white coating on tongue    She is feeling increasingly weak in both of legs    She does not seem dehydrated     Eloisa Pritchett requests that you give her a call to let her know about any treatment plans so she is aware

## 2022-03-22 NOTE — TELEPHONE ENCOUNTER
All of these are chronic  She has chronic constipation and was just hospitalized for this  She was discharged on miralax and linzess so I stopped the linzess and she is supposed to titrate the miralax down to the lowest dose until she is going regularly but not having frequent stools  She suffers from dry mouth and is supposed to use biotene products this is an ongoing issue  She has spinal stenosis and this is causing leg weakness  , but at her age, she is not a surgical candidate

## 2022-03-23 ENCOUNTER — TELEPHONE (OUTPATIENT)
Dept: FAMILY MEDICINE CLINIC | Facility: CLINIC | Age: 87
End: 2022-03-23

## 2022-03-23 NOTE — TELEPHONE ENCOUNTER
Bob Bailey from [facility] visiting nurse called, they started today  She also mentioned that patient was complaining about mouth pain and loose stools    She also said that when she spoke to patient's daughter she mentioned that she thought her mom needed to get blood work done    Bob Bailey left fax number if you wanted to send orders over    [phone]

## 2022-03-23 NOTE — TELEPHONE ENCOUNTER
All of these are chronic  She has chronic constipation and was just hospitalized for this  She was discharged on miralax and linzess so I stopped the linzess and she is supposed to titrate the miralax down to the lowest dose until she is going regularly but not having frequent stools  She suffers from dry mouth and is supposed to use biotene products this is an ongoing issue  She has spinal stenosis and this is causing leg weakness  , but at her age, she is not a surgical candidate    She does need a sodium due to hyponatremia

## 2022-03-25 DIAGNOSIS — E87.1 HYPONATREMIA: Primary | ICD-10-CM

## 2022-03-25 NOTE — TELEPHONE ENCOUNTER
If you want a sodium level done, please enter orders and I will fax it to them to draw     Fax [phone]

## 2022-03-29 ENCOUNTER — DOCUMENTATION (OUTPATIENT)
Dept: SOCIAL WORK | Facility: HOSPITAL | Age: 87
End: 2022-03-29

## 2022-03-29 NOTE — PROGRESS NOTES
Dr Jackson Montiel,   I am here with Bethanie Garcia and her daughter is asking about a natural supplement called Walker Tera  It says its a natural bark laxative  They are inquiring if you think she could try a low dose of it as the directions on bottle state take 2 capsules BID  Daughter wants to try 1 capsule to see if in conjunction with her miralax and linzess if you would permit it? Please let either Bing Douse or I know  She is currently taking everything as directed  But did not take her linzess yet today  Please advise      Gema Martinez RN  [facility]na

## 2022-03-30 NOTE — PROGRESS NOTES
I would rather she not take the bark laxative  I have no idea how this will interact with her other meds as the natural supplements are not studied by the FDA  Aspirin is derived from bark of a willow  Please tell her to stick with either the Linzess or Miralax

## 2022-04-04 ENCOUNTER — PATIENT OUTREACH (OUTPATIENT)
Dept: FAMILY MEDICINE CLINIC | Facility: CLINIC | Age: 87
End: 2022-04-04

## 2022-04-04 NOTE — PROGRESS NOTES
Update received from [facility] stating patient is ineligible for in-home benefits due to exceeding asset limits  Call placed to patient's dtr Joshua Simmons to check status of patient and discuss alternate in-home services  Spoke with Joshua Simmons who stated patient is doing fine  She is receiving [facility] services (SN, PT, aide)  Joshua Simmons confirmed patient exceeds asset limit for [facility]   Joshua Simmons not interested in any private pay services at this time  Joshua Simmons denied any additional needs  Will close case; will remain available to assist with any future needs

## 2022-04-12 ENCOUNTER — LAB REQUISITION (OUTPATIENT)
Dept: LAB | Facility: HOSPITAL | Age: 87
End: 2022-04-12
Payer: COMMERCIAL

## 2022-04-12 DIAGNOSIS — E87.1 HYPO-OSMOLALITY AND HYPONATREMIA: ICD-10-CM

## 2022-04-12 DIAGNOSIS — R79.89 AZOTEMIA: Primary | ICD-10-CM

## 2022-04-12 LAB
ANION GAP SERPL CALCULATED.3IONS-SCNC: 10 MMOL/L (ref 4–13)
BUN SERPL-MCNC: 27 MG/DL (ref 5–25)
CALCIUM SERPL-MCNC: 8.9 MG/DL (ref 8.3–10.1)
CHLORIDE SERPL-SCNC: 102 MMOL/L (ref 100–108)
CO2 SERPL-SCNC: 27 MMOL/L (ref 21–32)
CREAT SERPL-MCNC: 2.1 MG/DL (ref 0.6–1.3)
GFR SERPL CREATININE-BSD FRML MDRD: 19 ML/MIN/1.73SQ M
GLUCOSE SERPL-MCNC: 100 MG/DL (ref 65–140)
POTASSIUM SERPL-SCNC: 4.1 MMOL/L (ref 3.5–5.3)
SODIUM SERPL-SCNC: 139 MMOL/L (ref 136–145)

## 2022-04-12 PROCEDURE — 80048 BASIC METABOLIC PNL TOTAL CA: CPT | Performed by: FAMILY MEDICINE

## 2022-04-15 ENCOUNTER — APPOINTMENT (OUTPATIENT)
Dept: LAB | Facility: CLINIC | Age: 87
End: 2022-04-15
Payer: COMMERCIAL

## 2022-04-15 DIAGNOSIS — R79.89 AZOTEMIA: ICD-10-CM

## 2022-04-15 LAB
ANION GAP SERPL CALCULATED.3IONS-SCNC: 5 MMOL/L (ref 4–13)
BUN SERPL-MCNC: 28 MG/DL (ref 5–25)
CALCIUM SERPL-MCNC: 9.3 MG/DL (ref 8.3–10.1)
CHLORIDE SERPL-SCNC: 106 MMOL/L (ref 100–108)
CO2 SERPL-SCNC: 26 MMOL/L (ref 21–32)
CREAT SERPL-MCNC: 1.85 MG/DL (ref 0.6–1.3)
GFR SERPL CREATININE-BSD FRML MDRD: 22 ML/MIN/1.73SQ M
GLUCOSE SERPL-MCNC: 88 MG/DL (ref 65–140)
POTASSIUM SERPL-SCNC: 4.5 MMOL/L (ref 3.5–5.3)
SODIUM SERPL-SCNC: 137 MMOL/L (ref 136–145)

## 2022-04-15 PROCEDURE — 80048 BASIC METABOLIC PNL TOTAL CA: CPT

## 2022-04-15 PROCEDURE — 36415 COLL VENOUS BLD VENIPUNCTURE: CPT

## 2022-04-20 DIAGNOSIS — F41.9 ANXIETY: ICD-10-CM

## 2022-04-20 RX ORDER — ALPRAZOLAM 0.25 MG/1
TABLET ORAL
Qty: 120 TABLET | Refills: 0 | Status: SHIPPED | OUTPATIENT
Start: 2022-04-20 | End: 2022-06-07

## 2022-04-21 ENCOUNTER — TELEPHONE (OUTPATIENT)
Dept: FAMILY MEDICINE CLINIC | Facility: CLINIC | Age: 87
End: 2022-04-21

## 2022-04-21 NOTE — TELEPHONE ENCOUNTER
----- Message from Fahad Horton DO sent at 4/21/2022 12:34 PM EDT -----  Regarding: FW: HHA order  [id number] Soledad Gibson to give a verbal order for what is requested below  ----- Message -----  From: Lashay Doran RN  Sent: 4/21/2022  11:00 AM EDT  To: Fahad Horton DO  Subject: HHA order                                        Hi Dr Emelina Bellamy! I am requesting a VO for HHA to resume care 4 25 to help with bathing and adls  Our order was only originally for 4 weeks so she did not have a hha this past week  I would like to see Ms Nellie Arambula for approx 2 more weeks to fu with her re her meds and constipation so I will continue to have the HHA in to meet her bathing needs  Please reply if you agree to give us a VO for the hha beginning 4 25 ending 5 10 22     Thank you!   Jamie Pearson RN  [phone]

## 2022-05-05 DIAGNOSIS — G47.00 INSOMNIA, UNSPECIFIED TYPE: ICD-10-CM

## 2022-05-05 RX ORDER — ZOLPIDEM TARTRATE 12.5 MG/1
TABLET, FILM COATED, EXTENDED RELEASE ORAL
Qty: 30 TABLET | Refills: 0 | Status: SHIPPED | OUTPATIENT
Start: 2022-05-05 | End: 2022-06-02

## 2022-05-19 DIAGNOSIS — I50.32 CHRONIC DIASTOLIC HEART FAILURE (HCC): ICD-10-CM

## 2022-05-20 RX ORDER — FUROSEMIDE 20 MG/1
TABLET ORAL
Qty: 60 TABLET | Refills: 0 | Status: SHIPPED | OUTPATIENT
Start: 2022-05-20 | End: 2022-06-17

## 2022-06-02 DIAGNOSIS — G47.00 INSOMNIA, UNSPECIFIED TYPE: ICD-10-CM

## 2022-06-02 RX ORDER — ZOLPIDEM TARTRATE 12.5 MG/1
TABLET, FILM COATED, EXTENDED RELEASE ORAL
Qty: 30 TABLET | Refills: 0 | Status: SHIPPED | OUTPATIENT
Start: 2022-06-02 | End: 2022-07-12

## 2022-06-07 DIAGNOSIS — F41.9 ANXIETY: ICD-10-CM

## 2022-06-07 RX ORDER — ALPRAZOLAM 0.25 MG/1
TABLET ORAL
Qty: 120 TABLET | Refills: 0 | Status: SHIPPED | OUTPATIENT
Start: 2022-06-07 | End: 2022-07-12

## 2022-06-07 NOTE — TELEPHONE ENCOUNTER
04/20/2022 04/20/2022 ALPRAZolam (Tablet)  120 0 30 0 25 MG NA [facility]

## 2022-06-16 DIAGNOSIS — I50.32 CHRONIC DIASTOLIC HEART FAILURE (HCC): ICD-10-CM

## 2022-06-16 DIAGNOSIS — E03.9 HYPOTHYROIDISM, UNSPECIFIED TYPE: ICD-10-CM

## 2022-06-17 RX ORDER — LEVOTHYROXINE SODIUM 0.03 MG/1
TABLET ORAL
Qty: 45 TABLET | Refills: 3 | Status: SHIPPED | OUTPATIENT
Start: 2022-06-17

## 2022-06-17 RX ORDER — FUROSEMIDE 20 MG/1
TABLET ORAL
Qty: 60 TABLET | Refills: 0 | Status: SHIPPED | OUTPATIENT
Start: 2022-06-17 | End: 2022-07-21

## 2022-07-11 DIAGNOSIS — G47.00 INSOMNIA, UNSPECIFIED TYPE: ICD-10-CM

## 2022-07-11 DIAGNOSIS — K29.00 ACUTE GASTRITIS WITHOUT HEMORRHAGE, UNSPECIFIED GASTRITIS TYPE: ICD-10-CM

## 2022-07-11 DIAGNOSIS — F41.9 ANXIETY: ICD-10-CM

## 2022-07-11 NOTE — TELEPHONE ENCOUNTER
1  [id number] 06/07/2022 06/07/2022 ALPRAZolam (Tablet)  120 0 30 0 25 MG NA [facility] #[id number]  Medicare 0 / 0 [place]    1  [id number] 06/02/2022 06/02/2022 Zolpidem Tartrate (Tablet, Extended Release)  30 0 30 12 5 MG NA [facility] #[id number]  Medicare 0 / 0 PA    1  [id number] 05/05/2022 05/05/2022 Zolpidem Tartrate (Tablet, Extended Release)  30 0 30 12 5 [id number]  Medicare 0 / 0 PA

## 2022-07-12 RX ORDER — FAMOTIDINE 40 MG/1
TABLET, FILM COATED ORAL
Qty: 30 TABLET | Refills: 2 | Status: SHIPPED | OUTPATIENT
Start: 2022-07-12 | End: 2022-10-19

## 2022-07-12 RX ORDER — ZOLPIDEM TARTRATE 12.5 MG/1
TABLET, FILM COATED, EXTENDED RELEASE ORAL
Qty: 30 TABLET | Refills: 0 | Status: SHIPPED | OUTPATIENT
Start: 2022-07-12 | End: 2022-08-12

## 2022-07-12 RX ORDER — ALPRAZOLAM 0.25 MG/1
TABLET ORAL
Qty: 120 TABLET | Refills: 0 | Status: SHIPPED | OUTPATIENT
Start: 2022-07-12 | End: 2022-08-12

## 2022-07-19 ENCOUNTER — OFFICE VISIT (OUTPATIENT)
Dept: FAMILY MEDICINE CLINIC | Facility: CLINIC | Age: 87
End: 2022-07-19
Payer: COMMERCIAL

## 2022-07-19 VITALS
HEIGHT: 67 IN | WEIGHT: 174 LBS | TEMPERATURE: 98.3 F | DIASTOLIC BLOOD PRESSURE: 80 MMHG | SYSTOLIC BLOOD PRESSURE: 120 MMHG | HEART RATE: 62 BPM | BODY MASS INDEX: 27.31 KG/M2

## 2022-07-19 DIAGNOSIS — I10 ESSENTIAL HYPERTENSION: Primary | ICD-10-CM

## 2022-07-19 DIAGNOSIS — E03.9 HYPOTHYROIDISM, UNSPECIFIED TYPE: ICD-10-CM

## 2022-07-19 DIAGNOSIS — E78.2 MIXED HYPERLIPIDEMIA: ICD-10-CM

## 2022-07-19 PROCEDURE — 1160F RVW MEDS BY RX/DR IN RCRD: CPT | Performed by: FAMILY MEDICINE

## 2022-07-19 PROCEDURE — 99213 OFFICE O/P EST LOW 20 MIN: CPT | Performed by: FAMILY MEDICINE

## 2022-07-19 NOTE — PROGRESS NOTES
Patient ID: Tracie Whatley is a 80 y o  female  HPI: 80 y  o female presents for follow up hypertension and hypercholesterolemia  Pt denies any dizziness,  chest pain, palpitations, or dypsnea with exertion  SUBJECTIVE    Family History   Problem Relation Age of Onset    Heart disease Family     Hypertension Family     Cancer Family      Social History     Socioeconomic History    Marital status:      Spouse name: Not on file    Number of children: Not on file    Years of education: Less than high school    Highest education level: Not on file   Occupational History    Occupation: Retired   Tobacco Use    Smoking status: Never Smoker    Smokeless tobacco: Never Used   Substance and Sexual Activity    Alcohol use: Not Currently    Drug use: Never    Sexual activity: Not Currently     Partners: Male     Birth control/protection: Post-menopausal   Other Topics Concern    Not on file   Social History Narrative    Always uses seatbelt    Denied:  History of daily caffeinated cola consumption    Denied:  History of daily coffee consumption    Daily tea consumption    Denied:  History of dental care, regularly    Exercise:  Walking    Living will in place    No Congregation beliefs    Power of  in existence    Water intake, adequate (per day)     Social Determinants of Health     Financial Resource Strain: Not on file   Food Insecurity: No Food Insecurity    Worried About [address] [facility] in the Last Year: Never true    [address] in the Last Year: Never true   Transportation Needs: No Transportation Needs    Lack of Transportation (Medical): No    Lack of Transportation (Non-Medical):  No   Physical Activity: Not on file   Stress: Not on file   Social Connections: Not on file   Intimate Partner Violence: Not on file   Housing Stability: Unknown    Unable to Pay for Housing in the Last Year: No    Number of Places Lived in the Last Year: Not on file    Unstable Housing in the Last Year: No     Past Medical History:   Diagnosis Date    [facility]fib [facility])     Cardiac disease     Hyperlipidemia     Hypertension      Past Surgical History:   Procedure Laterality Date    APPENDECTOMY      CHOLECYSTECTOMY      HERNIA REPAIR      HYSTERECTOMY      NEPHRECTOMY Right      Allergies   Allergen Reactions    Penicillin G Anaphylaxis    Spironolactone Shortness Of Breath    Atorvastatin      Other reaction(s): Leg Cramps    Cephalexin Headache    Chocolate - Food Allergy     Citalopram Abdominal Pain and Headache    Diltiazem     Fosinopril Sodium-Hctz     Methylprednisolone Nausea Only     Tablet generic only    Monopril [Fosinopril]      Reaction Date: 28Apr2011;     Penicillins     Pollen Extract     Pravastatin Myalgia     Other reaction(s): Leg Cramps    Sporanox [Itraconazole]      Reaction Date: 28Apr2011;     Strawberry C [Ascorbate - Food Allergy]     Caffeine - Food Allergy Palpitations     Tachycardia       Current Outpatient Medications:     ALPRAZolam (XANAX) 0 25 mg tablet, TAKE ONE TABLET BY MOUTH FOUR TIMES A DAY AS NEEDED FOR ANXIETY, Disp: 120 tablet, Rfl: 0    amiodarone 100 mg tablet, Take 1 tablet (100 mg total) by mouth daily, Disp: 90 tablet, Rfl: 3    Artificial Saliva (Xerostomia Relief [place]) SOLN, Apply 1 application to the mouth or throat 4 (four) times a day, Disp: 10 mL, Rfl: 3    aspirin (ECOTRIN LOW STRENGTH) 81 mg EC tablet, Take 81 mg by mouth every other day, Disp: , Rfl:     bisacodyl (DULCOLAX) 10 mg suppository, Insert 1 suppository (10 mg total) into the rectum daily as needed for constipation, Disp: 12 suppository, Rfl: 0    calamine-zinc oxide 8-8 %, Apply topically 4 (four) times a day to the rectum for anal soreness and pain, Disp: 177 mL, Rfl: 0    carbamide peroxide (DEBROX) 6 5 % otic solution, Administer 5 drops into both ears 2 (two) times a day For earwax removal, Disp: 15 mL, Rfl: 0    Crestor 5 MG tablet, TAKE ONE TABLET BY MOUTH EVERY DAY, Disp: 90 tablet, Rfl: 1    Emollient (EUCERIN) lotion, Apply topically 2 (two) times a day, Disp: , Rfl:     famotidine (PEPCID) 40 MG tablet, TAKE ONE TABLET BY MOUTH EVERY DAY, Disp: 30 tablet, Rfl: 2    furosemide (LASIX) 20 mg tablet, TAKE TWO TABLETS BY MOUTH EVERY DAY, Disp: 60 tablet, Rfl: 0    glycerin-hypromellose- (ARTIFICIAL TEARS) 0 2-0 2-1 % SOLN, Administer 1 drop to both eyes 3 (three) times a day as needed (dry eyes), Disp: 15 mL, Rfl: 0    levothyroxine 25 mcg tablet, TAKE 1 AND 1/2 TABLETS BY MOUTH DAILY, Disp: 45 tablet, Rfl: 3    lidocaine (LMX) 4 % cream, Apply topically 4 (four) times a day as needed for mild pain To the rectal area, Disp: 30 g, Rfl: 0    linaCLOtide (Linzess) 72 MCG CAPS, Take 1 capsule by mouth daily, Disp: 30 capsule, Rfl: 3    pilocarpine (SALAGEN) 5 mg tablet, Take 1 tablet (5 mg total) by mouth 2 (two) times a day, Disp: 60 tablet, Rfl: 2    polyethylene glycol (MIRALAX) 17 g packet, Take 17g by mouth daily  If constipation worsens, take 17 g by mouth two times daily  , Disp: 72 each, Rfl: 0    predniSONE 1 mg tablet, Take 1 tablet (1 mg total) by mouth daily, Disp: 90 tablet, Rfl: 0    Respiratory Therapy Supplies (NEBULIZER/ADULT MASK) KIT, by Does not apply route every 4 (four) hours as needed (wheezing), Disp: 1 each, Rfl: 0    simethicone (MYLICON) 80 mg chewable tablet, Chew 1 tablet (80 mg total) every 6 (six) hours as needed for flatulence, Disp: 30 tablet, Rfl: 0    zolpidem (AMBIEN CR) 12 5 MG CR tablet, TAKE ONE TABLET BY MOUTH AT BEDTIME, Disp: 30 tablet, Rfl: 0    Review of Systems  Constitutional:     Denies fever, chills ,fatigue ,weakness ,weight loss, weight gain     ENT: Denies earache ,loss of hearing ,nosebleed, nasal discharge,nasal congestion ,sore throat ,hoarseness  Pulmonary: Denies shortness of breath ,cough  ,dyspnea on exertion, orthopnea  ,PND   Cardiovascular:  Denies bradycardia , tachycardia  ,palpations, lower extremity edema leg, claudication  Breast:  Denies new or changing breast lumps ,nipple discharge ,nipple changes  Abdomen:  Denies abdominal pain , anorexia , indigestion, nausea, vomiting, constipation, diarrhea  Musculoskeletal: Denies myalgias, arthralgias, joint swelling, joint stiffness , limb pain, limb swelling  Gu: denies dysuria, polyuria  Skin: Denies skin rash, skin lesion, skin wound, itching, dry skin  Neuro: Denies headache, numbness, tingling, confusion, loss of consciousness, dizziness, vertigo  Psychiatric: Denies feelings of depression, suicidal ideation, anxiety, sleep disturbances    OBJECTIVE  /80   Pulse 62   Temp 98 3 °F (36 8 °C)   Ht 5' 7" (1 702 m)   Wt 78 9 kg (174 lb)   BMI 27 25 kg/m²   Constitutional:   NAD, well appearing and well nourished      ENT:   Conjunctiva and lids: no injection, edema, or discharge     Pupils and iris: ANKIT bilaterally    External inspection of ears and nose: normal without deformities or discharge  Otoscopic exam: Canals patent without erythema  Nasal mucosa, septum and turbinates: Normal or edema or discharge         Oropharynx:  Moist mucosa, normal tongue and tonsils without lesions  No erythema        Pulmonary:Respiratory effort normal rate and rhythm, no increased work of breathing   Auscultation of lungs:  Clear bilaterally with no adventitious breath sounds       Cardiovascular: regular rate and rhythm, S1 and S2, no murmur, no edema and/or varicosities of LE      Abdomen: Soft and non-distended     Positive bowel sounds      No heptomegaly or splenomegaly      Gu: no suprapubic tenderness or CVA tenderness, no urethral discharge  Lymphatic:  No anterior or posterior cervical lymphadenopathy         Musculoskeletal:  Gait and station: Normal gait      Digits and nails normal without clubbing or cyanosis       Inspection/palpation of joints, bones, and muscles:  No joint tenderness, swelling, full active and passive range of motion       Skin: Normal skin turgor and no rashes      Neuro:       Normal reflexes      Psych:   alert and oriented to person, place and time     normal mood and affect       Assessment/Plan:Diagnoses and all orders for this visit:    Essential hypertension  -     Comprehensive metabolic panel; Future    Mixed hyperlipidemia  -     Lipid Panel with Direct LDL reflex; Future    Hypothyroidism, unspecified type  -     TSH, 3rd generation; Future        I will see patient back in 4 mos or sooner prn

## 2022-07-21 DIAGNOSIS — I50.32 CHRONIC DIASTOLIC HEART FAILURE (HCC): ICD-10-CM

## 2022-07-21 RX ORDER — FUROSEMIDE 20 MG/1
TABLET ORAL
Qty: 60 TABLET | Refills: 0 | Status: SHIPPED | OUTPATIENT
Start: 2022-07-21 | End: 2022-08-24

## 2022-08-12 DIAGNOSIS — G47.00 INSOMNIA, UNSPECIFIED TYPE: ICD-10-CM

## 2022-08-12 DIAGNOSIS — F41.9 ANXIETY: ICD-10-CM

## 2022-08-12 RX ORDER — ZOLPIDEM TARTRATE 12.5 MG/1
TABLET, FILM COATED, EXTENDED RELEASE ORAL
Qty: 30 TABLET | Refills: 0 | Status: SHIPPED | OUTPATIENT
Start: 2022-08-12 | End: 2022-09-12

## 2022-08-12 RX ORDER — ALPRAZOLAM 0.25 MG/1
TABLET ORAL
Qty: 120 TABLET | Refills: 0 | Status: SHIPPED | OUTPATIENT
Start: 2022-08-12 | End: 2022-09-12

## 2022-08-12 NOTE — TELEPHONE ENCOUNTER
1 [id number] 07/12/2022 07/12/2022 ALPRAZolam (Tablet) 120 0 30 0 25 MG NA [facility] #[id number] Medicare 0 / 0 [place]    1 [id number] 07/12/2022 07/12/2022 Zolpidem Tartrate (Tablet, Extended Release) 30 0 30 12 5 MG NA [facility] #[id number] Medicare 0 / 0 [place]

## 2022-08-24 DIAGNOSIS — I50.32 CHRONIC DIASTOLIC HEART FAILURE (HCC): ICD-10-CM

## 2022-08-24 RX ORDER — FUROSEMIDE 20 MG/1
TABLET ORAL
Qty: 60 TABLET | Refills: 0 | Status: SHIPPED | OUTPATIENT
Start: 2022-08-24 | End: 2022-09-26 | Stop reason: SDUPTHER

## 2022-09-12 DIAGNOSIS — F41.9 ANXIETY: ICD-10-CM

## 2022-09-12 DIAGNOSIS — G47.00 INSOMNIA, UNSPECIFIED TYPE: ICD-10-CM

## 2022-09-12 RX ORDER — ALPRAZOLAM 0.25 MG/1
TABLET ORAL
Qty: 120 TABLET | Refills: 0 | Status: SHIPPED | OUTPATIENT
Start: 2022-09-12 | End: 2022-10-13

## 2022-09-12 RX ORDER — ZOLPIDEM TARTRATE 12.5 MG/1
TABLET, FILM COATED, EXTENDED RELEASE ORAL
Qty: 30 TABLET | Refills: 0 | Status: SHIPPED | OUTPATIENT
Start: 2022-09-12 | End: 2022-10-13

## 2022-09-12 NOTE — TELEPHONE ENCOUNTER
08/14/2022 08/12/2022 Zolpidem Tartrate (Tablet, Extended Release)  30 0 30 12 5 MG NA [facility] #[id number]  Medicare 0 / 0 [place]    1  [id number] 08/12/2022 08/12/2022 ALPRAZolam (Tablet)  120 0 30 0 25 MG NA [facility]AR[facility]  GI

## 2022-09-26 ENCOUNTER — TELEPHONE (OUTPATIENT)
Dept: FAMILY MEDICINE CLINIC | Facility: CLINIC | Age: 87
End: 2022-09-26

## 2022-09-26 DIAGNOSIS — I50.32 CHRONIC DIASTOLIC HEART FAILURE (HCC): ICD-10-CM

## 2022-09-26 RX ORDER — FUROSEMIDE 20 MG/1
40 TABLET ORAL DAILY
Qty: 60 TABLET | Refills: 3 | Status: SHIPPED | OUTPATIENT
Start: 2022-09-26

## 2022-09-26 NOTE — TELEPHONE ENCOUNTER
Pt daughter requesting refill of Lasix send to [facility] in [address]

## 2022-10-10 DIAGNOSIS — F41.9 ANXIETY: ICD-10-CM

## 2022-10-10 DIAGNOSIS — G47.00 INSOMNIA, UNSPECIFIED TYPE: ICD-10-CM

## 2022-10-12 NOTE — TELEPHONE ENCOUNTER
1  [id number] 09/12/2022 09/12/2022 ALPRAZolam (Tablet)  120 0 30 0 25 MG NA [facility] #[id number]  Medicare 0 / 0 [place]    1  [id number] 09/12/2022 09/12/2022 Zolpidem Tartrate (Tablet, Extended Release)  30 0 30 12 5 MG NA [facility] #[id number]  Medicare 0 / 0 [place]    1  [id number] 08/14/2022 08/12/2022 Zolpidem Tartrate (Tablet, Extended Release)  30 0 30 12 5 [id number]  Medicare 0 / 0 [place]

## 2022-10-13 RX ORDER — ALPRAZOLAM 0.25 MG/1
TABLET ORAL
Qty: 120 TABLET | Refills: 0 | Status: SHIPPED | OUTPATIENT
Start: 2022-10-13

## 2022-10-13 RX ORDER — ZOLPIDEM TARTRATE 12.5 MG/1
TABLET, FILM COATED, EXTENDED RELEASE ORAL
Qty: 30 TABLET | Refills: 0 | Status: SHIPPED | OUTPATIENT
Start: 2022-10-13

## 2022-10-19 ENCOUNTER — IMMUNIZATIONS (OUTPATIENT)
Dept: FAMILY MEDICINE CLINIC | Facility: CLINIC | Age: 87
End: 2022-10-19
Payer: COMMERCIAL

## 2022-10-19 DIAGNOSIS — E03.9 HYPOTHYROIDISM, UNSPECIFIED TYPE: ICD-10-CM

## 2022-10-19 DIAGNOSIS — Z23 ENCOUNTER FOR IMMUNIZATION: Primary | ICD-10-CM

## 2022-10-19 DIAGNOSIS — K29.00 ACUTE GASTRITIS WITHOUT HEMORRHAGE, UNSPECIFIED GASTRITIS TYPE: ICD-10-CM

## 2022-10-19 PROCEDURE — 90662 IIV NO PRSV INCREASED AG IM: CPT | Performed by: FAMILY MEDICINE

## 2022-10-19 PROCEDURE — G0008 ADMIN INFLUENZA VIRUS VAC: HCPCS | Performed by: FAMILY MEDICINE

## 2022-10-19 RX ORDER — FAMOTIDINE 40 MG/1
TABLET, FILM COATED ORAL
Qty: 30 TABLET | Refills: 2 | Status: SHIPPED | OUTPATIENT
Start: 2022-10-19

## 2022-10-19 RX ORDER — LEVOTHYROXINE SODIUM 0.03 MG/1
TABLET ORAL
Qty: 45 TABLET | Refills: 3 | Status: SHIPPED | OUTPATIENT
Start: 2022-10-19

## 2022-11-09 DIAGNOSIS — F41.9 ANXIETY: ICD-10-CM

## 2022-11-09 DIAGNOSIS — G47.00 INSOMNIA, UNSPECIFIED TYPE: ICD-10-CM

## 2022-11-10 RX ORDER — ALPRAZOLAM 0.25 MG/1
TABLET ORAL
Qty: 120 TABLET | Refills: 0 | Status: SHIPPED | OUTPATIENT
Start: 2022-11-10

## 2022-11-10 RX ORDER — ZOLPIDEM TARTRATE 12.5 MG/1
TABLET, FILM COATED, EXTENDED RELEASE ORAL
Qty: 30 TABLET | Refills: 0 | Status: SHIPPED | OUTPATIENT
Start: 2022-11-10

## 2022-11-10 NOTE — TELEPHONE ENCOUNTER
1 [id number] 10/14/2022 10/13/2022 Zolpidem Tartrate (Tablet, Extended Release) 30 0 30 12 5 MG NA [facility] #[id number] Medicare 0 / 0 [place]    1 [id number] 10/13/2022 10/13/2022 ALPRAZolam (Tablet) 120 0 30 0 25 MG NA [facility] ESPAR[facility] #[id number] Medicare 0 / 0 [place]

## 2022-11-15 ENCOUNTER — APPOINTMENT (OUTPATIENT)
Dept: LAB | Facility: CLINIC | Age: 87
End: 2022-11-15

## 2022-11-15 DIAGNOSIS — E03.9 HYPOTHYROIDISM, UNSPECIFIED TYPE: ICD-10-CM

## 2022-11-15 DIAGNOSIS — R79.89 AZOTEMIA: ICD-10-CM

## 2022-11-15 DIAGNOSIS — I10 ESSENTIAL HYPERTENSION: ICD-10-CM

## 2022-11-15 DIAGNOSIS — E87.1 HYPONATREMIA: ICD-10-CM

## 2022-11-15 DIAGNOSIS — E78.2 MIXED HYPERLIPIDEMIA: ICD-10-CM

## 2022-11-15 LAB
ALBUMIN SERPL BCP-MCNC: 3.6 G/DL (ref 3.5–5)
ALP SERPL-CCNC: 89 U/L (ref 46–116)
ALT SERPL W P-5'-P-CCNC: 20 U/L (ref 12–78)
ANION GAP SERPL CALCULATED.3IONS-SCNC: 6 MMOL/L (ref 4–13)
AST SERPL W P-5'-P-CCNC: 17 U/L (ref 5–45)
BILIRUB SERPL-MCNC: 0.99 MG/DL (ref 0.2–1)
BUN SERPL-MCNC: 30 MG/DL (ref 5–25)
CALCIUM SERPL-MCNC: 9.4 MG/DL (ref 8.3–10.1)
CHLORIDE SERPL-SCNC: 103 MMOL/L (ref 96–108)
CHOLEST SERPL-MCNC: 158 MG/DL
CO2 SERPL-SCNC: 27 MMOL/L (ref 21–32)
CREAT SERPL-MCNC: 2.12 MG/DL (ref 0.6–1.3)
GFR SERPL CREATININE-BSD FRML MDRD: 19 ML/MIN/1.73SQ M
GLUCOSE P FAST SERPL-MCNC: 99 MG/DL (ref 65–99)
HDLC SERPL-MCNC: 39 MG/DL
LDLC SERPL CALC-MCNC: 83 MG/DL (ref 0–100)
POTASSIUM SERPL-SCNC: 4.4 MMOL/L (ref 3.5–5.3)
PROT SERPL-MCNC: 7.5 G/DL (ref 6.4–8.4)
SODIUM SERPL-SCNC: 136 MMOL/L (ref 135–147)
TRIGL SERPL-MCNC: 181 MG/DL
TSH SERPL DL<=0.05 MIU/L-ACNC: 5.92 UIU/ML (ref 0.45–4.5)

## 2022-11-16 DIAGNOSIS — E03.9 HYPOTHYROIDISM, UNSPECIFIED TYPE: ICD-10-CM

## 2022-11-16 RX ORDER — LEVOTHYROXINE SODIUM 0.05 MG/1
50 TABLET ORAL DAILY
Qty: 30 TABLET | Refills: 3 | Status: SHIPPED | OUTPATIENT
Start: 2022-11-16 | End: 2022-11-22 | Stop reason: SDUPTHER

## 2022-11-17 ENCOUNTER — RA CDI HCC (OUTPATIENT)
Dept: OTHER | Facility: HOSPITAL | Age: 87
End: 2022-11-17

## 2022-11-17 NOTE — PROGRESS NOTES
[facility]  coding opportunities     I13 0     Chart Reviewed number of suggestions sent to Provider: 1     Patients Insurance     Medicare Insurance: [facility] [address]

## 2022-11-22 ENCOUNTER — OFFICE VISIT (OUTPATIENT)
Dept: FAMILY MEDICINE CLINIC | Facility: CLINIC | Age: 87
End: 2022-11-22

## 2022-11-22 VITALS
HEART RATE: 56 BPM | SYSTOLIC BLOOD PRESSURE: 120 MMHG | WEIGHT: 182.6 LBS | BODY MASS INDEX: 28.66 KG/M2 | TEMPERATURE: 97.6 F | HEIGHT: 67 IN | DIASTOLIC BLOOD PRESSURE: 70 MMHG | OXYGEN SATURATION: 95 %

## 2022-11-22 DIAGNOSIS — G47.00 INSOMNIA, UNSPECIFIED TYPE: ICD-10-CM

## 2022-11-22 DIAGNOSIS — E03.9 HYPOTHYROIDISM, UNSPECIFIED TYPE: ICD-10-CM

## 2022-11-22 DIAGNOSIS — I50.32 CHRONIC DIASTOLIC CONGESTIVE HEART FAILURE (HCC): ICD-10-CM

## 2022-11-22 DIAGNOSIS — F41.9 ANXIETY: Primary | ICD-10-CM

## 2022-11-22 DIAGNOSIS — E78.2 MIXED HYPERLIPIDEMIA: ICD-10-CM

## 2022-11-22 RX ORDER — LEVOTHYROXINE SODIUM 0.05 MG/1
50 TABLET ORAL DAILY
Qty: 30 TABLET | Refills: 3 | Status: SHIPPED | OUTPATIENT
Start: 2022-11-22 | End: 2022-12-22

## 2022-11-22 RX ORDER — ALPRAZOLAM 0.25 MG/1
0.25 TABLET ORAL 4 TIMES DAILY PRN
Qty: 120 TABLET | Refills: 3 | Status: SHIPPED | OUTPATIENT
Start: 2022-11-22

## 2022-11-22 RX ORDER — ZOLPIDEM TARTRATE 12.5 MG/1
12.5 TABLET, FILM COATED, EXTENDED RELEASE ORAL
Qty: 30 TABLET | Refills: 3 | Status: SHIPPED | OUTPATIENT
Start: 2022-11-22

## 2022-11-25 NOTE — PROGRESS NOTES
Patient ID: Cande Paula is a 80 y o  female  HPI: 80 y  o female presents for follow up of anxiety,hypercholesterolemia,  hypothyroidism, chronic diastolic chf and insomnia  She denies any break through symptoms, edema, dyspnea, chest pressure or fatigue  BMI Counseling: Body mass index is 28 6 kg/m²  The BMI is above normal  Nutrition recommendations include reducing portion sizes and moderation in carbohydrate intake  SUBJECTIVE    Family History   Problem Relation Age of Onset   • Heart disease Family    • Hypertension Family    • Cancer Family      Social History     Socioeconomic History   • Marital status:      Spouse name: Not on file   • Number of children: Not on file   • Years of education: Less than high school   • Highest education level: Not on file   Occupational History   • Occupation: Retired   Tobacco Use   • Smoking status: Never   • Smokeless tobacco: Never   Substance and Sexual Activity   • Alcohol use: Not Currently   • Drug use: Never   • Sexual activity: Not Currently     Partners: Male     Birth control/protection: Post-menopausal   Other Topics Concern   • Not on file   Social History Narrative    Always uses seatbelt    Denied:  History of daily caffeinated cola consumption    Denied:  History of daily coffee consumption    Daily tea consumption    Denied:  History of dental care, regularly    Exercise:  Walking    Living will in place    No Congregational beliefs    Power of  in existence    Water intake, adequate (per day)     Social Determinants of Health     Financial Resource Strain: Not on file   Food Insecurity: No Food Insecurity   • Worried About Running Out of Food in the Last Year: Never true   • Ran Out of Food in the Last Year: Never true   Transportation Needs: No Transportation Needs   • Lack of Transportation (Medical): No   • Lack of Transportation (Non-Medical):  No   Physical Activity: Not on file   Stress: Not on file   Social Connections: Not on file Intimate Partner Violence: Not on file   Housing Stability: Unknown   • Unable to Pay for Housing in the Last Year: No   • Number of Places Lived in the Last Year: Not on file   • Unstable Housing in the Last Year: No     Past Medical History:   Diagnosis Date   • A-fib ([facility] [address] )    • Cardiac disease    • Hyperlipidemia    • Hypertension      Past Surgical History:   Procedure Laterality Date   • APPENDECTOMY     • CHOLECYSTECTOMY     • HERNIA REPAIR     • HYSTERECTOMY     • NEPHRECTOMY Right      Allergies   Allergen Reactions   • Penicillin G Anaphylaxis   • Spironolactone Shortness Of Breath   • Atorvastatin      Other reaction(s): Leg Cramps   • Cephalexin Headache   • Chocolate - Food Allergy    • Citalopram Abdominal Pain and Headache   • Diltiazem    • Fosinopril Sodium-Hctz    • Methylprednisolone Nausea Only     Tablet generic only   • Monopril [Fosinopril]      Reaction Date: 28Apr2011;    • Penicillins    • Pollen Extract    • Pravastatin Myalgia     Other reaction(s): Leg Cramps   • Sporanox [Itraconazole]      Reaction Date: 28Apr2011;    • Strawberry C [Ascorbate - Food Allergy]    • Caffeine - Food Allergy Palpitations     Tachycardia       Current Outpatient Medications:   •  ALPRAZolam (XANAX) 0 25 mg tablet, Take 1 tablet (0 25 mg total) by mouth 4 (four) times a day as needed for anxiety, Disp: 120 tablet, Rfl: 3  •  amiodarone 100 mg tablet, Take 1 tablet (100 mg total) by mouth daily, Disp: 90 tablet, Rfl: 3  •  Artificial Saliva (Xerostomia Relief [place]) SOLN, Apply 1 application to the mouth or throat 4 (four) times a day, Disp: 10 mL, Rfl: 3  •  aspirin (ECOTRIN LOW STRENGTH) 81 mg EC tablet, Take 81 mg by mouth every other day, Disp: , Rfl:   •  calamine-zinc oxide 8-8 %, Apply topically 4 (four) times a day to the rectum for anal soreness and pain, Disp: 177 mL, Rfl: 0  •  carbamide peroxide (DEBROX) 6 5 % otic solution, Administer 5 drops into both ears 2 (two) times a day For earwax removal, Disp: 15 mL, Rfl: 0  •  Emollient (EUCERIN) lotion, Apply topically 2 (two) times a day, Disp: , Rfl:   •  famotidine (PEPCID) 40 MG tablet, TAKE ONE TABLET BY MOUTH EVERY DAY, Disp: 30 tablet, Rfl: 2  •  furosemide (LASIX) 20 mg tablet, Take 2 tablets (40 mg total) by mouth daily, Disp: 60 tablet, Rfl: 3  •  glycerin-hypromellose- (ARTIFICIAL TEARS) 0 2-0 2-1 % SOLN, Administer 1 drop to both eyes 3 (three) times a day as needed (dry eyes), Disp: 15 mL, Rfl: 0  •  levothyroxine 50 mcg tablet, Take 1 tablet (50 mcg total) by mouth daily, Disp: 30 tablet, Rfl: 3  •  lidocaine (LMX) 4 % cream, Apply topically 4 (four) times a day as needed for mild pain To the rectal area, Disp: 30 g, Rfl: 0  •  polyethylene glycol (MIRALAX) 17 g packet, Take 17g by mouth daily  If constipation worsens, take 17 g by mouth two times daily  , Disp: 72 each, Rfl: 0  •  predniSONE 1 mg tablet, Take 1 tablet (1 mg total) by mouth daily, Disp: 90 tablet, Rfl: 0  •  zolpidem (AMBIEN CR) 12 5 MG CR tablet, Take 1 tablet (12 5 mg total) by mouth daily at bedtime, Disp: 30 tablet, Rfl: 3  •  bisacodyl (DULCOLAX) 10 mg suppository, Insert 1 suppository (10 mg total) into the rectum daily as needed for constipation (Patient not taking: Reported on 11/22/2022), Disp: 12 suppository, Rfl: 0  •  Crestor 5 MG tablet, TAKE ONE TABLET BY MOUTH EVERY DAY (Patient not taking: Reported on 11/22/2022), Disp: 90 tablet, Rfl: 1  •  linaCLOtide (Linzess) 72 MCG CAPS, Take 1 capsule by mouth daily (Patient not taking: Reported on 11/22/2022), Disp: 30 capsule, Rfl: 3  •  pilocarpine (SALAGEN) 5 mg tablet, Take 1 tablet (5 mg total) by mouth 2 (two) times a day (Patient not taking: Reported on 11/22/2022), Disp: 60 tablet, Rfl: 2  •  Respiratory Therapy Supplies (NEBULIZER/ADULT MASK) KIT, by Does not apply route every 4 (four) hours as needed (wheezing) (Patient not taking: Reported on 11/22/2022), Disp: 1 each, Rfl: 0  •  simethicone (Samantha Civil) 80 mg chewable tablet, Chew 1 tablet (80 mg total) every 6 (six) hours as needed for flatulence (Patient not taking: Reported on 11/22/2022), Disp: 30 tablet, Rfl: 0    Review of Systems  Constitutional:     Denies fever, chills ,fatigue ,weakness ,weight loss, weight gain     ENT: Denies earache ,loss of hearing ,nosebleed, nasal discharge,nasal congestion ,sore throat ,hoarseness  Pulmonary: Denies shortness of breath ,cough  ,dyspnea on exertion, orthopnea  ,PND   Cardiovascular:  Denies bradycardia , tachycardia  ,palpations, lower extremity edema leg, claudication  Breast:  Denies new or changing breast lumps ,nipple discharge ,nipple changes  Abdomen:  Denies abdominal pain , anorexia , indigestion, nausea, vomiting, constipation, diarrhea  Musculoskeletal: Denies myalgias, arthralgias, joint swelling, joint stiffness , limb pain, limb swelling  Gu: denies dysuria, polyuria  Skin: Denies skin rash, skin lesion, skin wound, itching, dry skin  Neuro: Denies headache, numbness, tingling, confusion, loss of consciousness, dizziness, vertigo  Psychiatric: Denies feelings of depression, suicidal ideation, anxiety, sleep disturbances    OBJECTIVE  /70   Pulse 56   Temp 97 6 °F (36 4 °C)   Ht 5' 7" (1 702 m)   Wt 82 8 kg (182 lb 9 6 oz)   SpO2 95%   BMI 28 60 kg/m²   Constitutional:   NAD, well appearing and well nourished      ENT:   Conjunctiva and lids: no injection, edema, or discharge     Pupils and iris: ANKIT bilaterally    External inspection of ears and nose: normal without deformities or discharge  Otoscopic exam: Canals patent without erythema  Nasal mucosa, septum and turbinates: Normal or edema or discharge         Oropharynx:  Moist mucosa, normal tongue and tonsils without lesions  No erythema        Pulmonary:Respiratory effort normal rate and rhythm, no increased work of breathing   Auscultation of lungs:  Clear bilaterally with no adventitious breath sounds       Cardiovascular: regular rate and rhythm, S1 and S2, no murmur, no edema and/or varicosities of LE      Abdomen: Soft and non-distended     Positive bowel sounds      No heptomegaly or splenomegaly      Gu: no suprapubic tenderness or CVA tenderness, no urethral discharge  Lymphatic:  No anterior or posterior cervical lymphadenopathy         Musculoskeletal:  Gait and station: Normal gait      Digits and nails normal without clubbing or cyanosis       Inspection/palpation of joints, bones, and muscles:  No joint tenderness, swelling, full active and passive range of motion       Skin: Normal skin turgor and no rashes      Neuro:       Normal reflexes     Psych:   alert and oriented to person, place and time     normal mood and affect       Assessment/Plan:Diagnoses and all orders for this visit:    Anxiety  Comments:  Stable on current meds  Orders:  -     ALPRAZolam (XANAX) 0 25 mg tablet; Take 1 tablet (0 25 mg total) by mouth 4 (four) times a day as needed for anxiety    Hypothyroidism, unspecified type  Comments:  Continue with levothyroxine therapy   Orders:  -     levothyroxine 50 mcg tablet; Take 1 tablet (50 mcg total) by mouth daily    Insomnia, unspecified type  Comments:  continue zolpidem therapy  Orders:  -     zolpidem (AMBIEN CR) 12 5 MG CR tablet; Take 1 tablet (12 5 mg total) by mouth daily at bedtime    Chronic diastolic congestive heart failure ([address] )  Comments:  Continue low sodium, daily weights and diuretic tx  Mixed hyperlipidemia  Comments:  continue Crestor therapy  I will see patient back in 4 mos or sooner prn

## 2022-12-13 ENCOUNTER — TELEPHONE (OUTPATIENT)
Dept: FAMILY MEDICINE CLINIC | Facility: CLINIC | Age: 87
End: 2022-12-13

## 2022-12-13 NOTE — TELEPHONE ENCOUNTER
Patient's daughter called asking if her mom is supposed to get her blood work done now, or 2 weeks before her next appointment     Please advise

## 2022-12-27 ENCOUNTER — APPOINTMENT (OUTPATIENT)
Dept: LAB | Facility: CLINIC | Age: 87
End: 2022-12-27

## 2022-12-27 DIAGNOSIS — E03.9 HYPOTHYROIDISM, UNSPECIFIED TYPE: ICD-10-CM

## 2022-12-27 LAB — TSH SERPL DL<=0.05 MIU/L-ACNC: 3.96 UIU/ML (ref 0.45–4.5)

## 2023-01-06 DIAGNOSIS — I50.32 CHRONIC DIASTOLIC HEART FAILURE (HCC): ICD-10-CM

## 2023-01-06 RX ORDER — FUROSEMIDE 20 MG/1
TABLET ORAL
Qty: 60 TABLET | Refills: 3 | Status: SHIPPED | OUTPATIENT
Start: 2023-01-06

## 2023-01-16 DIAGNOSIS — I50.32 CHRONIC DIASTOLIC HEART FAILURE (HCC): ICD-10-CM

## 2023-01-16 DIAGNOSIS — K29.00 ACUTE GASTRITIS WITHOUT HEMORRHAGE, UNSPECIFIED GASTRITIS TYPE: ICD-10-CM

## 2023-01-17 RX ORDER — FUROSEMIDE 20 MG/1
TABLET ORAL
Qty: 60 TABLET | Refills: 3 | Status: SHIPPED | OUTPATIENT
Start: 2023-01-17

## 2023-01-17 RX ORDER — FAMOTIDINE 40 MG/1
TABLET, FILM COATED ORAL
Qty: 30 TABLET | Refills: 2 | Status: SHIPPED | OUTPATIENT
Start: 2023-01-17

## 2023-02-01 NOTE — TELEPHONE ENCOUNTER
Message given to [facility] Pregnancy And Lactation Text: This medication is Pregnancy Category B and is considered safe. There is some data to suggest avoiding during the third trimester. It is unknown if this medication is excreted in breast milk.

## 2023-02-05 DIAGNOSIS — I48.91 ATRIAL FIBRILLATION, UNSPECIFIED TYPE (HCC): ICD-10-CM

## 2023-02-06 RX ORDER — AMIODARONE HYDROCHLORIDE 100 MG/1
TABLET ORAL
Qty: 90 TABLET | Refills: 3 | Status: SHIPPED | OUTPATIENT
Start: 2023-02-06

## 2023-03-13 DIAGNOSIS — E03.9 HYPOTHYROIDISM, UNSPECIFIED TYPE: ICD-10-CM

## 2023-03-13 RX ORDER — LEVOTHYROXINE SODIUM 0.05 MG/1
TABLET ORAL
Qty: 30 TABLET | Refills: 3 | Status: SHIPPED | OUTPATIENT
Start: 2023-03-13

## 2023-03-21 ENCOUNTER — RA CDI HCC (OUTPATIENT)
Dept: OTHER | Facility: HOSPITAL | Age: 88
End: 2023-03-21

## 2023-03-22 ENCOUNTER — OFFICE VISIT (OUTPATIENT)
Dept: FAMILY MEDICINE CLINIC | Facility: CLINIC | Age: 88
End: 2023-03-22

## 2023-03-22 VITALS
TEMPERATURE: 97.9 F | HEIGHT: 67 IN | SYSTOLIC BLOOD PRESSURE: 122 MMHG | WEIGHT: 182.4 LBS | BODY MASS INDEX: 28.63 KG/M2 | HEART RATE: 53 BPM | OXYGEN SATURATION: 92 % | DIASTOLIC BLOOD PRESSURE: 70 MMHG

## 2023-03-22 DIAGNOSIS — I50.32 CHRONIC DIASTOLIC CONGESTIVE HEART FAILURE (HCC): ICD-10-CM

## 2023-03-22 DIAGNOSIS — E78.2 MIXED HYPERLIPIDEMIA: ICD-10-CM

## 2023-03-22 DIAGNOSIS — H60.501 ACUTE OTITIS EXTERNA OF RIGHT EAR, UNSPECIFIED TYPE: ICD-10-CM

## 2023-03-22 DIAGNOSIS — E03.9 HYPOTHYROIDISM, UNSPECIFIED TYPE: ICD-10-CM

## 2023-03-22 DIAGNOSIS — I10 ESSENTIAL HYPERTENSION: Primary | ICD-10-CM

## 2023-03-22 DIAGNOSIS — N18.5 CHRONIC KIDNEY DISEASE, STAGE 5 (HCC): ICD-10-CM

## 2023-03-22 DIAGNOSIS — I48.0 PAROXYSMAL ATRIAL FIBRILLATION (HCC): ICD-10-CM

## 2023-03-22 DIAGNOSIS — K31.84 GASTROPARESIS: ICD-10-CM

## 2023-03-22 DIAGNOSIS — B37.2 CUTANEOUS CANDIDIASIS: ICD-10-CM

## 2023-03-22 RX ORDER — METOCLOPRAMIDE 5 MG/1
TABLET ORAL
Qty: 30 TABLET | Refills: 3 | Status: SHIPPED | OUTPATIENT
Start: 2023-03-22 | End: 2023-03-28

## 2023-03-22 NOTE — PROGRESS NOTES
[facility]  coding opportunities     I42 9, I13 0     Chart Reviewed number of suggestions sent to Provider: 2     Patients Insurance     Medicare Insurance: [employer]

## 2023-03-23 ENCOUNTER — TELEPHONE (OUTPATIENT)
Dept: FAMILY MEDICINE CLINIC | Facility: CLINIC | Age: 88
End: 2023-03-23

## 2023-03-23 DIAGNOSIS — H60.501 ACUTE OTITIS EXTERNA OF RIGHT EAR, UNSPECIFIED TYPE: Primary | ICD-10-CM

## 2023-03-23 NOTE — TELEPHONE ENCOUNTER
lEsi called from pharmacy  Asking for a substitute for Neomycin TC  This is no longer being made or available  Please send over substitute      Stalin Lacy 79

## 2023-03-28 ENCOUNTER — TELEPHONE (OUTPATIENT)
Dept: FAMILY MEDICINE CLINIC | Facility: CLINIC | Age: 88
End: 2023-03-28

## 2023-03-28 DIAGNOSIS — R10.9 ABDOMINAL SPASMS: Primary | ICD-10-CM

## 2023-03-28 RX ORDER — HYOSCYAMINE SULFATE 0.125 MG
0.12 TABLET,DISINTEGRATING ORAL EVERY 4 HOURS PRN
Qty: 40 TABLET | Refills: 1 | Status: SHIPPED | OUTPATIENT
Start: 2023-03-28

## 2023-03-28 NOTE — PROGRESS NOTES
Patient ID: Meron Vogt is a 80 y o  female  HPI: 80 y  o female presents for follow up hypertension , hypercholesterolemia, chf, ckd, afib and hypothyroidism    Pt denies any dizziness,  chest pain, palpitations, or dypsnea with exertion  She is complaining of gastroparesis(pain and bloating after eating with early satiety), right ear pain , or irritated red, skin fold under breast area  SUBJECTIVE    Family History   Problem Relation Age of Onset   • Heart disease Family    • Hypertension Family    • Cancer Family      Social History     Socioeconomic History   • Marital status:       Spouse name: Not on file   • Number of children: Not on file   • Years of education: Less than high school   • Highest education level: Not on file   Occupational History   • Occupation: Retired   Tobacco Use   • Smoking status: Never     Passive exposure: Current   • Smokeless tobacco: Never   Substance and Sexual Activity   • Alcohol use: Not Currently   • Drug use: Never   • Sexual activity: Not Currently     Partners: Male     Birth control/protection: Post-menopausal   Other Topics Concern   • Not on file   Social History Narrative    Always uses seatbelt    Denied:  History of daily caffeinated cola consumption    Denied:  History of daily coffee consumption    Daily tea consumption    Denied:  History of dental care, regularly    Exercise:  Walking    Living will in place    No Latter day beliefs    Power of  in existence    Water intake, adequate (per day)     Social Determinants of Health     Financial Resource Strain: Not on file   Food Insecurity: Not on file   Transportation Needs: Not on file   Physical Activity: Not on file   Stress: Not on file   Social Connections: Not on file   Intimate Partner Violence: Not on file   Housing Stability: Not on file     Past Medical History:   Diagnosis Date   • A-fib [facility])    • Cardiac disease    • Hyperlipidemia    • Hypertension      Past Surgical History: Procedure Laterality Date   • APPENDECTOMY     • CHOLECYSTECTOMY     • HERNIA REPAIR     • HYSTERECTOMY     • NEPHRECTOMY Right      Allergies   Allergen Reactions   • Penicillin G Anaphylaxis   • Spironolactone Shortness Of Breath   • Atorvastatin      Other reaction(s): Leg Cramps   • Cephalexin Headache   • Chocolate - Food Allergy    • Citalopram Abdominal Pain and Headache   • Diltiazem    • Fosinopril Sodium-Hctz    • Methylprednisolone Nausea Only     Tablet generic only   • Monopril [Fosinopril]      Reaction Date: [id number];    • Penicillins    • Pollen Extract    • Pravastatin Myalgia     Other reaction(s): Leg Cramps   • Sporanox [Itraconazole]      Reaction Date: [id number];    • Strawberry C [Ascorbate - Food Allergy]    • Caffeine - Food Allergy Palpitations     Tachycardia       Current Outpatient Medications:   •  ALPRAZolam (XANAX) 0 25 mg tablet, Take 1 tablet (0 25 mg total) by mouth 4 (four) times a day as needed for anxiety, Disp: 120 tablet, Rfl: 3  •  amiodarone 100 mg tablet, TAKE ONE TABLET BY MOUTH DAILY, Disp: 90 tablet, Rfl: 3  •  Artificial Saliva (Xerostomia Relief [place]) SOLN, Apply 1 application to the mouth or throat 4 (four) times a day, Disp: 10 mL, Rfl: 3  •  aspirin (ECOTRIN LOW STRENGTH) 81 mg EC tablet, Take 81 mg by mouth every other day, Disp: , Rfl:   •  carbamide peroxide (DEBROX) 6 5 % otic solution, Administer 5 drops into both ears 2 (two) times a day For earwax removal, Disp: 15 mL, Rfl: 0  •  Emollient (EUCERIN) lotion, Apply topically 2 (two) times a day, Disp: , Rfl:   •  famotidine (PEPCID) 40 MG tablet, TAKE ONE TABLET BY MOUTH EVERY DAY, Disp: 30 tablet, Rfl: 2  •  furosemide (LASIX) 20 mg tablet, TAKE TWO TABLETS BY MOUTH EVERY DAY, Disp: 60 tablet, Rfl: 3  •  glycerin-hypromellose- (ARTIFICIAL TEARS) 0 2-0 2-1 % SOLN, Administer 1 drop to both eyes 3 (three) times a day as needed (dry eyes), Disp: 15 mL, Rfl: 0  •  levothyroxine 50 mcg tablet, TAKE ONE TABLET BY MOUTH EVERY DAY, Disp: 30 tablet, Rfl: 3  •  metoclopramide (REGLAN) 5 mg tablet, 1 tab prior to supper, Disp: 30 tablet, Rfl: 3  •  neomycin-colistin-hydrocortisone-thonzonium (CORTISPORIN TC) 0 33-0 3-1-0 05 % otic suspension, Administer 4 drops to the right ear 3 (three) times a day, Disp: 10 mL, Rfl: 0  •  nystatin-triamcinolone (MYCOLOG-II) cream, Apply topically 2 (two) times a day, Disp: 60 g, Rfl: 3  •  polyethylene glycol (MIRALAX) 17 g packet, Take 17g by mouth daily  If constipation worsens, take 17 g by mouth two times daily  , Disp: 72 each, Rfl: 0  •  zolpidem (AMBIEN CR) 12 5 MG CR tablet, Take 1 tablet (12 5 mg total) by mouth daily at bedtime, Disp: 30 tablet, Rfl: 3  •  bisacodyl (DULCOLAX) 10 mg suppository, Insert 1 suppository (10 mg total) into the rectum daily as needed for constipation (Patient not taking: Reported on 11/22/2022), Disp: 12 suppository, Rfl: 0  •  calamine-zinc oxide 8-8 %, Apply topically 4 (four) times a day to the rectum for anal soreness and pain (Patient not taking: Reported on 3/22/2023), Disp: 177 mL, Rfl: 0  •  ciprofloxacin-hydrocortisone (CIPRO HC OTIC) otic suspension, Administer 3 drops to the right ear 2 (two) times a day, Disp: 10 mL, Rfl: 0  •  lidocaine (LMX) 4 % cream, Apply topically 4 (four) times a day as needed for mild pain To the rectal area (Patient not taking: Reported on 3/22/2023), Disp: 30 g, Rfl: 0  •  predniSONE 1 mg tablet, Take 1 tablet (1 mg total) by mouth daily (Patient not taking: Reported on 3/22/2023), Disp: 90 tablet, Rfl: 0    Review of Systems  Constitutional:     Denies fever, chills ,fatigue ,weakness ,weight loss, weight gain     ENT: + Right  earache ,denies any loss of hearing ,nosebleed, nasal discharge,nasal congestion ,sore throat ,or hoarseness  Pulmonary: Denies shortness of breath ,cough  ,dyspnea on exertion, orthopnea  ,PND   Cardiovascular:  Denies bradycardia , tachycardia  ,palpations, lower extremity edema leg, "claudication  Breast:  Denies new or changing breast lumps ,nipple discharge ,nipple changes  Abdomen:  Denies abdominal pain , anorexia , indigestion, nausea, vomiting, constipation, diarrhea+ abdominal distention, bloating  Musculoskeletal: Denies myalgias, arthralgias, joint swelling, joint stiffness , limb pain, limb swelling  Gu: denies dysuria, polyuria  Skin: + red, macerated itchy skin under breasts  Neuro: Denies headache, numbness, tingling, confusion, loss of consciousness, dizziness, vertigo  Psychiatric: Denies feelings of depression, suicidal ideation, anxiety, sleep disturbances    OBJECTIVE  /70   Pulse (!) 53   Temp 97 9 °F (36 6 °C)   Ht 5' 7\" (1 702 m)   Wt 82 7 kg (182 lb 6 4 oz)   SpO2 92%   BMI 28 57 kg/m²   Constitutional:   NAD, well appearing and well nourished      ENT:   Conjunctiva and lids: no injection, edema, or discharge     Pupils and iris: ANKIT bilaterally    External inspection of ears and nose: normal without deformities or discharge  Otoscopic exam: Right external canal erythematous  And edematous      Nasal mucosa, septum and turbinates: Normal or edema or discharge         Oropharynx:  Moist mucosa, normal tongue and tonsils without lesions  + erythema of right external canal        Pulmonary:Respiratory effort normal rate and rhythm, no increased work of breathing   Auscultation of lungs:  Clear bilaterally with no adventitious breath sounds       Cardiovascular: regular rate and rhythm, S1 and S2, no murmur, no edema and/or varicosities of LE      Abdomen: Soft and non-distended     Positive bowel sounds      No heptomegaly or splenomegaly      Gu: no suprapubic tenderness or CVA tenderness, no urethral discharge  Lymphatic:  No anterior or posterior cervical lymphadenopathy         Musculoskeletal:  Gait and station: Normal gait      Digits and nails normal without clubbing or cyanosis       Inspection/palpation of joints, bones, and muscles:  No joint " tenderness, swelling, full active and passive range of motion       Skin: Normal skin turgor and erythematous, macerated rash under breasts bilaterally     Neuro:     Normal reflexes      Psych:   alert and oriented to person, place and time     normal mood and affect       Assessment/Plan:Diagnoses and all orders for this visit:    Essential hypertension  Comments:  Stable on presnt tx  Orders:  -     Comprehensive metabolic panel; Future    Mixed hyperlipidemia  Comments:  Stable on statin tx  Orders:  -     Lipid panel; Future    Cutaneous candidiasis  -     nystatin-triamcinolone (MYCOLOG-II) cream; Apply topically 2 (two) times a day    Gastroparesis  -     metoclopramide (REGLAN) 5 mg tablet; 1 tab prior to supper    Acute otitis externa of right ear, unspecified type  -     neomycin-colistin-hydrocortisone-thonzonium (CORTISPORIN TC) 0 33-0 3-1-0 05 % otic suspension; Administer 4 drops to the right ear 3 (three) times a day    Chronic diastolic congestive heart failure (HCC)  Comments:  Cotninue diuretics, daily weights, and low sodium diet  Chronic kidney disease, stage 5 (HCC)  Comments:  Cotninue renal surveillence    Paroxysmal atrial fibrillation ([facility] [address] )  Comments:  Continue amiodarone tx    Hypothyroidism, unspecified type  Comments:  Cotninue levothyroxine tx  Orders:  -     TSH, 3rd generation; Future        Reviewed with patient plan to treat with above plan      Patient instructed to call in 72 hours if not feeling better or if symptoms worse

## 2023-03-28 NOTE — TELEPHONE ENCOUNTER
Patient is not tolerating metoclopramide  Daughter states patient is still in pain and woozy on her feet after asking the medication       Pt # [phone]

## 2023-06-09 DIAGNOSIS — I50.32 CHRONIC DIASTOLIC HEART FAILURE (HCC): ICD-10-CM

## 2023-06-09 RX ORDER — FUROSEMIDE 20 MG/1
TABLET ORAL
Qty: 60 TABLET | Refills: 3 | Status: SHIPPED | OUTPATIENT
Start: 2023-06-09

## 2023-06-19 DIAGNOSIS — R10.9 ABDOMINAL SPASMS: ICD-10-CM

## 2023-06-19 RX ORDER — HYOSCYAMINE SULFATE 0.125 MG
TABLET,DISINTEGRATING ORAL
Qty: 40 TABLET | Refills: 1 | Status: SHIPPED | OUTPATIENT
Start: 2023-06-19

## 2023-07-09 DIAGNOSIS — K29.00 ACUTE GASTRITIS WITHOUT HEMORRHAGE, UNSPECIFIED GASTRITIS TYPE: ICD-10-CM

## 2023-07-09 DIAGNOSIS — E03.9 HYPOTHYROIDISM, UNSPECIFIED TYPE: ICD-10-CM

## 2023-07-10 RX ORDER — LEVOTHYROXINE SODIUM 0.05 MG/1
TABLET ORAL
Qty: 30 TABLET | Refills: 3 | Status: SHIPPED | OUTPATIENT
Start: 2023-07-10

## 2023-07-10 RX ORDER — FAMOTIDINE 40 MG/1
TABLET, FILM COATED ORAL
Qty: 30 TABLET | Refills: 2 | Status: SHIPPED | OUTPATIENT
Start: 2023-07-10

## 2023-07-25 ENCOUNTER — APPOINTMENT (OUTPATIENT)
Dept: LAB | Facility: CLINIC | Age: 88
End: 2023-07-25
Payer: COMMERCIAL

## 2023-07-25 DIAGNOSIS — I10 ESSENTIAL HYPERTENSION: ICD-10-CM

## 2023-07-25 DIAGNOSIS — E78.2 MIXED HYPERLIPIDEMIA: ICD-10-CM

## 2023-07-25 DIAGNOSIS — E03.9 HYPOTHYROIDISM, UNSPECIFIED TYPE: ICD-10-CM

## 2023-07-25 LAB
ALBUMIN SERPL BCP-MCNC: 3.8 G/DL (ref 3.5–5)
ALP SERPL-CCNC: 79 U/L (ref 46–116)
ALT SERPL W P-5'-P-CCNC: 19 U/L (ref 12–78)
ANION GAP SERPL CALCULATED.3IONS-SCNC: 6 MMOL/L
AST SERPL W P-5'-P-CCNC: 18 U/L (ref 5–45)
BILIRUB SERPL-MCNC: 0.84 MG/DL (ref 0.2–1)
BUN SERPL-MCNC: 29 MG/DL (ref 5–25)
CALCIUM SERPL-MCNC: 8.9 MG/DL (ref 8.3–10.1)
CHLORIDE SERPL-SCNC: 104 MMOL/L (ref 96–108)
CHOLEST SERPL-MCNC: 156 MG/DL
CO2 SERPL-SCNC: 27 MMOL/L (ref 21–32)
CREAT SERPL-MCNC: 2.26 MG/DL (ref 0.6–1.3)
GFR SERPL CREATININE-BSD FRML MDRD: 17 ML/MIN/1.73SQ M
GLUCOSE P FAST SERPL-MCNC: 97 MG/DL (ref 65–99)
HDLC SERPL-MCNC: 36 MG/DL
LDLC SERPL CALC-MCNC: 80 MG/DL (ref 0–100)
NONHDLC SERPL-MCNC: 120 MG/DL
POTASSIUM SERPL-SCNC: 4.6 MMOL/L (ref 3.5–5.3)
PROT SERPL-MCNC: 7.1 G/DL (ref 6.4–8.4)
SODIUM SERPL-SCNC: 137 MMOL/L (ref 135–147)
TRIGL SERPL-MCNC: 200 MG/DL
TSH SERPL DL<=0.05 MIU/L-ACNC: 3.56 UIU/ML (ref 0.45–4.5)

## 2023-07-25 PROCEDURE — 84443 ASSAY THYROID STIM HORMONE: CPT

## 2023-07-25 PROCEDURE — 80061 LIPID PANEL: CPT

## 2023-07-25 PROCEDURE — 80053 COMPREHEN METABOLIC PANEL: CPT

## 2023-07-25 PROCEDURE — 36415 COLL VENOUS BLD VENIPUNCTURE: CPT

## 2023-07-27 ENCOUNTER — OFFICE VISIT (OUTPATIENT)
Dept: FAMILY MEDICINE CLINIC | Facility: CLINIC | Age: 88
End: 2023-07-27
Payer: COMMERCIAL

## 2023-07-27 VITALS
BODY MASS INDEX: 28.94 KG/M2 | HEIGHT: 67 IN | TEMPERATURE: 97.5 F | OXYGEN SATURATION: 96 % | SYSTOLIC BLOOD PRESSURE: 122 MMHG | DIASTOLIC BLOOD PRESSURE: 72 MMHG | WEIGHT: 184.4 LBS | HEART RATE: 49 BPM

## 2023-07-27 DIAGNOSIS — E03.9 HYPOTHYROIDISM, UNSPECIFIED TYPE: ICD-10-CM

## 2023-07-27 DIAGNOSIS — E78.2 MIXED HYPERLIPIDEMIA: ICD-10-CM

## 2023-07-27 DIAGNOSIS — Z00.00 MEDICARE ANNUAL WELLNESS VISIT, SUBSEQUENT: Primary | ICD-10-CM

## 2023-07-27 DIAGNOSIS — K31.84 GASTROPARESIS: ICD-10-CM

## 2023-07-27 DIAGNOSIS — I48.0 PAROXYSMAL ATRIAL FIBRILLATION (HCC): ICD-10-CM

## 2023-07-27 DIAGNOSIS — I10 ESSENTIAL HYPERTENSION: ICD-10-CM

## 2023-07-27 PROCEDURE — 99214 OFFICE O/P EST MOD 30 MIN: CPT | Performed by: FAMILY MEDICINE

## 2023-07-27 PROCEDURE — G0439 PPPS, SUBSEQ VISIT: HCPCS | Performed by: FAMILY MEDICINE

## 2023-07-27 RX ORDER — METOCLOPRAMIDE 5 MG/1
TABLET ORAL
Qty: 30 TABLET | Refills: 2 | Status: SHIPPED | OUTPATIENT
Start: 2023-07-27

## 2023-07-27 NOTE — PATIENT INSTRUCTIONS
Medicare Preventive Visit Patient Instructions  Thank you for completing your Welcome to Medicare Visit or Medicare Annual Wellness Visit today. Your next wellness visit will be due in one year (7/27/2024). The screening/preventive services that you may require over the next 5-10 years are detailed below. Some tests may not apply to you based off risk factors and/or age. Screening tests ordered at today's visit but not completed yet may show as past due. Also, please note that scanned in results may not display below. Preventive Screenings:  Service Recommendations Previous Testing/Comments   Colorectal Cancer Screening  * Colonoscopy    * Fecal Occult Blood Test (FOBT)/Fecal Immunochemical Test (FIT)  * Fecal DNA/Cologuard Test  * Flexible Sigmoidoscopy Age: 43-73 years old   Colonoscopy: every 10 years (may be performed more frequently if at higher risk)  OR  FOBT/FIT: every 1 year  OR  Cologuard: every 3 years  OR  Sigmoidoscopy: every 5 years  Screening may be recommended earlier than age 39 if at higher risk for colorectal cancer. Also, an individualized decision between you and your healthcare provider will decide whether screening between the ages of 77-80 would be appropriate. Colonoscopy: Not on file  FOBT/FIT: Not on file  Cologuard: Not on file  Sigmoidoscopy: Not on file    Screening Not Indicated     Breast Cancer Screening Age: 36 years old  Frequency: every 1-2 years  Not required if history of left and right mastectomy Mammogram: 04/24/2013        Cervical Cancer Screening Between the ages of 21-29, pap smear recommended once every 3 years. Between the ages of 32-69, can perform pap smear with HPV co-testing every 5 years.    Recommendations may differ for women with a history of total hysterectomy, cervical cancer, or abnormal pap smears in past. Pap Smear: Not on file    Screening Not Indicated   Hepatitis C Screening Once for adults born between 1945 and 1965  More frequently in patients at high risk for Hepatitis C Hep C Antibody: Not on file        Diabetes Screening 1-2 times per year if you're at risk for diabetes or have pre-diabetes Fasting glucose: 97 mg/dL (7/25/2023)  A1C: No results in last 5 years (No results in last 5 years)  Screening Current   Cholesterol Screening Once every 5 years if you don't have a lipid disorder. May order more often based on risk factors. Lipid panel: 07/25/2023    Screening Not Indicated  History Lipid Disorder     Other Preventive Screenings Covered by Medicare:  1. Abdominal Aortic Aneurysm (AAA) Screening: covered once if your at risk. You're considered to be at risk if you have a family history of AAA. 2. Lung Cancer Screening: covers low dose CT scan once per year if you meet all of the following conditions: (1) Age 48-67; (2) No signs or symptoms of lung cancer; (3) Current smoker or have quit smoking within the last 15 years; (4) You have a tobacco smoking history of at least 20 pack years (packs per day multiplied by number of years you smoked); (5) You get a written order from a healthcare provider. 3. Glaucoma Screening: covered annually if you're considered high risk: (1) You have diabetes OR (2) Family history of glaucoma OR (3)  aged 48 and older OR (3)  American aged 72 and older  3. Osteoporosis Screening: covered every 2 years if you meet one of the following conditions: (1) You're estrogen deficient and at risk for osteoporosis based off medical history and other findings; (2) Have a vertebral abnormality; (3) On glucocorticoid therapy for more than 3 months; (4) Have primary hyperparathyroidism; (5) On osteoporosis medications and need to assess response to drug therapy. · Last bone density test (DXA Scan): 06/16/2011. 5. HIV Screening: covered annually if you're between the age of 14-79. Also covered annually if you are younger than 13 and older than 72 with risk factors for HIV infection.  For pregnant patients, it is covered up to 3 times per pregnancy. Immunizations:  Immunization Recommendations   Influenza Vaccine Annual influenza vaccination during flu season is recommended for all persons aged >= 6 months who do not have contraindications   Pneumococcal Vaccine   * Pneumococcal conjugate vaccine = PCV13 (Prevnar 13), PCV15 (Vaxneuvance), PCV20 (Prevnar 20)  * Pneumococcal polysaccharide vaccine = PPSV23 (Pneumovax) Adults 20-63 years old: 1-3 doses may be recommended based on certain risk factors  Adults 72 years old: 1-2 doses may be recommended based off what pneumonia vaccine you previously received   Hepatitis B Vaccine 3 dose series if at intermediate or high risk (ex: diabetes, end stage renal disease, liver disease)   Tetanus (Td) Vaccine - COST NOT COVERED BY MEDICARE PART B Following completion of primary series, a booster dose should be given every 10 years to maintain immunity against tetanus. Td may also be given as tetanus wound prophylaxis. Tdap Vaccine - COST NOT COVERED BY MEDICARE PART B Recommended at least once for all adults. For pregnant patients, recommended with each pregnancy. Shingles Vaccine (Shingrix) - COST NOT COVERED BY MEDICARE PART B  2 shot series recommended in those aged 48 and above     Health Maintenance Due:  There are no preventive care reminders to display for this patient. Immunizations Due:      Topic Date Due   • COVID-19 Vaccine (4 - Pfizer series) 02/04/2022   • Influenza Vaccine (1) 09/01/2023     Advance Directives   What are advance directives? Advance directives are legal documents that state your wishes and plans for medical care. These plans are made ahead of time in case you lose your ability to make decisions for yourself. Advance directives can apply to any medical decision, such as the treatments you want, and if you want to donate organs. What are the types of advance directives?   There are many types of advance directives, and each state has rules about how to use them. You may choose a combination of any of the following:  · Living will: This is a written record of the treatment you want. You can also choose which treatments you do not want, which to limit, and which to stop at a certain time. This includes surgery, medicine, IV fluid, and tube feedings. · Durable power of  for [facility]): This is a written record that states who you want to make healthcare choices for you when you are unable to make them for yourself. This person, called a proxy, is usually a family member or a friend. You may choose more than 1 proxy. · Do not resuscitate (DNR) order:  A DNR order is used in case your heart stops beating or you stop breathing. It is a request not to have certain forms of treatment, such as CPR. A DNR order may be included in other types of advance directives. · Medical directive: This covers the care that you want if you are in a coma, near death, or unable to make decisions for yourself. You can list the treatments you want for each condition. Treatment may include pain medicine, surgery, blood transfusions, dialysis, IV or tube feedings, and a ventilator (breathing machine). · Values history: This document has questions about your views, beliefs, and how you feel and think about life. This information can help others choose the care that you would choose. Why are advance directives important? An advance directive helps you control your care. Although spoken wishes may be used, it is better to have your wishes written down. Spoken wishes can be misunderstood, or not followed. Treatments may be given even if you do not want them. An advance directive may make it easier for your family to make difficult choices about your care. Fall Prevention    Fall prevention  includes ways to make your home and other areas safer. It also includes ways you can move more carefully to prevent a fall.  Health conditions that cause changes in your blood pressure, vision, or muscle strength and coordination may increase your risk for falls. Medicines may also increase your risk for falls if they make you dizzy, weak, or sleepy. Fall prevention tips:   · Stand or sit up slowly. · Use assistive devices as directed. · Wear shoes that fit well and have soles that . · Wear a personal alarm. · Stay active. · Manage your medical conditions. Home Safety Tips:  · Add items to prevent falls in the bathroom. · Keep paths clear. · Install bright lights in your home. · Keep items you use often on shelves within reach. · Paint or place reflective tape on the edges of your stairs. Urinary Incontinence   Urinary incontinence (UI)  is when you lose control of your bladder. UI develops because your bladder cannot store or empty urine properly. The 3 most common types of UI are stress incontinence, urge incontinence, or both. Medicines:   · May be given to help strengthen your bladder control. Report any side effects of medication to your healthcare provider. Do pelvic muscle exercises often:  Your pelvic muscles help you stop urinating. Squeeze these muscles tight for 5 seconds, then relax for 5 seconds. Gradually work up to squeezing for 10 seconds. Do 3 sets of 15 repetitions a day, or as directed. This will help strengthen your pelvic muscles and improve bladder control. Train your bladder:  Go to the bathroom at set times, such as every 2 hours, even if you do not feel the urge to go. You can also try to hold your urine when you feel the urge to go. For example, hold your urine for 5 minutes when you feel the urge to go. As that becomes easier, hold your urine for 10 minutes. Self-care:   · Keep a UI record. Write down how often you leak urine and how much you leak. Make a note of what you were doing when you leaked urine. · Drink liquids as directed.  You may need to limit the amount of liquid you drink to help control your urine leakage. Do not drink any liquid right before you go to bed. Limit or do not have drinks that contain caffeine or alcohol. · Prevent constipation. Eat a variety of high-fiber foods. Good examples are high-fiber cereals, beans, vegetables, and whole-grain breads. Walking is the best way to trigger your intestines to have a bowel movement. · Exercise regularly and maintain a healthy weight. Weight loss and exercise will decrease pressure on your bladder and help you control your leakage. · Use a catheter as directed  to help empty your bladder. A catheter is a tiny, plastic tube that is put into your bladder to drain your urine. · Go to behavior therapy as directed. Behavior therapy may be used to help you learn to control your urge to urinate. Weight Management   Why it is important to manage your weight:  Being overweight increases your risk of health conditions such as heart disease, high blood pressure, type 2 diabetes, and certain types of cancer. It can also increase your risk for osteoarthritis, sleep apnea, and other respiratory problems. Aim for a slow, steady weight loss. Even a small amount of weight loss can lower your risk of health problems. How to lose weight safely:  A safe and healthy way to lose weight is to eat fewer calories and get regular exercise. You can lose up about 1 pound a week by decreasing the number of calories you eat by 500 calories each day. Healthy meal plan for weight management:  A healthy meal plan includes a variety of foods, contains fewer calories, and helps you stay healthy. A healthy meal plan includes the following:  · Eat whole-grain foods more often. A healthy meal plan should contain fiber. Fiber is the part of grains, fruits, and vegetables that is not broken down by your body. Whole-grain foods are healthy and provide extra fiber in your diet. Some examples of whole-grain foods are whole-wheat breads and pastas, oatmeal, brown rice, and bulgur.   · Eat a variety of vegetables every day. Include dark, leafy greens such as spinach, kale, sylvia greens, and mustard greens. Eat yellow and orange vegetables such as carrots, sweet potatoes, and winter squash. · Eat a variety of fruits every day. Choose fresh or canned fruit (canned in its own juice or light syrup) instead of juice. Fruit juice has very little or no fiber. · Eat low-fat dairy foods. Drink fat-free (skim) milk or 1% milk. Eat fat-free yogurt and low-fat cottage cheese. Try low-fat cheeses such as mozzarella and other reduced-fat cheeses. · Choose meat and other protein foods that are low in fat. Choose beans or other legumes such as split peas or lentils. Choose fish, skinless poultry (chicken or turkey), or lean cuts of red meat (beef or pork). Before you cook meat or poultry, cut off any visible fat. · Use less fat and oil. Try baking foods instead of frying them. Add less fat, such as margarine, sour cream, regular salad dressing and mayonnaise to foods. Eat fewer high-fat foods. Some examples of high-fat foods include french fries, doughnuts, ice cream, and cakes. · Eat fewer sweets. Limit foods and drinks that are high in sugar. This includes candy, cookies, regular soda, and sweetened drinks. Exercise:  Exercise at least 30 minutes per day on most days of the week. Some examples of exercise include walking, biking, dancing, and swimming. You can also fit in more physical activity by taking the stairs instead of the elevator or parking farther away from stores. Ask your healthcare provider about the best exercise plan for you. © Copyright [employer] 2018 Information is for End User's use only and may not be sold, redistributed or otherwise used for commercial purposes.  All illustrations and images included in CareNotes® are the copyrighted property of [employer] or [address] [place] [address]

## 2023-07-27 NOTE — PROGRESS NOTES
Assessment and Plan:     Problem List Items Addressed This Visit    None       Preventive health issues were discussed with patient, and age appropriate screening tests were ordered as noted in patient's After Visit Summary. Personalized health advice and appropriate referrals for health education or preventive services given if needed, as noted in patient's After Visit Summary. History of Present Illness:     Patient presents for a Medicare Wellness Visit. The patient's hypertension, hyperlipidemia, paroxysmal A-fib and hypothyroidism are well controlled. She complains of bloating and discomfort only after the evening meal and we discussed a trial of Reglan prior to her evening meal.    HPI   Patient Care Team:  Min Booth DO as PCP - General  MD Min Bailon DO     Review of Systems:     Review of Systems   Constitutional: Negative for appetite change, chills and fever. HENT: Negative for ear pain, facial swelling, rhinorrhea, sinus pain, sore throat and trouble swallowing. Eyes: Negative for discharge and redness. Respiratory: Negative for chest tightness, shortness of breath and wheezing. Cardiovascular: Negative for chest pain and palpitations. Gastrointestinal: Positive for abdominal distention and abdominal pain. Negative for diarrhea, nausea and vomiting. Positive bloating and abdominal discomfort only after evening meal   Endocrine: Negative for polyuria. Genitourinary: Negative for dysuria and urgency. Musculoskeletal: Negative for arthralgias and back pain. Skin: Negative for rash. Neurological: Negative for dizziness, weakness and headaches. Hematological: Negative for adenopathy. Psychiatric/Behavioral: Negative for behavioral problems, confusion and sleep disturbance. All other systems reviewed and are negative.        Problem List:     Patient Active Problem List   Diagnosis   • Oral pain of unknown etiology   • Paroxysmal atrial fibrillation (HCC)   • Hypothyroidism   • Anxiety   • Cardiomyopathy ([address])   • CKD (chronic kidney disease), stage IV (HCC)   • Arthritis   • Chronic diastolic congestive heart failure (HCC)   • Hyperlipidemia   • Hypertension   • Insomnia   • Contact dermatitis   • Sinus bradycardia   • Mild aortic stenosis   • Bronchitis   • Fall   • Hematoma of right hip   • Constipation   • Chronic low back pain without sciatica   • Closed stable burst fracture of twelfth thoracic vertebra with routine healing   • Hyponatremia   • Chronic kidney disease, stage 5 (HCC)   • Xerostomia      Past Medical and Surgical History:     Past Medical History:   Diagnosis Date   • A-fib ([address])    • Cardiac disease    • Hyperlipidemia    • Hypertension      Past Surgical History:   Procedure Laterality Date   • APPENDECTOMY     • CHOLECYSTECTOMY     • HERNIA REPAIR     • HYSTERECTOMY     • NEPHRECTOMY Right       Family History:     Family History   Problem Relation Age of Onset   • Heart disease Family    • Hypertension Family    • Cancer Family       Social History:     Social History     Socioeconomic History   • Marital status:       Spouse name: None   • Number of children: None   • Years of education: Less than high school   • Highest education level: None   Occupational History   • Occupation: Retired   Tobacco Use   • Smoking status: Never     Passive exposure: Current   • Smokeless tobacco: Never   Substance and Sexual Activity   • Alcohol use: Not Currently   • Drug use: Never   • Sexual activity: Not Currently     Partners: Male     Birth control/protection: Post-menopausal   Other Topics Concern   • None   Social History Narrative    Always uses seatbelt    Denied:  History of daily caffeinated cola consumption    Denied:  History of daily coffee consumption    Daily tea consumption    Denied:  History of dental care, regularly    Exercise:  Walking    Living will in place    No Rastafari beliefs    Power of  in existence    Water intake, adequate (per day)     Social Determinants of Health     Financial Resource Strain: Not on file   Food Insecurity: No Food Insecurity (3/4/2022)    Hunger Vital Sign    • Worried About Running Out of Food in the Last Year: Never true    • Ran Out of Food in the Last Year: Never true   Transportation Needs: No Transportation Needs (3/4/2022)    PRAPARE - Transportation    • Lack of Transportation (Medical): No    • Lack of Transportation (Non-Medical):  No   Physical Activity: Not on file   Stress: Not on file   Social Connections: Not on file   Intimate Partner Violence: Not on file   Housing Stability: Unknown (3/4/2022)    Housing Stability Vital Sign    • Unable to Pay for Housing in the Last Year: No    • Number of Places Lived in the Last Year: Not on file    • Unstable Housing in the Last Year: No      Medications and Allergies:     Current Outpatient Medications   Medication Sig Dispense Refill   • ALPRAZolam (XANAX) 0.25 mg tablet TAKE ONE TABLET BY MOUTH FOUR TIMES A DAY AS NEEDED FOR ANXIETY 120 tablet 3   • amiodarone 100 mg tablet TAKE ONE TABLET BY MOUTH DAILY 90 tablet 3   • aspirin (ECOTRIN LOW STRENGTH) 81 mg EC tablet Take 81 mg by mouth every other day     • ciprofloxacin-hydrocortisone (CIPRO HC OTIC) otic suspension Administer 3 drops to the right ear 2 (two) times a day 10 mL 0   • Emollient (EUCERIN) lotion Apply topically 2 (two) times a day     • famotidine (PEPCID) 40 MG tablet TAKE ONE TABLET BY MOUTH EVERY DAY 30 tablet 2   • furosemide (LASIX) 20 mg tablet TAKE TWO TABLETS BY MOUTH EVERY DAY 60 tablet 3   • glycerin-hypromellose- (ARTIFICIAL TEARS) 0.2-0.2-1 % SOLN Administer 1 drop to both eyes 3 (three) times a day as needed (dry eyes) 15 mL 0   • hyoscyamine (ANASPAZ) 0.125 mg DISSOLVE ONE TABLET BY MOUTH EVERY 4 HOURS AS NEEDED FOR SPASMS 40 tablet 1   • levothyroxine 50 mcg tablet TAKE ONE TABLET BY MOUTH EVERY DAY 30 tablet 3   • neomycin-colistin-hydrocortisone-thonzonium (CORTISPORIN TC) 0.33-0.3-1-0.05 % otic suspension Administer 4 drops to the right ear 3 (three) times a day 10 mL 0   • nystatin-triamcinolone (MYCOLOG-II) cream Apply topically 2 (two) times a day 60 g 3   • polyethylene glycol (MIRALAX) 17 g packet Take 17g by mouth daily. If constipation worsens, take 17 g by mouth two times daily. 72 each 0   • zolpidem (AMBIEN CR) 12.5 MG CR tablet TAKE ONE TABLET BY MOUTH EVERY DAY AT BEDTIME 30 tablet 3   • Artificial Saliva (Xerostomia Relief [place]) SOLN Apply 1 application to the mouth or throat 4 (four) times a day (Patient not taking: Reported on 7/27/2023) 10 mL 3   • bisacodyl (DULCOLAX) 10 mg suppository Insert 1 suppository (10 mg total) into the rectum daily as needed for constipation (Patient not taking: Reported on 11/22/2022) 12 suppository 0   • calamine-zinc oxide 8-8 % Apply topically 4 (four) times a day to the rectum for anal soreness and pain (Patient not taking: Reported on 3/22/2023) 177 mL 0   • carbamide peroxide (DEBROX) 6.5 % otic solution Administer 5 drops into both ears 2 (two) times a day For earwax removal (Patient not taking: Reported on 7/27/2023) 15 mL 0   • lidocaine (LMX) 4 % cream Apply topically 4 (four) times a day as needed for mild pain To the rectal area (Patient not taking: Reported on 3/22/2023) 30 g 0   • predniSONE 1 mg tablet Take 1 tablet (1 mg total) by mouth daily (Patient not taking: Reported on 3/22/2023) 90 tablet 0     No current facility-administered medications for this visit.      Allergies   Allergen Reactions   • Penicillin G Anaphylaxis   • Spironolactone Shortness Of Breath   • Atorvastatin      Other reaction(s): Leg Cramps   • Cephalexin Headache   • Chocolate - Food Allergy    • Citalopram Abdominal Pain and Headache   • Diltiazem    • Fosinopril Sodium-Hctz    • Methylprednisolone Nausea Only     Tablet generic only   • Monopril [Fosinopril]      Reaction Date: 28Apr2011;    • Penicillins    • Pollen Extract    • Pravastatin Myalgia     Other reaction(s): Leg Cramps   • Sporanox [Itraconazole]      Reaction Date: [id number];    • Strawberry C [Ascorbate - Food Allergy]    • Caffeine - Food Allergy Palpitations     Tachycardia      Immunizations:     Immunization History   Administered Date(s) Administered   • COVID-19 PFIZER VACCINE 0.3 ML IM 03/08/2021, 03/29/2021, 12/10/2021   • Influenza Split High Dose Preservative Free IM 10/11/2011, 09/27/2012, 10/10/2013, 10/09/2014, 10/14/2015, 10/20/2016, 10/04/2017   • Influenza, high dose seasonal 0.7 mL 09/24/2018, 09/20/2019, 10/06/2020, 10/05/2021, 10/19/2022   • Pneumococcal Conjugate 13-Valent 12/14/2015   • Pneumococcal Polysaccharide PPV23 10/11/2011, 02/09/2022      Health Maintenance: There are no preventive care reminders to display for this patient. Topic Date Due   • COVID-19 Vaccine (4 - Pfizer series) 02/04/2022   • Influenza Vaccine (1) 09/01/2023      Medicare Screening Tests and Risk Assessments:     Niranjan Villanueva is here for her Subsequent Wellness visit. Last Medicare Wellness visit information reviewed, patient interviewed, no change since last AWV. Health Risk Assessment:   Patient rates overall health as good. Patient feels that their physical health rating is slightly worse. Patient is dissatisfied with their life. Eyesight was rated as same. Hearing was rated as same. Patient feels that their emotional and mental health rating is same. Patients states they are never, rarely angry. Patient states they are sometimes unusually tired/fatigued. Pain experienced in the last 7 days has been none. Patient states that she has experienced weight loss or gain in last 6 months. Depression Screening:   PHQ-2 Score: 0      Fall Risk Screening:    In the past year, patient has experienced: history of falling in past year    Number of falls: 1  Injured during fall?: Yes    Feels unsteady when standing or walking?: No    Worried about falling?: No      Urinary Incontinence Screening:   Patient has leaked urine accidently in the last six months. Home Safety:  Patient does not have trouble with stairs inside or outside of their home. Patient has working smoke alarms and has working carbon monoxide detector. Home safety hazards include: none. Nutrition:   Current diet is Regular. Medications:   Patient is currently taking over-the-counter supplements. OTC medications include: see medication list. Patient is able to manage medications. Activities of Daily Living (ADLs)/Instrumental Activities of Daily Living (IADLs):   Walk and transfer into and out of bed and chair?: Yes  Dress and groom yourself?: Yes    Bathe or shower yourself?: Yes    Feed yourself? Yes  Do your laundry/housekeeping?: Yes  Manage your money, pay your bills and track your expenses?: Yes  Make your own meals?: Yes    Do your own shopping?: Yes    Previous Hospitalizations:   Any hospitalizations or ED visits within the last 12 months?: No      Advance Care Planning:   Living will: Yes    Durable POA for healthcare:  Yes    Advanced directive: Yes    Advanced directive counseling given: Yes    Five wishes given: Yes    Patient declined ACP directive: No    End of Life Decisions reviewed with patient: Yes    Provider agrees with end of life decisions: Yes      Cognitive Screening:   Provider or family/friend/caregiver concerned regarding cognition?: No    PREVENTIVE SCREENINGS      Cardiovascular Screening:    General: Screening Not Indicated and History Lipid Disorder      Diabetes Screening:     General: Screening Current      Colorectal Cancer Screening:     General: Screening Not Indicated      Cervical Cancer Screening:    General: Screening Not Indicated      Osteoporosis Screening:    General: Screening Current      Abdominal Aortic Aneurysm (AAA) Screening:        General: Screening Current and Screening Not Indicated      Lung Cancer Screening:     General: Screening Not Indicated      Hepatitis C Screening:    General: Screening Current    Screening, Brief Intervention, and Referral to Treatment (SBIRT)    Screening    Typical number of drinks in a week: 0    Single Item Drug Screening:  How often have you used an illegal drug (including marijuana) or a prescription medication for non-medical reasons in the past year? never    Single Item Drug Screen Score: 0  Interpretation: Negative screen for possible drug use disorder    No results found. Physical Exam:     /72   Pulse (!) 49   Temp 97.5 °F (36.4 °C)   Ht 5' 7" (1.702 m)   Wt 83.6 kg (184 lb 6.4 oz)   SpO2 96%   BMI 28.88 kg/m²     Physical Exam  Vitals and nursing note reviewed. Constitutional:       General: She is not in acute distress. Appearance: Normal appearance. She is not ill-appearing or diaphoretic. HENT:      Head: Normocephalic and atraumatic. Right Ear: Tympanic membrane, ear canal and external ear normal.      Left Ear: Tympanic membrane, ear canal and external ear normal.      Nose: Nose normal. No congestion or rhinorrhea. Mouth/Throat:      Mouth: Mucous membranes are moist.      Pharynx: Oropharynx is clear. No posterior oropharyngeal erythema. Eyes:      General:         Right eye: No discharge. Left eye: No discharge. Extraocular Movements: Extraocular movements intact. Conjunctiva/sclera: Conjunctivae normal.      Pupils: Pupils are equal, round, and reactive to light. Neck:      Vascular: No carotid bruit. Cardiovascular:      Rate and Rhythm: Normal rate and regular rhythm. Pulses: Normal pulses. Heart sounds: Normal heart sounds. No murmur heard. Pulmonary:      Effort: Pulmonary effort is normal. No respiratory distress. Breath sounds: Normal breath sounds. No wheezing or rhonchi. Abdominal:      General: Abdomen is flat. Bowel sounds are normal. There is no distension.       Palpations: There is no mass. Tenderness: There is no abdominal tenderness. Musculoskeletal:         General: No swelling or deformity. Normal range of motion. Cervical back: Normal range of motion and neck supple. No rigidity. Right lower leg: No edema. Left lower leg: No edema. Lymphadenopathy:      Cervical: No cervical adenopathy. Skin:     General: Skin is warm and dry. Capillary Refill: Capillary refill takes less than 2 seconds. Coloration: Skin is not jaundiced. Findings: No bruising, erythema or rash. Neurological:      General: No focal deficit present. Mental Status: She is alert and oriented to person, place, and time. Cranial Nerves: No cranial nerve deficit. Sensory: No sensory deficit. Gait: Gait normal.      Deep Tendon Reflexes: Reflexes normal.   Psychiatric:         Mood and Affect: Mood normal.         Behavior: Behavior normal.         Thought Content:  Thought content normal.         Judgment: Judgment normal.          Merla Slipper, DO

## 2023-08-01 DIAGNOSIS — F41.9 ANXIETY: ICD-10-CM

## 2023-08-01 RX ORDER — ALPRAZOLAM 0.25 MG/1
TABLET ORAL
Qty: 120 TABLET | Refills: 3 | Status: SHIPPED | OUTPATIENT
Start: 2023-08-01

## 2023-08-01 NOTE — TELEPHONE ENCOUNTER
[id number] 07/09/2023 04/12/2023 ALPRAZolam (Tablet) 100.0 25 0.25 MG  [id number] [place] 3 / 3 [place]     1 [id number] 07/09/2023 04/12/2023 Zolpidem Tartrate (Tablet, Extended Release) 30.0 30 12.5 MG NA [id number] Medicare 3 / 3 [place]    1 [id number] 06/09/2023 04/12/2023 ALPRAZolam (Tablet) 100.0 25 0.25 MG NA [facility][id number] Medicare 2 / 3 [place]    1 [id number] 06/09/2023 04/12/2023 Zolpidem Tartrate (Tablet, Extended Release) 30.0 30 12.5 MG  [address] Medicare 2 / 3

## 2023-08-09 DIAGNOSIS — G47.00 INSOMNIA, UNSPECIFIED TYPE: ICD-10-CM

## 2023-08-09 RX ORDER — ZOLPIDEM TARTRATE 12.5 MG/1
12.5 TABLET, FILM COATED, EXTENDED RELEASE ORAL
Qty: 30 TABLET | Refills: 3 | Status: SHIPPED | OUTPATIENT
Start: 2023-08-09

## 2023-08-09 NOTE — TELEPHONE ENCOUNTER
1 [id number] 08/01/2023 08/01/2023 ALPRAZolam (Tablet) 120.0 30 0.25 MG NA [facility][id number] Medicare 0 / 3 [place]    1 [id number] 07/09/2023 04/12/2023 ALPRAZolam (Tablet) 100.0 25 0.25 MG NA [facility][id number] Medicare 3 / 3 [place]    1 [id number] 07/09/2023 04/12/2023 Zolpidem Tartrate (Tablet, Extended Release) 30.0 30 12.5 MG  [address] Medicare 3 / 3 [place]    1 [id number] 06/09/2023 04/12/2023 ALPRAZolam (Tablet) 100.0 25 0.25 MG  [address] Medicare 2 / 3 [place]    1 [id number] 06/09/2023 04/12/2023 Zolpidem Tartrate (Tablet, Extended Release) 30.0 30 12.5 MG  [address] Medicare 2 / 3 [place]    1 [id number] 05/11/2023 04/12/2023 Zolpidem Tartrate (Tablet, Extended Release) 30.0 30 12.5 MG  [address] Medicare 1 / 3 [place]    1 [id number] 05/07/2023 04/12/2023 ALPRAZolam (Tablet) 100.0 25 0.25 MG NA [facility][id number] Medicare 1 / 3 [place]    1 [id number] 04/13/2023 04/12/2023 ALPRAZolam (Tablet) 100.0 25 0.25 MG  [address] [place] 0 / 3 [place]    1 [id number] 04/13/2023 04/12/2023 Zolpidem Tartrate (Tablet, Extended Release) 30.0 30 12.5 MG  [address] [place] 0 / 3 [place]    1 [id number] 03/13/2023 11/22/2022 ALPRAZolam (Tablet) 120.0 30 0.25 MG  [address] [place] 3 / 3 [place]

## 2023-10-09 DIAGNOSIS — I50.32 CHRONIC DIASTOLIC HEART FAILURE (HCC): ICD-10-CM

## 2023-10-10 RX ORDER — FUROSEMIDE 20 MG/1
TABLET ORAL
Qty: 60 TABLET | Refills: 3 | Status: SHIPPED | OUTPATIENT
Start: 2023-10-10

## 2023-10-11 ENCOUNTER — TELEPHONE (OUTPATIENT)
Dept: FAMILY MEDICINE CLINIC | Facility: CLINIC | Age: 88
End: 2023-10-11

## 2023-10-11 NOTE — TELEPHONE ENCOUNTER
Patient's daughter called asking for an appointment today for you to see Phillip Prajapati     Daughter states that for the past year, Phillip Prajapati has been experiencing stomach pains every night after eating dinner.  She states that it does not happen after breakfast or lunch, just after dinner    Also, daughter states that last night, patient stubbed her toe while getting into bed and it is now swollen and painful    Daughter also would like patient to have a flu shot    Please advise

## 2023-10-19 ENCOUNTER — RA CDI HCC (OUTPATIENT)
Dept: OTHER | Facility: HOSPITAL | Age: 88
End: 2023-10-19

## 2023-10-19 ENCOUNTER — OFFICE VISIT (OUTPATIENT)
Dept: FAMILY MEDICINE CLINIC | Facility: CLINIC | Age: 88
End: 2023-10-19

## 2023-10-19 DIAGNOSIS — K29.00 ACUTE GASTRITIS WITHOUT HEMORRHAGE, UNSPECIFIED GASTRITIS TYPE: Primary | ICD-10-CM

## 2023-10-19 DIAGNOSIS — E03.9 HYPOTHYROIDISM, UNSPECIFIED TYPE: Chronic | ICD-10-CM

## 2023-10-19 DIAGNOSIS — Z23 ENCOUNTER FOR IMMUNIZATION: ICD-10-CM

## 2023-10-19 NOTE — PROGRESS NOTES
[address] [facility] coding opportunities     I13.0, I42.9     Chart Reviewed number of suggestions sent to Provider: 1     Patients Insurance     Medicare Insurance: [facility]

## 2023-10-20 RX ORDER — FAMOTIDINE 40 MG/1
40 TABLET, FILM COATED ORAL 2 TIMES DAILY
Qty: 30 TABLET | Refills: 2
Start: 2023-10-20

## 2023-10-20 NOTE — PROGRESS NOTES
Patient ID: Radha Lozano is a 80 y.o. female. HPI: 80 y. o.female presents for evaluation of soreness in her stomach especially when she takes her thyroid medicine on an empty stomach first thing in the morning. We discussed adjusting her Pepcid to twice daily dosing and having her take her thyroid at night on an empty stomach before going to bed. She denies any melena or hematochezia and pain does not wake her up in the middle the night. SUBJECTIVE    Family History   Problem Relation Age of Onset    Heart disease Family     Hypertension Family     Cancer Family      Social History     Socioeconomic History    Marital status:       Spouse name: Not on file    Number of children: Not on file    Years of education: Less than high school    Highest education level: Not on file   Occupational History    Occupation: Retired   Tobacco Use    Smoking status: Never     Passive exposure: Current    Smokeless tobacco: Never   Substance and Sexual Activity    Alcohol use: Not Currently    Drug use: Never    Sexual activity: Not Currently     Partners: Male     Birth control/protection: Post-menopausal   Other Topics Concern    Not on file   Social History Narrative    Always uses seatbelt    Denied:  History of daily caffeinated cola consumption    Denied:  History of daily coffee consumption    Daily tea consumption    Denied:  History of dental care, regularly    Exercise:  Walking    Living will in place    No Mosque beliefs    Power of  in existence    Water intake, adequate (per day)     Social Determinants of Health     Financial Resource Strain: Low Risk  (7/27/2023)    Overall Financial Resource Strain (CARDIA)     Difficulty of Paying Living Expenses: Not very hard   Food Insecurity: No Food Insecurity (3/4/2022)    Hunger Vital Sign     Worried About Running Out of Food in the Last Year: Never true     [address] in the Last Year: Never true   Transportation Needs: No Transportation Needs (7/27/2023)    PRAPARE - Transportation     Lack of Transportation (Medical): No     Lack of Transportation (Non-Medical):  No   Physical Activity: Not on file   Stress: Not on file   Social Connections: Not on file   Intimate Partner Violence: Not on file   Housing Stability: Unknown (3/4/2022)    Housing Stability Vital Sign     Unable to Pay for Housing in the Last Year: No     Number of Places Lived in the Last Year: Not on file     Unstable Housing in the Last Year: No     Past Medical History:   Diagnosis Date    A-fib ([address])     Cardiac disease     Hyperlipidemia     Hypertension      Past Surgical History:   Procedure Laterality Date    APPENDECTOMY      CHOLECYSTECTOMY      HERNIA REPAIR      HYSTERECTOMY      NEPHRECTOMY Right      Allergies   Allergen Reactions    Penicillin G Anaphylaxis    Spironolactone Shortness Of Breath    Atorvastatin      Other reaction(s): Leg Cramps    Cephalexin Headache    Chocolate - Food Allergy     Citalopram Abdominal Pain and Headache    Diltiazem     Fosinopril Sodium-Hctz     Methylprednisolone Nausea Only     Tablet generic only    Monopril [Fosinopril]      Reaction Date: 28Apr2011;     Penicillins     Pollen Extract     Pravastatin Myalgia     Other reaction(s): Leg Cramps    Sporanox [Itraconazole]      Reaction Date: 28Apr2011;     Strawberry C [Ascorbate - Food Allergy]     Caffeine - Food Allergy Palpitations     Tachycardia       Current Outpatient Medications:     ALPRAZolam (XANAX) 0.25 mg tablet, TAKE ONE TABLET BY MOUTH FOUR TIMES A DAY AS NEEDED FOR ANXIETY, Disp: 120 tablet, Rfl: 3    famotidine (PEPCID) 40 MG tablet, Take 1 tablet (40 mg total) by mouth 2 (two) times a day, Disp: 30 tablet, Rfl: 2    amiodarone 100 mg tablet, TAKE ONE TABLET BY MOUTH DAILY, Disp: 90 tablet, Rfl: 3    Artificial Saliva (Xerostomia Relief [place]) SOLN, Apply 1 application to the mouth or throat 4 (four) times a day (Patient not taking: Reported on 7/27/2023), Disp: 10 mL, Rfl: 3    aspirin (ECOTRIN LOW STRENGTH) 81 mg EC tablet, Take 81 mg by mouth every other day, Disp: , Rfl:     bisacodyl (DULCOLAX) 10 mg suppository, Insert 1 suppository (10 mg total) into the rectum daily as needed for constipation (Patient not taking: Reported on 11/22/2022), Disp: 12 suppository, Rfl: 0    calamine-zinc oxide 8-8 %, Apply topically 4 (four) times a day to the rectum for anal soreness and pain (Patient not taking: Reported on 3/22/2023), Disp: 177 mL, Rfl: 0    carbamide peroxide (DEBROX) 6.5 % otic solution, Administer 5 drops into both ears 2 (two) times a day For earwax removal (Patient not taking: Reported on 7/27/2023), Disp: 15 mL, Rfl: 0    ciprofloxacin-hydrocortisone (CIPRO HC OTIC) otic suspension, Administer 3 drops to the right ear 2 (two) times a day, Disp: 10 mL, Rfl: 0    Emollient (EUCERIN) lotion, Apply topically 2 (two) times a day, Disp: , Rfl:     furosemide (LASIX) 20 mg tablet, TAKE TWO TABLETS BY MOUTH EVERY DAY, Disp: 60 tablet, Rfl: 3    glycerin-hypromellose- (ARTIFICIAL TEARS) 0.2-0.2-1 % SOLN, Administer 1 drop to both eyes 3 (three) times a day as needed (dry eyes), Disp: 15 mL, Rfl: 0    hyoscyamine (ANASPAZ) 0.125 mg, DISSOLVE ONE TABLET BY MOUTH EVERY 4 HOURS AS NEEDED FOR SPASMS, Disp: 40 tablet, Rfl: 1    levothyroxine 50 mcg tablet, TAKE ONE TABLET BY MOUTH EVERY DAY, Disp: 30 tablet, Rfl: 3    lidocaine (LMX) 4 % cream, Apply topically 4 (four) times a day as needed for mild pain To the rectal area (Patient not taking: Reported on 3/22/2023), Disp: 30 g, Rfl: 0    neomycin-colistin-hydrocortisone-thonzonium (CORTISPORIN TC) 0.33-0.3-1-0.05 % otic suspension, Administer 4 drops to the right ear 3 (three) times a day, Disp: 10 mL, Rfl: 0    nystatin-triamcinolone (MYCOLOG-II) cream, Apply topically 2 (two) times a day, Disp: 60 g, Rfl: 3    polyethylene glycol (MIRALAX) 17 g packet, Take 17g by mouth daily.  If constipation worsens, take 17 g by mouth two times daily. , Disp: 72 each, Rfl: 0    predniSONE 1 mg tablet, Take 1 tablet (1 mg total) by mouth daily (Patient not taking: Reported on 3/22/2023), Disp: 90 tablet, Rfl: 0    zolpidem (AMBIEN CR) 12.5 MG CR tablet, TAKE ONE TABLET BY MOUTH AT BEDTIME, Disp: 30 tablet, Rfl: 3    Review of Systems  Constitutional:     Denies fever, chills ,fatigue ,weakness ,weight loss, weight gain     ENT: Denies earache ,loss of hearing ,nosebleed, nasal discharge,nasal congestion ,sore throat ,hoarseness  Pulmonary: Denies shortness of breath ,cough  ,dyspnea on exertion, orthopnea  ,PND   Cardiovascular:  Denies bradycardia , tachycardia  ,palpations, lower extremity edema leg, claudication  Breast:  Denies new or changing breast lumps ,nipple discharge ,nipple changes  Abdomen: Positive epigastric pain , no anorexia , indigestion, nausea, vomiting, constipation, or diarrhea  Musculoskeletal: Denies myalgias, arthralgias, joint swelling, joint stiffness , limb pain, limb swelling  Gu: denies dysuria, polyuria  Skin: Denies skin rash, skin lesion, skin wound, itching, dry skin  Neuro: Denies headache, numbness, tingling, confusion, loss of consciousness, dizziness, vertigo  Psychiatric: Denies feelings of depression, suicidal ideation, anxiety, sleep disturbances    OBJECTIVE  There were no vitals taken for this visit. Constitutional:   NAD, well appearing and well nourished      ENT:   Conjunctiva and lids: no injection, edema, or discharge     Pupils and iris: ANKIT bilaterally    External inspection of ears and nose: normal without deformities or discharge. Otoscopic exam: Canals patent without erythema. Nasal mucosa, septum and turbinates: Normal or edema or discharge         Oropharynx:  Moist mucosa, normal tongue and tonsils without lesions. No erythema        Pulmonary:Respiratory effort normal rate and rhythm, no increased work of breathing.  Auscultation of lungs:  Clear bilaterally with no adventitious breath sounds       Cardiovascular: regular rate and rhythm, S1 and S2, no murmur, no edema and/or varicosities of LE      Abdomen: Soft and non-distended     Positive bowel sounds      No heptomegaly or splenomegaly      Gu: no suprapubic tenderness or CVA tenderness, no urethral discharge  Lymphatic:  No anterior or posterior cervical lymphadenopathy    positive epigastric tenderness on palpation without rigidity ,rebound, or guarding     Musculoskeletal:  Gait and station: Normal gait      Digits and nails normal without clubbing or cyanosis       Inspection/palpation of joints, bones, and muscles:  No joint tenderness, swelling, full active and passive range of motion       Skin: Normal skin turgor and no rashes      Neuro:       Normal reflexes      Psych:   alert and oriented to person, place and time     normal mood and affect       Assessment/Plan:Diagnoses and all orders for this visit:    Acute gastritis without hemorrhage, unspecified gastritis type  -     famotidine (PEPCID) 40 MG tablet; Take 1 tablet (40 mg total) by mouth 2 (two) times a day    Hypothyroidism, unspecified type  Comments:  Patient to change his levothyroxine to bedtime on an empty stomach. Encounter for immunization  -     Pneumococcal Conjugate Vaccine 20-valent (Pcv20)  -     influenza vaccine, high-dose, PF 0.7 mL (FLUZONE HIGH-DOSE)        Reviewed with patient plan to treat with above plan.   Patient instructed to call in 72 hours if not feeling better or if symptoms worsen

## 2023-10-23 DIAGNOSIS — K29.00 ACUTE GASTRITIS WITHOUT HEMORRHAGE, UNSPECIFIED GASTRITIS TYPE: ICD-10-CM

## 2023-10-23 RX ORDER — FAMOTIDINE 40 MG/1
40 TABLET, FILM COATED ORAL 2 TIMES DAILY
Qty: 30 TABLET | Refills: 0 | OUTPATIENT
Start: 2023-10-23

## 2023-10-23 NOTE — TELEPHONE ENCOUNTER
Reason for call:   [x] Refill   [] Prior Auth  [] Other:     Office:   [x] PCP/Provider -   [] Specialty/Provider -     Medication: pepcid     Dose/Frequency: 40 mg/ 2 tabs daily     Quantity: 30 day supply     Pharmacy: [facility]     Does the patient have enough for 3 days?    [] Yes   [x] No - Send as HP to POD

## 2023-10-25 DIAGNOSIS — K29.00 ACUTE GASTRITIS WITHOUT HEMORRHAGE, UNSPECIFIED GASTRITIS TYPE: ICD-10-CM

## 2023-10-25 RX ORDER — FAMOTIDINE 40 MG/1
40 TABLET, FILM COATED ORAL DAILY
Qty: 30 TABLET | Refills: 2 | Status: SHIPPED | OUTPATIENT
Start: 2023-10-25

## 2023-11-12 DIAGNOSIS — E03.9 HYPOTHYROIDISM, UNSPECIFIED TYPE: ICD-10-CM

## 2023-11-13 RX ORDER — LEVOTHYROXINE SODIUM 0.05 MG/1
TABLET ORAL
Qty: 30 TABLET | Refills: 3 | Status: SHIPPED | OUTPATIENT
Start: 2023-11-13

## 2023-11-17 ENCOUNTER — RA CDI HCC (OUTPATIENT)
Dept: OTHER | Facility: HOSPITAL | Age: 88
End: 2023-11-17

## 2023-11-17 NOTE — PROGRESS NOTES
[address] coding opportunities       Chart reviewed, no opportunity found: [address] Review     Patients Insurance     Medicare Insurance: [employer]

## 2023-11-24 ENCOUNTER — TELEPHONE (OUTPATIENT)
Dept: FAMILY MEDICINE CLINIC | Facility: CLINIC | Age: 88
End: 2023-11-24

## 2023-11-24 DIAGNOSIS — R05.1 ACUTE COUGH: Primary | ICD-10-CM

## 2023-11-24 RX ORDER — DEXTROMETHORPHAN HYDROBROMIDE AND PROMETHAZINE HYDROCHLORIDE 15; 6.25 MG/5ML; MG/5ML
5 SYRUP ORAL 4 TIMES DAILY PRN
Qty: 180 ML | Refills: 0 | Status: SHIPPED | OUTPATIENT
Start: 2023-11-24

## 2023-11-24 NOTE — TELEPHONE ENCOUNTER
Spoke with Patient, states cough and wheezing started yesterday, Denies all other symptoms, headache, chest pain, SOB. Asking for promethazine- DM. States this always works when she starts feeling like this. And this is what Dr. Vijay Murray prescribes. Please advise.

## 2023-11-28 ENCOUNTER — OFFICE VISIT (OUTPATIENT)
Dept: FAMILY MEDICINE CLINIC | Facility: CLINIC | Age: 88
End: 2023-11-28
Payer: COMMERCIAL

## 2023-11-28 VITALS
BODY MASS INDEX: 28.94 KG/M2 | DIASTOLIC BLOOD PRESSURE: 70 MMHG | OXYGEN SATURATION: 95 % | TEMPERATURE: 97.4 F | HEART RATE: 61 BPM | WEIGHT: 184.4 LBS | SYSTOLIC BLOOD PRESSURE: 122 MMHG | HEIGHT: 67 IN

## 2023-11-28 DIAGNOSIS — I42.0 DILATED CARDIOMYOPATHY (HCC): ICD-10-CM

## 2023-11-28 DIAGNOSIS — I10 PRIMARY HYPERTENSION: Primary | ICD-10-CM

## 2023-11-28 DIAGNOSIS — E03.9 HYPOTHYROIDISM, UNSPECIFIED TYPE: Chronic | ICD-10-CM

## 2023-11-28 DIAGNOSIS — I50.32 CHRONIC DIASTOLIC CONGESTIVE HEART FAILURE (HCC): ICD-10-CM

## 2023-11-28 DIAGNOSIS — N18.4 CHRONIC KIDNEY DISEASE, STAGE 4 (SEVERE) (HCC): ICD-10-CM

## 2023-11-28 DIAGNOSIS — E78.2 MIXED HYPERLIPIDEMIA: ICD-10-CM

## 2023-11-28 PROCEDURE — 99214 OFFICE O/P EST MOD 30 MIN: CPT | Performed by: FAMILY MEDICINE

## 2023-11-29 DIAGNOSIS — F41.9 ANXIETY: ICD-10-CM

## 2023-11-29 PROBLEM — N18.4 CHRONIC KIDNEY DISEASE, STAGE 4 (SEVERE) (HCC): Status: ACTIVE | Noted: 2022-02-09

## 2023-11-29 NOTE — PROGRESS NOTES
Patient ID: Norman Carmona is a 80 y.o. female. HPI: 80 y. o.female presents for follow up hypertension , hypothyroidism, cardiomyopathy, chronic diastolic congestive heart failure, and stage IV chronic kidney disease. And hypercholesterolemia. Pt denies any dizziness,  chest pain, palpitations, or dypsnea with exertion. SUBJECTIVE    Family History   Problem Relation Age of Onset    Heart disease Family     Hypertension Family     Cancer Family      Social History     Socioeconomic History    Marital status:       Spouse name: Not on file    Number of children: Not on file    Years of education: Less than high school    Highest education level: Not on file   Occupational History    Occupation: Retired   Tobacco Use    Smoking status: Never     Passive exposure: Current    Smokeless tobacco: Never   Substance and Sexual Activity    Alcohol use: Not Currently    Drug use: Never    Sexual activity: Not Currently     Partners: Male     Birth control/protection: Post-menopausal   Other Topics Concern    Not on file   Social History Narrative    Always uses seatbelt    Denied:  History of daily caffeinated cola consumption    Denied:  History of daily coffee consumption    Daily tea consumption    Denied:  History of dental care, regularly    Exercise:  Walking    Living will in place    No Faith beliefs    Power of  in existence    Water intake, adequate (per day)     Social Determinants of Health     Financial Resource Strain: Low Risk  (7/27/2023)    Overall Financial Resource Strain (CARDIA)     Difficulty of Paying Living Expenses: Not very hard   Food Insecurity: No Food Insecurity (3/4/2022)    Hunger Vital Sign     Worried About [place] in the Last Year: Never true     [address] in the Last Year: Never true   Transportation Needs: No Transportation Needs (7/27/2023)    PRAPARE - Transportation     Lack of Transportation (Medical): No     Lack of Transportation (Non-Medical):  No   Physical Activity: Not on file   Stress: Not on file   Social Connections: Not on file   Intimate Partner Violence: Not on file   Housing Stability: Unknown (3/4/2022)    Housing Stability Vital Sign     Unable to Pay for Housing in the Last Year: No     Number of Places Lived in the Last Year: Not on file     Unstable Housing in the Last Year: No     Past Medical History:   Diagnosis Date    A-fib ([address] [facility])     Cardiac disease     Hyperlipidemia     Hypertension      Past Surgical History:   Procedure Laterality Date    APPENDECTOMY      CHOLECYSTECTOMY      HERNIA REPAIR      HYSTERECTOMY      NEPHRECTOMY Right      Allergies   Allergen Reactions    Penicillin G Anaphylaxis    Spironolactone Shortness Of Breath    Atorvastatin      Other reaction(s): Leg Cramps    Cephalexin Headache    Chocolate - Food Allergy     Citalopram Abdominal Pain and Headache    Diltiazem     Fosinopril Sodium-Hctz     Methylprednisolone Nausea Only     Tablet generic only    Monopril [Fosinopril]      Reaction Date: 28Apr2011;     Penicillins     Pollen Extract     Pravastatin Myalgia     Other reaction(s): Leg Cramps    Sporanox [Itraconazole]      Reaction Date: 28Apr2011;     Strawberry C [Ascorbate - Food Allergy]     Caffeine - Food Allergy Palpitations     Tachycardia       Current Outpatient Medications:     ALPRAZolam (XANAX) 0.25 mg tablet, TAKE ONE TABLET BY MOUTH FOUR TIMES A DAY AS NEEDED FOR ANXIETY, Disp: 120 tablet, Rfl: 3    amiodarone 100 mg tablet, TAKE ONE TABLET BY MOUTH DAILY, Disp: 90 tablet, Rfl: 3    aspirin (ECOTRIN LOW STRENGTH) 81 mg EC tablet, Take 81 mg by mouth every other day, Disp: , Rfl:     Emollient (EUCERIN) lotion, Apply topically 2 (two) times a day, Disp: , Rfl:     famotidine (PEPCID) 40 MG tablet, TAKE ONE TABLET BY MOUTH EVERY DAY, Disp: 30 tablet, Rfl: 2    furosemide (LASIX) 20 mg tablet, TAKE TWO TABLETS BY MOUTH EVERY DAY, Disp: 60 tablet, Rfl: 3    glycerin-hypromellose-PEG 400 (ARTIFICIAL TEARS) 0.2-0.2-1 % SOLN, Administer 1 drop to both eyes 3 (three) times a day as needed (dry eyes), Disp: 15 mL, Rfl: 0    hyoscyamine (ANASPAZ) 0.125 mg, DISSOLVE ONE TABLET BY MOUTH EVERY 4 HOURS AS NEEDED FOR SPASMS, Disp: 40 tablet, Rfl: 1    levothyroxine 50 mcg tablet, TAKE ONE TABLET BY MOUTH EVERY DAY (Patient taking differently: Take by mouth daily at bedtime), Disp: 30 tablet, Rfl: 3    neomycin-colistin-hydrocortisone-thonzonium (CORTISPORIN TC) 0.33-0.3-1-0.05 % otic suspension, Administer 4 drops to the right ear 3 (three) times a day, Disp: 10 mL, Rfl: 0    nystatin-triamcinolone (MYCOLOG-II) cream, Apply topically 2 (two) times a day, Disp: 60 g, Rfl: 3    polyethylene glycol (MIRALAX) 17 g packet, Take 17g by mouth daily. If constipation worsens, take 17 g by mouth two times daily. , Disp: 72 each, Rfl: 0    promethazine-dextromethorphan (PHENERGAN-DM) 6.25-15 mg/5 mL oral syrup, Take 5 mL by mouth 4 (four) times a day as needed for cough, Disp: 180 mL, Rfl: 0    zolpidem (AMBIEN CR) 12.5 MG CR tablet, TAKE ONE TABLET BY MOUTH AT BEDTIME, Disp: 30 tablet, Rfl: 3    Artificial Saliva (Xerostomia Relief [place]) SOLN, Apply 1 application to the mouth or throat 4 (four) times a day (Patient not taking: Reported on 7/27/2023), Disp: 10 mL, Rfl: 3    bisacodyl (DULCOLAX) 10 mg suppository, Insert 1 suppository (10 mg total) into the rectum daily as needed for constipation (Patient not taking: Reported on 11/22/2022), Disp: 12 suppository, Rfl: 0    ciprofloxacin-hydrocortisone (CIPRO HC OTIC) otic suspension, Administer 3 drops to the right ear 2 (two) times a day (Patient not taking: Reported on 11/28/2023), Disp: 10 mL, Rfl: 0    Review of Systems  Constitutional:     Denies fever, chills ,fatigue ,weakness ,weight loss, weight gain     ENT: Denies earache ,loss of hearing ,nosebleed, nasal discharge,nasal congestion ,sore throat ,hoarseness  Pulmonary: Denies shortness of breath ,cough ,dyspnea on exertion, orthopnea  ,PND   Cardiovascular:  Denies bradycardia , tachycardia  ,palpations, lower extremity edema leg, claudication  Breast:  Denies new or changing breast lumps ,nipple discharge ,nipple changes  Abdomen:  Denies abdominal pain , anorexia , indigestion, nausea, vomiting, constipation, diarrhea  Musculoskeletal: Denies myalgias, arthralgias, joint swelling, joint stiffness , limb pain, limb swelling  Gu: denies dysuria, polyuria  Skin: Denies skin rash, skin lesion, skin wound, itching, dry skin  Neuro: Denies headache, numbness, tingling, confusion, loss of consciousness, dizziness, vertigo  Psychiatric: Denies feelings of depression, suicidal ideation, anxiety, sleep disturbances    OBJECTIVE  /70   Pulse 61   Temp (!) 97.4 °F (36.3 °C)   Ht 5' 7" (1.702 m)   Wt 83.6 kg (184 lb 6.4 oz)   SpO2 95%   BMI 28.88 kg/m²   Constitutional:   NAD, well appearing and well nourished      ENT:   Conjunctiva and lids: no injection, edema, or discharge     Pupils and iris: ANKIT bilaterally    External inspection of ears and nose: normal without deformities or discharge. Otoscopic exam: Canals patent without erythema. Nasal mucosa, septum and turbinates: Normal or edema or discharge         Oropharynx:  Moist mucosa, normal tongue and tonsils without lesions. No erythema        Pulmonary:Respiratory effort normal rate and rhythm, no increased work of breathing.  Auscultation of lungs:  Clear bilaterally with no adventitious breath sounds       Cardiovascular: regular rate and rhythm, S1 and S2, no murmur, no edema and/or varicosities of LE      Abdomen: Soft and non-distended     Positive bowel sounds      No heptomegaly or splenomegaly      Gu: no suprapubic tenderness or CVA tenderness, no urethral discharge  Lymphatic:  No anterior or posterior cervical lymphadenopathy         Musculoskeletal:  Gait and station: Normal gait      Digits and nails normal without clubbing or cyanosis       Inspection/palpation of joints, bones, and muscles:  No joint tenderness, swelling, full active and passive range of motion       Skin: Normal skin turgor and no rashes      Neuro:      Normal reflexes     Psych:   alert and oriented to person, place and time     normal mood and affect       Assessment/Plan:Diagnoses and all orders for this visit:    Primary hypertension  Comments:  Stable on current therapy. Mixed hyperlipidemia  Comments:  Continue with low-fat diet. Hypothyroidism, unspecified type  Comments:  Stable on levothyroxine therapy. Chronic diastolic congestive heart failure ([address])  Comments:  Patient to continue with daily weights, diuretic therapy, and low-sodium diet. Dilated cardiomyopathy ([address])  Comments:  Continue with cardiology follow-up. Chronic kidney disease, stage 4 (severe) (HCC)  Comments:  Continue with renal surveillance        Reviewed with patient plan to treat with above plan.     Patient instructed to call in 72 hours if not feeling better or if symptoms worse

## 2023-11-30 RX ORDER — ALPRAZOLAM 0.25 MG/1
TABLET ORAL
Qty: 120 TABLET | Refills: 3 | Status: SHIPPED | OUTPATIENT
Start: 2023-11-30

## 2023-11-30 NOTE — TELEPHONE ENCOUNTER
1 [id number] 11/12/2023 08/09/2023 Zolpidem Tartrate (Tablet, Extended Release) 30.0 30 12.5 MG NA 1 [facility] #[id number] Medicare 3 / 3 [place]    1 [id number] 11/01/2023 08/01/2023 ALPRAZolam (Tablet) 120.0 30 0.25 MG  [address] #[id number] Medicare 3 / 3 [place]    1 [id number] 10/10/2023 08/09/2023 Zolpidem Tartrate (Tablet, Extended Release) 30.0 30 12.5 MG NA 1 [facility] #[id number] Medicare 2 / 3 [place]    1 [id number] 10/02/2023 08/01/2023 ALPRAZolam (Tablet) 120.0 30 0.25 MG NA [facility] #[id number] Medicare 2 / 3 [place]    1 [id number] 09/10/2023 08/09/2023 Zolpidem Tartrate (Tablet, Extended Release) 30.0 30 12.5 MG NA 1 [facility] #[id number] Medicare 1 / 3 [place]    1 [id number] 09/01/2023 08/01/2023 ALPRAZolam (Tablet) 120.0 30 0.25 MG NA [facility] #[id number] Medicare 1 / 3 [place]    1 [id number] 08/09/2023 08/09/2023 Zolpidem Tartrate (Tablet, Extended Release) 30.0 30 12.5 MG NA 1 [facility] #[id number] Medicare 0 / 3 [place]    1 [id number] 08/01/2023 08/01/2023 ALPRAZolam (Tablet) 120.0 30 0.25 MG  [facility] #9803 Medicare 0 / 3 [place]    1 [id number] 07/09/2023 04/12/2023 ALPRAZolam (Tablet) 100.0 25 0.25 MG NA [facility] #[id number] Medicare 3 / 3 [place]    1 [id number] 07/09/2023 04/12/2023 Zolpidem Tartrate (Tablet, Extended Release) 30.0 30 12.5 MG  [facility] [address] #[id number] Medicare 3 / 3 [place]

## 2023-12-07 ENCOUNTER — TELEPHONE (OUTPATIENT)
Age: 88
End: 2023-12-07

## 2023-12-07 DIAGNOSIS — J06.9 UPPER RESPIRATORY TRACT INFECTION, UNSPECIFIED TYPE: Primary | ICD-10-CM

## 2023-12-07 RX ORDER — DEXTROMETHORPHAN HYDROBROMIDE AND PROMETHAZINE HYDROCHLORIDE 15; 6.25 MG/5ML; MG/5ML
5 SYRUP ORAL 4 TIMES DAILY PRN
Qty: 180 ML | Refills: 0 | Status: SHIPPED | OUTPATIENT
Start: 2023-12-07

## 2023-12-07 RX ORDER — CEFUROXIME AXETIL 250 MG/1
250 TABLET ORAL EVERY 12 HOURS SCHEDULED
Qty: 20 TABLET | Refills: 0 | Status: SHIPPED | OUTPATIENT
Start: 2023-12-07 | End: 2023-12-17

## 2023-12-07 RX ORDER — AZITHROMYCIN 250 MG/1
TABLET, FILM COATED ORAL
Qty: 6 TABLET | Refills: 0 | Status: SHIPPED | OUTPATIENT
Start: 2023-12-07 | End: 2023-12-11

## 2023-12-07 NOTE — TELEPHONE ENCOUNTER
Spoke with patient's daughter, States her symptoms are sore throat, cough, a little congestion and eyes are watery. Only taking mucinex. Please advise.

## 2023-12-07 NOTE — TELEPHONE ENCOUNTER
[facility] called, interaction between azithromycin and amiodarone. Prolonged QT interval.  He said the interaction is considered high risk. Please return call.

## 2023-12-07 NOTE — TELEPHONE ENCOUNTER
Patient's daughter called to request a medication for her mother that's been on for the past 2 wks ans is not getting better     The daughter report that she is taking Muxines at the moment but is not working.      Please contact  for advice

## 2023-12-10 DIAGNOSIS — G47.00 INSOMNIA, UNSPECIFIED TYPE: ICD-10-CM

## 2023-12-11 RX ORDER — ZOLPIDEM TARTRATE 12.5 MG/1
12.5 TABLET, FILM COATED, EXTENDED RELEASE ORAL
Qty: 30 TABLET | Refills: 3 | Status: SHIPPED | OUTPATIENT
Start: 2023-12-11

## 2023-12-11 NOTE — TELEPHONE ENCOUNTER
1 [id number] 11/30/2023 11/30/2023 ALPRAZolam (Tablet) 120.0 30 0.25 MG NA [facility] #[id number] Medicare 0 / 3 [place]    1 [id number] 11/12/2023 08/09/2023 Zolpidem Tartrate (Tablet, Extended Release) 30.0 30 12.5 MG NA 1 [facility] #[id number] Medicare 3 / 3 [place]    1 [id number] 11/01/2023 08/01/2023 ALPRAZolam (Tablet) 120.0 30 0.25 MG  [address] #1425 Medicare 3 / 3 [place]    1 [id number] 10/10/2023 08/09/2023 Zolpidem Tartrate (Tablet, Extended Release) 30.0 30 12.5 MG NA 1 [facility] #6486 Medicare 2 / 3 [place]    1 [id number] 10/02/2023 08/01/2023 ALPRAZolam (Tablet) 120.0 30 0.25 MG NA [facility] #[id number] Medicare 2 / 3 [place]    1 [id number] 09/10/2023 08/09/2023 Zolpidem Tartrate (Tablet, Extended Release) 30.0 30 12.5 MG NA 1 [facility] #[id number] Medicare 1 / 3 [place]    1 [id number] 09/01/2023 08/01/2023 ALPRAZolam (Tablet) 120.0 30 0.25 MG NA [facility] #[id number] Medicare 1 / 3 [place]    1 [id number] 08/09/2023 08/09/2023 Zolpidem Tartrate (Tablet, Extended Release) 30.0 30 12.5 MG NA 1 [facility] #1192 Medicare 0 / 3 [place]    1 [id number] 08/01/2023 08/01/2023 ALPRAZolam (Tablet) 120.0 30 0.25 MG  [address] #4924 Medicare 0 / 3 [place]    1 [id number] 07/09/2023 04/12/2023 ALPRAZolam (Tablet) 100.0 25 0.25 MG  [address] #6098 Medicare 3 / 3 [place]

## 2023-12-15 ENCOUNTER — APPOINTMENT (OUTPATIENT)
Dept: LAB | Facility: CLINIC | Age: 88
End: 2023-12-15
Payer: COMMERCIAL

## 2023-12-15 DIAGNOSIS — I10 ESSENTIAL HYPERTENSION: ICD-10-CM

## 2023-12-15 DIAGNOSIS — E03.9 HYPOTHYROIDISM, UNSPECIFIED TYPE: ICD-10-CM

## 2023-12-15 DIAGNOSIS — E78.2 MIXED HYPERLIPIDEMIA: ICD-10-CM

## 2023-12-15 LAB
ALBUMIN SERPL BCP-MCNC: 4.1 G/DL (ref 3.5–5)
ALP SERPL-CCNC: 69 U/L (ref 34–104)
ALT SERPL W P-5'-P-CCNC: 13 U/L (ref 7–52)
ANION GAP SERPL CALCULATED.3IONS-SCNC: 10 MMOL/L
AST SERPL W P-5'-P-CCNC: 19 U/L (ref 13–39)
BILIRUB SERPL-MCNC: 1.01 MG/DL (ref 0.2–1)
BUN SERPL-MCNC: 28 MG/DL (ref 5–25)
CALCIUM SERPL-MCNC: 9.2 MG/DL (ref 8.4–10.2)
CHLORIDE SERPL-SCNC: 100 MMOL/L (ref 96–108)
CHOLEST SERPL-MCNC: 138 MG/DL
CO2 SERPL-SCNC: 25 MMOL/L (ref 21–32)
CREAT SERPL-MCNC: 2.01 MG/DL (ref 0.6–1.3)
GFR SERPL CREATININE-BSD FRML MDRD: 20 ML/MIN/1.73SQ M
GLUCOSE P FAST SERPL-MCNC: 105 MG/DL (ref 65–99)
HDLC SERPL-MCNC: 34 MG/DL
LDLC SERPL CALC-MCNC: 68 MG/DL (ref 0–100)
NONHDLC SERPL-MCNC: 104 MG/DL
POTASSIUM SERPL-SCNC: 4.1 MMOL/L (ref 3.5–5.3)
PROT SERPL-MCNC: 6.6 G/DL (ref 6.4–8.4)
SODIUM SERPL-SCNC: 135 MMOL/L (ref 135–147)
TRIGL SERPL-MCNC: 178 MG/DL
TSH SERPL DL<=0.05 MIU/L-ACNC: 2.46 UIU/ML (ref 0.45–4.5)

## 2023-12-15 PROCEDURE — 84443 ASSAY THYROID STIM HORMONE: CPT

## 2023-12-15 PROCEDURE — 80053 COMPREHEN METABOLIC PANEL: CPT

## 2023-12-15 PROCEDURE — 80061 LIPID PANEL: CPT

## 2023-12-15 PROCEDURE — 36415 COLL VENOUS BLD VENIPUNCTURE: CPT

## 2023-12-27 NOTE — TELEPHONE ENCOUNTER
Insurance denied her zolpidem and recommend her trying rozerem or silenor Patient needs TSH, Free T4 and FSH levels drawn.  She is also due for an annual exam in the office.  Please have her schedule so we can discuss treatment options for hot flashes at the time of her annual.

## 2024-02-01 DIAGNOSIS — K29.00 ACUTE GASTRITIS WITHOUT HEMORRHAGE, UNSPECIFIED GASTRITIS TYPE: ICD-10-CM

## 2024-02-02 RX ORDER — FAMOTIDINE 40 MG/1
40 TABLET, FILM COATED ORAL DAILY
Qty: 30 TABLET | Refills: 5 | Status: SHIPPED | OUTPATIENT
Start: 2024-02-02

## 2024-02-09 DIAGNOSIS — I50.32 CHRONIC DIASTOLIC HEART FAILURE (HCC): ICD-10-CM

## 2024-02-09 DIAGNOSIS — I48.91 ATRIAL FIBRILLATION, UNSPECIFIED TYPE (HCC): ICD-10-CM

## 2024-02-10 RX ORDER — FUROSEMIDE 20 MG/1
40 TABLET ORAL DAILY
Qty: 60 TABLET | Refills: 0 | Status: SHIPPED | OUTPATIENT
Start: 2024-02-10 | End: 2024-03-11

## 2024-02-12 RX ORDER — AMIODARONE HYDROCHLORIDE 100 MG/1
100 TABLET ORAL DAILY
Qty: 90 TABLET | Refills: 3 | Status: SHIPPED | OUTPATIENT
Start: 2024-02-12

## 2024-03-09 DIAGNOSIS — I50.32 CHRONIC DIASTOLIC HEART FAILURE (HCC): ICD-10-CM

## 2024-03-10 RX ORDER — FUROSEMIDE 20 MG/1
40 TABLET ORAL DAILY
Qty: 60 TABLET | Refills: 5 | Status: SHIPPED | OUTPATIENT
Start: 2024-03-10

## 2024-03-15 ENCOUNTER — RA CDI HCC (OUTPATIENT)
Dept: OTHER | Facility: HOSPITAL | Age: 89
End: 2024-03-15

## 2024-03-22 ENCOUNTER — RA CDI HCC (OUTPATIENT)
Dept: OTHER | Facility: HOSPITAL | Age: 89
End: 2024-03-22

## 2024-03-22 PROBLEM — I13.0 HYPERTENSIVE HEART AND CHRONIC KIDNEY DISEASE WITH HEART FAILURE AND STAGE 1 THROUGH STAGE 4 CHRONIC KIDNEY DISEASE, OR UNSPECIFIED CHRONIC KIDNEY DISEASE (HCC): Status: ACTIVE | Noted: 2024-03-22

## 2024-03-22 NOTE — PROGRESS NOTES
HCC coding opportunities     I13.0     Chart Reviewed number of suggestions sent to Provider: 1   GR    Patients Insurance     Medicare Insurance: [employer] Advantage

## 2024-03-28 ENCOUNTER — OFFICE VISIT (OUTPATIENT)
Dept: FAMILY MEDICINE CLINIC | Facility: CLINIC | Age: 89
End: 2024-03-28
Payer: COMMERCIAL

## 2024-03-28 ENCOUNTER — APPOINTMENT (OUTPATIENT)
Dept: LAB | Facility: CLINIC | Age: 89
End: 2024-03-28
Payer: COMMERCIAL

## 2024-03-28 VITALS
SYSTOLIC BLOOD PRESSURE: 106 MMHG | TEMPERATURE: 98 F | OXYGEN SATURATION: 94 % | BODY MASS INDEX: 29.63 KG/M2 | WEIGHT: 188.8 LBS | DIASTOLIC BLOOD PRESSURE: 74 MMHG | HEIGHT: 67 IN | HEART RATE: 52 BPM

## 2024-03-28 DIAGNOSIS — E03.9 HYPOTHYROIDISM, UNSPECIFIED TYPE: ICD-10-CM

## 2024-03-28 DIAGNOSIS — I50.9 CONGESTIVE HEART FAILURE, UNSPECIFIED HF CHRONICITY, UNSPECIFIED HEART FAILURE TYPE (HCC): ICD-10-CM

## 2024-03-28 DIAGNOSIS — I50.9 CONGESTIVE HEART FAILURE, UNSPECIFIED HF CHRONICITY, UNSPECIFIED HEART FAILURE TYPE (HCC): Primary | ICD-10-CM

## 2024-03-28 LAB
BNP SERPL-MCNC: 183 PG/ML (ref 0–100)
TSH SERPL DL<=0.05 MIU/L-ACNC: 4.28 UIU/ML (ref 0.45–4.5)

## 2024-03-28 PROCEDURE — 84443 ASSAY THYROID STIM HORMONE: CPT

## 2024-03-28 PROCEDURE — G2211 COMPLEX E/M VISIT ADD ON: HCPCS | Performed by: FAMILY MEDICINE

## 2024-03-28 PROCEDURE — 36415 COLL VENOUS BLD VENIPUNCTURE: CPT

## 2024-03-28 PROCEDURE — 99213 OFFICE O/P EST LOW 20 MIN: CPT | Performed by: FAMILY MEDICINE

## 2024-03-28 PROCEDURE — 83880 ASSAY OF NATRIURETIC PEPTIDE: CPT

## 2024-04-01 DIAGNOSIS — F41.9 ANXIETY: ICD-10-CM

## 2024-04-01 DIAGNOSIS — E03.9 HYPOTHYROIDISM, UNSPECIFIED TYPE: ICD-10-CM

## 2024-04-02 RX ORDER — ALPRAZOLAM 0.25 MG/1
TABLET ORAL
Qty: 120 TABLET | Refills: 3 | Status: SHIPPED | OUTPATIENT
Start: 2024-04-02

## 2024-04-02 RX ORDER — LEVOTHYROXINE SODIUM 0.05 MG/1
TABLET ORAL
Qty: 30 TABLET | Refills: 5 | Status: SHIPPED | OUTPATIENT
Start: 2024-04-02

## 2024-04-02 NOTE — TELEPHONE ENCOUNTER
1 [id number] 03/10/2024 12/11/2023 Zolpidem Tartrate (Tablet, Extended Release) 30.0 30 12.5 MG NA GUILLE FARAZ [facility] Medicare 3 / 3 [place]    1 [id number] 03/01/2024 11/30/2023 ALPRAZolam (Tablet) 120.0 30 0.25 MG NA TONI ESPARZA [facility] Medicare 3 / 3 [place]    1 [id number] 02/10/2024 12/11/2023 Zolpidem Tartrate (Tablet, Extended Release) 30.0 30 12.5 MG NA GUILLE FARAZ [facility] Medicare 2 / 3 [place]    1 [id number] 02/02/2024 11/30/2023 ALPRAZolam (Tablet) 120.0 30 0.25 [facility] 2 / 3 [place]    1 [id number] 01/12/2024 12/11/2023 Zolpidem Tartrate (Tablet, Extended Release) 30.0 30 12.5 MG  GUILLE FARAZ [facility] Medicare 1 / 3 [place]    1 [id number] 01/02/2024 11/30/2023 ALPRAZolam (Tablet) 100.0 25 0.25 MG [facility] 1 / 3 [place]    1 [id number] 12/11/2023 12/11/2023 Zolpidem Tartrate (Tablet, Extended Release) 30.0 30 12.5 MG NA GUILLE FARAZ [facility] Medicare 0 / 3 [place]    1 [id number] 11/30/2023 11/30/2023 ALPRAZolam (Tablet) 120.0 30 0.25 MG  [facility] Medicare 0 / 3 [place]    1 [id number] 11/12/2023 08/09/2023 Zolpidem Tartrate (Tablet, Extended Release) 30.0 30 12.5 MG  GUILLE FARAZ [facility] Medicare 3 / 3 [place]    1 [id number] 11/01/2023 08/01/2023 ALPRAZolam (Tablet) 120.0 30 0.25 [facility] Medicare 3 / 3 [place]

## 2024-04-08 DIAGNOSIS — G47.00 INSOMNIA, UNSPECIFIED TYPE: ICD-10-CM

## 2024-04-09 NOTE — PROGRESS NOTES
Patient ID: Li Koenig is a 89 y.o. female.    HPI: 89 y.o.female presents for evaluation of peripheral edema, but no orthopnea, dyspnea on exertion, dry cough or wheezing.  She is also here to have her thyroid rechecked.    SUBJECTIVE    Family History   Problem Relation Age of Onset    Heart disease Family     Hypertension Family     Cancer Family      Social History     Socioeconomic History    Marital status:      Spouse name: Not on file    Number of children: Not on file    Years of education: Less than high school    Highest education level: Not on file   Occupational History    Occupation: Retired   Tobacco Use    Smoking status: Never     Passive exposure: Current    Smokeless tobacco: Never   Substance and Sexual Activity    Alcohol use: Not Currently    Drug use: Never    Sexual activity: Not Currently     Partners: Male     Birth control/protection: Post-menopausal   Other Topics Concern    Not on file   Social History Narrative    Always uses seatbelt    Denied:  History of daily caffeinated cola consumption    Denied:  History of daily coffee consumption    Daily tea consumption    Denied:  History of dental care, regularly    Exercise:  Walking    Living will in place    No Zoroastrian beliefs    Power of  in existence    Water intake, adequate (per day)     Social Determinants of Health     Financial Resource Strain: Low Risk  (7/27/2023)    Overall Financial Resource Strain (CARDIA)     Difficulty of Paying Living Expenses: Not very hard   Food Insecurity: No Food Insecurity (3/4/2022)    Hunger Vital Sign     Worried About Running Out of Food in the Last Year: Never true     Ran Out of Food in the Last Year: Never true   Transportation Needs: No Transportation Needs (7/27/2023)    PRAPARE - Transportation     Lack of Transportation (Medical): No     Lack of Transportation (Non-Medical): No   Physical Activity: Not on file   Stress: Not on file   Social Connections: Not on file    Intimate Partner Violence: Not on file   Housing Stability: Unknown (3/4/2022)    Housing Stability Vital Sign     Unable to Pay for Housing in the Last Year: No     Number of Places Lived in the Last Year: Not on file     Unstable Housing in the Last Year: No     Past Medical History:   Diagnosis Date    A-fib (HCC)     Cardiac disease     Hyperlipidemia     Hypertension      Past Surgical History:   Procedure Laterality Date    APPENDECTOMY      CHOLECYSTECTOMY      HERNIA REPAIR      HYSTERECTOMY      NEPHRECTOMY Right      Allergies   Allergen Reactions    Penicillin G Anaphylaxis    Spironolactone Shortness Of Breath    Atorvastatin      Other reaction(s): Leg Cramps    Cephalexin Headache    Chocolate - Food Allergy     Citalopram Abdominal Pain and Headache    Diltiazem     Fosinopril Sodium-Hctz     Methylprednisolone Nausea Only     Tablet generic only    Monopril [Fosinopril]      Reaction Date: 28Apr2011;     Penicillins     Pollen Extract     Pravastatin Myalgia     Other reaction(s): Leg Cramps    Sporanox [Itraconazole]      Reaction Date: 28Apr2011;     Strawberry C [Ascorbate - Food Allergy]     Caffeine - Food Allergy Palpitations     Tachycardia       Current Outpatient Medications:     amiodarone 100 mg tablet, TAKE ONE TABLET BY MOUTH EVERY DAY, Disp: 90 tablet, Rfl: 3    aspirin (ECOTRIN LOW STRENGTH) 81 mg EC tablet, Take 81 mg by mouth every other day, Disp: , Rfl:     Emollient (EUCERIN) lotion, Apply topically 2 (two) times a day, Disp: , Rfl:     famotidine (PEPCID) 40 MG tablet, TAKE ONE TABLET BY MOUTH EVERY DAY, Disp: 30 tablet, Rfl: 5    furosemide (LASIX) 20 mg tablet, TAKE TWO TABLETS BY MOUTH EVERY DAY, Disp: 60 tablet, Rfl: 5    glycerin-hypromellose- (ARTIFICIAL TEARS) 0.2-0.2-1 % SOLN, Administer 1 drop to both eyes 3 (three) times a day as needed (dry eyes), Disp: 15 mL, Rfl: 0    hyoscyamine (ANASPAZ) 0.125 mg, DISSOLVE ONE TABLET BY MOUTH EVERY 4 HOURS AS NEEDED FOR  SPASMS, Disp: 40 tablet, Rfl: 1    neomycin-colistin-hydrocortisone-thonzonium (CORTISPORIN TC) 0.33-0.3-1-0.05 % otic suspension, Administer 4 drops to the right ear 3 (three) times a day, Disp: 10 mL, Rfl: 0    nystatin-triamcinolone (MYCOLOG-II) cream, Apply topically 2 (two) times a day, Disp: 60 g, Rfl: 3    polyethylene glycol (MIRALAX) 17 g packet, Take 17g by mouth daily. If constipation worsens, take 17 g by mouth two times daily., Disp: 72 each, Rfl: 0    promethazine-dextromethorphan (PHENERGAN-DM) 6.25-15 mg/5 mL oral syrup, Take 5 mL by mouth 4 (four) times a day as needed for cough, Disp: 180 mL, Rfl: 0    promethazine-dextromethorphan (PHENERGAN-DM) 6.25-15 mg/5 mL oral syrup, Take 5 mL by mouth 4 (four) times a day as needed for cough (Patient not taking: Reported on 3/28/2024), Disp: 180 mL, Rfl: 0    zolpidem (AMBIEN CR) 12.5 MG CR tablet, TAKE ONE TABLET BY MOUTH AT BEDTIME, Disp: 30 tablet, Rfl: 3    ALPRAZolam (XANAX) 0.25 mg tablet, TAKE ONE TABLET BY MOUTH FOUR TIMES A DAY AS NEEDED FOR ANXIETY, Disp: 120 tablet, Rfl: 3    Artificial Saliva (Xerostomia Relief [place]) SOLN, Apply 1 application to the mouth or throat 4 (four) times a day (Patient not taking: Reported on 7/27/2023), Disp: 10 mL, Rfl: 3    bisacodyl (DULCOLAX) 10 mg suppository, Insert 1 suppository (10 mg total) into the rectum daily as needed for constipation (Patient not taking: Reported on 11/22/2022), Disp: 12 suppository, Rfl: 0    ciprofloxacin-hydrocortisone (CIPRO HC OTIC) otic suspension, Administer 3 drops to the right ear 2 (two) times a day (Patient not taking: Reported on 11/28/2023), Disp: 10 mL, Rfl: 0    levothyroxine 50 mcg tablet, TAKE ONE TABLET BY MOUTH EVERY DAY, Disp: 30 tablet, Rfl: 5    Review of Systems  Constitutional:     Denies fever, chills ,fatigue ,weakness ,weight loss, weight gain     ENT: Denies earache ,loss of hearing ,nosebleed, nasal discharge,nasal congestion ,sore throat  ",hoarseness  Pulmonary: Denies shortness of breath ,cough  ,dyspnea on exertion, orthopnea  ,PND   Cardiovascular:  Denies bradycardia , tachycardia  ,palpations, +lower extremity edema leg, no claudication  Breast:  Denies new or changing breast lumps ,nipple discharge ,nipple changes  Abdomen:  Denies abdominal pain , anorexia , indigestion, nausea, vomiting, constipation, diarrhea  Musculoskeletal: Denies myalgias, arthralgias, joint swelling, joint stiffness , limb pain, limb swelling  Gu: denies dysuria, polyuria  Skin: Denies skin rash, skin lesion, skin wound, itching, dry skin  Neuro: Denies headache, numbness, tingling, confusion, loss of consciousness, dizziness, vertigo  Psychiatric: Denies feelings of depression, suicidal ideation, anxiety, sleep disturbances    OBJECTIVE  /74   Pulse (!) 52   Temp 98 °F (36.7 °C)   Ht 5' 7\" (1.702 m)   Wt 85.6 kg (188 lb 12.8 oz)   SpO2 94%   BMI 29.57 kg/m²   Constitutional:   NAD, well appearing and well nourished      ENT:   Conjunctiva and lids: no injection, edema, or discharge     Pupils and iris: ANKIT bilaterally    External inspection of ears and nose: normal without deformities or discharge.      Otoscopic exam: Canals patent without erythema.       Nasal mucosa, septum and turbinates: Normal or edema or discharge         Oropharynx:  Moist mucosa, normal tongue and tonsils without lesions. No erythema        Pulmonary:Respiratory effort normal rate and rhythm, no increased work of breathing. Auscultation of lungs:  Clear bilaterally with no adventitious breath sounds       Cardiovascular: regular rate and rhythm, S1 and S2, no murmur, + 2 pitting edema and/or varicosities of LE      Abdomen: Soft and non-distended     Positive bowel sounds      No heptomegaly or splenomegaly      Gu: no suprapubic tenderness or CVA tenderness, no urethral discharge  Lymphatic:  No anterior or posterior cervical lymphadenopathy         Musculoskeletal:  Gait and " station: Normal gait      Digits and nails normal without clubbing or cyanosis       Inspection/palpation of joints, bones, and muscles:  No joint tenderness, swelling, full active and passive range of motion       Skin: Normal skin turgor and no rashes         Normal reflexes       Psych:   alert and oriented to person, place and time     normal mood and affect       Assessment/Plan:Diagnoses and all orders for this visit:    Congestive heart failure, unspecified HF chronicity, unspecified heart failure type (HCC)  Comments:  Will double up on furosemide and potassium therapy for 72 hours and continue daily weights, leg elevation and low-sodium diet and low  Orders:  -     B-Type Natriuretic Peptide(BNP); Future    Will check TSH  Comments:  Will check TSH  Orders:  -     TSH w/Reflex; Future        Reviewed with patient plan to treat with above plan.  Patient instructed to call in 72 hours if not feeling better or if symptoms worsen evaluation of peripheral

## 2024-04-10 RX ORDER — ZOLPIDEM TARTRATE 12.5 MG/1
12.5 TABLET, FILM COATED, EXTENDED RELEASE ORAL
Qty: 30 TABLET | Refills: 3 | Status: SHIPPED | OUTPATIENT
Start: 2024-04-10

## 2024-04-10 NOTE — TELEPHONE ENCOUNTER
1 [id number] 04/02/2024 04/02/2024 ALPRAZolam (Tablet) 120.0 30 0.25 MG NA TONI ESPARZA [facility] Medicare 0 / 3 [place]    1 [id number] 03/10/2024 12/11/2023 Zolpidem Tartrate (Tablet, Extended Release) 30.0 30 12.5 MG NA GUILLE FARAZ [facility]6321 Medicare 3 / 3 PA    1 [id number] 03/01/2024 11/30/2023 ALPRAZolam (Tablet) 120.0 30 0.25 [facility] Medicare 3 / 3 PA    1 [id number] 02/10/2024 12/11/2023 Zolpidem Tartrate (Tablet, Extended Release) 30.0 30 12.5 MG NA GUILLE FARAZ [facility]6321 Medicare 2 / 3 PA    1 [id number] 02/02/2024 11/30/2023 ALPRAZolam (Tablet) 120.0 30 0.25 MG  [facility] Medicare 2 / 3 PA    1 [id number] 01/12/2024 12/11/2023 Zolpidem Tartrate (Tablet, Extended Release) 30.0 30 12.5 MG  [employer] [facility] Medicare 1 / 3 PA    1 [id number] 01/02/2024 11/30/2023 ALPRAZolam (Tablet) 100.0 25 0.25 MG  TONI ESPARZA [facility] Medicare 1 / 3 PA    1 [id number] 12/11/2023 12/11/2023 Zolpidem Tartrate (Tablet, Extended Release) 30.0 30 12.5 MG  GUILLE FARAZ [facility] Medicare 0 / 3 PA    1 [id number] 11/30/2023 11/30/2023 ALPRAZolam (Tablet) 120.0 30 0.25 MG NA [facility]I [facility] Medicare 0 / 3 PA    1 [id number] 11/12/2023 08/09/2023 Zolpidem Tartrate (Tablet, Extended Release) 30.0 30 12.5 MG NA [employer] [facility] Medicare 3 / 3 PA

## 2024-05-22 ENCOUNTER — OFFICE VISIT (OUTPATIENT)
Dept: FAMILY MEDICINE CLINIC | Facility: CLINIC | Age: 89
End: 2024-05-22
Payer: COMMERCIAL

## 2024-05-22 VITALS
OXYGEN SATURATION: 92 % | HEART RATE: 58 BPM | SYSTOLIC BLOOD PRESSURE: 116 MMHG | DIASTOLIC BLOOD PRESSURE: 84 MMHG | HEIGHT: 67 IN | TEMPERATURE: 97.7 F | WEIGHT: 192.6 LBS | BODY MASS INDEX: 30.23 KG/M2

## 2024-05-22 DIAGNOSIS — M25.561 CHRONIC PAIN OF RIGHT KNEE: Primary | ICD-10-CM

## 2024-05-22 DIAGNOSIS — G89.29 CHRONIC PAIN OF RIGHT KNEE: Primary | ICD-10-CM

## 2024-05-22 PROBLEM — N18.5 CHRONIC KIDNEY DISEASE, STAGE 5 (HCC): Status: ACTIVE | Noted: 2024-05-22

## 2024-05-22 PROCEDURE — 99213 OFFICE O/P EST LOW 20 MIN: CPT | Performed by: FAMILY MEDICINE

## 2024-05-22 PROCEDURE — G2211 COMPLEX E/M VISIT ADD ON: HCPCS | Performed by: FAMILY MEDICINE

## 2024-05-22 RX ORDER — PREDNISONE 10 MG/1
TABLET ORAL
Qty: 26 TABLET | Refills: 0 | Status: SHIPPED | OUTPATIENT
Start: 2024-05-22

## 2024-05-23 NOTE — PROGRESS NOTES
Patient ID: Li Koenig is a 89 y.o. female.    HPI: 89 y.o.female presents for evaluation of right knee pain.  She is having swelling and stiffness of the right knee with increased pain over the past week.  She denies any locking or instability.  She uses her quad cane to help stabilize her when walking.    SUBJECTIVE    Family History   Problem Relation Age of Onset    Heart disease Family     Hypertension Family     Cancer Family      Social History     Socioeconomic History    Marital status:      Spouse name: Not on file    Number of children: Not on file    Years of education: Less than high school    Highest education level: Not on file   Occupational History    Occupation: Retired   Tobacco Use    Smoking status: Never     Passive exposure: Current    Smokeless tobacco: Never   Substance and Sexual Activity    Alcohol use: Not Currently    Drug use: Never    Sexual activity: Not Currently     Partners: Male     Birth control/protection: Post-menopausal   Other Topics Concern    Not on file   Social History Narrative    Always uses seatbelt    Denied:  History of daily caffeinated cola consumption    Denied:  History of daily coffee consumption    Daily tea consumption    Denied:  History of dental care, regularly    Exercise:  Walking    Living will in place    No Shinto beliefs    Power of  in existence    Water intake, adequate (per day)     Social Determinants of Health     Financial Resource Strain: Low Risk  (7/27/2023)    Overall Financial Resource Strain (CARDIA)     Difficulty of Paying Living Expenses: Not very hard   Food Insecurity: No Food Insecurity (3/4/2022)    Hunger Vital Sign     Worried About Running Out of Food in the Last Year: Never true     Ran Out of Food in the Last Year: Never true   Transportation Needs: No Transportation Needs (7/27/2023)    PRAPARE - Transportation     Lack of Transportation (Medical): No     Lack of Transportation (Non-Medical): No   Physical  Activity: Not on file   Stress: Not on file   Social Connections: Not on file   Intimate Partner Violence: Not on file   Housing Stability: Unknown (3/4/2022)    Housing Stability Vital Sign     Unable to Pay for Housing in the Last Year: No     Number of Places Lived in the Last Year: Not on file     Unstable Housing in the Last Year: No     Past Medical History:   Diagnosis Date    A-fib (HCC)     Cardiac disease     Hyperlipidemia     Hypertension      Past Surgical History:   Procedure Laterality Date    APPENDECTOMY      CHOLECYSTECTOMY      HERNIA REPAIR      HYSTERECTOMY      NEPHRECTOMY Right      Allergies   Allergen Reactions    Penicillin G Anaphylaxis    Spironolactone Shortness Of Breath    Atorvastatin      Other reaction(s): Leg Cramps    Cephalexin Headache    Chocolate - Food Allergy     Citalopram Abdominal Pain and Headache    Diltiazem     Fosinopril Sodium-Hctz     Methylprednisolone Nausea Only     Tablet generic only    Monopril [Fosinopril]      Reaction Date: 28Apr2011;     Penicillins     Pollen Extract     Pravastatin Myalgia     Other reaction(s): Leg Cramps    Sporanox [Itraconazole]      Reaction Date: 28Apr2011;     Strawberry C [Ascorbate - Food Allergy]     Caffeine - Food Allergy Palpitations     Tachycardia       Current Outpatient Medications:     ALPRAZolam (XANAX) 0.25 mg tablet, TAKE ONE TABLET BY MOUTH FOUR TIMES A DAY AS NEEDED FOR ANXIETY, Disp: 120 tablet, Rfl: 3    amiodarone 100 mg tablet, TAKE ONE TABLET BY MOUTH EVERY DAY, Disp: 90 tablet, Rfl: 3    aspirin (ECOTRIN LOW STRENGTH) 81 mg EC tablet, Take 81 mg by mouth every other day, Disp: , Rfl:     Emollient (EUCERIN) lotion, Apply topically 2 (two) times a day, Disp: , Rfl:     famotidine (PEPCID) 40 MG tablet, TAKE ONE TABLET BY MOUTH EVERY DAY, Disp: 30 tablet, Rfl: 5    furosemide (LASIX) 20 mg tablet, TAKE TWO TABLETS BY MOUTH EVERY DAY, Disp: 60 tablet, Rfl: 5    glycerin-hypromellose- (ARTIFICIAL TEARS)  0.2-0.2-1 % SOLN, Administer 1 drop to both eyes 3 (three) times a day as needed (dry eyes), Disp: 15 mL, Rfl: 0    hyoscyamine (ANASPAZ) 0.125 mg, DISSOLVE ONE TABLET BY MOUTH EVERY 4 HOURS AS NEEDED FOR SPASMS, Disp: 40 tablet, Rfl: 1    levothyroxine 50 mcg tablet, TAKE ONE TABLET BY MOUTH EVERY DAY, Disp: 30 tablet, Rfl: 5    neomycin-colistin-hydrocortisone-thonzonium (CORTISPORIN TC) 0.33-0.3-1-0.05 % otic suspension, Administer 4 drops to the right ear 3 (three) times a day, Disp: 10 mL, Rfl: 0    nystatin-triamcinolone (MYCOLOG-II) cream, Apply topically 2 (two) times a day, Disp: 60 g, Rfl: 3    polyethylene glycol (MIRALAX) 17 g packet, Take 17g by mouth daily. If constipation worsens, take 17 g by mouth two times daily., Disp: 72 each, Rfl: 0    predniSONE 10 mg tablet, 3 tabs po bid x2 days, then 2 tabs po bid x2 days, then 1 tab bid x2 days, then 1 daily until done., Disp: 26 tablet, Rfl: 0    promethazine-dextromethorphan (PHENERGAN-DM) 6.25-15 mg/5 mL oral syrup, Take 5 mL by mouth 4 (four) times a day as needed for cough, Disp: 180 mL, Rfl: 0    promethazine-dextromethorphan (PHENERGAN-DM) 6.25-15 mg/5 mL oral syrup, Take 5 mL by mouth 4 (four) times a day as needed for cough (Patient not taking: Reported on 3/28/2024), Disp: 180 mL, Rfl: 0    zolpidem (AMBIEN CR) 12.5 MG CR tablet, TAKE ONE TABLET BY MOUTH AT BEDTIME, Disp: 30 tablet, Rfl: 3    Artificial Saliva (Xerostomia Relief [place]) SOLN, Apply 1 application to the mouth or throat 4 (four) times a day (Patient not taking: Reported on 7/27/2023), Disp: 10 mL, Rfl: 3    bisacodyl (DULCOLAX) 10 mg suppository, Insert 1 suppository (10 mg total) into the rectum daily as needed for constipation (Patient not taking: Reported on 11/22/2022), Disp: 12 suppository, Rfl: 0    ciprofloxacin-hydrocortisone (CIPRO HC OTIC) otic suspension, Administer 3 drops to the right ear 2 (two) times a day (Patient not taking: Reported on 11/28/2023), Disp: 10 mL, Rfl:  "0    Review of Systems  Constitutional:     Denies fever, chills ,fatigue ,weakness ,weight loss, weight gain     ENT: Denies earache ,loss of hearing ,nosebleed, nasal discharge,nasal congestion ,sore throat ,hoarseness  Pulmonary: Denies shortness of breath ,cough  ,dyspnea on exertion, orthopnea  ,PND   Cardiovascular:  Denies bradycardia , tachycardia  ,palpations, lower extremity edema leg, claudication  Breast:  Denies new or changing breast lumps ,nipple discharge ,nipple changes  Abdomen:  Denies abdominal pain , anorexia , indigestion, nausea, vomiting, constipation, diarrhea  Musculoskeletal: + right knee swelling, stiffness and aching  Gu: denies dysuria, polyuria  Skin: Denies skin rash, skin lesion, skin wound, itching, dry skin  Neuro: Denies headache, numbness, tingling, confusion, loss of consciousness, dizziness, vertigo  Psychiatric: Denies feelings of depression, suicidal ideation, anxiety, sleep disturbances    OBJECTIVE  /84   Pulse 58   Temp 97.7 °F (36.5 °C)   Ht 5' 7\" (1.702 m)   Wt 87.4 kg (192 lb 9.6 oz)   SpO2 92%   BMI 30.17 kg/m²   Constitutional:   NAD, well appearing and well nourished      ENT:   Conjunctiva and lids: no injection, edema, or discharge     Pupils and iris: ANKIT bilaterally    External inspection of ears and nose: normal without deformities or discharge.      Otoscopic exam: Canals patent without erythema.       Nasal mucosa, septum and turbinates: Normal or edema or discharge         Oropharynx:  Moist mucosa, normal tongue and tonsils without lesions. No erythema        Pulmonary:Respiratory effort normal rate and rhythm, no increased work of breathing. Auscultation of lungs:  Clear bilaterally with no adventitious breath sounds       Cardiovascular: regular rate and rhythm, S1 and S2, no murmur, no edema and/or varicosities of LE      Abdomen: Soft and non-distended     Positive bowel sounds      No heptomegaly or splenomegaly      Gu: no suprapubic " tenderness or CVA tenderness, no urethral discharge  Lymphatic:  No anterior or posterior cervical lymphadenopathy         Musculoskeletal:  Gait and station: Normal gait      Digits and nails normal without clubbing or cyanosis       Inspection/palpation of joints, bones, and muscles: + right knee edema, + crepitance and full rom with pain ; negative mensical sign   Skin: Normal skin turgor and no rashes      Neuro:    Normal reflexes       Psych:   alert and oriented to person, place and time     normal mood and affect       Assessment/Plan:Diagnoses and all orders for this visit:    Chronic pain of right knee  Comments:  Pt to ice her knee for 20 min intervals.  Orders:  -     predniSONE 10 mg tablet; 3 tabs po bid x2 days, then 2 tabs po bid x2 days, then 1 tab bid x2 days, then 1 daily until done.        Reviewed with patient plan to treat with above plan.    Patient instructed to call in 72 hours if not feeling better or if symptoms worsen

## 2024-06-21 DIAGNOSIS — M19.90 OSTEOARTHRITIS, UNSPECIFIED OSTEOARTHRITIS TYPE, UNSPECIFIED SITE: Primary | ICD-10-CM

## 2024-06-21 RX ORDER — ONDANSETRON 4 MG/1
1 TABLET, ORALLY DISINTEGRATING ORAL DAILY
Qty: 30 PATCH | Refills: 3 | Status: SHIPPED | OUTPATIENT
Start: 2024-06-21

## 2024-07-22 ENCOUNTER — RA CDI HCC (OUTPATIENT)
Dept: OTHER | Facility: HOSPITAL | Age: 89
End: 2024-07-22

## 2024-07-25 ENCOUNTER — RA CDI HCC (OUTPATIENT)
Dept: OTHER | Facility: HOSPITAL | Age: 89
End: 2024-07-25

## 2024-07-26 NOTE — PROGRESS NOTES
HCC coding opportunities     I73.9, I13.0     Chart Reviewed number of suggestions sent to Provider: 2   GR    Patients Insurance     Medicare Insurance: [employer] Ad[employer]age

## 2024-07-31 DIAGNOSIS — F41.9 ANXIETY: ICD-10-CM

## 2024-07-31 DIAGNOSIS — G47.00 INSOMNIA, UNSPECIFIED TYPE: ICD-10-CM

## 2024-07-31 DIAGNOSIS — K29.00 ACUTE GASTRITIS WITHOUT HEMORRHAGE, UNSPECIFIED GASTRITIS TYPE: ICD-10-CM

## 2024-08-01 PROBLEM — N18.4 CHRONIC KIDNEY DISEASE, STAGE 4 (SEVERE) (HCC): Status: ACTIVE | Noted: 2017-04-01

## 2024-08-01 RX ORDER — FAMOTIDINE 40 MG/1
40 TABLET, FILM COATED ORAL DAILY
Qty: 30 TABLET | Refills: 5 | Status: SHIPPED | OUTPATIENT
Start: 2024-08-01

## 2024-08-01 RX ORDER — ZOLPIDEM TARTRATE 12.5 MG/1
12.5 TABLET, FILM COATED, EXTENDED RELEASE ORAL
Qty: 30 TABLET | Refills: 3 | Status: SHIPPED | OUTPATIENT
Start: 2024-08-01

## 2024-08-01 RX ORDER — ALPRAZOLAM 0.25 MG/1
TABLET ORAL
Qty: 120 TABLET | Refills: 3 | Status: SHIPPED | OUTPATIENT
Start: 2024-08-01

## 2024-08-01 NOTE — TELEPHONE ENCOUNTER
1 [id number] 07/07/2024 04/10/2024 Zolpidem Tartrate (Tablet, Extended Release) 30.0 30 12.5 MG NA TONI BROADT [facility] Medicare 3 / 3 [place]    1 [id number] 07/01/2024 04/02/2024 ALPRAZolam (Tablet) 120.0 30 0.25 MG NA TONI BROADT [facility] Medicare 3 / 3 PA    1 [id number] 06/10/2024 04/10/2024 Zolpidem Tartrate (Tablet, Extended Release) 30.0 30 12.5 MG NA TONI BROADT [facility] Medicare 2 / 3 [place]    1 [id number] 06/02/2024 04/02/2024 ALPRAZolam (Tablet) 120.0 30 0.25 MG NA TONI BROADT [facility] Medicare 2 / 3 [place]    1 [id number] 05/14/2024 04/10/2024 Zolpidem Tartrate (Tablet, Extended Release) 30.0 30 12.5 MG NA TONI ISAIAS [facility] Medicare 1 / 3 PA    1 [id number] 05/01/2024 04/02/2024 ALPRAZolam (Tablet) 120.0 30 0.25 MG NA TONI BROADT [facility] Medicare 1 / 3 PA    1 [id number] 04/10/2024 04/10/2024 Zolpidem Tartrate (Tablet, Extended Release) 30.0 30 12.5 MG NA TONI BROADT [facility] Medicare 0 / 3 PA    1 [id number] 04/02/2024 04/02/2024 ALPRAZolam (Tablet) 120.0 30 0.25 MG NA TONI BROADT [facility] Medicare 0 / 3 PA    1 [id number] 03/10/2024 12/11/2023 Zolpidem Tartrate (Tablet, Extended Release) 30.0 30 12.5 MG NA GUILLE FARAZ [facility] Medicare 3 / 3 PA    1 [id number] 03/01/2024 11/30/2023 ALPRAZolam (Tablet) 120.0 30 0.25 MG NA TONI BROADT [facility] Medicare 3 / 3 PA

## 2024-08-02 ENCOUNTER — OFFICE VISIT (OUTPATIENT)
Dept: FAMILY MEDICINE CLINIC | Facility: CLINIC | Age: 89
End: 2024-08-02
Payer: COMMERCIAL

## 2024-08-02 VITALS
WEIGHT: 193 LBS | DIASTOLIC BLOOD PRESSURE: 72 MMHG | HEIGHT: 69 IN | TEMPERATURE: 97.2 F | SYSTOLIC BLOOD PRESSURE: 120 MMHG | BODY MASS INDEX: 28.58 KG/M2 | HEART RATE: 78 BPM

## 2024-08-02 DIAGNOSIS — M19.90 OSTEOARTHRITIS, UNSPECIFIED OSTEOARTHRITIS TYPE, UNSPECIFIED SITE: ICD-10-CM

## 2024-08-02 DIAGNOSIS — E78.2 MIXED HYPERLIPIDEMIA: ICD-10-CM

## 2024-08-02 DIAGNOSIS — Z00.00 MEDICARE ANNUAL WELLNESS VISIT, SUBSEQUENT: Primary | ICD-10-CM

## 2024-08-02 DIAGNOSIS — E03.9 HYPOTHYROIDISM, UNSPECIFIED TYPE: Chronic | ICD-10-CM

## 2024-08-02 DIAGNOSIS — I10 PRIMARY HYPERTENSION: ICD-10-CM

## 2024-08-02 DIAGNOSIS — R10.9 ABDOMINAL SPASMS: ICD-10-CM

## 2024-08-02 PROCEDURE — 99214 OFFICE O/P EST MOD 30 MIN: CPT | Performed by: FAMILY MEDICINE

## 2024-08-02 PROCEDURE — G0439 PPPS, SUBSEQ VISIT: HCPCS | Performed by: FAMILY MEDICINE

## 2024-08-02 RX ORDER — DICYCLOMINE HYDROCHLORIDE 10 MG/1
CAPSULE ORAL
Qty: 10 CAPSULE | Refills: 1 | Status: SHIPPED | OUTPATIENT
Start: 2024-08-02

## 2024-08-02 RX ORDER — ONDANSETRON 4 MG/1
1 TABLET, ORALLY DISINTEGRATING ORAL 2 TIMES DAILY
Qty: 60 PATCH | Refills: 3 | Status: SHIPPED | OUTPATIENT
Start: 2024-08-02

## 2024-08-02 NOTE — PROGRESS NOTES
Ambulatory Visit  Name: Li Koenig      : 1926      MRN: [id number]  Encounter Provider: Yadi Shah DO  Encounter Date: 2024   Encounter department: [facility]    Assessment & Plan        Preventive health issues were discussed with patient, and age appropriate screening tests were ordered as noted in patient's After Visit Summary. Personalized health advice and appropriate referrals for health education or preventive services given if needed, as noted in patient's After Visit Summary.    History of Present Illness      Her bp, cholesterol, hypothyroidism, and osteoarthritis are well controlled.  Her abdominal spasms persist.    Patient Care Team:  Yadi Shah DO as PCP - General  MD Yadi Brooks DO    Review of Systems   Constitutional:  Negative for appetite change, chills and fever.   HENT:  Negative for ear pain, facial swelling, rhinorrhea, sinus pain, sore throat and trouble swallowing.    Eyes:  Negative for discharge and redness.   Respiratory:  Negative for chest tightness, shortness of breath and wheezing.    Cardiovascular:  Negative for chest pain and palpitations.   Gastrointestinal:  Positive for abdominal pain. Negative for diarrhea, nausea and vomiting.   Endocrine: Negative for polyuria.   Genitourinary:  Negative for dysuria and urgency.   Musculoskeletal:  Positive for arthralgias. Negative for back pain.        + joint stiffness   Skin:  Negative for rash.   Neurological:  Negative for dizziness, weakness and headaches.   Hematological:  Negative for adenopathy.   Psychiatric/Behavioral:  Negative for behavioral problems, confusion and sleep disturbance.    All other systems reviewed and are negative.    Medical History Reviewed by provider this encounter:       Annual Wellness Visit Questionnaire   Li is here for her Subsequent Wellness visit. Last Medicare Wellness visit information reviewed,  patient interviewed, no change since last AWV.     Health Risk Assessment:   Patient rates overall health as fair. Patient feels that their physical health rating is slightly worse. Patient is satisfied with their life. Eyesight was rated as same. Hearing was rated as same. Patient feels that their emotional and mental health rating is same. Patients states they are never, rarely angry. Patient states they are sometimes unusually tired/fatigued. Pain experienced in the last 7 days has been a lot. Patient's pain rating has been 8/10. Patient states that she has experienced no weight loss or gain in last 6 months.     Depression Screening:   PHQ-2 Score: 0      Fall Risk Screening:   In the past year, patient has experienced: no history of falling in past year      Urinary Incontinence Screening:   Patient has not leaked urine accidently in the last six months.     Home Safety:  Patient does not have trouble with stairs inside or outside of their home. Patient has working smoke alarms and has working carbon monoxide detector. Home safety hazards include: none.     Nutrition:   Current diet is Regular.     Medications:   Patient is currently taking over-the-counter supplements. OTC medications include: see medication list. Patient is able to manage medications.     Activities of Daily Living (ADLs)/Instrumental Activities of Daily Living (IADLs):   Walk and transfer into and out of bed and chair?: Yes  Dress and groom yourself?: Yes    Bathe or shower yourself?: Yes    Feed yourself? Yes  Do your laundry/housekeeping?: Yes  Manage your money, pay your bills and track your expenses?: Yes  Make your own meals?: Yes    Do your own shopping?: Yes    Previous Hospitalizations:   Any hospitalizations or ED visits within the last 12 months?: No      Advance Care Planning:   Living will: Yes    Durable POA for healthcare: Yes    Advanced directive: Yes    Advanced directive counseling given: Yes    ACP document given: Yes     Patient declined ACP directive: No    End of Life Decisions reviewed with patient: Yes    Provider agrees with end of life decisions: Yes      Cognitive Screening:   Provider or family/friend/caregiver concerned regarding cognition?: No    PREVENTIVE SCREENINGS      Cardiovascular Screening:    General: Screening Not Indicated and History Lipid Disorder      Diabetes Screening:     General: Screening Current      Colorectal Cancer Screening:     General: Screening Not Indicated      Breast Cancer Screening:     General: Patient Declines and Risks and Benefits Discussed      Cervical Cancer Screening:    General: Screening Not Indicated      Osteoporosis Screening:    General: Screening Current      Abdominal Aortic Aneurysm (AAA) Screening:        General: Screening Current and Screening Not Indicated      Lung Cancer Screening:     General: Screening Not Indicated      Hepatitis C Screening:    General: Screening Current    Screening, Brief Intervention, and Referral to Treatment (SBIRT)    Screening      AUDIT-C Screenin) How often did you have a drink containing alcohol in the past year? never  2) How many drinks did you have on a typical day when you were drinking in the past year? 0  3) How often did you have 6 or more drinks on one occasion in the past year? never    AUDIT-C Score: 0  Interpretation: Score 0-2 (female): Negative screen for alcohol misuse    Single Item Drug Screening:  How often have you used an illegal drug (including marijuana) or a prescription medication for non-medical reasons in the past year? never    Single Item Drug Screen Score: 0  Interpretation: Negative screen for possible drug use disorder    Social Determinants of Health     Financial Resource Strain: Low Risk  (2023)    Overall Financial Resource Strain (CARDIA)     Difficulty of Paying Living Expenses: Not very hard   Food Insecurity: No Food Insecurity (3/4/2022)    Hunger Vital Sign     Worried About Running Out  of Food in the Last Year: Never true     Ran Out of Food in the Last Year: Never true   Transportation Needs: No Transportation Needs (7/27/2023)    PRAPARE - Transportation     Lack of Transportation (Medical): No     Lack of Transportation (Non-Medical): No   Housing Stability: Unknown (3/4/2022)    Housing Stability Vital Sign     Unable to Pay for Housing in the Last Year: No     Unstable Housing in the Last Year: No    Received from [facility]     No results found.    Objective     There were no vitals taken for this visit.    Physical Exam  Vitals and nursing note reviewed.   Constitutional:       General: She is not in acute distress.     Appearance: Normal appearance. She is well-developed. She is not ill-appearing or diaphoretic.   HENT:      Head: Normocephalic and atraumatic.      Right Ear: Tympanic membrane, ear canal and external ear normal.      Left Ear: Tympanic membrane, ear canal and external ear normal.      Nose: Nose normal. No congestion or rhinorrhea.      Mouth/Throat:      Mouth: Mucous membranes are moist.      Pharynx: Oropharynx is clear. No oropharyngeal exudate or posterior oropharyngeal erythema.   Eyes:      General: No scleral icterus.        Right eye: No discharge.         Left eye: No discharge.      Extraocular Movements: Extraocular movements intact.      Conjunctiva/sclera: Conjunctivae normal.      Pupils: Pupils are equal, round, and reactive to light.   Neck:      Thyroid: No thyromegaly.      Vascular: No carotid bruit or JVD.      Trachea: No tracheal deviation.   Cardiovascular:      Rate and Rhythm: Normal rate and regular rhythm.      Pulses: Normal pulses.      Heart sounds: Normal heart sounds. No murmur heard.  Pulmonary:      Effort: Pulmonary effort is normal. No respiratory distress.      Breath sounds: Normal breath sounds. No stridor. No wheezing, rhonchi or rales.   Abdominal:      General: Abdomen is flat. Bowel sounds are normal. There is no  distension.      Palpations: Abdomen is soft. There is no mass.      Tenderness: There is generalized abdominal tenderness. There is no guarding or rebound.   Musculoskeletal:         General: No swelling, tenderness or deformity. Normal range of motion.      Cervical back: Normal range of motion and neck supple. No rigidity.      Right lower leg: No edema.      Left lower leg: No edema.   Lymphadenopathy:      Cervical: No cervical adenopathy.   Skin:     General: Skin is warm and dry.      Capillary Refill: Capillary refill takes less than 2 seconds.      Coloration: Skin is not jaundiced.      Findings: No bruising, erythema or rash.   Neurological:      General: No focal deficit present.      Mental Status: She is alert and oriented to person, place, and time.      Cranial Nerves: No cranial nerve deficit.      Sensory: No sensory deficit.      Motor: No abnormal muscle tone.      Coordination: Coordination normal.      Gait: Gait normal.      Deep Tendon Reflexes: Reflexes are normal and symmetric. Reflexes normal.   Psychiatric:         Mood and Affect: Mood normal.         Behavior: Behavior normal.         Thought Content: Thought content normal.         Judgment: Judgment normal.       Administrative Statements   I have spent a total time of 20 minutes in caring for this patient on the day of the visit/encounter including Diagnostic results, Prognosis, Risks and benefits of tx options, Instructions for management, Patient and family education, and Importance of tx compliance.

## 2024-08-14 ENCOUNTER — TELEPHONE (OUTPATIENT)
Age: 89
End: 2024-08-14

## 2024-08-14 NOTE — TELEPHONE ENCOUNTER
Tori called in stated that her mom finished the medication as of yesterday for the ten days with out any changes. dicyclomine (BENTYL) 10 mg capsule She will go back to her previous meds. She just wanted DR Shah to be aware

## 2024-08-31 DIAGNOSIS — I50.32 CHRONIC DIASTOLIC HEART FAILURE (HCC): ICD-10-CM

## 2024-09-02 RX ORDER — FUROSEMIDE 20 MG
40 TABLET ORAL DAILY
Qty: 60 TABLET | Refills: 5 | Status: SHIPPED | OUTPATIENT
Start: 2024-09-02

## 2024-10-01 DIAGNOSIS — E03.9 HYPOTHYROIDISM, UNSPECIFIED TYPE: ICD-10-CM

## 2024-10-01 RX ORDER — LEVOTHYROXINE SODIUM 50 UG/1
TABLET ORAL
Qty: 100 TABLET | Refills: 1 | Status: SHIPPED | OUTPATIENT
Start: 2024-10-01

## 2024-10-18 ENCOUNTER — IMMUNIZATIONS (OUTPATIENT)
Dept: FAMILY MEDICINE CLINIC | Facility: CLINIC | Age: 89
End: 2024-10-18
Payer: COMMERCIAL

## 2024-10-18 DIAGNOSIS — Z23 ENCOUNTER FOR IMMUNIZATION: Primary | ICD-10-CM

## 2024-10-18 PROCEDURE — G0008 ADMIN INFLUENZA VIRUS VAC: HCPCS

## 2024-10-18 PROCEDURE — 90662 IIV NO PRSV INCREASED AG IM: CPT

## 2024-11-20 ENCOUNTER — RA CDI HCC (OUTPATIENT)
Dept: OTHER | Facility: HOSPITAL | Age: 89
End: 2024-11-20

## 2024-11-21 NOTE — PROGRESS NOTES
HCC coding opportunities          Chart Reviewed number of suggestions sent to Provider: 2  I13.0, I42.0       Patients Insurance     Medicare Insurance: [employer] Ad[employer]age

## 2024-12-01 DIAGNOSIS — I48.91 ATRIAL FIBRILLATION, UNSPECIFIED TYPE (HCC): ICD-10-CM

## 2024-12-01 DIAGNOSIS — G47.00 INSOMNIA, UNSPECIFIED TYPE: ICD-10-CM

## 2024-12-01 DIAGNOSIS — F41.9 ANXIETY: ICD-10-CM

## 2024-12-02 ENCOUNTER — OFFICE VISIT (OUTPATIENT)
Dept: FAMILY MEDICINE CLINIC | Facility: CLINIC | Age: 89
End: 2024-12-02
Payer: COMMERCIAL

## 2024-12-02 VITALS
DIASTOLIC BLOOD PRESSURE: 80 MMHG | HEART RATE: 54 BPM | SYSTOLIC BLOOD PRESSURE: 128 MMHG | HEIGHT: 69 IN | BODY MASS INDEX: 29.56 KG/M2 | WEIGHT: 199.6 LBS | TEMPERATURE: 97.5 F | OXYGEN SATURATION: 93 %

## 2024-12-02 DIAGNOSIS — K29.00 ACUTE GASTRITIS WITHOUT HEMORRHAGE, UNSPECIFIED GASTRITIS TYPE: Primary | ICD-10-CM

## 2024-12-02 DIAGNOSIS — G47.00 INSOMNIA, UNSPECIFIED TYPE: ICD-10-CM

## 2024-12-02 DIAGNOSIS — F41.9 ANXIETY: ICD-10-CM

## 2024-12-02 DIAGNOSIS — N18.4 CHRONIC KIDNEY DISEASE, STAGE 4 (SEVERE) (HCC): ICD-10-CM

## 2024-12-02 DIAGNOSIS — R05.1 ACUTE COUGH: ICD-10-CM

## 2024-12-02 DIAGNOSIS — I48.0 PAROXYSMAL ATRIAL FIBRILLATION (HCC): ICD-10-CM

## 2024-12-02 PROCEDURE — G2211 COMPLEX E/M VISIT ADD ON: HCPCS | Performed by: FAMILY MEDICINE

## 2024-12-02 PROCEDURE — 99214 OFFICE O/P EST MOD 30 MIN: CPT | Performed by: FAMILY MEDICINE

## 2024-12-02 RX ORDER — DEXTROMETHORPHAN HYDROBROMIDE AND PROMETHAZINE HYDROCHLORIDE 15; 6.25 MG/5ML; MG/5ML
5 SYRUP ORAL 4 TIMES DAILY PRN
Qty: 180 ML | Refills: 0 | Status: SHIPPED | OUTPATIENT
Start: 2024-12-02

## 2024-12-02 RX ORDER — PANTOPRAZOLE SODIUM 40 MG/1
40 TABLET, DELAYED RELEASE ORAL
Qty: 30 TABLET | Refills: 5 | Status: SHIPPED | OUTPATIENT
Start: 2024-12-02 | End: 2025-05-31

## 2024-12-02 RX ORDER — ALPRAZOLAM 0.25 MG/1
TABLET ORAL
Qty: 120 TABLET | Refills: 3 | Status: SHIPPED | OUTPATIENT
Start: 2024-12-02 | End: 2024-12-02 | Stop reason: SDUPTHER

## 2024-12-02 RX ORDER — ZOLPIDEM TARTRATE 12.5 MG/1
12.5 TABLET, FILM COATED, EXTENDED RELEASE ORAL
Qty: 30 TABLET | Refills: 3 | Status: SHIPPED | OUTPATIENT
Start: 2024-12-02

## 2024-12-02 RX ORDER — ALPRAZOLAM 0.25 MG/1
0.25 TABLET ORAL 4 TIMES DAILY PRN
Qty: 120 TABLET | Refills: 3 | Status: SHIPPED | OUTPATIENT
Start: 2024-12-02

## 2024-12-02 RX ORDER — ZOLPIDEM TARTRATE 12.5 MG/1
12.5 TABLET, FILM COATED, EXTENDED RELEASE ORAL
Qty: 30 TABLET | Refills: 3 | Status: SHIPPED | OUTPATIENT
Start: 2024-12-02 | End: 2024-12-02 | Stop reason: SDUPTHER

## 2024-12-02 NOTE — TELEPHONE ENCOUNTER
1 [id number] 11/01/2024 08/01/2024 ALPRAZolam (Tablet) 120.0 30 0.25 MG NA [facility] Medicare 3 / 3 PA    1 [id number] 11/01/2024 08/01/2024 Zolpidem Tartrate (Tablet, Extended Release) 30.0 30 12.5 MG NA [facility] Medicare 3 / 3 PA    1 [id number] 10/01/2024 08/01/2024 ALPRAZolam (Tablet) 120.0 30 0.25 MG NA [facility] Medicare 2 / 3 PA    1 [id number] 10/01/2024 08/01/2024 Zolpidem Tartrate (Tablet, Extended Release) 30.0 30 12.5 MG NA [facility] Medicare 2 / 3 PA    1 [id number] 09/02/2024 08/01/2024 Zolpidem Tartrate (Tablet, Extended Release) 30.0 30 12.5 MG NA [facility] Medicare 1 / 3 PA    1 [id number] 09/01/2024 08/01/2024 ALPRAZolam (Tablet) 120.0 30 0.25 MG NA [facility] Medicare 1 / 3 PA    1 [id number] 08/04/2024 08/01/2024 Zolpidem Tartrate (Tablet, Extended Release) 30.0 30 12.5 MG NA [facility] Medicare 0 / 3 PA    1 [id number] 08/01/2024 08/01/2024 ALPRAZolam (Tablet) 120.0 30 0.25 MG NA [facility] Medicare 0 / 3 PA    1 [id number] 07/07/2024 04/10/2024 Zolpidem Tartrate (Tablet, Extended Release) 30.0 30 12.5 MG NA [facility] Medicare 3 / 3 PA    1 [id number] 07/01/2024 04/02/2024 ALPRAZolam (Tablet) 120.0 30 0.25 MG NA [facility] Medicare 3 / 3 PA       Neuro

## 2024-12-03 RX ORDER — AMIODARONE HYDROCHLORIDE 100 MG/1
100 TABLET ORAL DAILY
Qty: 90 TABLET | Refills: 3 | Status: SHIPPED | OUTPATIENT
Start: 2024-12-03

## 2024-12-10 NOTE — PROGRESS NOTES
Name: Li Koenig      : 1926      MRN: [id number]  Encounter Provider: Yadi Shah DO  Encounter Date: 2024   Encounter department: [facility]    Assessment & Plan  Acute gastritis without hemorrhage, unspecified gastritis type  Pt to call in one week's time with an update.    Orders:    pantoprazole (PROTONIX) 40 mg tablet; Take 1 tablet (40 mg total) by mouth daily before breakfast    Anxiety  Cotninue with current meds  Orders:    ALPRAZolam (XANAX) 0.25 mg tablet; Take 1 tablet (0.25 mg total) by mouth 4 (four) times a day as needed for anxiety    Acute cough    Orders:    promethazine-dextromethorphan (PHENERGAN-DM) 6.25-15 mg/5 mL oral syrup; Take 5 mL by mouth 4 (four) times a day as needed for cough    Chronic kidney disease, stage 4 (severe) (HCC)  Lab Results   Component Value Date    EGFR 20 12/15/2023    EGFR 17 2023    EGFR 19 11/15/2022    CREATININE 2.01 (H) 12/15/2023    CREATININE 2.26 (H) 2023    CREATININE 2.12 (H) 11/15/2022   Continue with renal surviellence         Paroxysmal atrial fibrillation (HCC)  Stable with rate control and aspirin therapy       Insomnia, unspecified type  Stable on zolpidem CR. therapy  Orders:    zolpidem (AMBIEN CR) 12.5 MG CR tablet; Take 1 tablet (12.5 mg total) by mouth daily at bedtime         History of Present Illness     The patient presents for folllow up of hypertension, anxiety, afib,CKD and insomnia.  She has a chronic cough and would like a refill on her cough med and complains of increased acid reflux and dull epigastric pain.  We discussed restarting a PPI and she is in agreement.        Review of Systems   Constitutional:  Negative for appetite change, chills and fever.   HENT:  Negative for ear pain, facial swelling, rhinorrhea, sinus pain, sore throat and trouble swallowing.    Eyes:  Negative for discharge and redness.   Respiratory:  Positive for cough. Negative for chest tightness,  shortness of breath and wheezing.         + dry cough   Cardiovascular:  Negative for chest pain and palpitations.   Gastrointestinal:  Positive for abdominal pain. Negative for diarrhea, nausea and vomiting.        + epigastric burning and GERD   Endocrine: Negative for polyuria.   Genitourinary:  Negative for dysuria and urgency.   Musculoskeletal:  Negative for arthralgias and back pain.   Skin:  Negative for rash.   Neurological:  Negative for dizziness, weakness and headaches.   Hematological:  Negative for adenopathy.   Psychiatric/Behavioral:  Negative for behavioral problems, confusion and sleep disturbance.    All other systems reviewed and are negative.    Past Medical History:   Diagnosis Date    A-fib (HCC)     Cardiac disease     Hyperlipidemia     Hypertension      Past Surgical History:   Procedure Laterality Date    APPENDECTOMY      CHOLECYSTECTOMY      HERNIA REPAIR      HYSTERECTOMY      NEPHRECTOMY Right      Family History   Problem Relation Age of Onset    Heart disease Family     Hypertension Family     Cancer Family      Social History     Tobacco Use    Smoking status: Never     Passive exposure: Past    Smokeless tobacco: Never   Vaping Use    Vaping status: Never Used   Substance and Sexual Activity    Alcohol use: Not Currently    Drug use: Never    Sexual activity: Not Currently     Partners: Male     Birth control/protection: Post-menopausal     Current Outpatient Medications on File Prior to Visit   Medication Sig    Artificial Saliva (Xerostomia Relief [place]) SOLN Apply 1 application to the mouth or throat 4 (four) times a day    aspirin (ECOTRIN LOW STRENGTH) 81 mg EC tablet Take 81 mg by mouth every other day    bisacodyl (DULCOLAX) 10 mg suppository Insert 1 suppository (10 mg total) into the rectum daily as needed for constipation    ciprofloxacin-hydrocortisone (CIPRO HC OTIC) otic suspension Administer 3 drops to the right ear 2 (two) times a day    Diclofenac Epolamine 1.3 %  PTCH Apply 1 patch topically 2 (two) times a day    Emollient (EUCERIN) lotion Apply topically 2 (two) times a day    furosemide (LASIX) 20 mg tablet TAKE TWO TABLETS BY MOUTH EVERY DAY    glycerin-hypromellose- (ARTIFICIAL TEARS) 0.2-0.2-1 % SOLN Administer 1 drop to both eyes 3 (three) times a day as needed (dry eyes)    levothyroxine 50 mcg tablet TAKE ONE TABLET BY MOUTH EVERY DAY    nystatin-triamcinolone (MYCOLOG-II) cream Apply topically 2 (two) times a day    polyethylene glycol (MIRALAX) 17 g packet Take 17g by mouth daily. If constipation worsens, take 17 g by mouth two times daily.    promethazine-dextromethorphan (PHENERGAN-DM) 6.25-15 mg/5 mL oral syrup Take 5 mL by mouth 4 (four) times a day as needed for cough    amiodarone 100 mg tablet TAKE ONE TABLET BY MOUTH EVERY DAY    dicyclomine (BENTYL) 10 mg capsule Take one hour prior to supper (Patient not taking: Reported on 12/2/2024)    hyoscyamine (ANASPAZ) 0.125 mg DISSOLVE ONE TABLET BY MOUTH EVERY 4 HOURS AS NEEDED FOR SPASMS (Patient not taking: Reported on 12/2/2024)    neomycin-colistin-hydrocortisone-thonzonium (CORTISPORIN TC) 0.33-0.3-1-0.05 % otic suspension Administer 4 drops to the right ear 3 (three) times a day (Patient not taking: Reported on 8/2/2024)    predniSONE 10 mg tablet 3 tabs po bid x2 days, then 2 tabs po bid x2 days, then 1 tab bid x2 days, then 1 daily until done. (Patient not taking: Reported on 8/2/2024)     Allergies   Allergen Reactions    Penicillin G Anaphylaxis    Spironolactone Shortness Of Breath    Atorvastatin      Other reaction(s): Leg Cramps    Cephalexin Headache    Chocolate - Food Allergy     Citalopram Abdominal Pain and Headache    Diltiazem     Fosinopril Sodium-Hctz     Methylprednisolone Nausea Only     Tablet generic only    Monopril [Fosinopril]      Reaction Date: 28Apr2011;     Penicillins     Pollen Extract     Pravastatin Myalgia     Other reaction(s): Leg Cramps    Sporanox [Itraconazole]   "    Reaction Date: 28Apr2011;     Strawberry C [Ascorbate - Food Allergy]     Caffeine - Food Allergy Palpitations     Tachycardia     Immunization History   Administered Date(s) Administered    COVID-19 PFIZER VACCINE 0.3 ML IM 03/08/2021, 03/29/2021, 12/10/2021    COVID-19 Pfizer mRNA vacc PF stefan-sucrose 12 yr and older (Comirnaty) 11/13/2023, 11/11/2024    COVID-19 Pfizer vac (Stefan-sucrose, gray cap) 12 yr+ IM 08/29/2022    Influenza Split High Dose Preservative Free IM 10/11/2011, 09/27/2012, 10/10/2013, 10/09/2014, 10/14/2015, 10/20/2016, 10/04/2017, 10/18/2024    Influenza, high dose seasonal 0.7 mL 09/24/2018, 09/20/2019, 10/06/2020, 10/05/2021, 10/19/2022, 10/19/2023    Pneumococcal Conjugate 13-Valent 12/14/2015    Pneumococcal Conjugate Vaccine 20-valent (Pcv20), Polysace 10/19/2023    Pneumococcal Polysaccharide PPV23 10/11/2011, 02/09/2022     Objective   /80   Pulse (!) 54   Temp 97.5 °F (36.4 °C)   Ht 5' 9\" (1.753 m)   Wt 90.5 kg (199 lb 9.6 oz)   SpO2 93%   BMI 29.48 kg/m²     Physical Exam  Vitals and nursing note reviewed.   Constitutional:       General: She is not in acute distress.     Appearance: Normal appearance. She is well-developed. She is not ill-appearing or diaphoretic.   HENT:      Head: Normocephalic and atraumatic.      Right Ear: Tympanic membrane, ear canal and external ear normal.      Left Ear: Tympanic membrane, ear canal and external ear normal.      Nose: Nose normal. No congestion or rhinorrhea.      Mouth/Throat:      Mouth: Mucous membranes are moist.      Pharynx: Oropharynx is clear. No oropharyngeal exudate or posterior oropharyngeal erythema.   Eyes:      General: No scleral icterus.        Right eye: No discharge.         Left eye: No discharge.      Extraocular Movements: Extraocular movements intact.      Conjunctiva/sclera: Conjunctivae normal.      Pupils: Pupils are equal, round, and reactive to light.   Neck:      Thyroid: No thyromegaly.      " Vascular: No carotid bruit or JVD.      Trachea: No tracheal deviation.   Cardiovascular:      Rate and Rhythm: Normal rate and regular rhythm.      Pulses: Normal pulses.      Heart sounds: Normal heart sounds. No murmur heard.  Pulmonary:      Effort: Pulmonary effort is normal. No respiratory distress.      Breath sounds: Normal breath sounds. No stridor. No wheezing, rhonchi or rales.   Abdominal:      General: Abdomen is flat. Bowel sounds are normal. There is no distension.      Palpations: Abdomen is soft. There is no mass.      Tenderness: There is abdominal tenderness. There is no guarding or rebound.      Comments: + minimal epigastric tenderness on palpation    Musculoskeletal:         General: No swelling, tenderness or deformity. Normal range of motion.      Cervical back: Normal range of motion and neck supple. No rigidity.      Right lower leg: No edema.      Left lower leg: No edema.   Lymphadenopathy:      Cervical: No cervical adenopathy.   Skin:     General: Skin is warm and dry.      Capillary Refill: Capillary refill takes less than 2 seconds.      Coloration: Skin is not jaundiced.      Findings: No bruising, erythema or rash.   Neurological:      General: No focal deficit present.      Mental Status: She is alert and oriented to person, place, and time.      Cranial Nerves: No cranial nerve deficit.      Sensory: No sensory deficit.      Motor: No abnormal muscle tone.      Coordination: Coordination normal.      Gait: Gait normal.      Deep Tendon Reflexes: Reflexes are normal and symmetric. Reflexes normal.   Psychiatric:         Mood and Affect: Mood normal.         Behavior: Behavior normal.         Thought Content: Thought content normal.         Judgment: Judgment normal.       Administrative Statements   I have spent a total time of 20 minutes in caring for this patient on the day of the visit/encounter including Diagnostic results, Prognosis, Risks and benefits of tx options,  Instructions for management, Patient and family education, and Importance of tx compliance. Topics discussed with the patient / family include symptom assessment and management, medication review, medication adjustment, psychosocial support, and advanced directives.

## 2024-12-10 NOTE — ASSESSMENT & PLAN NOTE
Lab Results   Component Value Date    EGFR 20 12/15/2023    EGFR 17 07/25/2023    EGFR 19 11/15/2022    CREATININE 2.01 (H) 12/15/2023    CREATININE 2.26 (H) 07/25/2023    CREATININE 2.12 (H) 11/15/2022   Continue with renal surviellence

## 2024-12-10 NOTE — ASSESSMENT & PLAN NOTE
Stable on zolpidem CR. therapy  Orders:    zolpidem (AMBIEN CR) 12.5 MG CR tablet; Take 1 tablet (12.5 mg total) by mouth daily at bedtime

## 2024-12-10 NOTE — ASSESSMENT & PLAN NOTE
Angus with current meds  Orders:    ALPRAZolam (XANAX) 0.25 mg tablet; Take 1 tablet (0.25 mg total) by mouth 4 (four) times a day as needed for anxiety

## 2024-12-27 DIAGNOSIS — M19.90 OSTEOARTHRITIS, UNSPECIFIED OSTEOARTHRITIS TYPE, UNSPECIFIED SITE: ICD-10-CM

## 2024-12-27 RX ORDER — ONDANSETRON 4 MG/1
TABLET, ORALLY DISINTEGRATING ORAL
Qty: 60 PATCH | Refills: 3 | Status: SHIPPED | OUTPATIENT
Start: 2024-12-27

## 2025-01-01 ENCOUNTER — RA CDI HCC (OUTPATIENT)
Dept: OTHER | Facility: HOSPITAL | Age: OVER 89
End: 2025-01-01

## 2025-01-01 ENCOUNTER — ANESTHESIA (INPATIENT)
Dept: RADIOLOGY | Facility: HOSPITAL | Age: OVER 89
DRG: 871 | End: 2025-01-01
Payer: COMMERCIAL

## 2025-01-01 ENCOUNTER — ANESTHESIA EVENT (INPATIENT)
Dept: RADIOLOGY | Facility: HOSPITAL | Age: OVER 89
DRG: 871 | End: 2025-01-01
Payer: COMMERCIAL

## 2025-01-01 ENCOUNTER — TELEMEDICINE (OUTPATIENT)
Dept: FAMILY MEDICINE CLINIC | Facility: CLINIC | Age: OVER 89
End: 2025-01-01

## 2025-01-01 ENCOUNTER — TELEPHONE (OUTPATIENT)
Dept: FAMILY MEDICINE CLINIC | Facility: CLINIC | Age: OVER 89
End: 2025-01-01

## 2025-01-01 ENCOUNTER — APPOINTMENT (INPATIENT)
Dept: RADIOLOGY | Facility: HOSPITAL | Age: OVER 89
DRG: 871 | End: 2025-01-01
Attending: RADIOLOGY
Payer: COMMERCIAL

## 2025-01-01 ENCOUNTER — HOSPITAL ENCOUNTER (INPATIENT)
Facility: HOSPITAL | Age: OVER 89
LOS: 1 days | DRG: 871 | End: 2025-03-28
Attending: EMERGENCY MEDICINE | Admitting: INTERNAL MEDICINE
Payer: COMMERCIAL

## 2025-01-01 ENCOUNTER — APPOINTMENT (EMERGENCY)
Dept: CT IMAGING | Facility: HOSPITAL | Age: OVER 89
DRG: 871 | End: 2025-01-01
Payer: COMMERCIAL

## 2025-01-01 ENCOUNTER — APPOINTMENT (INPATIENT)
Dept: RADIOLOGY | Facility: HOSPITAL | Age: OVER 89
DRG: 871 | End: 2025-01-01
Payer: COMMERCIAL

## 2025-01-01 VITALS
OXYGEN SATURATION: 91 % | DIASTOLIC BLOOD PRESSURE: 46 MMHG | TEMPERATURE: 98.2 F | HEART RATE: 72 BPM | RESPIRATION RATE: 26 BRPM | SYSTOLIC BLOOD PRESSURE: 92 MMHG

## 2025-01-01 DIAGNOSIS — N13.30 HYDRONEPHROSIS, LEFT: ICD-10-CM

## 2025-01-01 DIAGNOSIS — G93.41 SEPSIS DUE TO ESCHERICHIA COLI WITH ENCEPHALOPATHY WITHOUT SEPTIC SHOCK (HCC): ICD-10-CM

## 2025-01-01 DIAGNOSIS — F51.01 PRIMARY INSOMNIA: ICD-10-CM

## 2025-01-01 DIAGNOSIS — N13.30 HYDRONEPHROSIS OF LEFT KIDNEY: Primary | ICD-10-CM

## 2025-01-01 DIAGNOSIS — A41.51 SEPSIS DUE TO ESCHERICHIA COLI WITH ENCEPHALOPATHY WITHOUT SEPTIC SHOCK (HCC): ICD-10-CM

## 2025-01-01 DIAGNOSIS — R65.20 SEPSIS DUE TO ESCHERICHIA COLI WITH ENCEPHALOPATHY WITHOUT SEPTIC SHOCK (HCC): ICD-10-CM

## 2025-01-01 DIAGNOSIS — F41.9 ANXIETY: Primary | ICD-10-CM

## 2025-01-01 DIAGNOSIS — M25.50 ARTHRALGIA, UNSPECIFIED JOINT: ICD-10-CM

## 2025-01-01 LAB
ACANTHOCYTES BLD QL SMEAR: PRESENT
ALBUMIN SERPL BCG-MCNC: 3.2 G/DL (ref 3.5–5)
ALBUMIN SERPL BCG-MCNC: 3.3 G/DL (ref 3.5–5)
ALBUMIN SERPL BCG-MCNC: 4.7 G/DL (ref 3.5–5)
ALP SERPL-CCNC: 85 U/L (ref 34–104)
ALP SERPL-CCNC: 90 U/L (ref 34–104)
ALP SERPL-CCNC: 95 U/L (ref 34–104)
ALT SERPL W P-5'-P-CCNC: 18 U/L (ref 7–52)
ALT SERPL W P-5'-P-CCNC: 2582 U/L (ref 7–52)
ALT SERPL W P-5'-P-CCNC: 2776 U/L (ref 7–52)
ANION GAP SERPL CALCULATED.3IONS-SCNC: 10 MMOL/L (ref 4–13)
ANION GAP SERPL CALCULATED.3IONS-SCNC: 10 MMOL/L (ref 4–13)
ANION GAP SERPL CALCULATED.3IONS-SCNC: 11 MMOL/L (ref 4–13)
ANISOCYTOSIS BLD QL SMEAR: PRESENT
APAP SERPL-MCNC: 27 UG/ML (ref 10–20)
AST SERPL W P-5'-P-CCNC: 20 U/L (ref 13–39)
AST SERPL W P-5'-P-CCNC: 3224 U/L (ref 13–39)
AST SERPL W P-5'-P-CCNC: 3673 U/L (ref 13–39)
ATRIAL RATE: 57 BPM
ATRIAL RATE: 88 BPM
BACTERIA UR CULT: NORMAL
BACTERIA UR QL AUTO: ABNORMAL /HPF
BASE EX.OXY STD BLDV CALC-SCNC: 92.4 % (ref 60–80)
BASE EXCESS BLDV CALC-SCNC: -11.5 MMOL/L
BASOPHILS # BLD AUTO: 0.07 THOUSANDS/ÂΜL (ref 0–0.1)
BASOPHILS # BLD MANUAL: 0 THOUSAND/UL (ref 0–0.1)
BASOPHILS NFR BLD AUTO: 0 % (ref 0–1)
BASOPHILS NFR MAR MANUAL: 0 % (ref 0–1)
BILIRUB SERPL-MCNC: 1.02 MG/DL (ref 0.2–1)
BILIRUB SERPL-MCNC: 3.81 MG/DL (ref 0.2–1)
BILIRUB SERPL-MCNC: 3.94 MG/DL (ref 0.2–1)
BILIRUB UR QL STRIP: NEGATIVE
BNP SERPL-MCNC: 2368 PG/ML (ref 0–100)
BUN SERPL-MCNC: 28 MG/DL (ref 5–25)
BUN SERPL-MCNC: 39 MG/DL (ref 5–25)
BUN SERPL-MCNC: 41 MG/DL (ref 5–25)
CALCIUM ALBUM COR SERPL-MCNC: 8.1 MG/DL (ref 8.3–10.1)
CALCIUM ALBUM COR SERPL-MCNC: 8.3 MG/DL (ref 8.3–10.1)
CALCIUM SERPL-MCNC: 10.2 MG/DL (ref 8.4–10.2)
CALCIUM SERPL-MCNC: 7.5 MG/DL (ref 8.4–10.2)
CALCIUM SERPL-MCNC: 7.7 MG/DL (ref 8.4–10.2)
CHLORIDE SERPL-SCNC: 102 MMOL/L (ref 96–108)
CHLORIDE SERPL-SCNC: 105 MMOL/L (ref 96–108)
CHLORIDE SERPL-SCNC: 95 MMOL/L (ref 96–108)
CLARITY UR: ABNORMAL
CO2 SERPL-SCNC: 13 MMOL/L (ref 21–32)
CO2 SERPL-SCNC: 18 MMOL/L (ref 21–32)
CO2 SERPL-SCNC: 25 MMOL/L (ref 21–32)
COLOR UR: ABNORMAL
CREAT SERPL-MCNC: 1.94 MG/DL (ref 0.6–1.3)
CREAT SERPL-MCNC: 3.52 MG/DL (ref 0.6–1.3)
CREAT SERPL-MCNC: 3.54 MG/DL (ref 0.6–1.3)
EOSINOPHIL # BLD AUTO: 0 THOUSAND/ÂΜL (ref 0–0.61)
EOSINOPHIL # BLD MANUAL: 0 THOUSAND/UL (ref 0–0.4)
EOSINOPHIL NFR BLD AUTO: 0 % (ref 0–6)
EOSINOPHIL NFR BLD MANUAL: 0 % (ref 0–6)
ERYTHROCYTE [DISTWIDTH] IN BLOOD BY AUTOMATED COUNT: 13.9 % (ref 11.6–15.1)
ERYTHROCYTE [DISTWIDTH] IN BLOOD BY AUTOMATED COUNT: 14.9 % (ref 11.6–15.1)
GFR SERPL CREATININE-BSD FRML MDRD: 10 ML/MIN/1.73SQ M
GFR SERPL CREATININE-BSD FRML MDRD: 10 ML/MIN/1.73SQ M
GFR SERPL CREATININE-BSD FRML MDRD: 21 ML/MIN/1.73SQ M
GLUCOSE SERPL-MCNC: 139 MG/DL (ref 65–140)
GLUCOSE SERPL-MCNC: 154 MG/DL (ref 65–140)
GLUCOSE SERPL-MCNC: 160 MG/DL (ref 65–140)
GLUCOSE SERPL-MCNC: 163 MG/DL (ref 65–140)
GLUCOSE UR STRIP-MCNC: NEGATIVE MG/DL
HCO3 BLDV-SCNC: 13.6 MMOL/L (ref 24–30)
HCT VFR BLD AUTO: 38.8 % (ref 34.8–46.1)
HCT VFR BLD AUTO: 48.4 % (ref 34.8–46.1)
HGB BLD-MCNC: 12.8 G/DL (ref 11.5–15.4)
HGB BLD-MCNC: 16.5 G/DL (ref 11.5–15.4)
HGB UR QL STRIP.AUTO: ABNORMAL
IMM GRANULOCYTES # BLD AUTO: 0.48 THOUSAND/UL (ref 0–0.2)
IMM GRANULOCYTES NFR BLD AUTO: 3 % (ref 0–2)
KETONES UR STRIP-MCNC: NEGATIVE MG/DL
LACTATE SERPL-SCNC: 1.9 MMOL/L (ref 0.5–2)
LACTATE SERPL-SCNC: 5.1 MMOL/L (ref 0.5–2)
LACTATE SERPL-SCNC: 5.3 MMOL/L (ref 0.5–2)
LACTATE SERPL-SCNC: 6.6 MMOL/L (ref 0.5–2)
LEUKOCYTE ESTERASE UR QL STRIP: ABNORMAL
LG PLATELETS BLD QL SMEAR: PRESENT
LIPASE SERPL-CCNC: 31 U/L (ref 11–82)
LYMPHOCYTES # BLD AUTO: 0.19 THOUSANDS/ÂΜL (ref 0.6–4.47)
LYMPHOCYTES # BLD AUTO: 0.99 THOUSAND/UL (ref 0.6–4.47)
LYMPHOCYTES # BLD AUTO: 5 % (ref 14–44)
LYMPHOCYTES NFR BLD AUTO: 1 % (ref 14–44)
MCH RBC QN AUTO: 33.4 PG (ref 26.8–34.3)
MCH RBC QN AUTO: 34.1 PG (ref 26.8–34.3)
MCHC RBC AUTO-ENTMCNC: 32.7 G/DL (ref 31.4–37.4)
MCHC RBC AUTO-ENTMCNC: 34.1 G/DL (ref 31.4–37.4)
MCV RBC AUTO: 103 FL (ref 82–98)
MCV RBC AUTO: 98 FL (ref 82–98)
MONOCYTES # BLD AUTO: 0.2 THOUSAND/UL (ref 0–1.22)
MONOCYTES # BLD AUTO: 0.37 THOUSAND/ÂΜL (ref 0.17–1.22)
MONOCYTES NFR BLD AUTO: 2 % (ref 4–12)
MONOCYTES NFR BLD: 1 % (ref 4–12)
NEUTROPHILS # BLD AUTO: 18.11 THOUSANDS/ÂΜL (ref 1.85–7.62)
NEUTROPHILS # BLD MANUAL: 18.62 THOUSAND/UL (ref 1.85–7.62)
NEUTS SEG NFR BLD AUTO: 94 % (ref 43–75)
NEUTS SEG NFR BLD AUTO: 94 % (ref 43–75)
NITRITE UR QL STRIP: NEGATIVE
NON-SQ EPI CELLS URNS QL MICRO: ABNORMAL /HPF
NRBC BLD AUTO-RTO: 0 /100 WBCS
O2 CT BLDV-SCNC: 17.6 ML/DL
P AXIS: 75 DEGREES
P AXIS: 81 DEGREES
PCO2 BLDV: 29 MM HG (ref 42–50)
PH BLDV: 7.29 [PH] (ref 7.3–7.4)
PH UR STRIP.AUTO: 6.5 [PH]
PLATELET # BLD AUTO: 232 THOUSANDS/UL (ref 149–390)
PLATELET # BLD AUTO: 78 THOUSANDS/UL (ref 149–390)
PLATELET BLD QL SMEAR: ABNORMAL
PLATELET BLD QL SMEAR: ADEQUATE
PMV BLD AUTO: 10.3 FL (ref 8.9–12.7)
PMV BLD AUTO: 12.3 FL (ref 8.9–12.7)
PO2 BLDV: 116.1 MM HG (ref 35–45)
POIKILOCYTOSIS BLD QL SMEAR: PRESENT
POTASSIUM SERPL-SCNC: 4.2 MMOL/L (ref 3.5–5.3)
POTASSIUM SERPL-SCNC: 4.4 MMOL/L (ref 3.5–5.3)
POTASSIUM SERPL-SCNC: 4.4 MMOL/L (ref 3.5–5.3)
PR INTERVAL: 236 MS
PR INTERVAL: 240 MS
PROT SERPL-MCNC: 5.2 G/DL (ref 6.4–8.4)
PROT SERPL-MCNC: 5.3 G/DL (ref 6.4–8.4)
PROT SERPL-MCNC: 7.6 G/DL (ref 6.4–8.4)
PROT UR STRIP-MCNC: ABNORMAL MG/DL
QRS AXIS: 35 DEGREES
QRS AXIS: 42 DEGREES
QRSD INTERVAL: 102 MS
QRSD INTERVAL: 108 MS
QT INTERVAL: 416 MS
QT INTERVAL: 486 MS
QTC INTERVAL: 473 MS
QTC INTERVAL: 503 MS
RBC # BLD AUTO: 3.72 MILLION/UL (ref 3.81–5.12)
RBC # BLD AUTO: 4.94 MILLION/UL (ref 3.81–5.12)
RBC #/AREA URNS AUTO: ABNORMAL /HPF
RBC MORPH BLD: NORMAL
RBC MORPH BLD: PRESENT
SODIUM SERPL-SCNC: 128 MMOL/L (ref 135–147)
SODIUM SERPL-SCNC: 130 MMOL/L (ref 135–147)
SODIUM SERPL-SCNC: 131 MMOL/L (ref 135–147)
SP GR UR STRIP.AUTO: 1.01 (ref 1–1.03)
T WAVE AXIS: 128 DEGREES
T WAVE AXIS: 188 DEGREES
UROBILINOGEN UR STRIP-ACNC: <2 MG/DL
VENTRICULAR RATE: 57 BPM
VENTRICULAR RATE: 88 BPM
WBC # BLD AUTO: 19.22 THOUSAND/UL (ref 4.31–10.16)
WBC # BLD AUTO: 19.81 THOUSAND/UL (ref 4.31–10.16)
WBC #/AREA URNS AUTO: ABNORMAL /HPF
WBC CLUMPS # UR AUTO: PRESENT /UL

## 2025-01-01 PROCEDURE — 50432 PLMT NEPHROSTOMY CATHETER: CPT

## 2025-01-01 PROCEDURE — 96375 TX/PRO/DX INJ NEW DRUG ADDON: CPT

## 2025-01-01 PROCEDURE — 96365 THER/PROPH/DIAG IV INF INIT: CPT

## 2025-01-01 PROCEDURE — 93010 ELECTROCARDIOGRAM REPORT: CPT | Performed by: INTERNAL MEDICINE

## 2025-01-01 PROCEDURE — 80143 DRUG ASSAY ACETAMINOPHEN: CPT

## 2025-01-01 PROCEDURE — 80053 COMPREHEN METABOLIC PANEL: CPT

## 2025-01-01 PROCEDURE — 82948 REAGENT STRIP/BLOOD GLUCOSE: CPT

## 2025-01-01 PROCEDURE — C1769 GUIDE WIRE: HCPCS

## 2025-01-01 PROCEDURE — C1894 INTRO/SHEATH, NON-LASER: HCPCS

## 2025-01-01 PROCEDURE — 87186 SC STD MICRODIL/AGAR DIL: CPT | Performed by: EMERGENCY MEDICINE

## 2025-01-01 PROCEDURE — 85027 COMPLETE CBC AUTOMATED: CPT

## 2025-01-01 PROCEDURE — 83880 ASSAY OF NATRIURETIC PEPTIDE: CPT

## 2025-01-01 PROCEDURE — 87077 CULTURE AEROBIC IDENTIFY: CPT | Performed by: EMERGENCY MEDICINE

## 2025-01-01 PROCEDURE — C1729 CATH, DRAINAGE: HCPCS

## 2025-01-01 PROCEDURE — 71045 X-RAY EXAM CHEST 1 VIEW: CPT

## 2025-01-01 PROCEDURE — 74176 CT ABD & PELVIS W/O CONTRAST: CPT

## 2025-01-01 PROCEDURE — 99222 1ST HOSP IP/OBS MODERATE 55: CPT | Performed by: UROLOGY

## 2025-01-01 PROCEDURE — 82805 BLOOD GASES W/O2 SATURATION: CPT

## 2025-01-01 PROCEDURE — 99284 EMERGENCY DEPT VISIT MOD MDM: CPT

## 2025-01-01 PROCEDURE — 87086 URINE CULTURE/COLONY COUNT: CPT | Performed by: EMERGENCY MEDICINE

## 2025-01-01 PROCEDURE — 83690 ASSAY OF LIPASE: CPT | Performed by: EMERGENCY MEDICINE

## 2025-01-01 PROCEDURE — 96376 TX/PRO/DX INJ SAME DRUG ADON: CPT

## 2025-01-01 PROCEDURE — 99291 CRITICAL CARE FIRST HOUR: CPT | Performed by: EMERGENCY MEDICINE

## 2025-01-01 PROCEDURE — 96361 HYDRATE IV INFUSION ADD-ON: CPT

## 2025-01-01 PROCEDURE — 83605 ASSAY OF LACTIC ACID: CPT

## 2025-01-01 PROCEDURE — 85007 BL SMEAR W/DIFF WBC COUNT: CPT

## 2025-01-01 PROCEDURE — 36415 COLL VENOUS BLD VENIPUNCTURE: CPT

## 2025-01-01 PROCEDURE — 87154 CUL TYP ID BLD PTHGN 6+ TRGT: CPT | Performed by: EMERGENCY MEDICINE

## 2025-01-01 PROCEDURE — 0T9430Z DRAINAGE OF LEFT KIDNEY PELVIS WITH DRAINAGE DEVICE, PERCUTANEOUS APPROACH: ICD-10-PCS | Performed by: RADIOLOGY

## 2025-01-01 PROCEDURE — 99238 HOSP IP/OBS DSCHRG MGMT 30/<: CPT | Performed by: INTERNAL MEDICINE

## 2025-01-01 PROCEDURE — 87040 BLOOD CULTURE FOR BACTERIA: CPT | Performed by: EMERGENCY MEDICINE

## 2025-01-01 PROCEDURE — 83605 ASSAY OF LACTIC ACID: CPT | Performed by: EMERGENCY MEDICINE

## 2025-01-01 PROCEDURE — 99223 1ST HOSP IP/OBS HIGH 75: CPT | Performed by: INTERNAL MEDICINE

## 2025-01-01 PROCEDURE — 50432 PLMT NEPHROSTOMY CATHETER: CPT | Performed by: RADIOLOGY

## 2025-01-01 PROCEDURE — 81001 URINALYSIS AUTO W/SCOPE: CPT | Performed by: EMERGENCY MEDICINE

## 2025-01-01 PROCEDURE — 93005 ELECTROCARDIOGRAM TRACING: CPT

## 2025-01-01 RX ORDER — TEMAZEPAM 15 MG/1
15 CAPSULE ORAL
Qty: 30 CAPSULE | Refills: 0 | Status: SHIPPED | OUTPATIENT
Start: 2025-01-01 | End: 2025-01-01

## 2025-01-01 RX ORDER — SODIUM CHLORIDE, SODIUM GLUCONATE, SODIUM ACETATE, POTASSIUM CHLORIDE, MAGNESIUM CHLORIDE, SODIUM PHOSPHATE, DIBASIC, AND POTASSIUM PHOSPHATE .53; .5; .37; .037; .03; .012; .00082 G/100ML; G/100ML; G/100ML; G/100ML; G/100ML; G/100ML; G/100ML
500 INJECTION, SOLUTION INTRAVENOUS ONCE
Status: COMPLETED | OUTPATIENT
Start: 2025-01-01 | End: 2025-01-01

## 2025-01-01 RX ORDER — HYDROMORPHONE HCL IN WATER/PF 6 MG/30 ML
0.2 PATIENT CONTROLLED ANALGESIA SYRINGE INTRAVENOUS ONCE
Refills: 0 | Status: COMPLETED | OUTPATIENT
Start: 2025-01-01 | End: 2025-01-01

## 2025-01-01 RX ORDER — ACETAMINOPHEN 10 MG/ML
1000 INJECTION, SOLUTION INTRAVENOUS ONCE
Status: COMPLETED | OUTPATIENT
Start: 2025-01-01 | End: 2025-01-01

## 2025-01-01 RX ORDER — ALPRAZOLAM 0.25 MG
0.25 TABLET ORAL 4 TIMES DAILY PRN
Status: DISCONTINUED | OUTPATIENT
Start: 2025-01-01 | End: 2025-01-01

## 2025-01-01 RX ORDER — LIDOCAINE 50 MG/G
1 PATCH TOPICAL DAILY
Status: DISCONTINUED | OUTPATIENT
Start: 2025-01-01 | End: 2025-01-01

## 2025-01-01 RX ORDER — ACETAMINOPHEN 10 MG/ML
1000 INJECTION, SOLUTION INTRAVENOUS EVERY 6 HOURS PRN
Status: DISCONTINUED | OUTPATIENT
Start: 2025-01-01 | End: 2025-01-01

## 2025-01-01 RX ORDER — FENTANYL CITRATE 50 UG/ML
INJECTION, SOLUTION INTRAMUSCULAR; INTRAVENOUS AS NEEDED
Status: DISCONTINUED | OUTPATIENT
Start: 2025-01-01 | End: 2025-01-01

## 2025-01-01 RX ORDER — ASPIRIN 81 MG/1
81 TABLET ORAL EVERY OTHER DAY
Status: DISCONTINUED | OUTPATIENT
Start: 2025-01-01 | End: 2025-01-01

## 2025-01-01 RX ORDER — BISACODYL 10 MG
10 SUPPOSITORY, RECTAL RECTAL DAILY PRN
Status: DISCONTINUED | OUTPATIENT
Start: 2025-01-01 | End: 2025-01-01 | Stop reason: HOSPADM

## 2025-01-01 RX ORDER — PREDNISONE 1 MG/1
2 TABLET ORAL 2 TIMES DAILY WITH MEALS
Status: DISCONTINUED | OUTPATIENT
Start: 2025-01-01 | End: 2025-01-01

## 2025-01-01 RX ORDER — SUCCINYLCHOLINE/SOD CL,ISO/PF 100 MG/5ML
SYRINGE (ML) INTRAVENOUS AS NEEDED
Status: DISCONTINUED | OUTPATIENT
Start: 2025-01-01 | End: 2025-01-01

## 2025-01-01 RX ORDER — PREDNISONE 1 MG/1
1 TABLET ORAL 2 TIMES DAILY WITH MEALS
Status: DISCONTINUED | OUTPATIENT
Start: 2025-03-30 | End: 2025-01-01

## 2025-01-01 RX ORDER — EPHEDRINE SULFATE 50 MG/ML
INJECTION INTRAVENOUS AS NEEDED
Status: DISCONTINUED | OUTPATIENT
Start: 2025-01-01 | End: 2025-01-01

## 2025-01-01 RX ORDER — LIDOCAINE WITH 8.4% SOD BICARB 0.9%(10ML)
SYRINGE (ML) INJECTION AS NEEDED
Status: COMPLETED | OUTPATIENT
Start: 2025-01-01 | End: 2025-01-01

## 2025-01-01 RX ORDER — POLYETHYLENE GLYCOL 3350 17 G/17G
17 POWDER, FOR SOLUTION ORAL DAILY
Status: DISCONTINUED | OUTPATIENT
Start: 2025-01-01 | End: 2025-01-01

## 2025-01-01 RX ORDER — HYDROMORPHONE HCL IN WATER/PF 6 MG/30 ML
0.2 PATIENT CONTROLLED ANALGESIA SYRINGE INTRAVENOUS EVERY 6 HOURS PRN
Status: DISCONTINUED | OUTPATIENT
Start: 2025-01-01 | End: 2025-01-01

## 2025-01-01 RX ORDER — HYDROMORPHONE HCL/PF 1 MG/ML
0.3 SYRINGE (ML) INJECTION EVERY 2 HOUR PRN
Refills: 0 | Status: DISCONTINUED | OUTPATIENT
Start: 2025-01-01 | End: 2025-01-01 | Stop reason: HOSPADM

## 2025-01-01 RX ORDER — ONDANSETRON 2 MG/ML
4 INJECTION INTRAMUSCULAR; INTRAVENOUS EVERY 4 HOURS PRN
Status: DISCONTINUED | OUTPATIENT
Start: 2025-01-01 | End: 2025-01-01

## 2025-01-01 RX ORDER — ALBUMIN HUMAN 50 G/1000ML
SOLUTION INTRAVENOUS CONTINUOUS PRN
Status: DISCONTINUED | OUTPATIENT
Start: 2025-01-01 | End: 2025-01-01

## 2025-01-01 RX ORDER — ACETAMINOPHEN 325 MG/1
650 TABLET ORAL EVERY 4 HOURS PRN
Status: DISCONTINUED | OUTPATIENT
Start: 2025-01-01 | End: 2025-01-01

## 2025-01-01 RX ORDER — HEPARIN SODIUM 5000 [USP'U]/ML
5000 INJECTION, SOLUTION INTRAVENOUS; SUBCUTANEOUS EVERY 8 HOURS SCHEDULED
Status: DISCONTINUED | OUTPATIENT
Start: 2025-01-01 | End: 2025-01-01

## 2025-01-01 RX ORDER — BUSPIRONE HYDROCHLORIDE 5 MG/1
10 TABLET ORAL 2 TIMES DAILY
Status: DISCONTINUED | OUTPATIENT
Start: 2025-01-01 | End: 2025-01-01

## 2025-01-01 RX ORDER — LEVOTHYROXINE SODIUM 50 UG/1
50 TABLET ORAL
Status: DISCONTINUED | OUTPATIENT
Start: 2025-01-01 | End: 2025-01-01

## 2025-01-01 RX ORDER — GLYCOPYRROLATE 0.2 MG/ML
0.1 INJECTION INTRAMUSCULAR; INTRAVENOUS EVERY 4 HOURS PRN
Status: DISCONTINUED | OUTPATIENT
Start: 2025-01-01 | End: 2025-01-01 | Stop reason: HOSPADM

## 2025-01-01 RX ORDER — OXYCODONE HYDROCHLORIDE 5 MG/1
5 TABLET ORAL EVERY 4 HOURS PRN
Status: DISCONTINUED | OUTPATIENT
Start: 2025-01-01 | End: 2025-01-01

## 2025-01-01 RX ORDER — HALOPERIDOL 5 MG/ML
0.5 INJECTION INTRAMUSCULAR EVERY 2 HOUR PRN
Status: DISCONTINUED | OUTPATIENT
Start: 2025-01-01 | End: 2025-01-01 | Stop reason: HOSPADM

## 2025-01-01 RX ORDER — LIDOCAINE HYDROCHLORIDE 10 MG/ML
INJECTION, SOLUTION EPIDURAL; INFILTRATION; INTRACAUDAL; PERINEURAL AS NEEDED
Status: DISCONTINUED | OUTPATIENT
Start: 2025-01-01 | End: 2025-01-01

## 2025-01-01 RX ORDER — SODIUM CHLORIDE, SODIUM LACTATE, POTASSIUM CHLORIDE, CALCIUM CHLORIDE 600; 310; 30; 20 MG/100ML; MG/100ML; MG/100ML; MG/100ML
125 INJECTION, SOLUTION INTRAVENOUS CONTINUOUS
Status: DISCONTINUED | OUTPATIENT
Start: 2025-01-01 | End: 2025-01-01

## 2025-01-01 RX ORDER — LORAZEPAM 2 MG/ML
1 INJECTION INTRAMUSCULAR
Status: DISCONTINUED | OUTPATIENT
Start: 2025-01-01 | End: 2025-01-01

## 2025-01-01 RX ORDER — ZOLPIDEM TARTRATE 5 MG/1
5 TABLET ORAL
Status: DISCONTINUED | OUTPATIENT
Start: 2025-01-01 | End: 2025-01-01

## 2025-01-01 RX ORDER — ENOXAPARIN SODIUM 100 MG/ML
40 INJECTION SUBCUTANEOUS DAILY
Status: DISCONTINUED | OUTPATIENT
Start: 2025-01-01 | End: 2025-01-01

## 2025-01-01 RX ORDER — SODIUM CHLORIDE 9 MG/ML
100 INJECTION, SOLUTION INTRAVENOUS CONTINUOUS
Status: DISCONTINUED | OUTPATIENT
Start: 2025-01-01 | End: 2025-01-01

## 2025-01-01 RX ORDER — PROPOFOL 10 MG/ML
INJECTION, EMULSION INTRAVENOUS AS NEEDED
Status: DISCONTINUED | OUTPATIENT
Start: 2025-01-01 | End: 2025-01-01

## 2025-01-01 RX ORDER — PANTOPRAZOLE SODIUM 40 MG/1
40 TABLET, DELAYED RELEASE ORAL
Status: DISCONTINUED | OUTPATIENT
Start: 2025-01-01 | End: 2025-01-01

## 2025-01-01 RX ORDER — BUSPIRONE HYDROCHLORIDE 10 MG/1
10 TABLET ORAL 2 TIMES DAILY
Qty: 60 TABLET | Refills: 2 | Status: SHIPPED | OUTPATIENT
Start: 2025-01-01 | End: 2025-01-01

## 2025-01-01 RX ORDER — HYDROCORTISONE SODIUM SUCCINATE 100 MG/2ML
100 INJECTION INTRAMUSCULAR; INTRAVENOUS EVERY 8 HOURS SCHEDULED
Status: DISCONTINUED | OUTPATIENT
Start: 2025-01-01 | End: 2025-01-01

## 2025-01-01 RX ORDER — AMIODARONE HYDROCHLORIDE 200 MG/1
100 TABLET ORAL DAILY
Status: DISCONTINUED | OUTPATIENT
Start: 2025-01-01 | End: 2025-01-01

## 2025-01-01 RX ORDER — FUROSEMIDE 40 MG/1
40 TABLET ORAL DAILY
Status: DISCONTINUED | OUTPATIENT
Start: 2025-01-01 | End: 2025-01-01

## 2025-01-01 RX ORDER — SODIUM CHLORIDE, SODIUM LACTATE, POTASSIUM CHLORIDE, CALCIUM CHLORIDE 600; 310; 30; 20 MG/100ML; MG/100ML; MG/100ML; MG/100ML
INJECTION, SOLUTION INTRAVENOUS CONTINUOUS PRN
Status: DISCONTINUED | OUTPATIENT
Start: 2025-01-01 | End: 2025-01-01

## 2025-01-01 RX ORDER — PREDNISONE 1 MG/1
1 TABLET ORAL DAILY
Status: DISCONTINUED | OUTPATIENT
Start: 2025-04-02 | End: 2025-01-01

## 2025-01-01 RX ORDER — LORAZEPAM 2 MG/ML
1 INJECTION INTRAMUSCULAR
Status: DISCONTINUED | OUTPATIENT
Start: 2025-01-01 | End: 2025-01-01 | Stop reason: HOSPADM

## 2025-01-01 RX ORDER — ALBUMIN HUMAN 50 G/1000ML
12.5 SOLUTION INTRAVENOUS ONCE
Status: COMPLETED | OUTPATIENT
Start: 2025-01-01 | End: 2025-01-01

## 2025-01-01 RX ORDER — PREDNISONE 1 MG/1
TABLET ORAL
Qty: 30 TABLET | Refills: 3 | Status: SHIPPED | OUTPATIENT
Start: 2025-01-01 | End: 2025-01-01

## 2025-01-01 RX ORDER — SODIUM CHLORIDE 9 MG/ML
10 INJECTION, SOLUTION INTRAMUSCULAR; INTRAVENOUS; SUBCUTANEOUS DAILY
Qty: 900 ML | Refills: 0 | Status: SHIPPED | OUTPATIENT
Start: 2025-01-01 | End: 2025-03-29

## 2025-01-01 RX ORDER — LABETALOL HYDROCHLORIDE 5 MG/ML
10 INJECTION, SOLUTION INTRAVENOUS EVERY 6 HOURS PRN
Status: DISCONTINUED | OUTPATIENT
Start: 2025-01-01 | End: 2025-01-01

## 2025-01-01 RX ADMIN — ALBUMIN HUMAN: 50 SOLUTION INTRAVENOUS at 20:44

## 2025-01-01 RX ADMIN — SODIUM CHLORIDE, SODIUM LACTATE, POTASSIUM CHLORIDE, AND CALCIUM CHLORIDE: .6; .31; .03; .02 INJECTION, SOLUTION INTRAVENOUS at 18:57

## 2025-01-01 RX ADMIN — Medication 100 MG: at 19:34

## 2025-01-01 RX ADMIN — SODIUM CHLORIDE, SODIUM LACTATE, POTASSIUM CHLORIDE, AND CALCIUM CHLORIDE 500 ML: .6; .31; .03; .02 INJECTION, SOLUTION INTRAVENOUS at 11:54

## 2025-01-01 RX ADMIN — ALBUMIN (HUMAN) 12.5 G: 12.5 INJECTION, SOLUTION INTRAVENOUS at 23:49

## 2025-01-01 RX ADMIN — CEFTRIAXONE 1000 MG: 10 INJECTION, POWDER, FOR SOLUTION INTRAVENOUS at 10:12

## 2025-01-01 RX ADMIN — ALBUMIN HUMAN: 50 SOLUTION INTRAVENOUS at 19:32

## 2025-01-01 RX ADMIN — LIDOCAINE 1 PATCH: 50 PATCH CUTANEOUS at 12:40

## 2025-01-01 RX ADMIN — LORAZEPAM 1 MG: 2 INJECTION INTRAMUSCULAR; INTRAVENOUS at 07:04

## 2025-01-01 RX ADMIN — HYDROMORPHONE HYDROCHLORIDE 0.2 MG: 0.2 INJECTION, SOLUTION INTRAMUSCULAR; INTRAVENOUS; SUBCUTANEOUS at 08:51

## 2025-01-01 RX ADMIN — SODIUM CHLORIDE, SODIUM GLUCONATE, SODIUM ACETATE, POTASSIUM CHLORIDE, MAGNESIUM CHLORIDE, SODIUM PHOSPHATE, DIBASIC, AND POTASSIUM PHOSPHATE 500 ML: .53; .5; .37; .037; .03; .012; .00082 INJECTION, SOLUTION INTRAVENOUS at 01:01

## 2025-01-01 RX ADMIN — HEPARIN SODIUM 5000 UNITS: 5000 INJECTION INTRAVENOUS; SUBCUTANEOUS at 05:25

## 2025-01-01 RX ADMIN — SODIUM CHLORIDE, SODIUM LACTATE, POTASSIUM CHLORIDE, AND CALCIUM CHLORIDE 500 ML: .6; .31; .03; .02 INJECTION, SOLUTION INTRAVENOUS at 13:40

## 2025-01-01 RX ADMIN — CEFEPIME 1000 MG: 1 INJECTION, POWDER, FOR SOLUTION INTRAMUSCULAR; INTRAVENOUS at 22:58

## 2025-01-01 RX ADMIN — LIDOCAINE HYDROCHLORIDE 50 MG: 10 INJECTION, SOLUTION EPIDURAL; INFILTRATION; INTRACAUDAL; PERINEURAL at 19:34

## 2025-01-01 RX ADMIN — SODIUM CHLORIDE, SODIUM GLUCONATE, SODIUM ACETATE, POTASSIUM CHLORIDE, MAGNESIUM CHLORIDE, SODIUM PHOSPHATE, DIBASIC, AND POTASSIUM PHOSPHATE 500 ML: .53; .5; .37; .037; .03; .012; .00082 INJECTION, SOLUTION INTRAVENOUS at 04:32

## 2025-01-01 RX ADMIN — SODIUM CHLORIDE, SODIUM LACTATE, POTASSIUM CHLORIDE, AND CALCIUM CHLORIDE 125 ML/HR: .6; .31; .03; .02 INJECTION, SOLUTION INTRAVENOUS at 12:40

## 2025-01-01 RX ADMIN — HYDROMORPHONE HYDROCHLORIDE 0.3 MG: 1 INJECTION, SOLUTION INTRAMUSCULAR; INTRAVENOUS; SUBCUTANEOUS at 07:08

## 2025-01-01 RX ADMIN — HEPARIN SODIUM 5000 UNITS: 5000 INJECTION INTRAVENOUS; SUBCUTANEOUS at 13:39

## 2025-01-01 RX ADMIN — SODIUM CHLORIDE, SODIUM LACTATE, POTASSIUM CHLORIDE, AND CALCIUM CHLORIDE 125 ML/HR: .6; .31; .03; .02 INJECTION, SOLUTION INTRAVENOUS at 07:54

## 2025-01-01 RX ADMIN — ACETAMINOPHEN 1000 MG: 10 INJECTION INTRAVENOUS at 09:43

## 2025-01-01 RX ADMIN — HYDROCORTISONE SODIUM SUCCINATE 100 MG: 100 INJECTION, POWDER, FOR SOLUTION INTRAMUSCULAR; INTRAVENOUS at 14:11

## 2025-01-01 RX ADMIN — Medication 10 ML: at 20:03

## 2025-01-01 RX ADMIN — SODIUM CHLORIDE 100 ML/HR: 0.9 INJECTION, SOLUTION INTRAVENOUS at 21:57

## 2025-01-01 RX ADMIN — IOHEXOL 50 ML: 350 INJECTION, SOLUTION INTRAVENOUS at 21:16

## 2025-01-01 RX ADMIN — FENTANYL CITRATE 25 MCG: 50 INJECTION, SOLUTION INTRAMUSCULAR; INTRAVENOUS at 19:34

## 2025-01-01 RX ADMIN — HYDROCORTISONE SODIUM SUCCINATE 100 MG: 100 INJECTION, POWDER, FOR SOLUTION INTRAMUSCULAR; INTRAVENOUS at 23:05

## 2025-01-01 RX ADMIN — SODIUM CHLORIDE 100 ML/HR: 0.9 INJECTION, SOLUTION INTRAVENOUS at 14:14

## 2025-01-01 RX ADMIN — HEPARIN SODIUM 5000 UNITS: 5000 INJECTION INTRAVENOUS; SUBCUTANEOUS at 22:31

## 2025-01-01 RX ADMIN — SODIUM CHLORIDE 500 ML: 0.9 INJECTION, SOLUTION INTRAVENOUS at 16:22

## 2025-01-01 RX ADMIN — EPHEDRINE SULFATE 10 MG: 50 INJECTION INTRAVENOUS at 20:10

## 2025-01-01 RX ADMIN — PROPOFOL 70 MG: 10 INJECTION, EMULSION INTRAVENOUS at 19:34

## 2025-01-01 RX ADMIN — ACETAMINOPHEN 1000 MG: 10 INJECTION INTRAVENOUS at 16:22

## 2025-01-01 RX ADMIN — SODIUM BICARBONATE 100 ML/HR: 84 INJECTION, SOLUTION INTRAVENOUS at 00:44

## 2025-01-01 RX ADMIN — HYDROMORPHONE HYDROCHLORIDE 0.2 MG: 0.2 INJECTION, SOLUTION INTRAMUSCULAR; INTRAVENOUS; SUBCUTANEOUS at 07:36

## 2025-01-01 RX ADMIN — HYDROCORTISONE SODIUM SUCCINATE 100 MG: 100 INJECTION, POWDER, FOR SOLUTION INTRAMUSCULAR; INTRAVENOUS at 05:25

## 2025-01-01 RX ADMIN — SODIUM CHLORIDE 1000 ML: 0.9 INJECTION, SOLUTION INTRAVENOUS at 17:13

## 2025-01-01 RX ADMIN — ACETAMINOPHEN 1000 MG: 10 INJECTION INTRAVENOUS at 22:29

## 2025-01-26 DIAGNOSIS — E03.9 HYPOTHYROIDISM, UNSPECIFIED TYPE: ICD-10-CM

## 2025-01-27 RX ORDER — LEVOTHYROXINE SODIUM 50 UG/1
50 TABLET ORAL DAILY
Qty: 90 TABLET | Refills: 1 | Status: SHIPPED | OUTPATIENT
Start: 2025-01-27

## 2025-02-17 DIAGNOSIS — F41.9 ANXIETY: ICD-10-CM

## 2025-02-17 DIAGNOSIS — G47.00 INSOMNIA, UNSPECIFIED TYPE: ICD-10-CM

## 2025-02-17 RX ORDER — ZOLPIDEM TARTRATE 12.5 MG/1
12.5 TABLET, FILM COATED, EXTENDED RELEASE ORAL
Qty: 30 TABLET | Refills: 3 | Status: SHIPPED | OUTPATIENT
Start: 2025-02-17

## 2025-02-17 RX ORDER — ALPRAZOLAM 0.25 MG/1
0.25 TABLET ORAL 4 TIMES DAILY PRN
Qty: 120 TABLET | Refills: 3 | Status: SHIPPED | OUTPATIENT
Start: 2025-02-17

## 2025-02-24 NOTE — TELEPHONE ENCOUNTER
The pharmacy called to report the medication ALPRAZolam (XANAX) 0.25 mg tablet  was send to the wrong pharmacy needs to be cancel at [facility] in this way the can give it to the patient and re sent the medication to [facility]

## 2025-02-26 DIAGNOSIS — I50.32 CHRONIC DIASTOLIC HEART FAILURE (HCC): ICD-10-CM

## 2025-02-27 RX ORDER — FUROSEMIDE 20 MG/1
40 TABLET ORAL DAILY
Qty: 60 TABLET | Refills: 0 | Status: SHIPPED | OUTPATIENT
Start: 2025-02-27

## 2025-03-19 ENCOUNTER — TELEPHONE (OUTPATIENT)
Age: OVER 89
End: 2025-03-19

## 2025-03-19 NOTE — TELEPHONE ENCOUNTER
Tori called for Li to change appointment on 3/25 it was too early in the morning.   Patient is having panic attacks and unable to come in so early in the morning. I did try to get her seen sooner with another doctor/CRNP but later in the morning, but she stated she only wanted PCP.     Please advise and contact Tori if anything else can be done for patient.  Thank you

## 2025-03-24 NOTE — PROGRESS NOTES
Virtual Regular VisitName: Li Koenig      : 1926      MRN: [id number]  Encounter Provider: Yadi Shah DO  Encounter Date: 3/24/2025   Encounter department: [facility]  :  Assessment & Plan  Anxiety    Orders:    busPIRone (BUSPAR) 10 mg tablet; Take 1 tablet (10 mg total) by mouth 2 (two) times a day    Arthralgia, unspecified joint    Orders:    predniSONE 1 mg tablet; 3 po bid x3 days then 2 po bidx3 days then 1 po bid x3days then 1 qd thereafter    Primary insomnia    Orders:    temazepam (RESTORIL) 15 mg capsule; Take 1 capsule (15 mg total) by mouth daily at bedtime as needed for sleep        History of Present Illness     The patient  presents complaining of increased anxiety symptoms.  She is experiencing daily stress and anxiety and is willing to start on buspar therapy.  In addition, she is also experiencing insomnia.  She is  also experiencing ongoing arthralgia and would like to go back on prednisone therapy.        Review of Systems   Constitutional:  Negative for appetite change, chills and fever.   HENT:  Negative for ear pain, facial swelling, rhinorrhea, sinus pain, sore throat and trouble swallowing.    Eyes:  Negative for discharge and redness.   Respiratory:  Negative for chest tightness, shortness of breath and wheezing.    Cardiovascular:  Negative for chest pain and palpitations.   Gastrointestinal:  Negative for abdominal pain, diarrhea, nausea and vomiting.   Endocrine: Negative for polyuria.   Genitourinary:  Negative for dysuria and urgency.   Musculoskeletal:  Positive for arthralgias. Negative for back pain.   Skin:  Negative for rash.   Neurological:  Negative for dizziness, weakness and headaches.   Hematological:  Negative for adenopathy.   Psychiatric/Behavioral:  Positive for sleep disturbance. Negative for behavioral problems and confusion. The patient is nervous/anxious.    All other systems reviewed and are negative.      Objective    There were no vitals taken for this visit.    Physical Exam  Vitals reviewed.   Constitutional:       Appearance: Normal appearance. She is not ill-appearing.   Eyes:      General:         Right eye: No discharge.         Left eye: No discharge.   Pulmonary:      Effort: No respiratory distress.   Neurological:      General: No focal deficit present.      Mental Status: She is alert and oriented to person, place, and time. Mental status is at baseline.      Gait: Gait normal.   Psychiatric:         Mood and Affect: Mood normal.         Behavior: Behavior normal.         Thought Content: Thought content normal.         Judgment: Judgment normal.         Administrative Statements   Encounter provider Yadi Shah, DO    The Patient is located at Home and in the following state in which I hold an active license [place].    The patient was identified by name and date of birth. Li Koenig was informed that this is a telemedicine visit and that the visit is being conducted through the Epic Embedded platform. She agrees to proceed..  My office door was closed. No one else was in the room.  She acknowledged consent and understanding of privacy and security of the video platform. The patient has agreed to participate and understands they can discontinue the visit at any time.    I have spent a total time of 20 minutes in caring for this patient on the day of the visit/encounter including Diagnostic results, Prognosis, Risks and benefits of tx options, Instructions for management, Patient and family education, Importance of tx compliance, Risk factor reductions, and Impressions, not including the time spent for establishing the audio/video connection.

## 2025-03-25 NOTE — PROGRESS NOTES
HCC coding opportunities          Chart Reviewed number of suggestions sent to Provider: 2    I42.9  I13.0     Patients Insurance     Medicare Insurance: [employer]

## 2025-03-27 PROBLEM — R82.90 ABNORMAL URINALYSIS: Status: ACTIVE | Noted: 2025-01-01

## 2025-03-27 PROBLEM — R10.9 ABDOMINAL PAIN: Status: ACTIVE | Noted: 2022-02-23

## 2025-03-27 PROBLEM — A41.9 SEPSIS (HCC): Status: ACTIVE | Noted: 2025-01-01

## 2025-03-27 PROBLEM — N13.30 HYDRONEPHROSIS, LEFT: Status: ACTIVE | Noted: 2025-01-01

## 2025-03-27 NOTE — SEDATION DOCUMENTATION
Pt under care and monitoring of anesthesia team. Please see anesthesia record for vital signs, cardiac, and respiratory status. Pt sedated, intubated. Positioned prone and secured to angio table with pressure points padded. Under body rosalie hugger in use.

## 2025-03-27 NOTE — ASSESSMENT & PLAN NOTE
CC: Abdominal pain gradually worsening  Imaging:  CTAP: Significant left hydronephrosis with abrupt cut off at the UPJ, not previously evident. No obstructing stone. 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 Mild left perinephric stranding.   Etiology: Unclear, high risk of having scar/stricture from previous procedures although it was a while ago    Plan:  Consulted urology for possible need of ureteric stent/nephrostomy tube  Monitor urine output  According to ED patient's urine was gross, considering leukocytosis and urinalysis will treat her for possible UTI/Pyelo, continue IV ceftriaxone 1 g daily  Monitor WBC/fever curve  Ordered adult pain regimen,, she got 2 doses of IV Dilaudid 0.2 in 1 hour, be cautious with further narcotic

## 2025-03-27 NOTE — ANESTHESIA PREPROCEDURE EVALUATION
Procedure:  IR NEPHROSTOMY TUBE PLACEMENT    Relevant Problems   CARDIO   (+) Chronic diastolic congestive heart failure (HCC)   (+) Hyperlipidemia   (+) Hypertension   (+) Mild aortic stenosis   (+) Paroxysmal atrial fibrillation (HCC)   (+) Sinus bradycardia      ENDO   (+) Hypothyroidism      /RENAL   (+) Chronic kidney disease, stage 4 (severe) (HCC)   (+) Hydronephrosis, left   (+) Hypertensive heart and chronic kidney disease with heart failure and stage 1 through stage 4 chronic kidney disease, or unspecified chronic kidney disease (HCC)      MUSCULOSKELETAL   (+) Arthritis   (+) Chronic low back pain without sciatica      NEURO/PSYCH   (+) Anxiety   (+) Chronic low back pain without sciatica        Physical Exam    Airway    Mallampati score: III  TM Distance: >3 FB  Neck ROM: full     Dental   No notable dental hx     Cardiovascular  Cardiovascular exam normal    Pulmonary  Pulmonary exam normal     Other Findings  post-pubertal.      Anesthesia Plan  ASA Score- 4 Emergent    Anesthesia Type- general with ASA Monitors.         Additional Monitors:     Airway Plan: ETT.           Plan Factors-Exercise tolerance (METS): <4 METS.    Chart reviewed. EKG reviewed.  Existing labs reviewed. Patient summary reviewed.    Patient is not a current smoker.              Induction- intravenous and rapid sequence induction.    Postoperative Plan- Plan for postoperative opioid use.     Perioperative Resuscitation Plan -   The DNR status was discussed with the patient and/or POA, and Level 3 code status (DNR/DNI) will be maintained throughout the perioperative period. Daughter does not want chest compressions during procedure. Intubation ok..    Informed Consent- Anesthetic plan and risks discussed with patient and daughter.  I personally reviewed this patient with the CRNA. Discussed and agreed on the Anesthesia Plan with the CRNA..      NPO Status:  No vitals data found for the desired time range.  Pudding 2 hrs  ago

## 2025-03-27 NOTE — ED PROCEDURE NOTE
PROCEDURE  ECG 12 Lead Documentation Only    Date/Time: 3/27/2025 7:39 AM    Performed by: Kuldeep Garcia MD  Authorized by: Kuldeep Garcia MD    Indications / Diagnosis:  ABD PAIN  ECG reviewed by me, the ED Provider: yes    Patient location:  ED  Previous ECG:     Previous ECG:  Compared to current    Comparison ECG info:  2/22/22- NO SIGN CHANGES    Similarity:  No change    Comparison to cardiac monitor: Yes    Interpretation:     Interpretation: non-specific    Rate:     ECG rate:  57    ECG rate assessment: bradycardic    Rhythm:     Rhythm: sinus bradycardia    Ectopy:     Ectopy: none    QRS:     QRS axis:  Normal    QRS intervals:  Wide  Conduction:     Conduction: abnormal      Abnormal conduction: 1st degree and non-specific intraventricular conduction delay    ST segments:     ST segments:  Normal  T waves:     T waves: flattening      Flattening:  II, V4, V5 and V6  Q waves:     Q waves:  V1  Other findings:     Other findings: LVH with strain         Kuldeep Garcia MD  03/27/25 0741

## 2025-03-27 NOTE — ED NOTES
Pressures continue decrease s/p bolus. Provider is aware. Mentation remains altered and pt responsive to pain at this time. Plan: 24 hrs of observation before proceeding with comfort care. Are comfortable with hypotension at this time        Isabel Renner RN  03/27/25 1808

## 2025-03-27 NOTE — ASSESSMENT & PLAN NOTE
Results from last 7 days   Lab Units 03/27/25  0953   LEUKOCYTES UA  Large*   NITRITE UA  Negative   GLUCOSE UA mg/dl Negative   KETONES UA mg/dl Negative   BLOOD UA  Large*   WBC UA /hpf Innumerable*   RBC UA /hpf Innumerable*   BACTERIA UA /hpf None Seen     See plan in left hydronephrosis

## 2025-03-27 NOTE — ED NOTES
Pt turned off L side. Wedges placed. Pt much more responsive. Speaking in short sentences. Obeying commands.          Isabel Renner RN  03/27/25 8065

## 2025-03-27 NOTE — SEPSIS NOTE
Sepsis Note   iL Koenig 89 y.o. female MRN: [id number]  Unit/Bed#: ED-42 Encounter: [id number]       Initial Sepsis Screening       Row Name 03/27/25 0947                Is the patient's history suggestive of a new or worsening infection? Yes (Proceed)  -MB        Suspected source of infection acute abdominal infection  -MB        Indicate SIRS criteria Leukocytosis (WBC > 94307 IJL) OR Leukopenia (WBC <4000 IJL) OR Bandemia (WBC >10% bands)  -MB        Are two or more of the above signs & symptoms of infection both present and new to the patient? No  -MB                  User Key  (r) = Recorded By, (t) = Taken By, (c) = Cosigned By      Initials Name Provider Type    MB Kuldeep Garcia MD Physician                        There is no height or weight on file to calculate BMI.  Wt Readings from Last 1 Encounters:   12/02/24 90.5 kg (199 lb 9.6 oz)        Ideal body weight: 66.2 kg (145 lb 15.1 oz)  Adjusted ideal body weight: 75.9 kg (167 lb 6.5 oz)

## 2025-03-27 NOTE — ASSESSMENT & PLAN NOTE
89-year-old presenting with suprapubic abdominal pain  CT scan significant hydronephrosis abrupt transition of caliber right UPJ junction suggesting UPJ stenosis  This is a new finding compared to her previous CT scans from 2022  WBC 19  UA innumerable WBCs, no bacteria.  Large leukocytes  Barnard catheter placed for accurate I's and O's draining cloudy urine  Kidney function at baseline Cr 1.94  Patient is a DNR/DNI.  I discussed with patient's daughter at bedside along with her cousin.  Renaldo out diagram of CT scan and showed the UPJ obstruction.  She had low blood pressure 58/41 which responded well with a liter of bolus.  We discussed her presentation and goals of care.  They do not want any aggressive measures for patient.  We discussed intervention in the form of nephrostomy tube.  At end of conversation family would not want to pursue measures including nephrostomy tube.  They do not want patient to be on pressors or upgraded level of care.  Continue antibiotics.  Respect patient and family wishes for no intervention.  Transition to hospice if appropriate.  Contact urology or IR if family members decision changes  Will sign off. Contact if goals of care changes.

## 2025-03-27 NOTE — H&P
H&P - Hospitalist   Name: Li Koenig 89 y.o. female I MRN: [id number]  Unit/Bed#: ED-42 I Date of Admission: 3/27/2025   Date of Service: 3/27/2025 I Hospital Day: 0     Assessment & Plan  Hydronephrosis, left  CC: Abdominal pain gradually worsening  Imaging:  CTAP: Significant left hydronephrosis with abrupt cut off at the UPJ, not previously evident. No obstructing stone. 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 Mild left perinephric stranding.   Etiology: Unclear, high risk of having scar/stricture from previous procedures although it was a while ago    Plan:  Consulted urology for possible need of ureteric stent/nephrostomy tube  Monitor urine output  According to ED patient's urine was gross, considering leukocytosis and urinalysis will treat her for possible UTI/Pyelo, continue IV ceftriaxone 1 g daily  Monitor WBC/fever curve  Ordered adult pain regimen,, she got 2 doses of IV Dilaudid 0.2 in 1 hour, be cautious with further narcotic  Hypertension  Blood Pressure: (!) 58/41  Patient's blood pressure was initially elevated, but 1 reading showed in 50s, went up to MAP greater than 65 without bolus, attending had a discussion with family, declined any ICU/pressors  Continue IVF LR at 125 mL/h, review/reorder tomorrow according to BP  Hold Lasix in the meantime  Abnormal urinalysis  Results from last 7 days   Lab Units 03/27/25  0953   LEUKOCYTES UA  Large*   NITRITE UA  Negative   GLUCOSE UA mg/dl Negative   KETONES UA mg/dl Negative   BLOOD UA  Large*   WBC UA /hpf Innumerable*   RBC UA /hpf Innumerable*   BACTERIA UA /hpf None Seen     See plan in left hydronephrosis  Abdominal pain  See plan in left hydronephrosis  Paroxysmal atrial fibrillation (HCC)  Continue PTA amiodarone 100 mg daily, aspirin 81 mg  Hypothyroidism  Continue PTA levothyroxine 50 mcg  Anxiety  Home regimen: BuSpar 10 mg twice daily, Xanax 0.25 mg 4 times daily as needed(patient is religiously taking all the time as per PDMP), zolpidem 5 mg  daily at bedtime(in chart it says diazepam but patient did not tolerated well would like to go back to zolpidem)  Continue home regimen      VTE Pharmacologic Prophylaxis:   Moderate Risk (Score 3-4) - Pharmacological DVT Prophylaxis Ordered: heparin.  Code Status: Level 3 - DNAR and DNI   Discussion with family: Updated  (daughter) at bedside.    Anticipated Length of Stay: Patient will be admitted on an inpatient basis with an anticipated length of stay of greater than 2 midnights secondary to left hydronephrosis.    History of Present Illness   Chief Complaint: Abdominal pain    Li Koenig is a 89 y.o. female with a PMH of hypertension, hypothyroidism, hysterectomy, right total nephrectomy who presents with gradually worsening abdominal pain.  During my examination patient was in mild distress secondary to pain, daughter was at bedside provided most of the history.  Per daughter patient does have chronic abdominal pain, also has history of ?  IBS-C, but since past 1 year her pain is gradually progressing and getting worse, last night patient was complaining of worsening abdominal pain, was also afraid of having any p.o. intake, she does ask for Fleet enema to get some relief with good normal bowel movement.  Daughter denies any change in urine output, reports urine is dark yellow denies any cloudy appearance/pus, also mentioned that she had adequate urine output last night.  In ED CT abdomen pelvis showed significant left hydronephrosis without any obstructing calculi, her white count was elevated to 19 but did not meet SIRS criteria on admission, UA/UM showed large leukocytosis.  Will admit patient as inpatient for further evaluation by urology.    Review of Systems   Constitutional:  Negative for chills and fever.   HENT:  Negative for ear pain and sore throat.    Eyes:  Negative for pain and visual disturbance.   Respiratory:  Negative for cough and shortness of breath.    Cardiovascular:   Negative for chest pain and palpitations.   Gastrointestinal:  Positive for abdominal pain and constipation. Negative for blood in stool and vomiting.   Genitourinary:  Negative for dysuria and hematuria.   Musculoskeletal:  Negative for arthralgias and back pain.   Skin:  Negative for color change and rash.   Neurological:  Negative for seizures and syncope.   All other systems reviewed and are negative.      Historical Information   Past Medical History:   Diagnosis Date    A-fib (HCC)     Cardiac disease     Hyperlipidemia     Hypertension      Past Surgical History:   Procedure Laterality Date    APPENDECTOMY      CHOLECYSTECTOMY      HERNIA REPAIR      HYSTERECTOMY      NEPHRECTOMY Right      Social History     Tobacco Use    Smoking status: Never     Passive exposure: Past    Smokeless tobacco: Never   Vaping Use    Vaping status: Never Used   Substance and Sexual Activity    Alcohol use: Not Currently    Drug use: Never    Sexual activity: Not Currently     Partners: Male     Birth control/protection: Post-menopausal     E-Cigarette/Vaping    E-Cigarette Use Never User      E-Cigarette/Vaping Substances     Family history non-contributory  Social History:  Marital Status:    Patient Pre-hospital Living Situation: Home  Patient Pre-hospital Level of Mobility: walks  Patient Pre-hospital Diet Restrictions: None    Meds/Allergies   I have reviewed home medications with patient family member.  Prior to Admission medications    Medication Sig Start Date End Date Taking? Authorizing Provider   ALPRAZolam (XANAX) 0.25 mg tablet Take 1 tablet (0.25 mg total) by mouth 4 (four) times a day as needed for anxiety 2/17/25  Yes Yadi Shah DO   amiodarone 100 mg tablet TAKE ONE TABLET BY MOUTH EVERY DAY 12/3/24  Yes Yadi Shah DO   aspirin (ECOTRIN LOW STRENGTH) 81 mg EC tablet Take 81 mg by mouth every other day   Yes Historical Provider, MD   bisacodyl (DULCOLAX) 10 mg suppository Insert  1 suppository (10 mg total) into the rectum daily as needed for constipation 3/7/22  Yes Priyank Molina MD   busPIRone (BUSPAR) 10 mg tablet Take 1 tablet (10 mg total) by mouth 2 (two) times a day 3/24/25 4/23/25 Yes Yadi Shah DO   furosemide (LASIX) 20 mg tablet TAKE TWO TABLETS BY MOUTH EVERY DAY 2/27/25  Yes Yadi Shah DO   levothyroxine 50 mcg tablet TAKE ONE TABLET BY MOUTH EVERY DAY 1/27/25  Yes Yadi Shah DO   pantoprazole (PROTONIX) 40 mg tablet Take 1 tablet (40 mg total) by mouth daily before breakfast 12/2/24 5/31/25 Yes Yadi Shah DO   polyethylene glycol (MIRALAX) 17 g packet Take 17g by mouth daily. If constipation worsens, take 17 g by mouth two times daily. 3/7/22  Yes Priyank Molina MD   temazepam (RESTORIL) 15 mg capsule Take 1 capsule (15 mg total) by mouth daily at bedtime as needed for sleep 3/24/25  Yes Yadi Shah DO   Artificial Saliva (Xerostomia Relief [place]) SOLN Apply 1 application to the mouth or throat 4 (four) times a day 10/5/21   Yadi Shah DO   ciprofloxacin-hydrocortisone (CIPRO HC OTIC) otic suspension Administer 3 drops to the right ear 2 (two) times a day 3/23/23   Yadi Shah DO   Diclofenac Epolamine 1.3 % PTCH APPLY ONE PATCH TOPICALLY TWO TIMES A DAY 12/27/24   Yadi Shah DO   Emollient (EUCERIN) lotion Apply topically 2 (two) times a day 4/23/15   Historical MD Roni   glycerin-hypromellose- (ARTIFICIAL TEARS) 0.2-0.2-1 % SOLN Administer 1 drop to both eyes 3 (three) times a day as needed (dry eyes) 3/7/22   Priyank Molina MD   nystatin-triamcinolone (MYCOLOG-II) cream Apply topically 2 (two) times a day 3/22/23   Yadi Shah, DO   predniSONE 1 mg tablet 3 po bid x3 days then 2 po bidx3 days then 1 po bid x3days then 1 qd thereafter 3/24/25   Yadi Shah,    predniSONE 10 mg tablet 3 tabs po  bid x2 days, then 2 tabs po bid x2 days, then 1 tab bid x2 days, then 1 daily until done.  Patient not taking: Reported on 8/2/2024 5/22/24   Yadi Shah DO   promethazine-dextromethorphan (PHENERGAN-DM) 6.25-15 mg/5 mL oral syrup Take 5 mL by mouth 4 (four) times a day as needed for cough 12/7/23   Yadi Shah DO   promethazine-dextromethorphan (PHENERGAN-DM) 6.25-15 mg/5 mL oral syrup Take 5 mL by mouth 4 (four) times a day as needed for cough 12/2/24   Yadi Shah DO     Allergies   Allergen Reactions    Penicillin G Anaphylaxis    Spironolactone Shortness Of Breath    Atorvastatin      Other reaction(s): Leg Cramps    Cephalexin Headache    Chocolate - Food Allergy     Citalopram Abdominal Pain and Headache    Diltiazem     Fosinopril Sodium-Hctz     Methylprednisolone Nausea Only     Tablet generic only    Monopril [Fosinopril]      Reaction Date: 28Apr2011;     Penicillins     Pollen Extract     Pravastatin Myalgia     Other reaction(s): Leg Cramps    Sporanox [Itraconazole]      Reaction Date: 28Apr2011;     Strawberry C [Ascorbate - Food Allergy]     Caffeine - Food Allergy Palpitations     Tachycardia       Objective :  Temp:  [98.8 °F (37.1 °C)] 98.8 °F (37.1 °C)  HR:  [] 101  BP: (151-223)/(67-98) 151/67  Resp:  [20] 20  SpO2:  [90 %-94 %] 90 %  O2 Device: Nasal cannula  Nasal Cannula O2 Flow Rate (L/min):  [3 L/min] 3 L/min    Physical Exam  Vitals and nursing note reviewed.   Constitutional:       General: She is not in acute distress.     Appearance: She is well-developed.   HENT:      Head: Normocephalic and atraumatic.      Mouth/Throat:      Mouth: Mucous membranes are dry.   Eyes:      Conjunctiva/sclera: Conjunctivae normal.   Cardiovascular:      Rate and Rhythm: Normal rate and regular rhythm.      Heart sounds: No murmur heard.     Comments: Muffled heart sounds  Pulmonary:      Effort: Pulmonary effort is normal. No respiratory distress.      Breath  "sounds: Normal breath sounds. No wheezing, rhonchi or rales.   Abdominal:      Palpations: Abdomen is soft.      Tenderness: There is abdominal tenderness in the left lower quadrant. There is left CVA tenderness. There is no guarding or rebound.   Musculoskeletal:         General: No swelling.      Cervical back: Neck supple.   Skin:     General: Skin is warm and dry.      Capillary Refill: Capillary refill takes less than 2 seconds.   Neurological:      Mental Status: She is alert.   Psychiatric:         Mood and Affect: Mood normal.          Lines/Drains:  Lines/Drains/Airways       Active Status       Name Placement date Placement time Site Days    External Urinary Catheter 03/27/25  0732  -- less than 1                          Lab Results: I have reviewed the following results:  Results from last 7 days   Lab Units 03/27/25  0703   WBC Thousand/uL 19.81*   HEMOGLOBIN g/dL 16.5*   HEMATOCRIT % 48.4*   PLATELETS Thousands/uL 232   LYMPHO PCT % 5*   MONO PCT % 1*   EOS PCT % 0     Results from last 7 days   Lab Units 03/27/25  0703   SODIUM mmol/L 130*   POTASSIUM mmol/L 4.4   CHLORIDE mmol/L 95*   CO2 mmol/L 25   BUN mg/dL 28*   CREATININE mg/dL 1.94*   ANION GAP mmol/L 10   CALCIUM mg/dL 10.2   ALBUMIN g/dL 4.7   TOTAL BILIRUBIN mg/dL 1.02*   ALK PHOS U/L 85   ALT U/L 18   AST U/L 20   GLUCOSE RANDOM mg/dL 160*             No results found for: \"HGBA1C\"  Results from last 7 days   Lab Units 03/27/25  0726   LACTIC ACID mmol/L 1.9       Imaging Results Review: I reviewed radiology reports from this admission including: CT abdomen/pelvis.  Other Study Results Review: EKG was reviewed.     Administrative Statements       ** Please Note: This note has been constructed using a voice recognition system. **    "

## 2025-03-27 NOTE — ASSESSMENT & PLAN NOTE
Orders:    temazepam (RESTORIL) 15 mg capsule; Take 1 capsule (15 mg total) by mouth daily at bedtime as needed for sleep

## 2025-03-27 NOTE — ED NOTES
SLIM bedside discussing plans with daughter. Mentations significantly declining. Responding to only sternal rub at this time. Tachypenic. Pressures 58/41. Initiated fluid bolus per Dr. Castellon. Provider confirming DNR/DNI status. Daughter states pt would not want pressors or ICU level of care. Pressures responding to fluid bolus. Pressures cycling every ten minutes for continued monitoring.      Isabel Renner RN  03/27/25 8986

## 2025-03-27 NOTE — CONSULTS
Consultation - Urology   Name: Li Koenig 89 y.o. female I MRN: [id number]  Unit/Bed#: ED-42 I Date of Admission: 3/27/2025   Date of Service: 3/27/2025 I Hospital Day: 0   Consult to Urology  Consult performed by: Neyda Durant PA-C  Consult ordered by: Kuldeep Garcia MD        Physician Requesting Evaluation: Jacey Mays*   Reason for Evaluation / Principal Problem: Left hydronephrosis    Assessment & Plan  Hydronephrosis, left  89-year-old presenting with suprapubic abdominal pain  CT scan significant hydronephrosis abrupt transition of caliber right UPJ junction suggesting UPJ stenosis  This is a new finding compared to her previous CT scans from 2022  WBC 19  UA innumerable WBCs, no bacteria.  Large leukocytes  Barnard catheter placed for accurate I's and O's draining cloudy urine  Kidney function at baseline Cr 1.94  Patient is a DNR/DNI.  I discussed with patient's daughter at bedside along with her cousin.  Renaldo out diagram of CT scan and showed the UPJ obstruction.  She had low blood pressure 58/41 which responded well with a liter of bolus.  We discussed her presentation and goals of care.  They do not want any aggressive measures for patient.  We discussed intervention in the form of nephrostomy tube.  At end of conversation family would not want to pursue measures including nephrostomy tube.  They do not want patient to be on pressors or upgraded level of care.  Continue antibiotics.  Respect patient and family wishes for no intervention.  Transition to hospice if appropriate.  Contact urology or IR if family members decision changes  Will sign off. Contact if goals of care changes.           Subjective:   HPI: Li mclain 89-year-old female past med history HTN, hypothyroidism, hysterectomy, right nephrectomy who presented to the ED with worsening abdominal pain.    Daughter at bedside providing most of the history.  Daughter reports patient has chronic abdominal pain however yesterday  complained of worsening lower abdominal pain.  She asked for Fleet enema to get relief.  She denies any recent fever, dysuria, flank pain. She does report patient having back pain    In the ED CT scan was done which showed significant left hydronephrosis with abrupt change at UPJ suggesting UPJ stenosis.  She has a white blood cell count of 19.  UA large leukocytes, no bacteria. She became hypotensive in the ED and a liter of bolus was ordered. At bedside patient w/ rigors, not able to provide history.     Review of Systems   Unable to perform ROS: Acuity of condition       Objective:    Vitals: Blood pressure (!) 72/35, pulse 84, temperature 98.8 °F (37.1 °C), temperature source Oral, resp. rate (!) 30, SpO2 91%, not currently breastfeeding.,There is no height or weight on file to calculate BMI.    Physical Exam  Constitutional:       Appearance: She is ill-appearing.   HENT:      Head: Normocephalic and atraumatic.      Right Ear: External ear normal.      Left Ear: External ear normal.      Nose: Nose normal.   Cardiovascular:      Rate and Rhythm: Normal rate.      Pulses: Normal pulses.   Pulmonary:      Effort: Pulmonary effort is normal.   Abdominal:      Comments: Barnard yellow, slightly cloudy    No CVAT         Imaging:  CT ABDOMEN AND PELVIS WITHOUT IV CONTRAST     INDICATION: lower abd apin - hx of appy/celia- hystrectomy/ r nephrectomy- lower abd pain - dfistention - n o stool in  gvault.     COMPARISON: 3/1/2022     TECHNIQUE: CT examination of the abdomen and pelvis was performed without intravenous contrast. Multiplanar 2D reformatted images were created from the source data.     This examination, like all CT scans performed in the [facility], was performed utilizing techniques to minimize radiation dose exposure, including the use of iterative reconstruction and automated exposure control. Radiation dose length   product (DLP) for this visit: 1139 mGy-cm     Enteric Contrast: Not  administered.     FINDINGS:     ABDOMEN     LOWER CHEST: No clinically significant abnormality in the visualized lower chest.     LIVER/BILIARY TREE: Unremarkable.     GALLBLADDER: Post cholecystectomy.     SPLEEN: Unremarkable.     PANCREAS: Unremarkable.     ADRENAL GLANDS: Unremarkable.     KIDNEYS/URETERS: The right kidney is surgically absent. 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 New significant hydronephrosis with abrupt transition of caliber at the ureteropelvic junction. No obstructing   stone. Left perinephric stranding.     STOMACH AND BOWEL: No evidence of bowel obstruction or gross inflammatory process. Diverticulosis in the left colon. No evidence of diverticulitis.     APPENDIX: No findings to suggest appendicitis.     ABDOMINOPELVIC CAVITY: No ascites. No pneumoperitoneum. No lymphadenopathy.     VESSELS: Unremarkable for patient's age.     PELVIS     REPRODUCTIVE ORGANS: Unremarkable for patient's age.     URINARY BLADDER: Unremarkable.     ABDOMINAL WALL/INGUINAL REGIONS: Unremarkable.     BONES: No acute fracture or suspicious osseous lesion. Redemonstrated old T12 compression fracture, unchanged. Multilevel degenerative changes in the spine. Degenerative changes in the hips.     IMPRESSION:  Significant left hydronephrosis with abrupt cut off at the UPJ, not previously evident. No obstructing stone. 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 Mild left perinephric stranding.  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 Recommend periodic sonographic follow-up for size.  Surgically absent right kidney.  Other chronic findings, as per the body of the report.           Workstation performed: [id number]     Labs:  Recent Labs     03/27/25  0703   WBC 19.81*       Recent Labs     03/27/25  0703   HGB 16.5*     Recent Labs     03/27/25  0703   HCT 48.4*     Recent Labs     03/27/25  0703   CREATININE 1.94*         History:    Past Medical  History:   Diagnosis Date    A-fib (HCC)     Cardiac disease     Hyperlipidemia     Hypertension      Social History     Socioeconomic History    Marital status:      Spouse name: None    Number of children: None    Years of education: Less than high school    Highest education level: None   Occupational History    Occupation: Retired   Tobacco Use    Smoking status: Never     Passive exposure: Past    Smokeless tobacco: Never   Vaping Use    Vaping status: Never Used   Substance and Sexual Activity    Alcohol use: Not Currently    Drug use: Never    Sexual activity: Not Currently     Partners: Male     Birth control/protection: Post-menopausal   Other Topics Concern    None   Social History Narrative    Always uses seatbelt    Denied:  History of daily caffeinated cola consumption    Denied:  History of daily coffee consumption    Daily tea consumption    Denied:  History of dental care, regularly    Exercise:  Walking    Living will in place    No Jehovah's witness beliefs    Power of  in existence    Water intake, adequate (per day)     Social Drivers of Health     Financial Resource Strain: Low Risk  (7/27/2023)    Overall Financial Resource Strain (CARDIA)     Difficulty of Paying Living Expenses: Not very hard   Food Insecurity: No Food Insecurity (8/2/2024)    Nursing - Inadequate Food Risk Classification     Worried About Running Out of Food in the Last Year: Never true     Ran Out of Food in the Last Year: Never true     Ran Out of Food in the Last Year: Not on file   Transportation Needs: No Transportation Needs (8/2/2024)    PRAPARE - Transportation     Lack of Transportation (Medical): No     Lack of Transportation (Non-Medical): No   Physical Activity: Not on file   Stress: Not on file   Social Connections: Unknown (6/18/2024)    Received from [facility]    Social Connections     How often do you feel lonely or isolated from those around you? (Adult - for ages 18 years and over): Not on file    Intimate Partner Violence: Not on file   Housing Stability: Low Risk  (8/2/2024)    Housing Stability Vital Sign     Unable to Pay for Housing in the Last Year: No     Number of Times Moved in the Last Year: 1     Homeless in the Last Year: No     Past Surgical History:   Procedure Laterality Date    APPENDECTOMY      CHOLECYSTECTOMY      HERNIA REPAIR      HYSTERECTOMY      NEPHRECTOMY Right      Family History   Problem Relation Age of Onset    Heart disease Family     Hypertension Family     Cancer Family        Neyda IZABELA Durant  Date: 3/27/2025 Time: 1:53 PM

## 2025-03-27 NOTE — ASSESSMENT & PLAN NOTE
Blood Pressure: (!) 58/41  Patient's blood pressure was initially elevated, but 1 reading showed in 50s, went up to MAP greater than 65 without bolus, attending had a discussion with family, declined any ICU/pressors  Continue IVF LR at 125 mL/h, review/reorder tomorrow according to BP  Hold Lasix in the meantime

## 2025-03-27 NOTE — ED PROVIDER NOTES
ED Disposition       None          Assessment & Plan       Medical Decision Making  - PT WITH ACUTE  NEW LOWER ABD PAIN AS AS PER HERSELF --  PT WILL NEED EXTENSIVE ER ABDOMINAL PAIN WORKUP --  LABS- URINE/ CT SCAN -- DURING ER EVALUATION PT DEVELOPED LIKELY RIGORS--  WHICH INITIATED IV  ANTIBX     - Critical Care Time Statement: Upon my evaluation, this patient had a high probability of imminent or life-threatening deterioration due to ACUTE ONSET O RIGORS  WITH CHANGE IN MS DURING E R EVALUATIONS - NECESSITATING ESCALATION OF WORKUP- IV ANTIBX , which required my direct attention, intervention, and personal management.  I spent a total of 35 minutes directly providing critical care services, including evaluating for the presence of life-threatening injuries or illnesses. This time is exclusive of procedures, teaching, treating other patients, family meetings, and any prior time recorded by providers other than myself.       Problems Addressed:  Hydronephrosis of left kidney: acute illness or injury     Details: SEE CHART AND ABOVE     Amount and/or Complexity of Data Reviewed  Independent Historian:      Details: DAUGHTER   External Data Reviewed: labs, radiology, ECG and notes.     Details: ALL REVIEWED BY ER MD   Labs: ordered. Decision-making details documented in ED Course.     Details: ALL REVIEWED AND COMPARED BY ER MD   Radiology: ordered and independent interpretation performed. Decision-making details documented in ED Course.     Details: ALL REVIEWED BY ER MD   ECG/medicine tests: ordered and independent interpretation performed. Decision-making details documented in ED Course.     Details: ALL REVIEWED AND COMPARED BY ER MD   Discussion of management or test interpretation with external provider(s): HIGH DEGREE OF ER MD THOUGHT COMPLEXITY AND WORKUP     Risk  Prescription drug management.  Parenteral controlled substances.  Decision regarding hospitalization.        ED Course as of 03/27/25 1604   Thu  Mar 27, 2025   0735 DWIGHT NESBITT NOTE- PT DID NOT TAKE ANY OF HER MEDS THIS AM    0742 DWIGHT NESBITT NOTE- 2022 CT SCAN OF ABVD/PELVIS REPORT S REVIEWED BY DWIGHT NESBITT- RECENT LABS ALL REVIEWED BY DWIGHT NESBITT    0817 DWIGHT NESBITT NOTE- PT- RE-EVAL- FEELS IMPROVED WITH PAIN    0924 Dwight nesbitt note-  pt- re-eval--  pt c/p o pain -- and feeling warm with worsening of chronic  facial movements ? Rigors -repeat oral temp 99.3-- will give iv rocephin - d/w daughter-  as possible urine infection with bacteremia-- with left ureteral obs- with one kidney -- dependign on what urology says-  d/w daughter possible nephrostomy tube- she would cbe agreeable- but otherwise level 3 dnr -- pulse ox in mid 80's- placed on 4 liters nc into high 80's--    1025 DWIGHT NESBITT NOTE- CASE  D/W  SLIM RESIDENT WANTS TO  MISTY TO MED SURG AT PRESENT WILL UPGRADE IF NEEDED    1026 DWIGHT NESBITT NOTE- PT -RE-EVAL- SOUND ASLEEP--  HD STABLE -  DAUGHTER AWARE O F UROLOGY PA TO SEE AND PENDING HOSP ADMIT       Medications   HYDROmorphone HCl (DILAUDID) injection 0.2 mg (has no administration in time range)       ED Risk Strat Scores                            SBIRT 22yo+      Flowsheet Row Most Recent Value   Initial Alcohol Screen: US AUDIT-C     1. How often do you have a drink containing alcohol? 0 Filed at: 03/27/2025 0708   2. How many drinks containing alcohol do you have on a typical day you are drinking?  0 Filed at: 03/27/2025 0708   3b. FEMALE Any Age, or MALE 65+: How often do you have 4 or more drinks on one occassion? 0 Filed at: 03/27/2025 0708   Audit-C Score 0 Filed at: 03/27/2025 0708   CHARAN: How many times in the past year have you...    Used an illegal drug or used a prescription medication for non-medical reasons? Never Filed at: 03/27/2025 0708                            History of Present Illness       Chief Complaint   Patient presents with    Abdominal Pain     RLQ pain from home. Alert and oriented, lives with daughter. Denies n/v/d. Has been dealing with Gi issues for 2yrs.        Past Medical History:   Diagnosis Date    A-fib (HCC)     Cardiac disease     Hyperlipidemia     Hypertension       Past Surgical History:   Procedure Laterality Date    APPENDECTOMY      CHOLECYSTECTOMY      HERNIA REPAIR      HYSTERECTOMY      NEPHRECTOMY Right       Family History   Problem Relation Age of Onset    Heart disease Family     Hypertension Family     Cancer Family       Social History     Tobacco Use    Smoking status: Never     Passive exposure: Past    Smokeless tobacco: Never   Vaping Use    Vaping status: Never Used   Substance Use Topics    Alcohol use: Not Currently    Drug use: Never      E-Cigarette/Vaping    E-Cigarette Use Never User       E-Cigarette/Vaping Substances      I have reviewed and agree with the history as documented.     89 yr female aS PER DAUGHTER INTERMITENT CHRONIC ABD PAIN FOR YRS--  WORSE SINCE YESTERDAY - 2 DAYS AGO HAD MULTIPLE EPISODES OF BROWN LIQUID STOOLS- WHICH RESOLVED- PT ON DAILY LAXATIVES  PT STATES MOSTLY LOWER ABD PAIN SINCE YESTERDAY MORNING WHICH IS NEW- DAUGHTER STATES NOT NEW-- NO FEVER- VOMITUS- NO  URINARY COMPS - PT HAS HAD MULTIPLE ABD SURG - C/O PERINEAL PAIN       History provided by:  Patient   used: No    Abdominal Pain  Pain location:  RLQ, LLQ and suprapubic  Pain quality: aching    Pain radiates to:  Does not radiate  Onset quality:  Gradual  Duration:  1 day  Timing:  Constant  Chronicity:  New  Associated symptoms: diarrhea    Associated symptoms: no constipation, no nausea and no vomiting        Review of Systems   Constitutional: Negative.    HENT: Negative.     Eyes: Negative.    Respiratory: Negative.     Cardiovascular: Negative.    Gastrointestinal:  Positive for abdominal pain and diarrhea. Negative for abdominal distention, anal bleeding, blood in stool, constipation, nausea, rectal pain and vomiting.   Endocrine: Negative.    Genitourinary: Negative.    Musculoskeletal: Negative.    Skin: Negative.     Allergic/Immunologic: Negative.    Neurological: Negative.    Hematological: Negative.    Psychiatric/Behavioral: Negative.             Objective       ED Triage Vitals [03/27/25 0659]   Temperature Pulse Blood Pressure Respirations SpO2 Patient Position - Orthostatic VS   98.8 °F (37.1 °C) 61 (!) 223/98 20 92 % Sitting      Temp Source Heart Rate Source BP Location FiO2 (%) Pain Score    Oral Monitor Right arm -- --      Vitals      Date and Time Temp Pulse SpO2 Resp BP Pain Score FACES Pain Rating User   03/27/25 0659 98.8 °F (37.1 °C) 61 92 % 20 223/98 -- -- KM            Physical Exam  Vitals and nursing note reviewed.   Constitutional:       General: She is not in acute distress.     Appearance: She is well-developed. She is not ill-appearing, toxic-appearing or diaphoretic.      Comments: AVS- HTNSIVE --  PULSE OX 92 % ON RA- INTERPRETATION IS LOW- NORMAL- NO INTERVENTION - PT UNCOMFORTABLE - HARD OF HEARING    HENT:      Head: Normocephalic and atraumatic.   Eyes:      General: No scleral icterus.     Extraocular Movements: Extraocular movements intact.      Pupils: Pupils are equal, round, and reactive to light.      Comments: MM PINK    Cardiovascular:      Rate and Rhythm: Regular rhythm.      Heart sounds: Murmur heard.      No friction rub. No gallop.      Comments: EQUAL BILATERAL RADIAL/DP PULSES- NO BLE EDEMA/CALF TENDERNESS/ASYM/ ERYTHEMA   Pulmonary:      Effort: No respiratory distress.      Breath sounds: No stridor. Rales present. No wheezing or rhonchi.      Comments: BIBASILAR CRACKLES   Chest:      Chest wall: No tenderness.   Abdominal:      General: There is distension. There is no abdominal bruit. There are no signs of injury.      Palpations: Abdomen is soft. There is no shifting dullness, fluid wave, hepatomegaly, splenomegaly, mass or pulsatile mass.      Tenderness: There is abdominal tenderness in the right lower quadrant, suprapubic area and left lower quadrant. There is no right  CVA tenderness, left CVA tenderness, guarding or rebound. Negative signs include Martinez's sign, Rovsing's sign, McBurney's sign, psoas sign and obturator sign.      Hernia: No hernia is present. There is no hernia in the umbilical area, ventral area, left inguinal area, right femoral area, left femoral area or right inguinal area.      Comments: NO CVA TENDERNESS- NO PERITONEAL SIGNS    Genitourinary:     Rectum: Normal. No mass or tenderness.      Comments: PERINEAL  RAW SKIN - NO SIGNS OF INFECTION - NO STOOL IN RECTAL VAULT   Skin:     General: Skin is warm.      Capillary Refill: Capillary refill takes less than 2 seconds.      Coloration: Skin is not cyanotic, jaundiced, mottled or pale.      Findings: No erythema or rash.   Neurological:      General: No focal deficit present.      Mental Status: She is alert and oriented to person, place, and time.      Cranial Nerves: No cranial nerve deficit.      Motor: No weakness.      Comments: NORMAL NON FOCAL NEURO EXAM -    Psychiatric:         Mood and Affect: Mood normal. Mood is not anxious or depressed.         Behavior: Behavior normal.         Results Reviewed       Procedure Component Value Units Date/Time    Lactic acid, plasma (w/reflex if result > 2.0) [[id number]] Collected: 03/27/25 0726    Lab Status: No result Specimen: Blood from Arm, Right     CBC and differential [[id number]]     Lab Status: No result Specimen: Blood     Comprehensive metabolic panel [[id number]]     Lab Status: No result Specimen: Blood     Lipase [[id number]]     Lab Status: No result Specimen: Blood     CBC and differential [[id number]]  (Abnormal) Collected: 03/27/25 0703    Lab Status: Preliminary result Specimen: Blood from Arm, Left Updated: 03/27/25 0714     WBC 19.81 Thousand/uL      RBC 4.94 Million/uL      Hemoglobin 16.5 g/dL      Hematocrit 48.4 %      MCV 98 fL      MCH 33.4 pg      MCHC 34.1 g/dL      RDW 13.9 %      MPV 10.3 fL      Platelets 232 Thousands/uL      Comprehensive metabolic panel [[id number]] Collected: 03/27/25 0703    Lab Status: In process Specimen: Blood from Arm, Left Updated: 03/27/25 0708            CT abdomen pelvis wo contrast    (Results Pending)       Procedures    ED Medication and Procedure Management   Prior to Admission Medications   Prescriptions Last Dose Informant Patient Reported? Taking?   ALPRAZolam (XANAX) 0.25 mg tablet   No No   Sig: Take 1 tablet (0.25 mg total) by mouth 4 (four) times a day as needed for anxiety   Artificial Saliva (Xerostomia Relief Spray) SOLN  Child No No   Sig: Apply 1 application to the mouth or throat 4 (four) times a day   Diclofenac Epolamine 1.3 % PTCH   No No   Sig: APPLY ONE PATCH TOPICALLY TWO TIMES A DAY   Emollient (EUCERIN) lotion  Child Yes No   Sig: Apply topically 2 (two) times a day   amiodarone 100 mg tablet   No No   Sig: TAKE ONE TABLET BY MOUTH EVERY DAY   aspirin (ECOTRIN LOW STRENGTH) 81 mg EC tablet  Child Yes No   Sig: Take 81 mg by mouth every other day   bisacodyl (DULCOLAX) 10 mg suppository  Child No No   Sig: Insert 1 suppository (10 mg total) into the rectum daily as needed for constipation   busPIRone (BUSPAR) 10 mg tablet   No No   Sig: Take 1 tablet (10 mg total) by mouth 2 (two) times a day   ciprofloxacin-hydrocortisone (CIPRO HC OTIC) otic suspension  Child No No   Sig: Administer 3 drops to the right ear 2 (two) times a day   furosemide (LASIX) 20 mg tablet   No No   Sig: TAKE TWO TABLETS BY MOUTH EVERY DAY   glycerin-hypromellose- (ARTIFICIAL TEARS) 0.2-0.2-1 % SOLN  Child No No   Sig: Administer 1 drop to both eyes 3 (three) times a day as needed (dry eyes)   levothyroxine 50 mcg tablet   No No   Sig: TAKE ONE TABLET BY MOUTH EVERY DAY   nystatin-triamcinolone (MYCOLOG-II) cream  Child No No   Sig: Apply topically 2 (two) times a day   pantoprazole (PROTONIX) 40 mg tablet   No No   Sig: Take 1 tablet (40 mg total) by mouth daily before breakfast   polyethylene glycol  (MIRALAX) 17 g packet  Child No No   Sig: Take 17g by mouth daily. If constipation worsens, take 17 g by mouth two times daily.   predniSONE 1 mg tablet   No No   Sig: 3 po bid x3 days then 2 po bidx3 days then 1 po bid x3days then 1 qd thereafter   predniSONE 10 mg tablet  Child No No   Sig: 3 tabs po bid x2 days, then 2 tabs po bid x2 days, then 1 tab bid x2 days, then 1 daily until done.   Patient not taking: Reported on 8/2/2024   promethazine-dextromethorphan (PHENERGAN-DM) 6.25-15 mg/5 mL oral syrup  Child No No   Sig: Take 5 mL by mouth 4 (four) times a day as needed for cough   promethazine-dextromethorphan (PHENERGAN-DM) 6.25-15 mg/5 mL oral syrup   No No   Sig: Take 5 mL by mouth 4 (four) times a day as needed for cough   temazepam (RESTORIL) 15 mg capsule   No No   Sig: Take 1 capsule (15 mg total) by mouth daily at bedtime as needed for sleep      Facility-Administered Medications: None     Patient's Medications   Discharge Prescriptions    No medications on file     No discharge procedures on file.  ED SEPSIS DOCUMENTATION            Kuldeep Garcia MD  03/28/25 8934

## 2025-03-27 NOTE — ASSESSMENT & PLAN NOTE
Home regimen: BuSpar 10 mg twice daily, Xanax 0.25 mg 4 times daily as needed(patient is religiously taking all the time as per PDMP), zolpidem 5 mg daily at bedtime(in chart it says diazepam but patient did not tolerated well would like to go back to zolpidem)  Continue home regimen

## 2025-03-28 NOTE — CASE MANAGEMENT
Case Management Progress Note    Patient name Li Koenig  Location S /S -01 MRN [id number]  : 1926 Date 3/28/2025       LOS (days): 1  Geometric Mean LOS (GMLOS) (days): 2.1  Days to GMLOS:1.1        OBJECTIVE:        Current admission status: Inpatient  Preferred Pharmacy:   [id number]  [place] - [address]  [address] [place] [address]  Phone: [phone] Fax: [phone]    [id number] - [place], [place] - [address] [place] [address]  Phone: [phone] Fax: [phone]    Primary Care Provider: Yadi Shah DO    Primary Insurance: [facility] [employer]  Secondary Insurance:     PROGRESS NOTE:  CM received hospice consult.     Rounded with SLIM, patient just placed on comfort care this AM. Does not currently look stable. Plan to hold on hospice consult, pending patient stability-- will follow up this afternoon, if needed.

## 2025-03-28 NOTE — ANESTHESIA POSTPROCEDURE EVALUATION
Post-Op Assessment Note    CV Status:  Stable  Pain Score: 0    Pain management: adequate       Mental Status:  Sleepy and arousable   Hydration Status:  Euvolemic   PONV Controlled:  Controlled   Airway Patency:  Patent     Post Op Vitals Reviewed: Yes    No anethesia notable event occurred.    Staff: CRNA           Last Filed PACU Vitals:  Vitals Value Taken Time   Temp 97.7 °F (36.5 °C) 03/27/25 2113   Pulse 77 03/27/25 2113   /52 03/27/25 2113   Resp 17 03/27/25 2113   SpO2 94 % 03/27/25 2113       Modified Hayley:     Vitals Value Taken Time   Activity 2 03/27/25 2113   Respiration 2 03/27/25 2113   Circulation 2 03/27/25 2113   Consciousness 1 03/27/25 2113   Oxygen Saturation 1 03/27/25 2113     Modified Hayley Score: 8

## 2025-03-28 NOTE — PLAN OF CARE
Problem: PAIN - ADULT  Goal: Verbalizes/displays adequate comfort level or baseline comfort level  Description: Interventions:- Encourage patient to monitor pain and request assistance- Assess pain using appropriate pain scale- Administer analgesics based on type and severity of pain and evaluate response- Implement non-pharmacological measures as appropriate and evaluate response- Consider cultural and social influences on pain and pain management- Notify physician/advanced practitioner if interventions unsuccessful or patient reports new pain  Outcome: Not Progressing     Problem: INFECTION - ADULT  Goal: Absence or prevention of progression during hospitalization  Description: INTERVENTIONS:- Assess and monitor for signs and symptoms of infection- Monitor lab/diagnostic results- Monitor all insertion sites, i.e. indwelling lines, tubes, and drains- Monitor endotracheal if appropriate and nasal secretions for changes in amount and color- [place] appropriate cooling/warming therapies per order- Administer medications as ordered- Instruct and encourage patient and family to use good hand hygiene technique- Identify and instruct in appropriate isolation precautions for identified infection/condition  Outcome: Not Progressing     Problem: DISCHARGE PLANNING  Goal: Discharge to home or other facility with appropriate resources  Description: INTERVENTIONS:- Identify barriers to discharge w/patient and caregiver- Arrange for needed discharge resources and transportation as appropriate- Identify discharge learning needs (meds, wound care, etc.)- Arrange for interpretive services to assist at discharge as needed- Refer to Case Management Department for coordinating discharge planning if the patient needs post-hospital services based on physician/advanced practitioner order or complex needs related to functional status, cognitive ability, or social support system  Outcome: Not Progressing

## 2025-03-28 NOTE — UTILIZATION REVIEW
Initial Clinical Review    Admission: Date/Time/Statement:   Admission Orders (From admission, onward)       Ordered        03/27/25 1022  INPATIENT ADMISSION  Once                          Orders Placed This Encounter   Procedures    INPATIENT ADMISSION     Standing Status:   Standing     Number of Occurrences:   1     Level of Care:   Med Surg [16]     Estimated length of stay:   More than 2 Midnights     Certification:   I certify that inpatient services are medically necessary for this patient for a duration of greater than two midnights. See H&P and MD Progress Notes for additional information about the patient's course of treatment.     ED Arrival Information       Expected   -    Arrival   3/27/2025 06:53    Acuity   Urgent              Means of arrival   Ambulance    Escorted by   [facility]    Service   Hospitalist    Admission type   Emergency              Arrival complaint   -             Chief Complaint   Patient presents with    Abdominal Pain     RLQ pain from home. Alert and oriented, lives with daughter. Denies n/v/d. Has been dealing with Gi issues for 2yrs.       Initial Presentation: 89 y.o. female to ED by EMS presents w gradually worsening abdominal pain afraid to take in PO, Daughter reports dark yellow urine   PMH hypertension, hypothyroidism, AFib not on AC, hysterectomy, right total nephrectomy     EXAM  GCS initial 15 decreasing to 9,   Initially hypertensive then hypotensive w SBP 50s, L CVA tenderness  Labs white count leukocytosis @ 19 , hyponatremia, UA abn   CT abdomen pelvis showed significant left hydronephrosis without any obstructing calculi,   Given 2 doses of IV Dilaudid 0.2 in 1 hour, IVF, IV fluid bolus   Inpatient admission due to Sepsis 2ndary to UTI w Hydronephrosis, AMS, Hyponatremia, AFib not on AC, GOC  IVF, IV antibx, consult Urology; trend BP; monitor mental status; encourage PO intake, trend NA, per GOC w Daughter prefers monitor next 24 HR w IV antibx, IVF if  deteriorates open for discussion for comfort care/ hospice  Urology   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  This was a very challenging procedure due to the patient's obesity and depth of the kidney.  The ureter was patent.  Some bloody urine is to be expected.  Plan:  Tube to bag drainage, flush q8 with 10cc NSS and qd at home and return in 2 months for a routine tube change.  Anesthesia: general  Anticipated Length of Stay/Certification Statement: Patient will be admitted on an inpatient basis with an anticipated length of stay of greater than 2 midnights secondary to left hydronephrosis.   Date: 3/28   Day 2: SD2 ICU  briefly required Levophed after placement of left nephrostomy tube   Maps noted to be 58 ; oxygen need increased from 6 L to 8 L  Discussed status with family; they are still open to pressors and a central line  Patient did not receive fluids/albumin in some time  Gave albumin 12.5 g 5%  Obtained labs including VBG, BNP, CMP, CBC, and lactate  VBG showed acidosis with low CO2 and low bicarb; switched patient's maintenance fluids from NS to bicarb  Chest x-ray which showed some concern for congestion on my read  MAP after albumin = 63 below target of MAP above 65, gave bolus 500 Isolyte  MAP following bolus was 75 proceeded to every hour BP checks  Lactate came back elevated at 5.3 (prior to bolus) will trend until below 2  Next lactate 5.1  Additional 500 mL Isolyte given  CMP showing 2 K+ LFTs up from circa 20; sample slightly  hemolyzed  Consider hepatic shock secondary to hypotension  However, need to follow-up  AM CMP  BNP showing 2, 368 up from 183     Family also made decision to make the patient comfort care     Date, Time and Cause of Death    Date of Death: 3/28/25  Time of Death: 10:55 AM  Preliminary Cause of Death: Septic shock     ED Treatment-Medication Administration from 03/27/2025 0653 to 03/27/2025 2150         Date/Time Order Dose Route Action     03/27/2025 0736 HYDROmorphone HCl (DILAUDID) injection 0.2 mg 0.2 mg Intravenous Given     03/27/2025 0754 lactated ringers infusion 125 mL/hr Intravenous New Bag     03/27/2025 0851 HYDROmorphone HCl (DILAUDID) injection 0.2 mg 0.2 mg Intravenous Given     03/27/2025 1012 ceftriaxone (ROCEPHIN) 1 g/50 mL in dextrose IVPB 1,000 mg Intravenous New Bag     03/27/2025 0943 acetaminophen (Ofirmev) injection 1,000 mg 1,000 mg Intravenous New Bag     03/27/2025 1240 lidocaine (LIDODERM) 5 % patch 1 patch 1 patch Topical Medication Applied     03/27/2025 1240 lactated ringers infusion 125 mL/hr Intravenous New Bag     03/27/2025 1339 heparin (porcine) subcutaneous injection 5,000 Units 5,000 Units Subcutaneous Given     03/27/2025 1154 lactated ringers bolus 500 mL 500 mL Intravenous New Bag     03/27/2025 1340 lactated ringers bolus 500 mL 500 mL Intravenous New Bag     03/27/2025 1411 hydrocortisone (Solu-CORTEF) injection 100 mg 100 mg Intravenous Given     03/27/2025 1414 sodium chloride 0.9 % infusion 100 mL/hr Intravenous New Bag     03/27/2025 1622 sodium chloride 0.9 % bolus 500 mL 500 mL Intravenous New Bag     03/27/2025 1622 acetaminophen (Ofirmev) injection 1,000 mg 1,000 mg Intravenous New Bag     03/27/2025 1713 sodium chloride 0.9 % bolus 1,000 mL 1,000 mL Intravenous New Bag     03/27/2025 1857 lactated ringers infusion -- Intravenous New Bag     03/27/2025 2114 lactated ringers infusion -- Intravenous Anesthesia Volume Adjustment     03/27/2025 2003 lidocaine 1%  buffered 10 mL Infiltration Given     03/27/2025 2116 iohexol (OMNIPAQUE) 350 MG/ML injection (SINGLE-DOSE) 50 mL 50 mL Other Given                  Scheduled Medications:  Medications 03/27 03/28   acetaminophen (Ofirmev) injection 1,000 mg  Dose: 1,000 mg  Freq: Once Route: IV  Last Dose: Stopped (03/27/25 1008)  Start: 03/27/25 0930 End: 03/27/25 1008   Admin Instructions:       0943     1008         albumin human (FLEXBUMIN) 5 % injection 12.5 g  Dose: 12.5 g  Freq: Once Route: IV  Start: 03/27/25 2330 End: 03/27/25 2349   Admin Instructions:       2349         amiodarone tablet 100 mg  Dose: 100 mg  Freq: Daily Route: PO  Start: 03/27/25 1130 End: 03/28/25 0658   Admin Instructions:       (1153) [C]      0658-D/C'd      aspirin (ECOTRIN LOW STRENGTH) EC tablet 81 mg  Dose: 81 mg  Freq: Every other day Route: PO  Start: 03/28/25 0900 End: 03/28/25 0658   Admin Instructions:        0658-D/C'd      busPIRone (BUSPAR) tablet 10 mg  Dose: 10 mg  Freq: 2 times daily Route: PO  Start: 03/27/25 1130 End: 03/28/25 0658   Admin Instructions:       (1154) [C]     (1467) [C]      0658-D/C'd      cefepime (MAXIPIME) 1,000 mg in dextrose 5 % 50 mL IVPB  Dose: 1,000 mg  Freq: Every 12 hours Route: IV  Last Dose: 1,000 mg (03/27/25 2258)  Start: 03/27/25 2300 End: 03/28/25 0658   Order specific questions:       2258      0658-D/C'd      ceftriaxone (ROCEPHIN) 1 g/50 mL in dextrose IVPB  Dose: 1,000 mg  Freq: Every 24 hours Route: IV  Start: 03/28/25 0930 End: 03/27/25 2227 2227-D/C'd       ceftriaxone (ROCEPHIN) 1 g/50 mL in dextrose IVPB  Dose: 1,000 mg  Freq: Once Route: IV  Last Dose: Stopped (03/27/25 1044)  Start: 03/27/25 0930 End: 03/27/25 1044       1012     1044         enoxaparin (LOVENOX) subcutaneous injection 40 mg  Dose: 40 mg  Freq: Daily Route: SC  Start: 03/27/25 1130 End: 03/27/25 1132   Admin Instructions:       1132-D/C'd           furosemide (LASIX) tablet 40 mg  Dose: 40 mg  Freq: Daily Route:  PO  Start: 03/27/25 1130 End: 03/28/25 0658       1131     (6111) [C]      0633     0658-D/C'd      heparin (porcine) subcutaneous injection 5,000 Units  Dose: 5,000 Units  Freq: Every 8 hours scheduled Route: SC  Start: 03/27/25 1400 End: 03/28/25 0658   Admin Instructions:       1339     223      0525     0658-D/C'd      hydrocortisone (Solu-CORTEF) injection 100 mg  Dose: 100 mg  Freq: Every 8 hours scheduled Route: IV  Start: 03/27/25 1400 End: 03/28/25 0658   Admin Instructions:       1419     2309      0525     0658-D/C'd      HYDROmorphone HCl (DILAUDID) injection 0.2 mg  Dose: 0.2 mg  Freq: Once Route: IV  Start: 03/27/25 0845 End: 03/27/25 0851   Admin Instructions:       0851         HYDROmorphone HCl (DILAUDID) injection 0.2 mg  Dose: 0.2 mg  Freq: Once Route: IV  Start: 03/27/25 0730 End: 03/27/25 0736   Admin Instructions:       0736         lactated ringers bolus 500 mL  Dose: 500 mL  Freq: Once Route: IV  Last Dose: Stopped (03/27/25 1541)  Start: 03/27/25 1345 End: 03/27/25 1541    1340     1541         lactated ringers bolus 500 mL  Dose: 500 mL  Freq: Once Route: IV  Last Dose: Stopped (03/27/25 1247)  Start: 03/27/25 1145 End: 03/27/25 1247    1154     1247         levothyroxine tablet 50 mcg  Dose: 50 mcg  Freq: Daily (early morning) Route: PO  Start: 03/27/25 1145 End: 03/28/25 0658   Admin Instructions:       (1053) [C]      (6879)     0658-D/C'd      lidocaine (LIDODERM) 5 % patch 1 patch  Dose: 1 patch  Freq: Daily Route: TP  Start: 03/27/25 1130 End: 03/28/25 0658   Admin Instructions:      Order specific questions:       1240      0044     0658-D/C'd      multi-electrolyte (Plasmalyte-A/Isolyte-S PH 7.4/Normosol-R) IV bolus 500 mL  Dose: 500 mL  Freq: Once Route: IV  Start: 03/28/25 0430 End: 03/28/25 0532     0432        multi-electrolyte (Plasmalyte-A/Isolyte-S PH 7.4/Normosol-R) IV bolus 500 mL  Dose: 500 mL  Freq: Once Route: IV  Start: 03/28/25 0100 End: 03/28/25 0201     0101         pantoprazole (PROTONIX) EC tablet 40 mg  Dose: 40 mg  Freq: Daily before breakfast Route: PO  Start: 03/28/25 0700 End: 03/28/25 0658   Admin Instructions:        (0601)     0658-D/C'd      polyethylene glycol (MIRALAX) packet 17 g  Dose: 17 g  Freq: Daily Route: PO  Start: 03/27/25 1130 End: 03/28/25 0658   Admin Instructions:       (1155) [C]      0658-D/C'd       predniSONE tablet 2 mg  Dose: 2 mg  Freq: 2 times daily with meals Route: PO  Start: 03/27/25 1630 End: 03/27/25 1336   Admin Instructions:       1336-D/C'd       Followed by   predniSONE tablet 1 mg  Dose: 1 mg  Freq: 2 times daily with meals Route: PO  Start: 03/30/25 1630 End: 03/27/25 1336   Admin Instructions:       1336-D/C'd       Followed by   predniSONE tablet 1 mg  Dose: 1 mg  Freq: Daily Route: PO  Start: 04/02/25 1630 End: 03/27/25 1336   Admin Instructions:       1336-D/C'd       sodium chloride 0.9 % bolus 1,000 mL  Dose: 1,000 mL  Freq: Once Route: IV  Last Dose: Stopped (03/27/25 1825)  Start: 03/27/25 1700 End: 03/27/25 1825    1713     1825         sodium chloride 0.9 % bolus 500 mL  Dose: 500 mL  Freq: Once Route: IV  Last Dose: Stopped (03/27/25 1705)  Start: 03/27/25 1615 End: 03/27/25 1705    1622     1705         sodium chloride 0.9 % bolus 500 mL  Dose: 500 mL  Freq: Once Route: IV  Start: 03/27/25 1145 End: 03/27/25 1142    1142-D/C'd            Continuous IV Infusions:  Medications 03/27 03/28   lactated ringers infusion  Rate: 125 mL/hr Dose: 125 mL/hr  Freq: Continuous Route: IV  Indications of Use: IV Hydration  Last Dose: Stopped (03/27/25 1154)  Start: 03/27/25 0745 End: 03/27/25 1127   Admin Instructions:       0754     1127-D/C'd  1154 [C]         norepinephrine (LEVOPHED) 4 mg (STANDARD CONCENTRATION) IV in sodium chloride 0.9% 250 mL  Freq: Continuous PRN Route: IV  Last Dose: Stopped (03/27/25 2059)  Start: 03/27/25 1952 End: 03/27/25 2113 1952 1956 1959 2050 2055 2059 2113-D/C'd        norepinephrine (LEVOPHED) 4 mg (STANDARD CONCENTRATION) IV in sodium chloride 0.9% 250 mL  Freq: Continuous PRN Route: IV  Start: 03/27/25 1938 End: 03/27/25 2113 1936 1938 1948 2021 2113 [C]     2113-D/C'd       sodium bicarbonate 150 mEq in dextrose 5 % 1,000 mL infusion  Rate: 100 mL/hr Dose: 100 mL/hr  Freq: Continuous Route: IV  Last Dose: Stopped (03/28/25 0704)  Start: 03/28/25 0030 End: 03/28/25 0658   Admin Instructions:        0044     0658-D/C'd  0704 [C]        sodium chloride 0.9 % infusion  Rate: 100 mL/hr Dose: 100 mL/hr  Freq: Continuous Route: IV  Indications of Use: IV Hydration  Last Dose: Stopped (03/28/25 0044)  Start: 03/27/25 1400 End: 03/28/25 0020    1414     2157      0020-D/C'd  0044 [C]           PRN Meds:  bisacodyl, 10 mg, Rectal, Daily PRN  glycopyrrolate, 0.1 mg, Intravenous, Q4H PRN  haloperidol lactate, 0.5 mg, Intravenous, Q2H PRN  HYDROmorphone, 0.3 mg, Intravenous, Q2H PRN 3/28 x1  LORazepam, 1 mg, Intravenous, Q10 Min PRN  3/28 x1      ED Triage Vitals   Temperature Pulse Respirations Blood Pressure SpO2 Pain Score   03/27/25 0659 03/27/25 0659 03/27/25 0659 03/27/25 0659 03/27/25 0659 03/27/25 2113   98.8 °F (37.1 °C) 61 20 (!) 223/98 92 % No Pain     Weight (last 2 days)       None            Vital Signs (last 3 days)       Date/Time Temp Pulse Resp BP MAP (mmHg) SpO2 Calculated FIO2 (%) - Nasal Cannula Nasal Cannula O2 Flow Rate (L/min) O2 Device Patient Position - Orthostatic VS Nicole Coma Scale Score Pain    03/28/25 0800 -- -- -- -- -- -- -- -- -- -- 3 --    03/28/25 0708 -- -- -- -- -- -- -- -- -- -- -- Med Not Given for Pain - for MAR use only    03/28/25 06:17:11 -- 72 -- 92/46 61 91 % -- -- -- -- -- --    03/28/25 05:10:03 -- 60 -- 90/48 62 89 % -- -- -- -- -- --    03/28/25 04:30:59 -- 65 -- 107/42 64 90 % -- -- -- -- -- --    03/28/25 0400 -- -- -- -- -- -- -- -- -- -- 13 --    03/28/25 0331 -- 66 -- 120/50 73 92 % -- -- -- -- -- --    03/28/25  02:36:28 -- 73 -- 112/56 75 94 % -- -- -- -- -- --    03/28/25 01:36:42 98.2 °F (36.8 °C) 68 -- -- -- 90 % -- -- -- -- -- --    03/28/25 01:00:16 -- 68 -- 101/54 70 92 % -- -- -- -- -- --    03/28/25 00:26:10 -- 70 -- 100/44 63 92 % -- -- -- -- -- --    03/27/25 22:59:33 -- 72 26 90/42 58 90 % -- -- -- -- -- --    03/27/25 22:27:48 -- 76 -- 93/45 61 91 % -- -- -- -- -- --    03/27/25 2221 -- -- -- -- -- 90 % 52 8 L/min Simple mask -- -- --    03/27/25 2212 -- -- -- -- -- -- -- -- -- -- 13 --    03/27/25 22:00:55 -- 80 28 95/59 71 87 % -- -- -- -- -- --    03/27/25 2138 98.1 °F (36.7 °C) 72 21 99/53 -- 95 % 44 6 L/min Simple mask -- -- No Pain    03/27/25 2130 97.8 °F (36.6 °C) 71 21 89/50 -- 94 % 44 6 L/min Simple mask -- -- No Pain    03/27/25 2125 -- 71 21 93/50 -- -- -- -- -- -- -- --    03/27/25 2115 -- 71 17 90/54 -- 94 % 44 6 L/min Simple mask -- -- No Pain    03/27/25 2113 97.7 °F (36.5 °C) 77 17 101/52 -- 94 % 44 6 L/min Simple mask -- -- No Pain    03/27/25 1851 -- 76 34 98/59 72 89 % 44 6 L/min Nasal cannula Sitting -- --    03/27/25 1826 -- -- -- -- -- -- -- -- -- -- 13 --    03/27/25 1815 -- 72 30 95/54 70 90 % -- -- None (Room air) Sitting -- --    03/27/25 1750 -- 72 32 91/54 70 91 % 40 5 L/min Nasal cannula Lying -- --    03/27/25 1715 -- 73 34 80/47 59 88 % 40 5 L/min Nasal cannula Lying -- --    03/27/25 1700 -- -- -- 78/43 56 -- -- -- -- -- -- --    03/27/25 1500 -- 75 34 76/45 55 90 % 40 5 L/min Nasal cannula Lying -- --    03/27/25 1441 -- -- -- -- -- -- -- -- -- -- 9 --    03/27/25 1415 -- 78 36 76/47 57 90 % 40 5 L/min Nasal cannula Lying -- --    03/27/25 1400 -- 82 36 77/39 54 90 % 40 5 L/min Nasal cannula Lying -- --    03/27/25 1300 -- 84 30 72/35 51 91 % 40 5 L/min Nasal cannula Lying -- --    03/27/25 1250 -- 87 -- 82/44 54 92 % -- -- -- -- -- --    03/27/25 1245 -- 91 28 97/52 66 92 % 40 5 L/min Nasal cannula Lying -- --    03/27/25 1221 -- 93 -- 191/137 159 91 % -- -- -- -- -- --     03/27/25 1151 -- 86 32 134/104 115 91 % 40 5 L/min Nasal cannula Lying -- --    03/27/25 1145 -- 86 18 58/41 46 89 % 36 4 L/min Nasal cannula Lying -- --    03/27/25 1000 -- 101 -- 151/67 97 90 % -- -- -- -- -- --    03/27/25 0959 -- 101 20 168/74 107 94 % 32 3 L/min Nasal cannula -- -- --    03/27/25 0745 -- 56 -- 190/90 129 94 % -- -- -- -- -- --    03/27/25 0707 -- -- -- -- -- -- -- -- None (Room air) -- 15 --    03/27/25 0659 98.8 °F (37.1 °C) 61 20 223/98 -- 92 % -- -- None (Room air) Sitting -- --              Pertinent Labs/Diagnostic Test Results:   Radiology:  XR chest portable   Final Interpretation by Kirk Krueger MD (03/28 0845)      Vascular congestion.         IR nephrostomy tube placement   Final Interpretation by Kuldeep Forte MD (03/27 2121)   Impression: Successful placement of a 10 Tristanian, 45 cm nephrostomy tube into the left renal collecting system after a nephrostogram demonstrated a markedly dilated renal pelvis with a patent ureter. This is a very challenging procedure due to the    patient's obesity and the depth of the kidney so there will be slightly bloody urine which is to be expected.      Plan: Tube to bag drainage. Flush with 10 cc normal saline solution every 8 hours in the hospital and q. day at home. Return in 2 months for routine tube change.         CT abdomen pelvis wo contrast   Final Interpretation by Sara Rivera MD (03/27 0901)   Significant left hydronephrosis with abrupt cut off at the UPJ, not previously evident. No obstructing stone. 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 Mild left perinephric stranding.   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 Recommend periodic sonographic follow-up for size.   Surgically absent right kidney.   Other chronic findings, as per the body of the report.         IR nephrostomy tube check/change/reposition/reinsertion/upsize    (Results Pending)     Cardiology:  ECG 12 lead   Final Result by Zack  MD Michael (03/28 0932)   Sinus rhythm with 1st degree A-V block with frequent Premature ventricular    complexes   Left ventricular hypertrophy with repolarization abnormality   Prolonged QT   Abnormal ECG   When compared with ECG of 27-Mar-2025 07:34,   Premature ventricular complexes are now Present   Vent. rate has increased by  31 bpm   Minimal criteria for Septal infarct are no longer Present   T wave inversion more evident in Lateral leads   Confirmed by Zack Rodriguez ([id number]) on 3/28/2025 9:32:55 AM      ECG 12 lead   Final Result by Zack Rodriguez MD (03/27 0833)   Sinus bradycardia with sinus arrhythmia with 1st degree A-V block   Left ventricular hypertrophy with repolarization abnormality   Abnormal ECG   When compared with ECG of 22-Feb-2022 21:23,   No significant change was found   Confirmed by Zack Rodriguez ([id number]) on 3/27/2025 8:33:51 AM        GI:  No orders to display           Results from last 7 days   Lab Units 03/27/25  2343 03/27/25  0703   WBC Thousand/uL 19.22* 19.81*   HEMOGLOBIN g/dL 12.8 16.5*   HEMATOCRIT % 38.8 48.4*   PLATELETS Thousands/uL 78* 232   TOTAL NEUT ABS Thousands/µL 18.11*  --          Results from last 7 days   Lab Units 03/28/25  0036 03/27/25 2343 03/27/25  0703   SODIUM mmol/L 128* 131* 130*   POTASSIUM mmol/L 4.2 4.4 4.4   CHLORIDE mmol/L 105 102 95*   CO2 mmol/L 13* 18* 25   ANION GAP mmol/L 10 11 10   BUN mg/dL 39* 41* 28*   CREATININE mg/dL 3.54* 3.52* 1.94*   EGFR ml/min/1.73sq m 10 10 21   CALCIUM mg/dL 7.5* 7.7* 10.2     Results from last 7 days   Lab Units 03/28/25  0036 03/27/25 2343 03/27/25  0703   AST U/L 3,224* 3,673* 20   ALT U/L 2,582* 2,776* 18   ALK PHOS U/L 90 95 85   TOTAL PROTEIN g/dL 5.3* 5.2* 7.6   ALBUMIN g/dL 3.2* 3.3* 4.7   TOTAL BILIRUBIN mg/dL 3.94* 3.81* 1.02*     Results from last 7 days   Lab Units 03/27/25  2245   POC GLUCOSE mg/dl 163*     Results from last 7 days   Lab Units 03/28/25  0036 03/27/25  2343 03/27/25  0703  "  GLUCOSE RANDOM mg/dL 139 154* 160*             No results found for: \"BETA-HYDROXYBUTYRATE\"       Results from last 7 days   Lab Units 03/27/25  2343   PH ELENA  7.289*   PCO2 ELENA mm Hg 29.0*   PO2 ELENA mm Hg 116.1*   HCO3 ELENA mmol/L 13.6*   BASE EXC ELENA mmol/L -11.5   O2 CONTENT ELENA ml/dL 17.6   O2 HGB, VENOUS % 92.4*                                 Results from last 7 days   Lab Units 03/28/25  0309 03/28/25  0036 03/27/25  2343 03/27/25  0726   LACTIC ACID mmol/L 5.1* 5.3* 6.6* 1.9             Results from last 7 days   Lab Units 03/27/25  2343   BNP pg/mL 2,368*                     Results from last 7 days   Lab Units 03/27/25  0703   LIPASE u/L 31                 Results from last 7 days   Lab Units 03/27/25  0953   CLARITY UA  Extra Turbid   COLOR UA  Light Orange   SPEC GRAV UA  1.012   PH UA  6.5   GLUCOSE UA mg/dl Negative   KETONES UA mg/dl Negative   BLOOD UA  Large*   PROTEIN UA mg/dl 100 (2+)*   NITRITE UA  Negative   BILIRUBIN UA  Negative   UROBILINOGEN UA (BE) mg/dl <2.0   LEUKOCYTES UA  Large*   WBC UA /hpf Innumerable*   RBC UA /hpf Innumerable*   BACTERIA UA /hpf None Seen   EPITHELIAL CELLS WET PREP /hpf None Seen                 Results from last 7 days   Lab Units 03/27/25  2343   ACETAMINOPHEN LVL ug/mL 27*                 Results from last 7 days   Lab Units 03/27/25  1012 03/27/25  1001 03/27/25  0700   GRAM STAIN RESULT   --  Gram negative rods* Gram negative rods*   URINE CULTURE  No Growth <1000 cfu/mL  --   --                    Past Medical History:   Diagnosis Date    A-fib (HCC)     Cardiac disease     Hyperlipidemia     Hypertension      Present on Admission:   Paroxysmal atrial fibrillation (HCC)   Hypothyroidism   Anxiety   (Resolved) Hypertension   Abdominal pain      Admitting Diagnosis: Abdominal pain [R10.9]  Constipation [K59.00]  Hydronephrosis, left [N13.30]  Hydronephrosis of left kidney [N13.30]  Age/Sex: 89 y.o. female    Network Utilization Review Department  ATTENTION: " Please call with any questions or concerns to [phone] and carefully listen to the prompts so that you are directed to the right person. All voicemails are confidential.   For Discharge needs, contact Care Management DC Support Team at [phone] opt. 2  Send all requests for admission clinical reviews, approved or denied determinations and any other requests to dedicated fax number below belonging to the campus where the patient is receiving treatment. List of dedicated fax numbers for the Facilities:  FACILITY NAME UR FAX NUMBER   ADMISSION DENIALS (Administrative/Medical Necessity) [phone]   DISCHARGE SUPPORT TEAM (NETWORK) [phone]   PARENT CHILD HEALTH (Maternity/NICU/Pediatrics) [phone]   [facility] [phone]   [facility] [phone]   Counts include 234 beds at the [facility] [phone]   [facility] [phone]   [facility] [phone]   [facility] [phone]   [facility] [phone]   [facility] [phone]   [facility] [phone]   [facility] [phone]   [facility] [phone]   [facility] [phone]

## 2025-03-28 NOTE — QUICK NOTE
Progress Note - Triage Asssessment   Li Koenig 89 y.o. female MRN: [id number]    Time Called ( Time): 05:17  Date Called: 03/28/25  Room#: S203  Person requesting evaluation: Dr. Barrientos    Situation:    89 yo F who presented with uro septic shock. Family was considering hospice but elected to give her one more shot and try surgery. Pt was able to tolerate IR nephrostomy tube placement in the left and only needed brief intervention with pressors.  Pt returned to the floor.  Pressures were holding until around 5:00 am. Pt lactic acid was 6 and not making adequate urine.  I evaluated the patient who was uncomfortable trying to take of the oxygen mask. I spoke with the daughter bedside. Daughter noted they wanted to give her a shot but decided against any more aggressive measures. Decided against dialysis, against central line, against pressors.  She decided she would like to make her comfortable with the goal of getting her home with hospice.  I informed her that we could try but she may not be stable.      Interventions:            Triage Assessment:     Patient to be admitted to step down level of care    Recommendations discussed with Dr. Barrientos and Ricky

## 2025-03-28 NOTE — QUICK NOTE
Patient briefly required Levophed after placement of left nephrostomy tube   Maps noted to be 58 when arrived to floors; oxygen need increased from 6 L to 8 L  Discussed with family; they are still open to pressors and a central line  Case discussed with CC; patient did not receive fluids/albumin in some time  Gave albumin 12.5 g 5%  Obtained labs including VBG, BNP, CMP, CBC, and lactate  VBG showed acidosis with low CO2 and low bicarb; switched patient's maintenance fluids from NS to bicarb  Obtained a chest x-ray which showed some concern for congestion on my read  However, patient's MAP after albumin was 63 below target of MAP above 65, gave bolus 500 Isolyte  MAP following bolus was 75 proceeded to every hour BP checks  Lactate came back elevated at 5.3 (prior to bolus) will trend until below 2  Next lactate 5.1  Additional 500 mL Isolyte given  CMP showing 2 K+ LFTs up from circa 20; sample slightly hemolyzed  Consider hepatic shock secondary to hypotension  However, need to follow-up  AM CMP  BNP showing 2, 368 up from 183 speech

## 2025-03-28 NOTE — SEDATION DOCUMENTATION
Procedure completed by Dr Forte. See anesthesia charting. Education provided to daughter prior to procedure, questions answered as offered. Catheter sutured, dry dressing to site. Transported to PACU, bedside report given.

## 2025-03-28 NOTE — ASSESSMENT & PLAN NOTE
Continue PTA amiodarone 100 mg daily, aspirin 81 mg  Comfort care measures as of this morning 3/28.

## 2025-03-28 NOTE — ASSESSMENT & PLAN NOTE
Home regimen: BuSpar 10 mg twice daily, Xanax 0.25 mg 4 times daily as needed(patient is religiously taking all the time as per PDMP), zolpidem 5 mg daily at bedtime(in chart it says diazepam but patient did not tolerated well would like to go back to zolpidem)  Comfort care measures as of this morning 3/28

## 2025-03-28 NOTE — ASSESSMENT & PLAN NOTE
CC: Abdominal pain gradually worsening  Imaging:  CTAP: Significant left hydronephrosis with abrupt cut off at the UPJ, not previously evident. No obstructing stone. 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 Mild left perinephric stranding.   Left nephrostomy tube placement placed by IR on 3/27, needing pressors afterwards due to persistent MAP < 65  UO minimal overnight, with family opting for comfort care this morning 3/28  Etiology: Unclear, high risk of having scar/stricture from previous procedures although it was a while ago    Plan:  Monitor urine output  Comfort care order set placed

## 2025-03-28 NOTE — PROGRESS NOTES
Progress Note - Hospitalist   Name: Li Koenig 89 y.o. female I MRN: [id number]  Unit/Bed#: S -01 I Date of Admission: 3/27/2025   Date of Service: 3/28/2025 I Hospital Day: 1    Assessment & Plan  Hydronephrosis, left  CC: Abdominal pain gradually worsening  Imaging:  CTAP: Significant left hydronephrosis with abrupt cut off at the UPJ, not previously evident. No obstructing stone. 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 Mild left perinephric stranding.   Left nephrostomy tube placement placed by IR on 3/27, needing pressors afterwards due to persistent MAP < 65  UO minimal overnight, with family opting for comfort care this morning 3/28  Etiology: Unclear, high risk of having scar/stricture from previous procedures although it was a while ago    Plan:  Monitor urine output  Comfort care order set placed  Abnormal urinalysis  Results from last 7 days   Lab Units 03/27/25  0953   LEUKOCYTES UA  Large*   NITRITE UA  Negative   GLUCOSE UA mg/dl Negative   KETONES UA mg/dl Negative   BLOOD UA  Large*   WBC UA /hpf Innumerable*   RBC UA /hpf Innumerable*   BACTERIA UA /hpf None Seen     See plan in left hydronephrosis  Hypertension (Resolved: 3/28/2025)  Blood Pressure: (!) 92/46  Patient's blood pressure was initially elevated, but 1 reading showed in 50s, went up to MAP greater than 65 without bolus, attending had a discussion with family, declined any ICU/pressors  Continue IVF LR at 125 mL/h, review/reorder tomorrow according to BP  Hold Lasix in the meantime  Abdominal pain  See plan in left hydronephrosis  Paroxysmal atrial fibrillation (HCC)  Continue PTA amiodarone 100 mg daily, aspirin 81 mg  Comfort care measures as of this morning 3/28.  Hypothyroidism  Continue PTA levothyroxine 50 mcg  Comfort care measures as of this morning 3/28  Anxiety  Home regimen: BuSpar 10 mg twice daily, Xanax 0.25 mg 4 times daily as needed(patient is religiously taking all the time as per PDMP), zolpidem 5 mg daily at  bedtime(in chart it says diazepam but patient did not tolerated well would like to go back to zolpidem)  Comfort care measures as of this morning 3/28  Sepsis (HCC)  See plan under hydronephrosis.    VTE Pharmacologic Prophylaxis: VTE Score: 4 VTE covered by:    None          Mobility:   Basic Mobility Inpatient Raw Score: 7  JH-HLM Goal: 2: Bed activities/Dependent transfer  JH-HLM Achieved: 2: Bed activities/Dependent transfer  JH-HLM Goal achieved. Continue to encourage appropriate mobility.    Patient Centered Rounds: I performed bedside rounds with nursing staff today.   Discussions with Specialists or Other Care Team Provider: Critical care    Education and Discussions with Family / Patient: Updated  (daughter) at bedside.    Current Length of Stay: 1 day(s)  Current Patient Status: Inpatient   Certification Statement: The patient will continue to require additional inpatient hospital stay due to unstable vitals, transition to comfort care  Discharge Plan: Anticipate discharge in 24-48 hrs to home.    Code Status: Level 4 - Comfort Care    Subjective   Patient seems to be uncomfortable with oxygen mask    Objective :  Temp:  [97.7 °F (36.5 °C)-98.2 °F (36.8 °C)] 98.2 °F (36.8 °C)  HR:  [60-93] 72  BP: ()/() 92/46  Resp:  [17-36] 26  SpO2:  [87 %-95 %] 91 %  O2 Device: Simple mask  Nasal Cannula O2 Flow Rate (L/min):  [4 L/min-8 L/min] 8 L/min    There is no height or weight on file to calculate BMI.     Input and Output Summary (last 24 hours):     Intake/Output Summary (Last 24 hours) at 3/28/2025 1116  Last data filed at 3/28/2025 0237  Gross per 24 hour   Intake 1100 ml   Output 170 ml   Net 930 ml       Physical Exam  Vitals and nursing note reviewed.   Constitutional:       General: She is not in acute distress.     Appearance: She is well-developed.   HENT:      Head: Normocephalic and atraumatic.   Eyes:      Conjunctiva/sclera: Conjunctivae normal.   Cardiovascular:      Rate  and Rhythm: Normal rate and regular rhythm.      Heart sounds: No murmur heard.  Pulmonary:      Effort: Pulmonary effort is normal. No respiratory distress.      Breath sounds: Normal breath sounds.   Abdominal:      Palpations: Abdomen is soft.      Tenderness: There is no abdominal tenderness. There is left CVA tenderness.   Musculoskeletal:         General: No swelling.      Cervical back: Neck supple.      Right lower leg: Edema present.      Left lower leg: Edema present.   Skin:     General: Skin is warm and dry.      Capillary Refill: Capillary refill takes less than 2 seconds.   Neurological:      Mental Status: She is alert.      Comments: Creatinine oxygen mask, seems to be uncomfortable   Psychiatric:         Mood and Affect: Mood normal.         Lines/Drains:  Lines/Drains/Airways       Active Status       Name Placement date Placement time Site Days    Nephrostomy Left 10.2 Fr. 03/27/25  2044  Left  less than 1    Urethral Catheter Latex 16 Fr. 03/27/25  1222  Latex  less than 1                  Urinary Catheter:  Goal for removal:  Comfort care measures                 Lab Results: I have reviewed the following results:   Results from last 7 days   Lab Units 03/27/25  2343   WBC Thousand/uL 19.22*   HEMOGLOBIN g/dL 12.8   HEMATOCRIT % 38.8   PLATELETS Thousands/uL 78*   SEGS PCT % 94*   LYMPHO PCT % 1*   MONO PCT % 2*   EOS PCT % 0     Results from last 7 days   Lab Units 03/28/25  0036   SODIUM mmol/L 128*   POTASSIUM mmol/L 4.2   CHLORIDE mmol/L 105   CO2 mmol/L 13*   BUN mg/dL 39*   CREATININE mg/dL 3.54*   ANION GAP mmol/L 10   CALCIUM mg/dL 7.5*   ALBUMIN g/dL 3.2*   TOTAL BILIRUBIN mg/dL 3.94*   ALK PHOS U/L 90   ALT U/L 2,582*   AST U/L 3,224*   GLUCOSE RANDOM mg/dL 139         Results from last 7 days   Lab Units 03/27/25  2245   POC GLUCOSE mg/dl 163*         Results from last 7 days   Lab Units 03/28/25  0309 03/28/25  0036 03/27/25  2343 03/27/25  0726   LACTIC ACID mmol/L 5.1* 5.3* 6.6*  1.9       Recent Cultures (last 7 days):   Results from last 7 days   Lab Units 03/27/25  1012 03/27/25  1001 03/27/25  0700   GRAM STAIN RESULT   --  Gram negative rods* Gram negative rods*   URINE CULTURE  No Growth <1000 cfu/mL  --   --        Imaging Results Review: No pertinent imaging studies reviewed.  Other Study Results Review: No additional pertinent studies reviewed.    Last 24 Hours Medication List:     Current Facility-Administered Medications:     bisacodyl (DULCOLAX) rectal suppository 10 mg, Daily PRN    glycopyrrolate (ROBINUL) injection 0.1 mg, Q4H PRN    haloperidol lactate (HALDOL) injection 0.5 mg, Q2H PRN    HYDROmorphone (DILAUDID) injection 0.3 mg, Q2H PRN    LORazepam (ATIVAN) injection 1 mg, Q10 Min PRN    Administrative Statements   Today, Patient Was Seen By: Kuldeep Espinoza MD    **Please Note: This note may have been constructed using a voice recognition system.**

## 2025-03-28 NOTE — DEATH NOTE
INPATIENT DEATH NOTE  Li Koenig 89 y.o. female MRN: [id number]  Unit/Bed#: S -01 Encounter: [id number]    Date, Time and Cause of Death    Date of Death: 3/28/25  Time of Death: 10:55 AM  Preliminary Cause of Death: Septic shock (HCC)  Entered by: Kuldeep Espinoza MD[[email or web address]]       Attribution       [email or web address] Kuldeep Espinoza MD 03/28/25 11:56                 PHYSICAL EXAM:  Unresponsive to noxious stimuli, Spontaneous respirations absent, Breath sounds absent, Carotid pulse absent, Heart sounds absent, Pupillary light reflex absent, and Corneal blink reflex absent    Medical Examiner notification criteria:  NONE APPLICABLE   Medical Examiner's office notified?:  No, does not meet ME notification criteria   Medical Examiner accepted case?:  No  Name of Medical Examiner: N/A         Autopsy Options:  Post-mortem examination declined by next of kin    Primary Service Attending Physician notified?:  yes - Attending:  Obed Romeo MD    Physician/Resident responsible for completing Discharge Summary:  Kuldeep Espinoza MD

## 2025-03-28 NOTE — ANESTHESIA POSTPROCEDURE EVALUATION
Post-Op Assessment Note    CV Status:  Stable  Pain Score: 0    Pain management: adequate       Mental Status:  Sleepy and arousable   Hydration Status:  Euvolemic   PONV Controlled:  Controlled   Airway Patency:  Patent     Post Op Vitals Reviewed: Yes    No anethesia notable event occurred.    Staff: CRNA, Anesthesiologist           Last Filed PACU Vitals:  Vitals Value Taken Time   Temp 97.7 °F (36.5 °C) 03/27/25 2113   Pulse 77 03/27/25 2113   /52 03/27/25 2113   Resp 17 03/27/25 2113   SpO2 94 % 03/27/25 2113       Modified Hayley:     Vitals Value Taken Time   Activity 2 03/27/25 2138   Respiration 2 03/27/25 2138   Circulation 2 03/27/25 2138   Consciousness 1 03/27/25 2138   Oxygen Saturation 1 03/27/25 2138     Modified Hayley Score: 8

## 2025-03-28 NOTE — ASSESSMENT & PLAN NOTE
Blood Pressure: (!) 92/46  Patient's blood pressure was initially elevated, but 1 reading showed in 50s, went up to MAP greater than 65 without bolus, attending had a discussion with family, declined any ICU/pressors  Continue IVF LR at 125 mL/h, review/reorder tomorrow according to BP  Hold Lasix in the meantime

## 2025-03-28 NOTE — DISCHARGE SUMMARY
Discharge Summary - Hospitalist   Name: Li Koenig 89 y.o. female I MRN: [id number]  Unit/Bed#: S -01 I Date of Admission: 3/27/2025   Date of Service: 3/28/2025 I Hospital Day: 1     Assessment & Plan  Hydronephrosis, left  CC: Abdominal pain gradually worsening  Imaging:  CTAP: Significant left hydronephrosis with abrupt cut off at the UPJ, not previously evident. No obstructing stone. 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 Mild left perinephric stranding.   Left nephrostomy tube placement placed by IR on 3/27, needing pressors afterwards due to persistent MAP < 65  UO minimal overnight, with family opting for comfort care this morning 3/28  Etiology: Unclear, high risk of having scar/stricture from previous procedures although it was a while ago    Plan:  Monitor urine output  Comfort care order set placed  Abnormal urinalysis  Results from last 7 days   Lab Units 03/27/25  0953   LEUKOCYTES UA  Large*   NITRITE UA  Negative   GLUCOSE UA mg/dl Negative   KETONES UA mg/dl Negative   BLOOD UA  Large*   WBC UA /hpf Innumerable*   RBC UA /hpf Innumerable*   BACTERIA UA /hpf None Seen     See plan in left hydronephrosis  Abdominal pain  See plan in left hydronephrosis  Paroxysmal atrial fibrillation (HCC)  Continue PTA amiodarone 100 mg daily, aspirin 81 mg  Comfort care measures as of this morning 3/28.  Hypothyroidism  Continue PTA levothyroxine 50 mcg  Comfort care measures as of this morning 3/28  Anxiety  Home regimen: BuSpar 10 mg twice daily, Xanax 0.25 mg 4 times daily as needed(patient is religiously taking all the time as per PDMP), zolpidem 5 mg daily at bedtime(in chart it says diazepam but patient did not tolerated well would like to go back to zolpidem)  Comfort care measures as of this morning 3/28  Sepsis (HCC)  See plan under hydronephrosis.     Medical Problems       Resolved Problems  Date Reviewed: 3/27/2025          Resolved    Hypertension 3/28/2025     Resolved by  Kuldeep Espinoza MD         Discharging Physician / Practitioner: Kuldeep Espinoza MD  PCP: Yadi Shah DO  Admission Date:   Admission Orders (From admission, onward)       Ordered        25 1022  INPATIENT ADMISSION  Once                          Discharge Date: 25    Consultations During Hospital Stay:  Critical Care, IR    Procedures Performed:   Left Nephrostomy Tube Placement    Significant Findings / Test Results:   None    Incidental Findings:   None     Test Results Pending at Discharge (will require follow up):   None     Outpatient Tests Requested:  None    Complications:  None    Reason for Admission: Abdominal pain    Hospital Course:   Li Koenig is a 89 y.o. female patient who originally presented to the hospital on 3/27/2025 due to increasing abdominal pain, lethargy.  During her hospital stay, septic shock secondary to UTI diagnosed with left-sided hydronephrosis on imaging due to possible stricture/obstruction at UPJ.  Patient's family initially did not want aggressive interventions such as critical care level monitoring or pressors, and initially denied nephrostomy tube placement as offered by urology team.  At that time, patient patient's family want to continue with medical treatment with IV antibiotics and fluids.  Later during her stay, patient's family requested going forwards with the left nephrostomy tube placement, with minimal UO overnight.  No improvement with minimal UA of red-tinged urine, without improvement in mental status and increasing oxygen requirements.  Patient's family requested comfort care measures morning of 3/28, with patient passing shortly thereafter.    The patient, initially admitted to the hospital as inpatient, was discharged earlier than expected given the following: .  Please see above list of diagnoses and related plan for additional information.     Condition at Discharge:  passed    Discharge Day Visit / Exam:   Subjective:  N/A  Vitals: Blood Pressure:  (!) 92/46 (03/28/25 0617)  Pulse: 72 (03/28/25 0617)  Temperature: 98.2 °F (36.8 °C) (03/28/25 0136)  Temp Source: Oral (03/27/25 0659)  Respirations: (!) 26 (03/27/25 2259)  SpO2: 91 % (03/28/25 0617)  Physical Exam - please see death note    Discussion with Family: Updated  (daughter) at bedside.    Discharge instructions/Information to patient and family:   See after visit summary for information provided to patient and family.      Provisions for Follow-Up Care:  See after visit summary for information related to follow-up care and any pertinent home health orders.      Mobility at time of Discharge:   Basic Mobility Inpatient Raw Score: 7  JH-HLM Goal: 2: Bed activities/Dependent transfer  JH-HLM Achieved: 2: Bed activities/Dependent transfer     Disposition:   Other: please see death note    Planned Readmission: No    Discharge Medications:  See after visit summary for reconciled discharge medications provided to patient and/or family.      Administrative Statements   Discharge Statement:  I have spent a total time of 30 minutes in caring for this patient on the day of the visit/encounter. >30 minutes of time was spent on: Diagnostic results, Patient and family education, Counseling / Coordination of care, Documenting in the medical record, and Communicating with other healthcare professionals .    **Please Note: This note may have been constructed using a voice recognition system**

## 2025-03-28 NOTE — BRIEF OP NOTE (RAD/CATH)
INTERVENTIONAL RADIOLOGY PROCEDURE NOTE    Date: 3/27/2025    Procedure: LEFT NEPHROSTOMY TUBE PLACEMENT  Procedure Summary       Date: 03/27/25 Room / Location: [facility] Interventional Radiology    Anesthesia Start: 1930 Anesthesia Stop:     Procedure: IR NEPHROSTOMY TUBE PLACEMENT Diagnosis: (Hydronephrosis and sepsis)    Scheduled Providers:  Responsible Provider: Carlos Mendoza MD    Anesthesia Type: general ASA Status: 4 - Emergent            Preoperative diagnosis:   1. Hydronephrosis of left kidney    2. Hydronephrosis, left         Postoperative diagnosis: Same.    Surgeon: Kuldeep Forte MD     Assistant: None. No qualified resident was available.    Blood loss: minimal    Specimens: urine     Findings: Successful left 10 Italian nephrostomy tube placement.  This was a very challenging procedure due to the patient's obesity and depth of the kidney.  The ureter was patent.  Some bloody urine is to be expected.    Plan:  Tube to bag drainage, flush q8 with 10cc NSS and qd at home and return in 2 months for a routine tube change.    Complications: None immediate.    Anesthesia: general

## 2025-03-29 LAB
BACTERIA BLD CULT: ABNORMAL
BACTERIA BLD CULT: ABNORMAL
E COLI DNA BLD POS QL NAA+NON-PROBE: DETECTED
GRAM STN SPEC: ABNORMAL
GRAM STN SPEC: ABNORMAL

## 2025-03-31 ENCOUNTER — TELEPHONE (OUTPATIENT)
Dept: FAMILY MEDICINE CLINIC | Facility: CLINIC | Age: OVER 89
End: 2025-03-31

## 2025-03-31 NOTE — UTILIZATION REVIEW
NOTIFICATION OF ADMISSION DISCHARGE   This is a Notification of Discharge from [facility]. Please be advised that this patient has been discharge from our facility. Below you will find the admission and discharge date and time including the patient’s disposition.   UTILIZATION REVIEW CONTACT:  Utilization Review Assistants  Network Utilization Review Department  Phone: [phone] x carefully listen to the prompts. All voicemails are confidential.  Email: [email or web address]     ADMISSION INFORMATION  PRESENTATION DATE: 3/27/2025  6:53 AM  OBERVATION ADMISSION DATE: N/A  INPATIENT ADMISSION DATE: 3/27/25 10:22 AM   DISCHARGE DATE: 3/28/2025  3:50 PM   DISPOSITION:    Network Utilization Review Department  ATTENTION: Please call with any questions or concerns to [phone] and carefully listen to the prompts so that you are directed to the right person. All voicemails are confidential.   For Discharge needs, contact Care Management DC Support Team at [phone] opt. 2  Send all requests for admission clinical reviews, approved or denied determinations and any other requests to dedicated fax number below belonging to the campus where the patient is receiving treatment. List of dedicated fax numbers for the Facilities:  FACILITY NAME UR FAX NUMBER   ADMISSION DENIALS (Administrative/Medical Necessity) [phone]   DISCHARGE SUPPORT TEAM ([facility]) [phone]   PARENT CHILD HEALTH (Maternity/NICU/Pediatrics) [phone]   [facility] [phone]   [facility] [phone]   [facility] [phone]   [facility] [phone]   [facility] [phone]   [facility] [phone]   [facility] [phone]   [facility] [phone]   [facility]  [facility] [phone]   [facility] [phone]   [facility] [phone]   [facility] [phone]